# Patient Record
Sex: FEMALE | Race: WHITE | NOT HISPANIC OR LATINO | Employment: PART TIME | URBAN - METROPOLITAN AREA
[De-identification: names, ages, dates, MRNs, and addresses within clinical notes are randomized per-mention and may not be internally consistent; named-entity substitution may affect disease eponyms.]

---

## 2017-04-05 ENCOUNTER — OFFICE VISIT (OUTPATIENT)
Dept: INTERNAL MEDICINE | Facility: CLINIC | Age: 62
End: 2017-04-05

## 2017-04-05 VITALS
OXYGEN SATURATION: 98 % | HEART RATE: 66 BPM | DIASTOLIC BLOOD PRESSURE: 80 MMHG | BODY MASS INDEX: 21.57 KG/M2 | TEMPERATURE: 98.5 F | SYSTOLIC BLOOD PRESSURE: 120 MMHG | WEIGHT: 107 LBS | HEIGHT: 59 IN | RESPIRATION RATE: 16 BRPM

## 2017-04-05 DIAGNOSIS — Z80.3 FAMILY HISTORY OF BREAST CANCER: ICD-10-CM

## 2017-04-05 DIAGNOSIS — J45.909 REACTIVE AIRWAY DISEASE WITHOUT COMPLICATION: ICD-10-CM

## 2017-04-05 DIAGNOSIS — E78.01 FAMILIAL HYPERCHOLESTEROLEMIA: ICD-10-CM

## 2017-04-05 DIAGNOSIS — M89.9 DISORDER OF BONE: ICD-10-CM

## 2017-04-05 DIAGNOSIS — E55.9 HYPOVITAMINOSIS D: ICD-10-CM

## 2017-04-05 DIAGNOSIS — I10 ESSENTIAL HYPERTENSION: Primary | ICD-10-CM

## 2017-04-05 DIAGNOSIS — K63.5 COLON POLYPS: ICD-10-CM

## 2017-04-05 DIAGNOSIS — L29.9 PRURITUS: ICD-10-CM

## 2017-04-05 DIAGNOSIS — Z12.31 ENCOUNTER FOR SCREENING MAMMOGRAM FOR MALIGNANT NEOPLASM OF BREAST: ICD-10-CM

## 2017-04-05 DIAGNOSIS — Z78.0 POSTMENOPAUSAL: ICD-10-CM

## 2017-04-05 DIAGNOSIS — E78.5 HYPERLIPIDEMIA, UNSPECIFIED HYPERLIPIDEMIA TYPE: ICD-10-CM

## 2017-04-05 PROCEDURE — 93000 ELECTROCARDIOGRAM COMPLETE: CPT | Performed by: INTERNAL MEDICINE

## 2017-04-05 PROCEDURE — 99214 OFFICE O/P EST MOD 30 MIN: CPT | Performed by: INTERNAL MEDICINE

## 2017-04-05 RX ORDER — TRIAMCINOLONE ACETONIDE 1 MG/G
CREAM TOPICAL
Qty: 30 G | Refills: 2 | Status: SHIPPED | OUTPATIENT
Start: 2017-04-05 | End: 2023-01-04

## 2017-04-05 RX ORDER — BENAZEPRIL HYDROCHLORIDE 20 MG/1
20 TABLET ORAL DAILY
Qty: 90 TABLET | Refills: 1 | Status: SHIPPED | OUTPATIENT
Start: 2017-04-05 | End: 2017-10-29 | Stop reason: SDUPTHER

## 2017-04-05 RX ORDER — ATORVASTATIN CALCIUM 40 MG/1
40 TABLET, FILM COATED ORAL DAILY
Qty: 90 TABLET | Refills: 1 | Status: SHIPPED | OUTPATIENT
Start: 2017-04-05 | End: 2017-11-25 | Stop reason: SDUPTHER

## 2017-04-05 NOTE — PROGRESS NOTES
Subjective   Nanci Umanzor is a 62 y.o. female.   Visit date not found  Wt Readings from Last 1 Encounters:   04/05/17 107 lb (48.5 kg)    she presents for a preventive medical examination, new patient to me but has been seen previously by Dr. Rubia Portillo    History of Present Illness   She has history of hypertension for at least 15 years.  Initial blood pressure was 190/100.  She is currently treated with benazepril 20 mg daily.  She has taken amlodipine 5 mg daily in addition to this in the past.    She has history of long-standing hypercholesterolemia is been on treatment for 5 or 6 years, currently with atorvastatin 40 mg daily.  There is strong family history of heart disease.    She has history of hypovitaminosis D and takes vitamin D supplement in addition to Citracal.  She was scheduled for bone density study last summer but did not have that done.  She is also negative mammograms.    There is family history of breast cancer in her sister.    She has a history of COPD.  She was previously treated with Advair discus.  She has not smoked for several years now.    She generally tries to exercise but every 2 days.    She has had a pruritic area on her back, below the right scapula, for about a year to year and a half, not previously evaluated    She has history of some chronic neck and back problems.    She has history of colon polyps and last had colonoscopy 2011.    Social history the patient is .  She works for [employer] as a business .  She has 3 grown children.  She previously smoked one pack of cigarettes daily from age [age], but not enough for the last several years.  She consumes occasional glass of wine.    Family history patient's father is 89 and has diabetes mellitus type II, atrial fibrillation, and is had valve surgery and thyroid problems.  Her mother is 87 and has heart disease and seizures and arthritis.  Siblings consistent 3 brothers and 3 sisters.  One  sisters had breast cancer.  The following portions of the patient's history were reviewed and updated as appropriate: allergies, current medications, past family history, past medical history, past social history, past surgical history and problem list.    Review of Systems   Constitutional: Negative.    HENT: Negative.    Eyes: Negative.    Respiratory: Negative.    Cardiovascular: Negative.    Endocrine: Negative.    Genitourinary: Negative.    Musculoskeletal: Positive for back pain and neck pain.   Skin: Negative.    Neurological: Negative.    Hematological: Negative.    Psychiatric/Behavioral: Negative.        Objective   Physical Exam   Constitutional: She appears well-developed and well-nourished.   HENT:   Head: Normocephalic and atraumatic.   Right Ear: External ear normal.   Left Ear: External ear normal.   Eyes: EOM are normal. Pupils are equal, round, and reactive to light. Right eye exhibits no discharge. Left eye exhibits no discharge. No scleral icterus.   Neck: Normal range of motion. Neck supple. No JVD present. No tracheal deviation present. No thyromegaly present.   Cardiovascular: Normal rate and regular rhythm.  Exam reveals no gallop and no friction rub.    No murmur heard.  Pulmonary/Chest: Effort normal and breath sounds normal. No respiratory distress. She has no wheezes. She has no rales. Right breast exhibits no inverted nipple, no mass, no nipple discharge, no skin change and no tenderness. Left breast exhibits no inverted nipple, no mass, no nipple discharge, no skin change and no tenderness.   Abdominal: Soft. Bowel sounds are normal. She exhibits no distension and no mass. There is no tenderness.   Musculoskeletal: Normal range of motion.   Lymphadenopathy:     She has no cervical adenopathy.   Neurological: She is alert.   Skin: Skin is warm.        Psychiatric: Her behavior is normal.   Vitals reviewed.    ECG 12 Lead  Date/Time: 4/5/2017 1:49 PM  Performed by: LUANNE MARIN  JAVIER  Authorized by: LUANNE MARIN   Comparison: not compared with previous ECG   Rhythm: sinus rhythm  Rate: bradycardic  BPM: 56  Conduction: conduction normal  ST Segments: ST segments normal  T Waves: T waves normal  QRS axis: normal  Other: no other findings  Clinical impression: normal ECG  Comments: The resting EKG shows normal sinus rhythm at 56/m, slight sinus bradycardia, otherwise normal, no previous tracing for comparison              Assessment/Plan   Nanci was seen today for annual exam.    Diagnoses and all orders for this visit:    Essential hypertension  Comments:  Continue benazepril 20 mg once daily  Orders:  -     Comprehensive Metabolic Panel  -     XR Chest PA & Lateral; Future  -     Urinalysis With Microscopic  -     ECG 12 Lead    Hyperlipidemia, unspecified hyperlipidemia type  Comments:  Continue atorvastatin 40 mg daily, will check chemistries and lipids  Orders:  -     Comprehensive Metabolic Panel  -     Lipid Panel  -     TSH  -     ECG 12 Lead    Reactive airway disease without complication  Comments:  We will refill Advair, check pulmonary function studies  Orders:  -     CBC & Differential  -     XR Chest PA & Lateral; Future  -     Pulmonary Function Test; Future    Colon polyps  Comments:  I will refer for colonoscopy  Orders:  -     Ambulatory Referral to General Surgery    Family history of breast cancer  Comments:  Advise yearly mammograms and monthly breast self-examinations, will order screening mammograms    Hypovitaminosis D  Comments:  Continue vitamin D supplement, check vitamin D level  Orders:  -     Vitamin D 25 Hydroxy  -     DEXA Bone Density Axial; Future    Postmenopausal  Comments:  Refer to [facility] for gynecology  Orders:  -     Ambulatory Referral to Gynecology  -     Mammo Screening Bilateral With CAD; Future    Disorder of bone   Comments:  We will check bone mineral density  Orders:  -     DEXA Bone Density Axial; Future    Encounter for screening  mammogram for malignant neoplasm of breast   Comments:  Screening mammograms will be ordered  Orders:  -     Mammo Screening Bilateral With CAD; Future        Schedule office follow-up about 4 months, return sooner if needed                       EMR Dragon/Transcription disclaimer:      Much of this encounter note is an electronic transcription/translation of spoken language to printed text. The electronic translation of spoken language may permit erroneous, or at times, nonsensical words or phrases to be inadvertently transcribed; Although I have reviewed the note for such errors, some may still exist.

## 2017-04-07 ENCOUNTER — TELEPHONE (OUTPATIENT)
Dept: INTERNAL MEDICINE | Facility: CLINIC | Age: 62
End: 2017-04-07

## 2017-04-07 DIAGNOSIS — Z86.010 HISTORY OF COLONIC POLYPS: Primary | ICD-10-CM

## 2017-04-07 RX ORDER — TRETINOIN 0.5 MG/G
CREAM TOPICAL NIGHTLY
Qty: 20 G | Refills: 1 | Status: SHIPPED | OUTPATIENT
Start: 2017-04-07 | End: 2018-03-15 | Stop reason: SDUPTHER

## 2017-04-07 RX ORDER — PEG-3350, SODIUM SULFATE, SODIUM CHLORIDE, POTASSIUM CHLORIDE, SODIUM ASCORBATE AND ASCORBIC ACID 7.5-2.691G
KIT ORAL
Qty: 1 EACH | Refills: 0 | Status: SHIPPED | OUTPATIENT
Start: 2017-04-07 | End: 2017-10-31

## 2017-04-07 NOTE — TELEPHONE ENCOUNTER
----- Message from Nanci Umanzor sent at 4/6/2017  4:55 PM EDT -----  Regarding: Prescription Question  Contact: [phone]  I noticed Dr. Kasper took me off tretinoin.  I forgot to ask him to refill that prescription.  Please add that medication back as I need it for a skin condition.  I would also appreciate if you could send a refill to [place] on [address].  Thank you. :SERGE

## 2017-04-21 ENCOUNTER — HOSPITAL ENCOUNTER (OUTPATIENT)
Dept: BONE DENSITY | Facility: HOSPITAL | Age: 62
Discharge: HOME | End: 2017-04-21
Attending: INTERNAL MEDICINE | Admitting: INTERNAL MEDICINE

## 2017-04-21 ENCOUNTER — HOSPITAL ENCOUNTER (OUTPATIENT)
Dept: MAMMOGRAPHY | Facility: HOSPITAL | Age: 62
Discharge: HOME | End: 2017-04-21
Attending: INTERNAL MEDICINE

## 2017-04-21 ENCOUNTER — HOSPITAL ENCOUNTER (OUTPATIENT)
Dept: RESPIRATORY THERAPY | Facility: HOSPITAL | Age: 62
Discharge: HOME | End: 2017-04-21
Attending: INTERNAL MEDICINE

## 2017-04-21 DIAGNOSIS — Z78.0 POSTMENOPAUSAL: ICD-10-CM

## 2017-04-21 DIAGNOSIS — E55.9 HYPOVITAMINOSIS D: ICD-10-CM

## 2017-04-21 DIAGNOSIS — J45.909 REACTIVE AIRWAY DISEASE WITHOUT COMPLICATION: ICD-10-CM

## 2017-04-21 DIAGNOSIS — Z12.31 ENCOUNTER FOR SCREENING MAMMOGRAM FOR MALIGNANT NEOPLASM OF BREAST: ICD-10-CM

## 2017-04-21 DIAGNOSIS — M89.9 DISORDER OF BONE: ICD-10-CM

## 2017-04-21 PROCEDURE — 77080 DXA BONE DENSITY AXIAL: CPT

## 2017-04-21 PROCEDURE — 94060 EVALUATION OF WHEEZING: CPT

## 2017-04-21 PROCEDURE — 94729 DIFFUSING CAPACITY: CPT

## 2017-04-21 PROCEDURE — 94726 PLETHYSMOGRAPHY LUNG VOLUMES: CPT

## 2017-04-21 PROCEDURE — G0202 SCR MAMMO BI INCL CAD: HCPCS

## 2017-04-21 RX ORDER — ALBUTEROL SULFATE 2.5 MG/3ML
2.5 SOLUTION RESPIRATORY (INHALATION) ONCE
Status: COMPLETED | OUTPATIENT
Start: 2017-04-21 | End: 2017-04-21

## 2017-04-21 RX ADMIN — ALBUTEROL SULFATE 2.5 MG: 2.5 SOLUTION RESPIRATORY (INHALATION) at 14:28

## 2017-05-24 ENCOUNTER — OFFICE VISIT (OUTPATIENT)
Dept: INTERNAL MEDICINE | Facility: CLINIC | Age: 62
End: 2017-05-24

## 2017-05-24 VITALS
HEIGHT: 59 IN | OXYGEN SATURATION: 98 % | RESPIRATION RATE: 16 BRPM | WEIGHT: 110 LBS | BODY MASS INDEX: 22.18 KG/M2 | SYSTOLIC BLOOD PRESSURE: 120 MMHG | TEMPERATURE: 98.2 F | DIASTOLIC BLOOD PRESSURE: 80 MMHG | HEART RATE: 60 BPM

## 2017-05-24 DIAGNOSIS — J20.9 ACUTE BRONCHITIS WITH BRONCHOSPASM: Primary | ICD-10-CM

## 2017-05-24 PROCEDURE — 99213 OFFICE O/P EST LOW 20 MIN: CPT | Performed by: INTERNAL MEDICINE

## 2017-05-24 RX ORDER — ALBUTEROL SULFATE 90 UG/1
2 AEROSOL, METERED RESPIRATORY (INHALATION)
Status: SHIPPED | OUTPATIENT
Start: 2017-05-24

## 2017-05-24 RX ORDER — PREDNISONE 10 MG/1
TABLET ORAL
Qty: 1 EACH | Refills: 0 | Status: ON HOLD | OUTPATIENT
Start: 2017-05-24 | End: 2017-06-30

## 2017-05-24 RX ORDER — AMOXICILLIN 500 MG/1
CAPSULE ORAL
Qty: 21 CAPSULE | Refills: 0 | Status: ON HOLD | OUTPATIENT
Start: 2017-05-24 | End: 2017-06-30

## 2017-06-22 ENCOUNTER — TELEPHONE (OUTPATIENT)
Dept: INTERNAL MEDICINE | Facility: CLINIC | Age: 62
End: 2017-06-22

## 2017-06-22 DIAGNOSIS — L98.9 SKIN LESION OF BACK: Primary | ICD-10-CM

## 2017-06-22 NOTE — TELEPHONE ENCOUNTER
Pt called and said the spot on her back that you had given her an anti-itch cream for has gotten worse and has now turned into an ache in the general area of the spot. Pt would like to know if she should be referred to a specialist or what she should do? Please advise.

## 2017-06-30 ENCOUNTER — ANESTHESIA EVENT (OUTPATIENT)
Dept: GASTROENTEROLOGY | Facility: HOSPITAL | Age: 62
End: 2017-06-30

## 2017-06-30 ENCOUNTER — HOSPITAL ENCOUNTER (OUTPATIENT)
Facility: HOSPITAL | Age: 62
Setting detail: HOSPITAL OUTPATIENT SURGERY
Discharge: HOME | End: 2017-06-30
Attending: SURGERY | Admitting: SURGERY

## 2017-06-30 ENCOUNTER — ANESTHESIA (OUTPATIENT)
Dept: GASTROENTEROLOGY | Facility: HOSPITAL | Age: 62
End: 2017-06-30

## 2017-06-30 VITALS
OXYGEN SATURATION: 99 % | HEIGHT: 59 IN | TEMPERATURE: 97.5 F | SYSTOLIC BLOOD PRESSURE: 129 MMHG | BODY MASS INDEX: 21.42 KG/M2 | HEART RATE: 72 BPM | RESPIRATION RATE: 10 BRPM | DIASTOLIC BLOOD PRESSURE: 73 MMHG | WEIGHT: 106.25 LBS

## 2017-06-30 DIAGNOSIS — Z86.010 HISTORY OF COLONIC POLYPS: ICD-10-CM

## 2017-06-30 PROCEDURE — 88305 TISSUE EXAM BY PATHOLOGIST: CPT | Performed by: SURGERY

## 2017-06-30 PROCEDURE — S0260 H&P FOR SURGERY: HCPCS | Performed by: SURGERY

## 2017-06-30 PROCEDURE — 25010000002 PROPOFOL 10 MG/ML EMULSION: Performed by: ANESTHESIOLOGY

## 2017-06-30 PROCEDURE — 45380 COLONOSCOPY AND BIOPSY: CPT | Performed by: SURGERY

## 2017-06-30 RX ORDER — PROPOFOL 10 MG/ML
VIAL (ML) INTRAVENOUS CONTINUOUS PRN
Status: DISCONTINUED | OUTPATIENT
Start: 2017-06-30 | End: 2017-06-30 | Stop reason: SURG

## 2017-06-30 RX ORDER — LIDOCAINE HYDROCHLORIDE 20 MG/ML
INJECTION, SOLUTION INFILTRATION; PERINEURAL AS NEEDED
Status: DISCONTINUED | OUTPATIENT
Start: 2017-06-30 | End: 2017-06-30 | Stop reason: SURG

## 2017-06-30 RX ORDER — PROPOFOL 10 MG/ML
VIAL (ML) INTRAVENOUS AS NEEDED
Status: DISCONTINUED | OUTPATIENT
Start: 2017-06-30 | End: 2017-06-30 | Stop reason: SURG

## 2017-06-30 RX ORDER — SODIUM CHLORIDE, SODIUM LACTATE, POTASSIUM CHLORIDE, CALCIUM CHLORIDE 600; 310; 30; 20 MG/100ML; MG/100ML; MG/100ML; MG/100ML
1000 INJECTION, SOLUTION INTRAVENOUS CONTINUOUS PRN
Status: DISCONTINUED | OUTPATIENT
Start: 2017-06-30 | End: 2017-06-30 | Stop reason: HOSPADM

## 2017-06-30 RX ADMIN — SODIUM CHLORIDE, POTASSIUM CHLORIDE, SODIUM LACTATE AND CALCIUM CHLORIDE 1000 ML: 600; 310; 30; 20 INJECTION, SOLUTION INTRAVENOUS at 10:16

## 2017-06-30 RX ADMIN — LIDOCAINE HYDROCHLORIDE 60 MG: 20 INJECTION, SOLUTION INFILTRATION; PERINEURAL at 10:59

## 2017-06-30 RX ADMIN — SODIUM CHLORIDE, POTASSIUM CHLORIDE, SODIUM LACTATE AND CALCIUM CHLORIDE: 600; 310; 30; 20 INJECTION, SOLUTION INTRAVENOUS at 10:59

## 2017-06-30 RX ADMIN — PROPOFOL 100 MCG/KG/MIN: 10 INJECTION, EMULSION INTRAVENOUS at 10:59

## 2017-06-30 RX ADMIN — PROPOFOL 100 MG: 10 INJECTION, EMULSION INTRAVENOUS at 10:59

## 2017-06-30 NOTE — ANESTHESIA PREPROCEDURE EVALUATION
Anesthesia Evaluation     Patient summary reviewed   history of anesthetic complications: prolonged sedation  NPO Solid Status: > 6 hours       Airway   Mallampati: II  TM distance: >3 FB  Neck ROM: full  Dental - normal exam     Pulmonary - normal exam   (+) asthma,   Cardiovascular - normal exam    (+) hypertension,       Neuro/Psych  GI/Hepatic/Renal/Endo      Musculoskeletal     Abdominal    Substance History      OB/GYN          Other                                        Anesthesia Plan    ASA 2     MAC     Anesthetic plan and risks discussed with patient.

## 2017-06-30 NOTE — ANESTHESIA POSTPROCEDURE EVALUATION
Patient: Nanci Umanzor    Procedure Summary     Date Anesthesia Start Anesthesia Stop Room / Location    06/30/17 1054   [facility]       Procedure Diagnosis Surgeon Provider    COLONOSCOPY TO CECUM with cold polypectomy (N/A ) History of colonic polyps  (History of colonic polyps [Z86.010]) MD David Griffin Jr., MD          Anesthesia Type: MAC  Last vitals  BP      Temp      Pulse 68 (06/30/17 1123)   Resp 12 (06/30/17 1123)    SpO2 96 % (06/30/17 1123)      Post Anesthesia Care and Evaluation    Patient location during evaluation: PHASE II  Anesthetic complications: No anesthetic complications

## 2017-07-03 LAB
CYTO UR: NORMAL
LAB AP CASE REPORT: NORMAL
Lab: NORMAL
PATH REPORT.FINAL DX SPEC: NORMAL
PATH REPORT.GROSS SPEC: NORMAL

## 2017-08-09 ENCOUNTER — OFFICE VISIT (OUTPATIENT)
Dept: INTERNAL MEDICINE | Facility: CLINIC | Age: 62
End: 2017-08-09

## 2017-08-09 VITALS
TEMPERATURE: 97.5 F | BODY MASS INDEX: 21.97 KG/M2 | HEART RATE: 69 BPM | WEIGHT: 109 LBS | SYSTOLIC BLOOD PRESSURE: 110 MMHG | OXYGEN SATURATION: 97 % | RESPIRATION RATE: 16 BRPM | DIASTOLIC BLOOD PRESSURE: 80 MMHG | HEIGHT: 59 IN

## 2017-08-09 DIAGNOSIS — E55.9 HYPOVITAMINOSIS D: Primary | ICD-10-CM

## 2017-08-09 DIAGNOSIS — E78.5 HYPERLIPIDEMIA, UNSPECIFIED HYPERLIPIDEMIA TYPE: ICD-10-CM

## 2017-08-09 DIAGNOSIS — I10 ESSENTIAL HYPERTENSION: ICD-10-CM

## 2017-08-09 DIAGNOSIS — K63.5 COLON POLYPS: ICD-10-CM

## 2017-08-09 DIAGNOSIS — S32.050A: ICD-10-CM

## 2017-08-09 DIAGNOSIS — M85.80 OSTEOPENIA: ICD-10-CM

## 2017-08-09 PROBLEM — J20.9 ACUTE BRONCHITIS WITH BRONCHOSPASM: Status: RESOLVED | Noted: 2017-05-24 | Resolved: 2017-08-09

## 2017-08-09 PROCEDURE — 99214 OFFICE O/P EST MOD 30 MIN: CPT | Performed by: INTERNAL MEDICINE

## 2017-08-09 RX ORDER — ALENDRONATE SODIUM 70 MG/1
TABLET ORAL
Qty: 12 TABLET | Refills: 3 | Status: SHIPPED | OUTPATIENT
Start: 2017-08-09 | End: 2019-01-28 | Stop reason: SDUPTHER

## 2017-08-09 NOTE — PROGRESS NOTES
Subjective   Nanci Umanzor is a 62 y.o. female.   5/24/2017  Wt Readings from Last 1 Encounters:   08/09/17 109 lb (49.4 kg)   She returns for follow-up of hypertension, hyperlipidemia, asthma, colon polyps, osteopenia    History of Present Illness   She was last seen in May for acute care visit because of cough.  She was prescribed amoxicillin, prednisone Dosepak, and albuterol for when necessary use.  Point function studies have been obtained in April and were normal, consistent with asthma.  She has not smoked since age 58.    She has hypertension treated with benazepril 20 mg daily.  No cardiac history or symptoms.    She has hypercholesterolemia treated with atorvastatin 40 mg daily.  No medication problems described.    She had bone mineral density checked in April and was found to have severe osteopenia.  She is taking Citracal and supplemental vitamin D.  She is interested in taking medication to help prevent progression osteoporosis.  We discussed the use of alendronate.  She has a history of a compression fracture of L5 of which was traumatic.  She had orthopedic evaluation by Dr. Kuo in 2008    She had colonoscopy June 30 by Dr. Alo Staton and was found to have diverticulosis and 2 polyps.  The pathology was tubular adenomas for both polyps with only low-grade dysplasia.  Repeat colonoscopy in 5 years was advised  The following portions of the patient's history were reviewed and updated as appropriate: allergies, current medications, past family history, past medical history, past social history, past surgical history and problem list.    Review of Systems   Constitutional: Negative.    HENT: Negative.    Eyes: Negative.    Respiratory: Negative.    Cardiovascular: Negative.    Endocrine: Negative.    Genitourinary: Negative.    Skin: Negative.    Neurological: Negative.    Hematological: Negative.    Psychiatric/Behavioral: Negative.        Objective   Physical Exam   Constitutional: She appears  well-developed and well-nourished.   HENT:   Head: Normocephalic and atraumatic.   Cardiovascular: Normal rate and regular rhythm.  Exam reveals no gallop and no friction rub.    No murmur heard.  Pulmonary/Chest: Effort normal and breath sounds normal. No respiratory distress. She has no wheezes. She has no rales.   Abdominal: Soft. Bowel sounds are normal. She exhibits no distension and no mass. There is no tenderness.   Neurological: She is alert.   Skin: Skin is warm.   Psychiatric: Her behavior is normal.   Vitals reviewed.      Assessment/Plan   Nanci was seen today for hypertension, hyperlipidemia and asthma.    Diagnoses and all orders for this visit:    Hypovitaminosis D  Comments:  Continue vitamin D supplement, will check vitamin D level  Orders:  -     Vitamin D 25 Hydroxy    Essential hypertension  Comments:  Continue benazepril 20 mg daily  Orders:  -     Comprehensive Metabolic Panel    Hyperlipidemia, unspecified hyperlipidemia type  Comments:  Continue atorvastatin 40 mg daily, we will check chemistries and lipids  Orders:  -     Lipid Panel  -     TSH    Colon polyps  Comments:  2 polyps on colonoscopy June 30, repeat colonoscopy in 5 years    Osteopenia  Comments:  Will start alendronate 70 mg once a week, continue Citracal and vitamin D  Orders:  -     TSH  -     Ambulatory Referral to Orthopedic Surgery    Closed compression fracture of fifth lumbar vertebra  -     Ambulatory Referral to Orthopedic Surgery    Other orders  -     alendronate (FOSAMAX) 70 MG tablet; 1 by mouth once a week, 60 minutes before breakfast, with a glass of plain water          Schedule office follow-up about 6 months, return sooner if needed                     EMR Dragon/Transcription disclaimer:      Much of this encounter note is an electronic transcription/translation of spoken language to printed text. The electronic translation of spoken language may permit erroneous, or at times, nonsensical words or phrases to  be inadvertently transcribed; Although I have reviewed the note for such errors, some may still exist.

## 2017-08-10 LAB
25(OH)D3+25(OH)D2 SERPL-MCNC: 45.7 NG/ML (ref 30–100)
ALBUMIN SERPL-MCNC: 4.5 G/DL (ref 3.5–5.2)
ALBUMIN/GLOB SERPL: 1.7 G/DL
ALP SERPL-CCNC: 57 U/L (ref 39–117)
ALT SERPL-CCNC: 30 U/L (ref 1–33)
AST SERPL-CCNC: 24 U/L (ref 1–32)
BILIRUB SERPL-MCNC: 0.4 MG/DL (ref 0.1–1.2)
BUN SERPL-MCNC: 16 MG/DL (ref 8–23)
BUN/CREAT SERPL: 22.2 (ref 7–25)
CALCIUM SERPL-MCNC: 10 MG/DL (ref 8.6–10.5)
CHLORIDE SERPL-SCNC: 97 MMOL/L (ref 98–107)
CHOLEST SERPL-MCNC: 237 MG/DL (ref 0–200)
CO2 SERPL-SCNC: 29 MMOL/L (ref 22–29)
CREAT SERPL-MCNC: 0.72 MG/DL (ref 0.57–1)
GLOBULIN SER CALC-MCNC: 2.7 GM/DL
GLUCOSE SERPL-MCNC: 90 MG/DL (ref 65–99)
HDLC SERPL-MCNC: 100 MG/DL (ref 40–60)
LDLC SERPL CALC-MCNC: 116 MG/DL (ref 0–100)
POTASSIUM SERPL-SCNC: 4.5 MMOL/L (ref 3.5–5.2)
PROT SERPL-MCNC: 7.2 G/DL (ref 6–8.5)
SODIUM SERPL-SCNC: 139 MMOL/L (ref 136–145)
TRIGL SERPL-MCNC: 106 MG/DL (ref 0–150)
TSH SERPL DL<=0.005 MIU/L-ACNC: 1.59 MIU/ML (ref 0.27–4.2)
VLDLC SERPL CALC-MCNC: 21.2 MG/DL (ref 5–40)

## 2017-09-18 DIAGNOSIS — S32.000A LUMBAR COMPRESSION FRACTURE, CLOSED, INITIAL ENCOUNTER (HCC): Primary | ICD-10-CM

## 2017-09-19 DIAGNOSIS — S32.000B LUMBAR COMPRESSION FRACTURE, OPEN, INITIAL ENCOUNTER (HCC): Primary | ICD-10-CM

## 2017-10-27 RX ORDER — ALBUTEROL SULFATE 90 UG/1
2 AEROSOL, METERED RESPIRATORY (INHALATION) EVERY 4 HOURS PRN
Qty: 6.7 G | Refills: 2 | Status: SHIPPED | OUTPATIENT
Start: 2017-10-27 | End: 2017-11-13 | Stop reason: SDUPTHER

## 2017-10-30 RX ORDER — BENAZEPRIL HYDROCHLORIDE 20 MG/1
TABLET ORAL
Qty: 90 TABLET | Refills: 0 | Status: SHIPPED | OUTPATIENT
Start: 2017-10-30 | End: 2017-11-27 | Stop reason: SDUPTHER

## 2017-11-03 ENCOUNTER — OFFICE VISIT (OUTPATIENT)
Dept: NEUROSURGERY | Facility: CLINIC | Age: 62
End: 2017-11-03

## 2017-11-03 VITALS
SYSTOLIC BLOOD PRESSURE: 110 MMHG | DIASTOLIC BLOOD PRESSURE: 83 MMHG | HEART RATE: 65 BPM | BODY MASS INDEX: 21.69 KG/M2 | HEIGHT: 59 IN | WEIGHT: 107.6 LBS

## 2017-11-03 DIAGNOSIS — M54.2 SPINE PAIN, CERVICAL: Primary | ICD-10-CM

## 2017-11-03 DIAGNOSIS — M54.14 THORACIC RADICULITIS: ICD-10-CM

## 2017-11-03 DIAGNOSIS — M54.12 RIGHT CERVICAL RADICULOPATHY: ICD-10-CM

## 2017-11-03 PROCEDURE — 99213 OFFICE O/P EST LOW 20 MIN: CPT | Performed by: NURSE PRACTITIONER

## 2017-11-03 RX ORDER — NAPROXEN 500 MG/1
500 TABLET ORAL 2 TIMES DAILY WITH MEALS
COMMUNITY
End: 2020-02-20

## 2017-11-03 NOTE — PROGRESS NOTES
Subjective   Patient ID: Nanci Umanzor is a 62 y.o. female is being seen for consultation today at the request of Ky Kasper MD  for chronic back pain and bilateral leg pain.  She denies any recent cause or injury. She and not had any recent treatments or imaging. Mrs Lehman takes Naproxen 500 mg PRN for pain.     Back Pain   This is a chronic problem. The current episode started more than 1 year ago. The problem occurs constantly. The pain is present in the thoracic spine and gluteal. The quality of the pain is described as aching. Radiates to: Persistent sensation of deep itching in the upper thoracic region to the right just below the scapula.  The symptoms have been going on for about 2 years with no relief. Associated symptoms include headaches and tingling (occasionally feels tingling in both arms and both legs.). Pertinent negatives include no dysuria, fever or pelvic pain. She has tried ice and heat for the symptoms. The treatment provided no relief.   Neck Pain    This is a recurrent problem. The problem occurs daily. The problem has been gradually worsening. The pain is associated with nothing. The pain is present in the left side, right side and midline. The quality of the pain is described as aching. The pain is moderate. The symptoms are aggravated by twisting and position. Associated symptoms include headaches and tingling (occasionally feels tingling in both arms and both legs.). Pertinent negatives include no fever. She has tried acetaminophen, muscle relaxants and NSAIDs for the symptoms. The treatment provided no relief.       The following portions of the patient's history were reviewed and updated as appropriate: allergies, current medications, past family history, past medical history, past social history, past surgical history and problem list.    Review of Systems   Constitutional: Negative for fever.   Genitourinary: Negative for dysuria and pelvic pain.   Musculoskeletal: Positive for  arthralgias, back pain (bilateral leg pain L>R ) and neck pain.   Allergic/Immunologic: Positive for environmental allergies.   Neurological: Positive for tingling (occasionally feels tingling in both arms and both legs.) and headaches.   Hematological: Bruises/bleeds easily.   All other systems reviewed and are negative.      Objective   Physical Exam   Constitutional: She is oriented to person, place, and time. She appears well-developed and well-nourished. She is cooperative. No distress.   HENT:   Head: Normocephalic and atraumatic.   Eyes: Conjunctivae and EOM are normal. Pupils are equal, round, and reactive to light.   Neck: Normal range of motion. Neck supple.   Cardiovascular: Normal rate and regular rhythm.    Pulmonary/Chest: Effort normal. No respiratory distress.   Abdominal: Soft. She exhibits no distension. There is no tenderness.   Musculoskeletal: Normal range of motion. She exhibits no edema.   Neurological: She is alert and oriented to person, place, and time. She has normal strength and normal reflexes. She displays normal reflexes. No cranial nerve deficit or sensory deficit. She exhibits normal muscle tone. Coordination and gait normal. GCS eye subscore is 4. GCS verbal subscore is 5. GCS motor subscore is 6.   Reflex Scores:       Tricep reflexes are 2+ on the right side and 2+ on the left side.       Bicep reflexes are 2+ on the right side and 2+ on the left side.       Brachioradialis reflexes are 2+ on the right side and 2+ on the left side.       Patellar reflexes are 2+ on the right side and 2+ on the left side.       Achilles reflexes are 2+ on the right side and 2+ on the left side.  No focal neurologic deficit.  Sensation equal and intact in the upper and lower extremities.  Negative Bailey's; negative clonus.  Straight leg raise negative bilaterally.   Skin: Skin is warm and dry. No rash noted. She is not diaphoretic.   Psychiatric: She has a normal mood and affect. Thought content  normal.   Vitals reviewed.    Neurologic Exam     Mental Status   Oriented to person, place, and time.     Cranial Nerves     CN III, IV, VI   Pupils are equal, round, and reactive to light.  Extraocular motions are normal.     Motor Exam     Strength   Strength 5/5 throughout.     Gait, Coordination, and Reflexes     Reflexes   Right brachioradialis: 2+  Left brachioradialis: 2+  Right biceps: 2+  Left biceps: 2+  Right triceps: 2+  Left triceps: 2+  Right patellar: 2+  Left patellar: 2+  Right achilles: 2+  Left achilles: 2+      Assessment/Plan   Independent Review of Radiographic Studies:      I have reviewed a previous MRI report from [facility] dated November 3 2008 today in the office.  The MRI showed a grade 1 anterolisthesis of L4 on L5 secondary to facet hypertrophy with right greater than left neural foraminal narrowing.  There is only mild lumbar stenosis at this level.  There is evidence of a previous burst fracture at L4-5 which is fused with the S1 vertebral body.  There is bilateral neural foraminal narrowing and mild canal stenosis at L5-S1.  In reviewing previous [facility] notes [facility].  There was documentation of a cervical MRI performed May 16, 2018 showed multilevel degenerative disease greatest at C5-6 and C6-7 with some C4-5 central canal stenosis and a right sided disc protrusion at C3-4.    Medical Decision Making:      Ms. Lehman was seen today for neurosurgical opinion at the request of her primary care physician Dr. Ky Kasper.  Notes from today's as will be forwarded upon completion.  Ms. Lehman has never undergone any cervical or lumbar surgery.  She has had problems with cervical and lumbar pain in the past.  She did sustain a burst fracture at L4-5 which was followed by [facility] nearly 10 years ago.  She eventually healed from this injury.  She was also evaluated for complaints of cervical pain but no surgery was  recommended.  She had undergone facet injections in the lumbar spine with fairly good relief.      Although she has complaints of both cervical pain, thoracic pain and lumbar pain, she feels that the cervical and thoracic issues are the most severe at this time.  She is having pain in the occipital region of her head with radiation into the posterior region of her cervical spine and down the left trapezius region and left arm.  She also has been having a worsening deep itching sensation in the right parascapular region.  She has seen a dermatologist due to the constant itching but no skin related issue was noted.    Ms. Umanzor denies wearing any tight clothing such as tight sports bra.  She has pain in the scapular region as well and it radiates to the flank.    I have reviewed the old imaging reports of the cervical and lumbar spine.  We will proceed with a new MRI of the cervical and thoracic spine.  She will return to the office after that for reevaluation.    Nanci was seen today for back pain and leg pain.    Diagnoses and all orders for this visit:    Spine pain, cervical  -     MRI Cervical Spine Without Contrast; Future  -     MRI Thoracic Spine Without Contrast; Future    Right cervical radiculopathy  -     MRI Cervical Spine Without Contrast; Future  -     MRI Thoracic Spine Without Contrast; Future    Thoracic radiculitis  -     MRI Cervical Spine Without Contrast; Future  -     MRI Thoracic Spine Without Contrast; Future      Return for after radiographic imaging.

## 2017-11-13 RX ORDER — ALBUTEROL SULFATE 90 UG/1
2 AEROSOL, METERED RESPIRATORY (INHALATION) EVERY 4 HOURS PRN
Qty: 6.7 G | Refills: 2 | Status: SHIPPED | OUTPATIENT
Start: 2017-11-13 | End: 2019-02-13

## 2017-11-22 ENCOUNTER — HOSPITAL ENCOUNTER (OUTPATIENT)
Dept: MRI IMAGING | Facility: HOSPITAL | Age: 62
Discharge: HOME | End: 2017-11-22
Admitting: NURSE PRACTITIONER

## 2017-11-22 ENCOUNTER — HOSPITAL ENCOUNTER (OUTPATIENT)
Dept: GENERAL RADIOLOGY | Facility: HOSPITAL | Age: 62
Discharge: HOME | End: 2017-11-22
Attending: INTERNAL MEDICINE

## 2017-11-22 ENCOUNTER — HOSPITAL ENCOUNTER (OUTPATIENT)
Dept: MRI IMAGING | Facility: HOSPITAL | Age: 62
Discharge: HOME | End: 2017-11-22

## 2017-11-22 DIAGNOSIS — I10 ESSENTIAL HYPERTENSION: ICD-10-CM

## 2017-11-22 DIAGNOSIS — M54.2 SPINE PAIN, CERVICAL: ICD-10-CM

## 2017-11-22 DIAGNOSIS — M54.14 THORACIC RADICULITIS: ICD-10-CM

## 2017-11-22 DIAGNOSIS — M54.12 RIGHT CERVICAL RADICULOPATHY: ICD-10-CM

## 2017-11-22 DIAGNOSIS — J45.909 REACTIVE AIRWAY DISEASE WITHOUT COMPLICATION: ICD-10-CM

## 2017-11-22 PROCEDURE — 72141 MRI NECK SPINE W/O DYE: CPT

## 2017-11-22 PROCEDURE — 71020 HC CHEST PA AND LATERAL: CPT

## 2017-11-22 PROCEDURE — 72146 MRI CHEST SPINE W/O DYE: CPT

## 2017-11-25 DIAGNOSIS — E78.01 FAMILIAL HYPERCHOLESTEROLEMIA: ICD-10-CM

## 2017-11-27 ENCOUNTER — TELEPHONE (OUTPATIENT)
Dept: INTERNAL MEDICINE | Facility: CLINIC | Age: 62
End: 2017-11-27

## 2017-11-27 RX ORDER — ATORVASTATIN CALCIUM 40 MG/1
TABLET, FILM COATED ORAL
Qty: 90 TABLET | Refills: 0 | Status: SHIPPED | OUTPATIENT
Start: 2017-11-27 | End: 2018-03-14 | Stop reason: SDUPTHER

## 2017-11-27 RX ORDER — BENAZEPRIL HYDROCHLORIDE 20 MG/1
TABLET ORAL
Qty: 90 TABLET | Refills: 0 | Status: SHIPPED | OUTPATIENT
Start: 2017-11-27 | End: 2018-03-14 | Stop reason: SDUPTHER

## 2017-11-27 NOTE — TELEPHONE ENCOUNTER
Patient called office and stated that during the holiday break patient was having blood pressure issues. BP was very high then dropped very low. Patient called on call physician they advised her to take a extra benazepril ever since taking it the patient feels like jaw is swelling and that she has something in chest. Patient was advised to go to ER. Patient said she had a appt with Jimena tomorrow and would rather see her and go that route.

## 2017-11-28 ENCOUNTER — OFFICE VISIT (OUTPATIENT)
Dept: INTERNAL MEDICINE | Facility: CLINIC | Age: 62
End: 2017-11-28

## 2017-11-28 VITALS
WEIGHT: 108 LBS | TEMPERATURE: 97.7 F | HEART RATE: 64 BPM | OXYGEN SATURATION: 97 % | RESPIRATION RATE: 16 BRPM | BODY MASS INDEX: 21.77 KG/M2 | SYSTOLIC BLOOD PRESSURE: 108 MMHG | DIASTOLIC BLOOD PRESSURE: 68 MMHG | HEIGHT: 59 IN

## 2017-11-28 DIAGNOSIS — I10 ESSENTIAL HYPERTENSION: ICD-10-CM

## 2017-11-28 DIAGNOSIS — K04.7 TOOTH INFECTION: Primary | ICD-10-CM

## 2017-11-28 PROCEDURE — 99214 OFFICE O/P EST MOD 30 MIN: CPT | Performed by: NURSE PRACTITIONER

## 2017-11-28 RX ORDER — AMOXICILLIN 500 MG/1
500 TABLET, FILM COATED ORAL 2 TIMES DAILY
Qty: 14 TABLET | Refills: 0 | Status: SHIPPED | OUTPATIENT
Start: 2017-11-28 | End: 2018-04-11 | Stop reason: ALTCHOICE

## 2017-11-28 NOTE — PROGRESS NOTES
Vitals:    11/28/17 1020   BP: 108/68   Pulse: 64   Resp: 16   Temp: 97.7 °F (36.5 °C)   SpO2: 97%     Last 2 weights    11/28/17  1020   Weight: 108 lb (49 kg)     Social History   Substance Use Topics   • Smoking status: Former Smoker   • Smokeless tobacco: Not on file   • Alcohol use 0.6 oz/week     1 Glasses of wine per week      Comment: WINE DAILY       Subjective     HPI  Pt presents to office today with HTN concerns. she states her bp has been ranging from 130-160/80's. Occasionally she has some ringing of the ear when her BP is high. She is currently taking lotensin 20 mg daily. She has been under a lot of stress and taking BP at least 10 times a day. Pt has also stopped exercising which helped her stress level    Pt also has a tender lymph nodes that is slightly swollen for the past week. Denies fever, odor from mouth but concerned she has a tooth infection.      The following portions of the patient's history were reviewed and updated as appropriate: allergies, current medications, past medical history, past social history and problem list.    Review of Systems   Constitutional: Negative.    Respiratory: Negative.  Negative for shortness of breath.    Cardiovascular: Negative.  Negative for chest pain and palpitations.        HTN   Musculoskeletal: Positive for neck pain.   Neurological: Negative for headaches.       Objective     Physical Exam   Constitutional: She is oriented to person, place, and time. Vital signs are normal. She appears well-developed and well-nourished.   HENT:   Head: Normocephalic and atraumatic.   Neck: Normal range of motion.   Cardiovascular: Normal rate, regular rhythm and normal heart sounds.    Pulmonary/Chest: Effort normal and breath sounds normal.   Musculoskeletal: Normal range of motion.   Lymphadenopathy:        Head (right side): Submental adenopathy present.   Pt has painful tender lymph node close to bottom teeth. No apparent swelling/abcess of tooth but given her  tender lymph node will treat with amoxil   Neurological: She is oriented to person, place, and time.   Nursing note and vitals reviewed.      Assessment/Plan   Nanci was seen today for hypertension.    Diagnoses and all orders for this visit:    Tooth infection  -     amoxicillin (AMOXIL) 500 MG tablet; Take 1 tablet by mouth 2 (Two) Times a Day.    Essential hypertension           -pt BP was stable. Cont 20 mg lotensin daily. Advised her to practice de-stressing non-pharmacoligical treatments such as exercise, yoga, deep breathing, massage therapy  -monitor bp daily or every other day. Not 10 times a day  -cont home meds  -antibiotic for possible tooth infection  -FU prn or if symptoms persist/worsen

## 2017-12-01 ENCOUNTER — OFFICE VISIT (OUTPATIENT)
Dept: NEUROSURGERY | Facility: CLINIC | Age: 62
End: 2017-12-01

## 2017-12-01 VITALS — SYSTOLIC BLOOD PRESSURE: 122 MMHG | HEART RATE: 67 BPM | DIASTOLIC BLOOD PRESSURE: 77 MMHG

## 2017-12-01 DIAGNOSIS — M47.814 ARTHROPATHY OF THORACIC FACET JOINT: ICD-10-CM

## 2017-12-01 DIAGNOSIS — M54.6 PAIN OF THORACIC FACET JOINT: ICD-10-CM

## 2017-12-01 DIAGNOSIS — M47.812 CERVICAL SPONDYLOSIS WITHOUT MYELOPATHY: Primary | ICD-10-CM

## 2017-12-01 PROCEDURE — 99213 OFFICE O/P EST LOW 20 MIN: CPT | Performed by: NURSE PRACTITIONER

## 2017-12-01 RX ORDER — AMLODIPINE BESYLATE 5 MG/1
TABLET ORAL
COMMUNITY
Start: 2017-11-27 | End: 2019-01-28 | Stop reason: SDUPTHER

## 2017-12-01 NOTE — PROGRESS NOTES
Subjective   Patient ID: Nanci Umanzor is a 62 y.o. female is here today for follow-up with a new Cervical and Thoracic MRI ordered for neck and back pain. She also has low back pain that radiates into buttocks and left hip. She has been doing Yoga with stretching. She currently takes Naproxen 500 mg PRN pain.    HPI Comments: Ms. Umanzor returns to the office today with a new MRI of the cervical and thoracic spine without contrast.  She continues to have cervical pain and thoracic pain with radiation just below the right scapular tip.  The pain is worse with prolonged standing.  The pain gets better with sitting and lying down.    Back Pain   This is a chronic problem. The current episode started more than 1 year ago. The problem occurs constantly. The problem is unchanged. The pain is present in the thoracic spine. The quality of the pain is described as aching. The pain is at a severity of 5/10. The pain is moderate. The pain is the same all the time. The symptoms are aggravated by twisting, standing, sitting and bending. Stiffness is present all day. Pertinent negatives include no bladder incontinence, bowel incontinence or chest pain. She has tried heat and ice (Yoga) for the symptoms. The treatment provided no relief.   Neck Pain    This is a chronic problem. The current episode started more than 1 year ago. The problem occurs daily. The problem has been gradually worsening. The pain is associated with nothing. The pain is present in the left side, right side and midline. The quality of the pain is described as burning. The pain is at a severity of 6/10. The pain is moderate. The symptoms are aggravated by position. Stiffness is present all day. Pertinent negatives include no chest pain. She has tried heat and ice (Yoga) for the symptoms. The treatment provided no relief.       The following portions of the patient's history were reviewed and updated as appropriate: allergies, current medications, past family  history, past medical history, past social history, past surgical history and problem list.    Review of Systems   Respiratory: Negative for chest tightness and shortness of breath.    Cardiovascular: Negative for chest pain.   Gastrointestinal: Negative for bowel incontinence.   Genitourinary: Negative for bladder incontinence.   Musculoskeletal: Positive for arthralgias (Left hip), back pain and neck pain.   All other systems reviewed and are negative.      Objective   Physical Exam   Constitutional: She is oriented to person, place, and time. She appears well-developed and well-nourished. She is cooperative. No distress.   HENT:   Head: Normocephalic and atraumatic.   Eyes: Conjunctivae and EOM are normal. Pupils are equal, round, and reactive to light.   Neck: Normal range of motion. Neck supple.   Cardiovascular: Normal rate and regular rhythm.    Pulmonary/Chest: Effort normal. No respiratory distress.   Abdominal: Soft. She exhibits no distension. There is no tenderness.   Musculoskeletal: Normal range of motion. She exhibits no edema.   Neurological: She is alert and oriented to person, place, and time. She has normal strength and normal reflexes. She displays normal reflexes. No sensory deficit. She exhibits normal muscle tone. Coordination normal. GCS eye subscore is 4. GCS verbal subscore is 5. GCS motor subscore is 6.   Reflex Scores:       Tricep reflexes are 2+ on the right side and 2+ on the left side.       Bicep reflexes are 2+ on the right side and 2+ on the left side.       Brachioradialis reflexes are 2+ on the right side and 2+ on the left side.       Patellar reflexes are 2+ on the right side and 2+ on the left side.       Achilles reflexes are 2+ on the right side and 2+ on the left side.  Tender to palpation in the right mid thoracic region.  Negative Bailey's; negative clonus   Skin: Skin is warm and dry. No rash noted. She is not diaphoretic. No erythema.   Psychiatric: She has a normal  mood and affect. Thought content normal.   Vitals reviewed.    Neurologic Exam     Mental Status   Oriented to person, place, and time.     Cranial Nerves     CN III, IV, VI   Pupils are equal, round, and reactive to light.  Extraocular motions are normal.     Motor Exam     Strength   Strength 5/5 throughout.     Gait, Coordination, and Reflexes     Reflexes   Right brachioradialis: 2+  Left brachioradialis: 2+  Right biceps: 2+  Left biceps: 2+  Right triceps: 2+  Left triceps: 2+  Right patellar: 2+  Left patellar: 2+  Right achilles: 2+  Left achilles: 2+      Assessment/Plan   Independent Review of Radiographic Studies:      I reviewed the MRI images of the cervical and thoracic spine performed without contrast dated November 22, 2017 today in the office.  The cervical MRI showed multilevel cervical spondylosis.  There is moderate canal narrowing at C4-5 and C5-C6.  There is bony foraminal narrowing greatest on the right at both these levels.  There is no abnormal cord signal or disc herniation.  The thoracic MRI revealed mild diffuse spondylosis secondary to degenerative changes and a small T4-5 disc protrusion as well as a mild to moderate facet arthropathy at T8-9.  No evidence of abnormal cord signal or disc herniation.    Medical Decision Making:      I reviewed the MRI results above with the patient.  She is managing okay but would like to try some injection therapies.  I explained that given that her symptoms are not 2 separate areas it would be in her better interest to see a pain management physician so that alternate therapies can be tried.  She agrees with that approach.  She is not interested in any surgery and her exam findings do not indicate a need for surgery.  Should her symptoms worsen, Ms. Umanzor was encouraged to call the office.  She will be seen as needed from here.    Nanci was seen today for back pain and neck pain.    Diagnoses and all orders for this visit:    Cervical spondylosis  without myelopathy  -     Ambulatory Referral to Pain Management    Pain of thoracic facet joint  -     Ambulatory Referral to Pain Management    Arthropathy of thoracic facet joint  -     Ambulatory Referral to Pain Management    Return if symptoms worsen or fail to improve.

## 2018-03-14 DIAGNOSIS — E78.01 FAMILIAL HYPERCHOLESTEROLEMIA: ICD-10-CM

## 2018-03-15 RX ORDER — ATORVASTATIN CALCIUM 40 MG/1
TABLET, FILM COATED ORAL
Qty: 90 TABLET | Refills: 0 | Status: SHIPPED | OUTPATIENT
Start: 2018-03-15 | End: 2018-06-14 | Stop reason: SDUPTHER

## 2018-03-15 RX ORDER — BENAZEPRIL HYDROCHLORIDE 20 MG/1
TABLET ORAL
Qty: 90 TABLET | Refills: 0 | Status: SHIPPED | OUTPATIENT
Start: 2018-03-15 | End: 2018-06-14 | Stop reason: SDUPTHER

## 2018-03-15 RX ORDER — TRETINOIN 0.5 MG/G
CREAM TOPICAL NIGHTLY
Qty: 20 G | Refills: 1 | Status: SHIPPED | OUTPATIENT
Start: 2018-03-15

## 2018-04-06 ENCOUNTER — TRANSCRIBE ORDERS (OUTPATIENT)
Dept: ADMINISTRATIVE | Facility: HOSPITAL | Age: 63
End: 2018-04-06

## 2018-04-06 DIAGNOSIS — Z12.31 VISIT FOR SCREENING MAMMOGRAM: Primary | ICD-10-CM

## 2018-04-11 ENCOUNTER — OFFICE VISIT (OUTPATIENT)
Dept: INTERNAL MEDICINE | Facility: CLINIC | Age: 63
End: 2018-04-11

## 2018-04-11 VITALS
HEIGHT: 59 IN | SYSTOLIC BLOOD PRESSURE: 108 MMHG | WEIGHT: 108.8 LBS | OXYGEN SATURATION: 97 % | TEMPERATURE: 97.9 F | RESPIRATION RATE: 16 BRPM | DIASTOLIC BLOOD PRESSURE: 70 MMHG | HEART RATE: 75 BPM | BODY MASS INDEX: 21.93 KG/M2

## 2018-04-11 DIAGNOSIS — L03.114 CELLULITIS OF LEFT UPPER EXTREMITY: Primary | ICD-10-CM

## 2018-04-11 PROCEDURE — 99213 OFFICE O/P EST LOW 20 MIN: CPT | Performed by: INTERNAL MEDICINE

## 2018-04-11 RX ORDER — CEPHALEXIN 500 MG/1
500 TABLET ORAL 3 TIMES DAILY
Qty: 30 TABLET | Refills: 0 | Status: SHIPPED | OUTPATIENT
Start: 2018-04-11 | End: 2018-04-21

## 2018-04-11 NOTE — PROGRESS NOTES
Subjective   Nanci Umanzor is a 63 y.o. female.   3/14/2018  Wt Readings from Last 1 Encounters:   04/11/18 49.4 kg (108 lb 12.8 oz)   She comes in for acute care visit regarding redness right elbow    History of Present Illness   She presents with three-day history of redness involving the distal right brachium, elbow area and proximal antibrachium.  No trauma described.  She has not had rash elsewhere has not had fever or chills.    She is due for her wellness visit on Monday next week  The following portions of the patient's history were reviewed and updated as appropriate: allergies, current medications, past family history, past medical history, past social history, past surgical history and problem list.    Review of Systems   Skin: Positive for rash.       Objective   Physical Exam   Constitutional: She appears well-developed and well-nourished. No distress.   Musculoskeletal:   And no mass or bursitis of the elbow was evident   Skin: Skin is warm. Rash noted. She is not diaphoretic. There is erythema.   There is erythema involving the distal right brachium, elbow area, and proximal antibrachium.  This area is tender to palpation, but no point tenderness and no mass is evident, no fluctuance   Vitals reviewed.      Assessment/Plan   Nanci was seen today for rash.    Diagnoses and all orders for this visit:    Cellulitis of left upper extremity  Comments:  We'll treat with cephalexin 500 mg 3 times a day for 10 days and reevaluate on follow-up next week    Other orders  -     Cephalexin 500 MG tablet; Take 500 mg by mouth 3 (Three) Times a Day for 10 days.        I keep appointment next Monday as scheduled, April 16                       EMR Dragon/Transcription disclaimer:      Much of this encounter note is an electronic transcription/translation of spoken language to printed text. The electronic translation of spoken language may permit erroneous, or at times, nonsensical words or phrases to be  inadvertently transcribed; Although I have reviewed the note for such errors, some may still exist.

## 2018-04-16 ENCOUNTER — OFFICE VISIT (OUTPATIENT)
Dept: INTERNAL MEDICINE | Facility: CLINIC | Age: 63
End: 2018-04-16

## 2018-04-16 VITALS — WEIGHT: 110.2 LBS | HEIGHT: 59 IN | RESPIRATION RATE: 16 BRPM | BODY MASS INDEX: 22.22 KG/M2

## 2018-04-16 DIAGNOSIS — I10 ESSENTIAL HYPERTENSION: ICD-10-CM

## 2018-04-16 DIAGNOSIS — L03.114 CELLULITIS OF LEFT UPPER EXTREMITY: Primary | ICD-10-CM

## 2018-04-16 DIAGNOSIS — D12.6 ADENOMATOUS POLYP OF COLON, UNSPECIFIED PART OF COLON: ICD-10-CM

## 2018-04-16 DIAGNOSIS — M85.80 OSTEOPENIA, UNSPECIFIED LOCATION: ICD-10-CM

## 2018-04-16 DIAGNOSIS — Z80.3 FAMILY HISTORY OF BREAST CANCER: ICD-10-CM

## 2018-04-16 DIAGNOSIS — E78.5 HYPERLIPIDEMIA, UNSPECIFIED HYPERLIPIDEMIA TYPE: ICD-10-CM

## 2018-04-16 DIAGNOSIS — J45.20 MILD INTERMITTENT REACTIVE AIRWAY DISEASE WITHOUT COMPLICATION: ICD-10-CM

## 2018-04-16 DIAGNOSIS — M81.8 OTHER OSTEOPOROSIS WITHOUT CURRENT PATHOLOGICAL FRACTURE: ICD-10-CM

## 2018-04-16 DIAGNOSIS — E55.9 HYPOVITAMINOSIS D: ICD-10-CM

## 2018-04-16 DIAGNOSIS — Z00.00 HEALTHCARE MAINTENANCE: ICD-10-CM

## 2018-04-16 PROCEDURE — G0439 PPPS, SUBSEQ VISIT: HCPCS | Performed by: INTERNAL MEDICINE

## 2018-04-16 PROCEDURE — G0009 ADMIN PNEUMOCOCCAL VACCINE: HCPCS | Performed by: INTERNAL MEDICINE

## 2018-04-16 PROCEDURE — 90670 PCV13 VACCINE IM: CPT | Performed by: INTERNAL MEDICINE

## 2018-04-16 PROCEDURE — 93000 ELECTROCARDIOGRAM COMPLETE: CPT | Performed by: INTERNAL MEDICINE

## 2018-04-16 PROCEDURE — 99213 OFFICE O/P EST LOW 20 MIN: CPT | Performed by: INTERNAL MEDICINE

## 2018-04-16 NOTE — PROGRESS NOTES
Subjective   Nanci Umanzor is a 63 y.o. female.   4/11/2018  Wt Readings from Last 1 Encounters:   04/16/18 50 kg (110 lb 3.2 oz)   She was last seen for acute care visit on April 11, 2018.  She returns for wellness visit and follow-up of hypertension, hyperlipidemia, osteoporosis, asthma, cervical arthritis, diverticulosis and colon polyps    History of Present Illness   She was seen in April 11 due to cellulitis of her right arm.  She was treated with cephalexin and has shown good response.  She was much better even after 2 days.  She is advised to complete the course of antibiotic therapy.    She has hypertension treated with amlodipine 5 mg daily and benazepril 20 mg daily.  She does not have any cardiac history or symptoms.    She has hyperlipidemia treated with atorvastatin 40 mg daily.  No medication problems described.    She has osteoporosis.  Bone mineral density study of April 2017 showed T score in the lumbosacral spine of -1.9, consistent with osteopenia and T score of the left femoral neck -2.8, consistent with mild osteoporosis.  She is treated with alendronate 70 mg once a week, Citracal daily and vitamin D supplement daily.  No falls described.    She has cervical spondylosis and has had evaluation by neurosurgery.  No radiculopathy at the present time.    She had colonoscopy in June 2017 by Dr. Alo Staton revealing diverticulosis and 2 polyps.  The polyps were both tubular adenomas on histology Repeat colonoscopy after 5 years was recommended.  No current bowel symptoms are described.    She has reactive airway disease treated with Advair discus 250/50, one inhalation twice daily and she has albuterol as a rescue medication.  She had pulmonary function studies done last spring.  She did not have any restricted obstructive disease at the time of testing, which would be consistent with asthma  The following portions of the patient's history were reviewed and updated as appropriate: allergies,  current medications, past family history, past medical history, past social history, past surgical history and problem list.    Review of Systems   Constitutional: Negative.    HENT: Negative.    Eyes: Negative.    Respiratory: Negative.    Cardiovascular: Negative.    Endocrine: Negative.    Genitourinary: Negative.    Skin: Positive for rash.   Neurological: Negative.    Hematological: Negative.    Psychiatric/Behavioral: Negative.        Objective     Physical Exam   Constitutional: She appears well-developed and well-nourished.   HENT:   Head: Normocephalic and atraumatic.   Right Ear: External ear normal.   Left Ear: External ear normal.   Nose: Nose normal.   Mouth/Throat: Oropharynx is clear and moist.   Eyes: Pupils are equal, round, and reactive to light. No scleral icterus.   Neck: Normal range of motion. No JVD present. No thyromegaly present.   Cardiovascular: Normal rate and regular rhythm.  Exam reveals no gallop and no friction rub.    No murmur heard.  Pulmonary/Chest: Effort normal and breath sounds normal. No respiratory distress. She has no wheezes. She has no rales.   Abdominal: Soft. Bowel sounds are normal. She exhibits no distension and no mass. There is no tenderness.   Lymphadenopathy:     She has no cervical adenopathy.   Neurological: She is alert. She has normal reflexes.   Skin: Skin is warm.   Psychiatric: Her behavior is normal.   Vitals reviewed.      ECG 12 Lead  Date/Time: 4/16/2018 1:57 PM  Performed by: LUANNE MARIN  Authorized by: LUANNE MARIN   Comparison: compared with previous ECG from 4/5/2017  Comparison to previous ECG: The previous EKG showed normal sinus rhythm at a rate 56/m, otherwise normal  Rhythm: sinus rhythm  Rate: normal  BPM: 63  Conduction: conduction normal  ST Segments: ST segments normal  T Waves: T waves normal  Clinical impression: normal ECG  Comments: The resting EKG shows normal sinus rhythm at a rate of 63/m, within normal limits, no significant  change compared with April 2017          Assessment/Plan   Nanci was seen today for annual exam, asthma, hypertension, hyperlipidemia and rash.    Diagnoses and all orders for this visit:    Cellulitis of left upper extremity  Comments:  Cellulitis much improved, finish the course of antibiotic therapy, cephalexin  Orders:  -     CBC & Differential    Essential hypertension  Comments:  Continue amlodipine 5 mg daily and benazepril 20 mg daily  Orders:  -     Comprehensive Metabolic Panel  -     ECG 12 Lead  -     Urinalysis With Microscopic - Urine, Clean Catch    Hyperlipidemia, unspecified hyperlipidemia type  Comments:  Continue atorvastatin 40 mg daily, we will check chemistries and lipids  Orders:  -     Lipid Panel  -     ECG 12 Lead    Mild intermittent reactive airway disease without complication  Comments:  Continue Advair Diskus 250/50 one inhalation twice daily and albuterol inhaler as needed as a rescue medication  Orders:  -     CBC & Differential    Hypovitaminosis D  Comments:  Continue vitamin D supplement, will check vitamin D level  Orders:  -     Vitamin D 25 Hydroxy    Osteopenia, unspecified location  -     TSH  -     T4, Free    Family history of breast cancer    Healthcare maintenance  Comments:  To receive Prevnar today to complete pneumococcal vaccine, check hepatitis C for screening  Orders:  -     Hepatitis C Antibody    Other osteoporosis without current pathological fracture   Comments:  Bone marrow density obtained when he 15  Orders:  -     TSH  -     T4, Free    Adenomatous polyp of colon, unspecified part of colon  Comments:  Colonoscopy June 2017, 2 adenomatous polyps, repeat after 5 years    Other orders  -     Pneumococcal Conjugate Vaccine 13-[facility] All                               EMR Dragon/Transcription disclaimer:      Much of this encounter note is an electronic transcription/translation of spoken language to printed text. The electronic translation of spoken language may  permit erroneous, or at times, nonsensical words or phrases to be inadvertently transcribed; Although I have reviewed the note for such errors, some may still exist.

## 2018-04-16 NOTE — PROGRESS NOTES
QUICK REFERENCE INFORMATION:  The ABCs of the Annual Wellness Visit    Subsequent Medicare Wellness Visit    HEALTH RISK ASSESSMENT    1955    Recent Hospitalizations:  No hospitalization(s) within the last year..        Current Medical Providers:  Patient Care Team:  Ky Kasper MD as PCP - General (Internal Medicine)  Dr. Alo Staton, general surgery      Smoking Status:  History   Smoking Status   • Former Smoker   Smokeless Tobacco   • Not on file       Alcohol Consumption:  History   Alcohol Use   • 0.6 oz/week   • 1 Glasses of wine per week     Comment: WINE DAILY       Depression Screen:   PHQ-2/PHQ-9 Depression Screening 4/16/2018   Little interest or pleasure in doing things 0   Feeling down, depressed, or hopeless 0   Trouble falling or staying asleep, or sleeping too much -   Feeling tired or having little energy -   Poor appetite or overeating -   Feeling bad about yourself - or that you are a failure or have let yourself or your family down -   Trouble concentrating on things, such as reading the newspaper or watching television -   Moving or speaking so slowly that other people could have noticed. Or the opposite - being so fidgety or restless that you have been moving around a lot more than usual -   Thoughts that you would be better off dead, or of hurting yourself in some way -   Total Score 0   If you checked off any problems, how difficult have these problems made it for you to do your work, take care of things at home, or get along with other people? -       Health Habits and Functional and Cognitive Screening:  Functional & Cognitive Status 4/16/2018   Do you have difficulty preparing food and eating? No   Do you have difficulty bathing yourself, getting dressed or grooming yourself? No   Do you have difficulty using the toilet? No   Do you have difficulty moving around from place to place? No   Do you have trouble with steps or getting out of a bed or a chair? No   In the past year  have you fallen or experienced a near fall? No   Current Diet Well Balanced Diet   Dental Exam Up to date   Eye Exam Up to date   Exercise (times per week) 3 times per week   Current Exercise Activities Include Cardiovasular Workout on Exercise Equipment   Do you need help using the phone?  No   Are you deaf or do you have serious difficulty hearing?  No   Do you need help with transportation? No   Do you need help shopping? No   Do you need help preparing meals?  No   Do you need help with housework?  No   Do you need help with laundry? No   Do you need help taking your medications? No   Do you need help managing money? No   Do you ever drive or ride in a car without wearing a seat belt? No           Does the patient have evidence of cognitive impairment? No    Aspirin use counseling: Does not need ASA (and currently is not on it)      Recent Lab Results:  CMP:  Lab Results   Component Value Date    GLU 90 08/09/2017    BUN 16 08/09/2017    CREATININE 0.72 08/09/2017    EGFRIFNONA 82 08/09/2017    EGFRIFAFRI 99 08/09/2017    BCR 22.2 08/09/2017     08/09/2017    K 4.5 08/09/2017    CO2 29.0 08/09/2017    CALCIUM 10.0 08/09/2017    PROTENTOTREF 7.2 08/09/2017    ALBUMIN 4.50 08/09/2017    LABGLOBREF 2.7 08/09/2017    LABIL2 1.7 08/09/2017    BILITOT 0.4 08/09/2017    ALKPHOS 57 08/09/2017    AST 24 08/09/2017    ALT 30 08/09/2017     Lipid Panel:  Lab Results   Component Value Date    TRIG 106 08/09/2017     (H) 08/09/2017    VLDL 21.2 08/09/2017     HbA1c:       Visual Acuity:  No exam data present    Age-appropriate Screening Schedule:  Refer to the list below for future screening recommendations based on patient's age, sex and/or medical conditions. Orders for these recommended tests are listed in the plan section. The patient has been provided with a written plan.    Health Maintenance   Topic Date Due   • PAP SMEAR  05/28/2016   • PNEUMOCOCCAL VACCINES (65+ LOW/MEDIUM RISK) (1 of 2 - PCV13)  02/09/2018   • INFLUENZA VACCINE  08/01/2018   • LIPID PANEL  08/09/2018   • MAMMOGRAM  04/21/2019   • DXA SCAN  04/21/2019   • TDAP/TD VACCINES (2 - Td) 01/01/2024   • COLONOSCOPY  06/30/2027   • ZOSTER VACCINE  Completed        Subjective   History of Present Illness    Nanci Umanzor is a 63 y.o. female who presents for an Subsequent Wellness Visit.    The following portions of the patient's history were reviewed and updated as appropriate: allergies, current medications, past family history, past medical history, past social history, past surgical history and problem list.    Outpatient Medications Prior to Visit   Medication Sig Dispense Refill   • albuterol (PROAIR HFA) 108 (90 Base) MCG/ACT inhaler Inhale 2 puffs Every 4 (Four) Hours As Needed for Wheezing or Shortness of Air. 6.7 g 2   • alendronate (FOSAMAX) 70 MG tablet 1 by mouth once a week, 60 minutes before breakfast, with a glass of plain water 12 tablet 3   • amLODIPine (NORVASC) 5 MG tablet      • atorvastatin (LIPITOR) 40 MG tablet TAKE ONE TABLET BY MOUTH DAILY 90 tablet 0   • benazepril (LOTENSIN) 20 MG tablet TAKE ONE TABLET BY MOUTH DAILY 90 tablet 0   • BIOTIN PO Take  by mouth.     • BLACK COHOSH PO Take  by mouth.     • calcium citrate-vitamin d (CITRACAL) 200-250 MG-UNIT tablet tablet Take  by mouth Daily.     • Cephalexin 500 MG tablet Take 500 mg by mouth 3 (Three) Times a Day for 10 days. 30 tablet 0   • Cholecalciferol (VITAMIN D-3 PO) Take  by mouth.     • fluticasone-salmeterol (ADVAIR DISKUS) 250-50 MCG/DOSE DISKUS Inhale 1 puff 2 (Two) Times a Day. 60 each 5   • Multiple Vitamins-Minerals (CENTRUM SILVER ADULT 50+ PO) Take  by mouth.     • Multiple Vitamins-Minerals (OCUVITE ADULT 50+ PO) Take  by mouth.     • naproxen (NAPROSYN) 500 MG tablet Take 500 mg by mouth 2 (Two) Times a Day With Meals.     • Omega-3 Fatty Acids (FISH OIL PO) Take  by mouth.     • tretinoin (RETIN-A) 0.05 % cream Apply  topically Every Night. 20 g 1   •  "triamcinolone (KENALOG) 0.1 % cream Apply topically to involved area up to twice a day as needed for itching 30 g 2     Facility-Administered Medications Prior to Visit   Medication Dose Route Frequency Provider Last Rate Last Dose   • albuterol (PROVENTIL HFA;VENTOLIN HFA) inhaler 2 puff  2 puff Inhalation 4x Daily - RT Ky Kasper MD           Patient Active Problem List   Diagnosis   • Cervical nerve root disorder   • Essential hypertension   • Drug therapy   • Hyperlipidemia   • Reactive airway disease without complication   • Colon polyps   • Family history of breast cancer   • Hypovitaminosis D   • Pruritus   • Osteopenia   • Closed compression fracture of fifth lumbar vertebra   • Cellulitis of left upper extremity       Advance Care Planning:  has NO advance directive - information provided to the patient today    Identification of Risk Factors:  Risk factors include: cardiovascular risk.    Review of Systems    Compared to one year ago, the patient feels her physical health is better.  Compared to one year ago, the patient feels her mental health is better.    Objective     Physical Exam    Vitals:    04/16/18 1255   Resp: 16   Weight: 50 kg (110 lb 3.2 oz)   Height: 149.9 cm (59\")   PainSc: 0-No pain       Patient's Body mass index is 22.26 kg/m². BMI is within normal parameters. No follow-up required.      Assessment/Plan   Patient Self-Management and Personalized Health Advice  The patient has been provided with information about: prevention of cardiac or vascular disease and preventive services including:   · Advance directive, Counseling for cardiovascular disease risk reduction, Pneumococcal vaccine .    Visit Diagnoses:    ICD-10-CM ICD-9-CM   1. Cellulitis of left upper extremity L03.114 682.3   2. Essential hypertension I10 401.9   3. Hyperlipidemia, unspecified hyperlipidemia type E78.5 272.4   4. Mild intermittent reactive airway disease without complication J45.20 493.90   5. Hypovitaminosis " D E55.9 268.9   6. Osteopenia, unspecified location M85.80 733.90   7. Family history of breast cancer Z80.3 V16.3   8. Healthcare maintenance Z00.00 V70.0   9. Other osteoporosis without current pathological fracture  M81.8 733.09   10. Adenomatous polyp of colon, unspecified part of colon D12.6 211.3       Orders Placed This Encounter   Procedures   • Pneumococcal Conjugate Vaccine 13-Valent All   • Comprehensive Metabolic Panel   • Lipid Panel   • Vitamin D 25 Hydroxy   • TSH   • T4, Free   • Hepatitis C Antibody   • ECG 12 Lead     Order Specific Question:   Reason for Exam:     Answer:   Hypertension, hyperlipidemia   • CBC & Differential     Order Specific Question:   Manual Differential     Answer:   No   • Urinalysis With Microscopic - Urine, Clean Catch       Outpatient Encounter Prescriptions as of 4/16/2018   Medication Sig Dispense Refill   • albuterol (PROAIR HFA) 108 (90 Base) MCG/ACT inhaler Inhale 2 puffs Every 4 (Four) Hours As Needed for Wheezing or Shortness of Air. 6.7 g 2   • alendronate (FOSAMAX) 70 MG tablet 1 by mouth once a week, 60 minutes before breakfast, with a glass of plain water 12 tablet 3   • amLODIPine (NORVASC) 5 MG tablet      • atorvastatin (LIPITOR) 40 MG tablet TAKE ONE TABLET BY MOUTH DAILY 90 tablet 0   • benazepril (LOTENSIN) 20 MG tablet TAKE ONE TABLET BY MOUTH DAILY 90 tablet 0   • BIOTIN PO Take  by mouth.     • BLACK COHOSH PO Take  by mouth.     • calcium citrate-vitamin d (CITRACAL) 200-250 MG-UNIT tablet tablet Take  by mouth Daily.     • Cephalexin 500 MG tablet Take 500 mg by mouth 3 (Three) Times a Day for 10 days. 30 tablet 0   • Cholecalciferol (VITAMIN D-3 PO) Take  by mouth.     • fluticasone-salmeterol (ADVAIR DISKUS) 250-50 MCG/DOSE DISKUS Inhale 1 puff 2 (Two) Times a Day. 60 each 5   • Multiple Vitamins-Minerals (CENTRUM SILVER ADULT 50+ PO) Take  by mouth.     • Multiple Vitamins-Minerals (OCUVITE ADULT 50+ PO) Take  by mouth.     • naproxen (NAPROSYN)  500 MG tablet Take 500 mg by mouth 2 (Two) Times a Day With Meals.     • Omega-3 Fatty Acids (FISH OIL PO) Take  by mouth.     • tretinoin (RETIN-A) 0.05 % cream Apply  topically Every Night. 20 g 1   • triamcinolone (KENALOG) 0.1 % cream Apply topically to involved area up to twice a day as needed for itching 30 g 2     Facility-Administered Encounter Medications as of 4/16/2018   Medication Dose Route Frequency Provider Last Rate Last Dose   • albuterol (PROVENTIL HFA;VENTOLIN HFA) inhaler 2 puff  2 puff Inhalation 4x Daily - RT Ky Kasper MD           Reviewed use of high risk medication in the elderly: not applicable  Reviewed for potential of harmful drug interactions in the elderly: not applicable    Follow Up:  No Follow-up on file.     An After Visit Summary and PPPS with all of these plans were given to the patient.

## 2018-04-17 LAB
25(OH)D3+25(OH)D2 SERPL-MCNC: 50.8 NG/ML (ref 30–100)
ALBUMIN SERPL-MCNC: 4.6 G/DL (ref 3.5–5.2)
ALBUMIN/GLOB SERPL: 1.8 G/DL
ALP SERPL-CCNC: 51 U/L (ref 39–117)
ALT SERPL-CCNC: 27 U/L (ref 1–33)
APPEARANCE UR: CLEAR
AST SERPL-CCNC: 26 U/L (ref 1–32)
BACTERIA #/AREA URNS HPF: NORMAL /HPF
BASOPHILS # BLD AUTO: 0.06 10*3/MM3 (ref 0–0.2)
BASOPHILS NFR BLD AUTO: 1 % (ref 0–1.5)
BILIRUB SERPL-MCNC: 0.2 MG/DL (ref 0.1–1.2)
BILIRUB UR QL STRIP: NEGATIVE
BUN SERPL-MCNC: 16 MG/DL (ref 8–23)
BUN/CREAT SERPL: 25 (ref 7–25)
CALCIUM SERPL-MCNC: 10 MG/DL (ref 8.6–10.5)
CASTS URNS MICRO: NORMAL
CHLORIDE SERPL-SCNC: 95 MMOL/L (ref 98–107)
CHOLEST SERPL-MCNC: 226 MG/DL (ref 0–200)
CO2 SERPL-SCNC: 29.2 MMOL/L (ref 22–29)
COLOR UR: YELLOW
CREAT SERPL-MCNC: 0.64 MG/DL (ref 0.57–1)
EOSINOPHIL # BLD AUTO: 0.39 10*3/MM3 (ref 0–0.7)
EOSINOPHIL NFR BLD AUTO: 6.2 % (ref 0.3–6.2)
EPI CELLS #/AREA URNS HPF: NORMAL /HPF
ERYTHROCYTE [DISTWIDTH] IN BLOOD BY AUTOMATED COUNT: 12.8 % (ref 11.7–13)
GFR SERPLBLD CREATININE-BSD FMLA CKD-EPI: 113 ML/MIN/1.73
GFR SERPLBLD CREATININE-BSD FMLA CKD-EPI: 93 ML/MIN/1.73
GLOBULIN SER CALC-MCNC: 2.6 GM/DL
GLUCOSE SERPL-MCNC: 88 MG/DL (ref 65–99)
GLUCOSE UR QL: NEGATIVE
HCT VFR BLD AUTO: 41 % (ref 35.6–45.5)
HCV AB S/CO SERPL IA: <0.1 S/CO RATIO (ref 0–0.9)
HDLC SERPL-MCNC: 101 MG/DL (ref 40–60)
HGB BLD-MCNC: 13.2 G/DL (ref 11.9–15.5)
HGB UR QL STRIP: NEGATIVE
IMM GRANULOCYTES # BLD: 0 10*3/MM3 (ref 0–0.03)
IMM GRANULOCYTES NFR BLD: 0 % (ref 0–0.5)
KETONES UR QL STRIP: NEGATIVE
LDLC SERPL CALC-MCNC: 103 MG/DL (ref 0–100)
LEUKOCYTE ESTERASE UR QL STRIP: NEGATIVE
LYMPHOCYTES # BLD AUTO: 1.63 10*3/MM3 (ref 0.9–4.8)
LYMPHOCYTES NFR BLD AUTO: 25.8 % (ref 19.6–45.3)
MCH RBC QN AUTO: 32.4 PG (ref 26.9–32)
MCHC RBC AUTO-ENTMCNC: 32.2 G/DL (ref 32.4–36.3)
MCV RBC AUTO: 100.7 FL (ref 80.5–98.2)
MONOCYTES # BLD AUTO: 0.51 10*3/MM3 (ref 0.2–1.2)
MONOCYTES NFR BLD AUTO: 8.1 % (ref 5–12)
NEUTROPHILS # BLD AUTO: 3.72 10*3/MM3 (ref 1.9–8.1)
NEUTROPHILS NFR BLD AUTO: 58.9 % (ref 42.7–76)
NITRITE UR QL STRIP: NEGATIVE
PH UR STRIP: 6 [PH] (ref 5–8)
PLATELET # BLD AUTO: 314 10*3/MM3 (ref 140–500)
POTASSIUM SERPL-SCNC: 4.5 MMOL/L (ref 3.5–5.2)
PROT SERPL-MCNC: 7.2 G/DL (ref 6–8.5)
PROT UR QL STRIP: NEGATIVE
RBC # BLD AUTO: 4.07 10*6/MM3 (ref 3.9–5.2)
RBC #/AREA URNS HPF: NORMAL /HPF
SODIUM SERPL-SCNC: 138 MMOL/L (ref 136–145)
SP GR UR: 1.02 (ref 1–1.03)
T4 FREE SERPL-MCNC: 1 NG/DL (ref 0.93–1.7)
TRIGL SERPL-MCNC: 110 MG/DL (ref 0–150)
TSH SERPL DL<=0.005 MIU/L-ACNC: 1.68 MIU/ML (ref 0.27–4.2)
UROBILINOGEN UR STRIP-MCNC: (no result) MG/DL
VLDLC SERPL CALC-MCNC: 22 MG/DL (ref 5–40)
WBC # BLD AUTO: 6.31 10*3/MM3 (ref 4.5–10.7)
WBC #/AREA URNS HPF: NORMAL /HPF

## 2018-04-28 ENCOUNTER — APPOINTMENT (OUTPATIENT)
Dept: MAMMOGRAPHY | Facility: HOSPITAL | Age: 63
End: 2018-04-28
Attending: INTERNAL MEDICINE

## 2018-06-14 DIAGNOSIS — E78.01 FAMILIAL HYPERCHOLESTEROLEMIA: ICD-10-CM

## 2018-06-14 RX ORDER — BENAZEPRIL HYDROCHLORIDE 20 MG/1
TABLET ORAL
Qty: 90 TABLET | Refills: 0 | Status: SHIPPED | OUTPATIENT
Start: 2018-06-14 | End: 2018-07-09 | Stop reason: SDUPTHER

## 2018-06-14 RX ORDER — ATORVASTATIN CALCIUM 40 MG/1
TABLET, FILM COATED ORAL
Qty: 90 TABLET | Refills: 0 | Status: SHIPPED | OUTPATIENT
Start: 2018-06-14 | End: 2018-09-29 | Stop reason: SDUPTHER

## 2018-06-15 ENCOUNTER — HOSPITAL ENCOUNTER (OUTPATIENT)
Dept: MAMMOGRAPHY | Facility: HOSPITAL | Age: 63
Discharge: HOME | End: 2018-06-15
Attending: INTERNAL MEDICINE | Admitting: INTERNAL MEDICINE

## 2018-06-15 DIAGNOSIS — Z12.31 VISIT FOR SCREENING MAMMOGRAM: ICD-10-CM

## 2018-06-15 PROCEDURE — 77067 SCR MAMMO BI INCL CAD: CPT

## 2018-06-15 PROCEDURE — 77063 BREAST TOMOSYNTHESIS BI: CPT

## 2018-07-09 RX ORDER — BENAZEPRIL HYDROCHLORIDE 20 MG/1
20 TABLET ORAL DAILY
Qty: 90 TABLET | Refills: 0 | Status: SHIPPED | OUTPATIENT
Start: 2018-07-09 | End: 2018-12-24 | Stop reason: SDUPTHER

## 2018-09-29 DIAGNOSIS — E78.01 FAMILIAL HYPERCHOLESTEROLEMIA: ICD-10-CM

## 2018-10-02 RX ORDER — ATORVASTATIN CALCIUM 40 MG/1
TABLET, FILM COATED ORAL
Qty: 90 TABLET | Refills: 0 | Status: SHIPPED | OUTPATIENT
Start: 2018-10-02 | End: 2019-02-13 | Stop reason: SDUPTHER

## 2018-10-22 ENCOUNTER — TELEPHONE (OUTPATIENT)
Dept: INTERNAL MEDICINE | Facility: CLINIC | Age: 63
End: 2018-10-22

## 2018-10-22 DIAGNOSIS — G89.29 CHRONIC BILATERAL LOW BACK PAIN WITH BILATERAL SCIATICA: Primary | ICD-10-CM

## 2018-10-22 DIAGNOSIS — M54.41 CHRONIC BILATERAL LOW BACK PAIN WITH BILATERAL SCIATICA: Primary | ICD-10-CM

## 2018-10-22 DIAGNOSIS — M54.42 CHRONIC BILATERAL LOW BACK PAIN WITH BILATERAL SCIATICA: Primary | ICD-10-CM

## 2018-10-22 NOTE — TELEPHONE ENCOUNTER
Pt called complaining of severe sciatica pain to the point of not being able to work and would like to know if you could refer her to pain management. Please advise.

## 2018-11-05 ENCOUNTER — OFFICE VISIT (OUTPATIENT)
Dept: INTERNAL MEDICINE | Facility: CLINIC | Age: 63
End: 2018-11-05

## 2018-11-05 VITALS
BODY MASS INDEX: 21.41 KG/M2 | SYSTOLIC BLOOD PRESSURE: 140 MMHG | RESPIRATION RATE: 16 BRPM | DIASTOLIC BLOOD PRESSURE: 84 MMHG | HEIGHT: 59 IN | WEIGHT: 106.2 LBS | HEART RATE: 75 BPM | TEMPERATURE: 97.7 F | OXYGEN SATURATION: 96 %

## 2018-11-05 DIAGNOSIS — M21.619 BUNION: Primary | ICD-10-CM

## 2018-11-05 DIAGNOSIS — D12.6 ADENOMATOUS POLYP OF COLON, UNSPECIFIED PART OF COLON: ICD-10-CM

## 2018-11-05 DIAGNOSIS — E55.9 HYPOVITAMINOSIS D: ICD-10-CM

## 2018-11-05 DIAGNOSIS — J45.20 MILD INTERMITTENT REACTIVE AIRWAY DISEASE WITHOUT COMPLICATION: ICD-10-CM

## 2018-11-05 DIAGNOSIS — E78.5 HYPERLIPIDEMIA, UNSPECIFIED HYPERLIPIDEMIA TYPE: ICD-10-CM

## 2018-11-05 DIAGNOSIS — I10 ESSENTIAL HYPERTENSION: ICD-10-CM

## 2018-11-05 LAB
ALBUMIN SERPL-MCNC: 4.7 G/DL (ref 3.5–5.2)
ALBUMIN/GLOB SERPL: 1.7 G/DL
ALP SERPL-CCNC: 53 U/L (ref 39–117)
ALT SERPL-CCNC: 30 U/L (ref 1–33)
APPEARANCE UR: CLEAR
AST SERPL-CCNC: 30 U/L (ref 1–32)
BACTERIA #/AREA URNS HPF: ABNORMAL /HPF
BILIRUB SERPL-MCNC: 0.2 MG/DL (ref 0.1–1.2)
BILIRUB UR QL STRIP: NEGATIVE
BUN SERPL-MCNC: 15 MG/DL (ref 8–23)
BUN/CREAT SERPL: 22.4 (ref 7–25)
CALCIUM SERPL-MCNC: 9.9 MG/DL (ref 8.6–10.5)
CASTS URNS MICRO: ABNORMAL
CHLORIDE SERPL-SCNC: 98 MMOL/L (ref 98–107)
CHOLEST SERPL-MCNC: 217 MG/DL (ref 0–200)
CO2 SERPL-SCNC: 28.6 MMOL/L (ref 22–29)
COLOR UR: (no result)
CREAT SERPL-MCNC: 0.67 MG/DL (ref 0.57–1)
EPI CELLS #/AREA URNS HPF: ABNORMAL /HPF
GLOBULIN SER CALC-MCNC: 2.8 GM/DL
GLUCOSE SERPL-MCNC: 89 MG/DL (ref 65–99)
GLUCOSE UR QL: NEGATIVE
HDLC SERPL-MCNC: 121 MG/DL (ref 40–60)
HGB UR QL STRIP: NEGATIVE
KETONES UR QL STRIP: (no result)
LDLC SERPL CALC-MCNC: 70 MG/DL (ref 0–100)
LEUKOCYTE ESTERASE UR QL STRIP: (no result)
NITRITE UR QL STRIP: NEGATIVE
PH UR STRIP: 7 [PH] (ref 5–8)
POTASSIUM SERPL-SCNC: 4 MMOL/L (ref 3.5–5.2)
PROT SERPL-MCNC: 7.5 G/DL (ref 6–8.5)
PROT UR QL STRIP: NEGATIVE
RBC #/AREA URNS HPF: ABNORMAL /HPF
SODIUM SERPL-SCNC: 142 MMOL/L (ref 136–145)
SP GR UR: 1.03 (ref 1–1.03)
TRIGL SERPL-MCNC: 130 MG/DL (ref 0–150)
UROBILINOGEN UR STRIP-MCNC: (no result) MG/DL
VLDLC SERPL CALC-MCNC: 26 MG/DL (ref 5–40)
WBC #/AREA URNS HPF: ABNORMAL /HPF

## 2018-11-05 PROCEDURE — 99214 OFFICE O/P EST MOD 30 MIN: CPT | Performed by: INTERNAL MEDICINE

## 2018-11-05 NOTE — PROGRESS NOTES
Subjective   Nanci Umanzor is a 63 y.o. female.   9/29/2018  Wt Readings from Last 1 Encounters:   11/05/18 48.2 kg (106 lb 3.2 oz)   She was last seen in April 2018 neck, subsequent annual wellness visit then.  Her weight at that time was 110, 106 now.    She returns for follow-up of hypertension, hyperlipidemia, colon polyps, arthritis, asthma, right foot pain    History of Present Illness   She complains of having problems with a bunion on her right foot.  This makes it painful for her to have shoes on.    She has hypertension treated with amlodipine 5 mg daily and benazepril 20 mg daily.  No cardiac history or symptoms.    She has hyperlipidemia treated with atorvastatin 40 mg daily.  No problems with medication.    She has osteoporosis and last had bone density checked April 2017.  She is treated with alendronate 70 mg once a week, Citracal and vitamin D.    She had colonoscopy June 2017 by Dr. Alo Staton.  She was found have diverticulosis and 2 tubular adenomas.  Repeat colonoscopy after 5 years was recommended.    She has asthma treated with Advair discus 250/50 one inhalation twice daily and albuterol as a rescue medication.    She complains of stress, partially related to her daughter having a drug problem.  The following portions of the patient's history were reviewed and updated as appropriate: allergies, current medications, past family history, past medical history, past social history, past surgical history and problem list.    Review of Systems   Constitutional: Negative.    HENT: Negative.    Eyes: Negative.    Respiratory: Positive for cough.    Cardiovascular: Negative.    Endocrine: Negative.    Genitourinary: Negative.    Musculoskeletal: Positive for arthralgias.   Skin: Negative.    Neurological: Negative.    Hematological: Negative.    Psychiatric/Behavioral: Negative.        Objective   Physical Exam   Constitutional: She appears well-developed and well-nourished.   HENT:   Head:  Normocephalic and atraumatic.   Cardiovascular: Normal rate and regular rhythm.  Exam reveals no gallop and no friction rub.    No murmur heard.  Pulmonary/Chest: Effort normal and breath sounds normal. No respiratory distress. She has no wheezes. She has no rales.   Abdominal: Soft. Bowel sounds are normal. She exhibits no distension and no mass. There is no tenderness.   Musculoskeletal:   Hallux valgus right foot   Neurological: She is alert.   Skin: Skin is warm.   Psychiatric: Her behavior is normal.   Vitals reviewed.      Assessment/Plan   Nanci was seen today for hypertension, hyperlipidemia and asthma.    Diagnoses and all orders for this visit:    Terry  Comments:  We'll refer for podiatry consult, Dr. Jaiden Valente  Orders:  -     Ambulatory Referral to Podiatry    Essential hypertension  Comments:  Continue amlodipine 5 mg daily and benazepril 20 mg daily  Orders:  -     Comprehensive Metabolic Panel  -     Urinalysis With Microscopic - Urine, Clean Catch    Hyperlipidemia, unspecified hyperlipidemia type  Comments:  Continue atorvastatin 40 mg daily, we will check chemistries and lipids  Orders:  -     Lipid Panel    Mild intermittent reactive airway disease without complication  Comments:  Continue Advair Diskus 250/50, one inhalation twice daily, and albuterol inhaler as needed    Adenomatous polyp of colon, unspecified part of colon  Comments:  Last colonoscopy June 2017, Dr. Alo Staton.  Repeat colonoscopy after 5 years    Hypovitaminosis D  Comments:  Continue vitamin D 2000 international units daily        Recommend office follow-up about 6 months, return sooner if needed                       EMR Dragon/Transcription disclaimer:      Much of this encounter note is an electronic transcription/translation of spoken language to printed text. The electronic translation of spoken language may permit erroneous, or at times, nonsensical words or phrases to be inadvertently transcribed; Although I  have reviewed the note for such errors, some may still exist.

## 2018-12-24 RX ORDER — BENAZEPRIL HYDROCHLORIDE 20 MG/1
TABLET ORAL
Qty: 90 TABLET | Refills: 3 | Status: SHIPPED | OUTPATIENT
Start: 2018-12-24 | End: 2020-01-21 | Stop reason: SDUPTHER

## 2019-01-28 RX ORDER — ALENDRONATE SODIUM 70 MG/1
TABLET ORAL
Qty: 12 TABLET | Refills: 3 | Status: SHIPPED | OUTPATIENT
Start: 2019-01-28 | End: 2019-09-12

## 2019-01-28 RX ORDER — AMLODIPINE BESYLATE 5 MG/1
5 TABLET ORAL DAILY
Qty: 30 TABLET | Refills: 5 | Status: SHIPPED | OUTPATIENT
Start: 2019-01-28 | End: 2019-09-05 | Stop reason: SDUPTHER

## 2019-01-31 ENCOUNTER — TRANSCRIBE ORDERS (OUTPATIENT)
Dept: ADMINISTRATIVE | Facility: HOSPITAL | Age: 64
End: 2019-01-31

## 2019-01-31 DIAGNOSIS — G89.29 CHRONIC LOW BACK PAIN, UNSPECIFIED BACK PAIN LATERALITY, WITH SCIATICA PRESENCE UNSPECIFIED: ICD-10-CM

## 2019-01-31 DIAGNOSIS — M51.37 DEGENERATION OF LUMBAR OR LUMBOSACRAL INTERVERTEBRAL DISC: ICD-10-CM

## 2019-01-31 DIAGNOSIS — M47.816 LUMBAR SPONDYLOSIS: Primary | ICD-10-CM

## 2019-01-31 DIAGNOSIS — M54.5 CHRONIC LOW BACK PAIN, UNSPECIFIED BACK PAIN LATERALITY, WITH SCIATICA PRESENCE UNSPECIFIED: ICD-10-CM

## 2019-02-13 ENCOUNTER — OFFICE VISIT (OUTPATIENT)
Dept: FAMILY MEDICINE CLINIC | Facility: CLINIC | Age: 64
End: 2019-02-13

## 2019-02-13 VITALS
DIASTOLIC BLOOD PRESSURE: 70 MMHG | BODY MASS INDEX: 21.77 KG/M2 | OXYGEN SATURATION: 98 % | RESPIRATION RATE: 15 BRPM | HEART RATE: 77 BPM | SYSTOLIC BLOOD PRESSURE: 110 MMHG | TEMPERATURE: 98.3 F | HEIGHT: 59 IN | WEIGHT: 108 LBS

## 2019-02-13 DIAGNOSIS — M85.80 OSTEOPENIA, UNSPECIFIED LOCATION: ICD-10-CM

## 2019-02-13 DIAGNOSIS — E55.9 HYPOVITAMINOSIS D: ICD-10-CM

## 2019-02-13 DIAGNOSIS — D12.6 ADENOMATOUS POLYP OF COLON, UNSPECIFIED PART OF COLON: ICD-10-CM

## 2019-02-13 DIAGNOSIS — E78.01 FAMILIAL HYPERCHOLESTEROLEMIA: ICD-10-CM

## 2019-02-13 DIAGNOSIS — J45.20 MILD INTERMITTENT REACTIVE AIRWAY DISEASE WITHOUT COMPLICATION: ICD-10-CM

## 2019-02-13 DIAGNOSIS — I10 ESSENTIAL HYPERTENSION: Primary | ICD-10-CM

## 2019-02-13 DIAGNOSIS — Z78.0 POST-MENOPAUSAL: ICD-10-CM

## 2019-02-13 PROCEDURE — 99214 OFFICE O/P EST MOD 30 MIN: CPT | Performed by: INTERNAL MEDICINE

## 2019-02-13 RX ORDER — ATORVASTATIN CALCIUM 40 MG/1
40 TABLET, FILM COATED ORAL DAILY
Qty: 90 TABLET | Refills: 3 | Status: SHIPPED | OUTPATIENT
Start: 2019-02-13 | End: 2020-01-21 | Stop reason: SDUPTHER

## 2019-02-13 RX ORDER — ALBUTEROL SULFATE 90 UG/1
2 AEROSOL, METERED RESPIRATORY (INHALATION) EVERY 4 HOURS PRN
Qty: 1 INHALER | Refills: 6 | Status: SHIPPED | OUTPATIENT
Start: 2019-02-13 | End: 2020-02-24

## 2019-02-13 NOTE — PROGRESS NOTES
Subjective   Nanci Umanzor is a 64 y.o. female. Patient is here today for establishing care here as a new patient.  Patient's been seeing Dr. Kasper and has known problems of hypertension, hyperlipidemia, reactive airway disease with periodic asthma, low vitamin D level, history of colon polyps, history of osteopenia and chronic back pain.  She is seeing a back specialist and is having an MRI scan done tomorrow.  I of course would like to get those results and I believe this is Dr. Bender.  Otherwise she's feeling okay and is had no significant chest pain, breathing problems or myalgias  Chief Complaint   Patient presents with   • Establish Care   • Back Pain          Vitals:    02/13/19 1322   BP: 110/70   Pulse: 77   Resp: 15   Temp: 98.3 °F (36.8 °C)   SpO2: 98%     The following portions of the patient's history were reviewed and updated as appropriate: allergies, current medications, past family history, past medical history, past social history, past surgical history and problem list.    Past Medical History:   Diagnosis Date   • Ankle fracture     Rt 2002  Lt 2004   • Anxiety    • Arthritis    • Asthma    • Clavicle fracture     10/21/1970   • Colon polyp    • Depression    • Fibula fracture     left 2002   • Heart murmur    • Hyperlipidemia    • Hypertension    • Pelvis fracture (CMS/[facility])     10/21/1970   • Wrist fracture     2007      Allergies   Allergen Reactions   • Clarithromycin Shortness Of Breath and Palpitations      Social History     Socioeconomic History   • Marital status:      Spouse name: Not on file   • Number of children: 3   • Years of education: AS   • Highest education level: Not on file   Social Needs   • Financial resource strain: Not on file   • Food insecurity - worry: Not on file   • Food insecurity - inability: Not on file   • Transportation needs - medical: Not on file   • Transportation needs - non-medical: Not on file   Occupational History   • Occupation: ADMINISTRATION      Comment: Disabled   Tobacco Use   • Smoking status: Former Smoker   Substance and Sexual Activity   • Alcohol use: Yes     Alcohol/week: 0.6 oz     Types: 1 Glasses of wine per week     Comment: WINE DAILY   • Drug use: Defer   • Sexual activity: Defer   Other Topics Concern   • Not on file   Social History Narrative   • Not on file        Current Outpatient Medications:   •  albuterol sulfate  (90 Base) MCG/ACT inhaler, Inhale 2 puffs Every 4 (Four) Hours As Needed for Wheezing., Disp: 1 inhaler, Rfl: 6  •  alendronate (FOSAMAX) 70 MG tablet, 1 by mouth once a week, 60 minutes before breakfast, with a glass of plain water, Disp: 12 tablet, Rfl: 3  •  amLODIPine (NORVASC) 5 MG tablet, Take 1 tablet by mouth Daily., Disp: 30 tablet, Rfl: 5  •  atorvastatin (LIPITOR) 40 MG tablet, Take 1 tablet by mouth Daily., Disp: 90 tablet, Rfl: 3  •  benazepril (LOTENSIN) 20 MG tablet, TAKE ONE TABLET BY MOUTH DAILY, Disp: 90 tablet, Rfl: 3  •  BIOTIN PO, Take  by mouth., Disp: , Rfl:   •  BLACK COHOSH PO, Take  by mouth., Disp: , Rfl:   •  calcium citrate-vitamin d (CITRACAL) 200-250 MG-UNIT tablet tablet, Take  by mouth Daily., Disp: , Rfl:   •  Cholecalciferol (VITAMIN D-3 PO), Take  by mouth., Disp: , Rfl:   •  fluticasone-salmeterol (ADVAIR DISKUS) 250-50 MCG/DOSE DISKUS, Inhale 1 puff 2 (Two) Times a Day., Disp: 60 each, Rfl: 4  •  Multiple Vitamins-Minerals (CENTRUM SILVER ADULT 50+ PO), Take  by mouth., Disp: , Rfl:   •  Multiple Vitamins-Minerals (OCUVITE ADULT 50+ PO), Take  by mouth., Disp: , Rfl:   •  naproxen (NAPROSYN) 500 MG tablet, Take 500 mg by mouth 2 (Two) Times a Day With Meals., Disp: , Rfl:   •  Omega-3 Fatty Acids (FISH OIL PO), Take  by mouth., Disp: , Rfl:   •  tretinoin (RETIN-A) 0.05 % cream, Apply  topically Every Night., Disp: 20 g, Rfl: 1  •  triamcinolone (KENALOG) 0.1 % cream, Apply topically to involved area up to twice a day as needed for itching, Disp: 30 g, Rfl: 2    Current  Facility-Administered Medications:   •  albuterol (PROVENTIL HFA;VENTOLIN HFA) inhaler 2 puff, 2 puff, Inhalation, 4x Daily - RT, Ky Kasper MD     Objective     History of Present Illness     Review of Systems   Constitutional: Negative for diaphoresis, fatigue and fever.   HENT: Negative.    Eyes: Negative.    Respiratory: Negative.  Negative for shortness of breath.    Cardiovascular: Negative.  Negative for chest pain and palpitations.   Gastrointestinal: Negative.  Negative for abdominal pain, nausea and vomiting.   Endocrine: Negative.    Genitourinary: Negative.    Musculoskeletal: Positive for back pain and neck pain.   Neurological: Positive for weakness. Negative for dizziness, syncope, light-headedness and headaches.   Psychiatric/Behavioral: Negative.  Negative for confusion.       Physical Exam   Constitutional: She is oriented to person, place, and time. She appears well-developed and well-nourished.   Pleasant, cooperative no acute distress and appearing healthy with a blood pressure 120/70   HENT:   Head: Normocephalic and atraumatic.   Eyes: Conjunctivae are normal. Pupils are equal, round, and reactive to light. No scleral icterus.   Neck: Normal range of motion. Neck supple.   Cardiovascular: Normal rate, regular rhythm and normal heart sounds.   Pulmonary/Chest: Effort normal and breath sounds normal. No respiratory distress. She has no wheezes. She has no rales.   Musculoskeletal: Normal range of motion. She exhibits no edema.   Neurological: She is alert and oriented to person, place, and time.   Skin: Skin is warm and dry.   Psychiatric: She has a normal mood and affect. Her behavior is normal.   Nursing note and vitals reviewed.      ASSESSMENT  patient appears stable and generally healthy.  Blood pressures well controlled and heart and lungs sound fine and I heard no wheezing currently.  She has some lumbar back pain with some mild symptoms involving her left thigh and buttock area  consistent with some mild sciatica.  She is seeing a pain specialist tomorrow and is having an MRI scan.  #1-hypertension, controlled  #2-hyperlipidemia  #3-mild asthma  #4-low back pain with mild left sciatica, workup in progress by pain specialists  #5-history of osteopenia  #6-family history of breast cancer     Problem List Items Addressed This Visit        Cardiovascular and Mediastinum    Essential hypertension - Primary    Hyperlipidemia    Relevant Medications    atorvastatin (LIPITOR) 40 MG tablet       Respiratory    Reactive airway disease without complication       Digestive    Colon polyps    Hypovitaminosis D       Musculoskeletal and Integument    Osteopenia    Relevant Orders    DEXA Bone Density Axial      Other Visit Diagnoses     Post-menopausal        Relevant Orders    DEXA Bone Density Axial          PLAN  the patient will continue current medicines as now.  I ordered a DEXA scan to be done on the patient's April 21 or later because of insurance coverage.  I plan on rechecking the patient in 4 months with a CBC, CMP, lipid panel, TSH and free T4, vitamin D level, urinalysis    There are no Patient Instructions on file for this visit.  Return in about 4 months (around 6/13/2019) for with labs.

## 2019-02-14 ENCOUNTER — HOSPITAL ENCOUNTER (OUTPATIENT)
Dept: MRI IMAGING | Facility: HOSPITAL | Age: 64
Discharge: HOME | End: 2019-02-14
Attending: PAIN MEDICINE | Admitting: PAIN MEDICINE

## 2019-02-14 DIAGNOSIS — M47.816 LUMBAR SPONDYLOSIS: ICD-10-CM

## 2019-02-14 DIAGNOSIS — M51.37 DEGENERATION OF LUMBAR OR LUMBOSACRAL INTERVERTEBRAL DISC: ICD-10-CM

## 2019-02-14 DIAGNOSIS — M54.5 CHRONIC LOW BACK PAIN, UNSPECIFIED BACK PAIN LATERALITY, WITH SCIATICA PRESENCE UNSPECIFIED: ICD-10-CM

## 2019-02-14 DIAGNOSIS — G89.29 CHRONIC LOW BACK PAIN, UNSPECIFIED BACK PAIN LATERALITY, WITH SCIATICA PRESENCE UNSPECIFIED: ICD-10-CM

## 2019-02-14 PROCEDURE — 72148 MRI LUMBAR SPINE W/O DYE: CPT

## 2019-04-19 RX ORDER — TRETINOIN 0.5 MG/G
CREAM TOPICAL NIGHTLY
Qty: 20 G | Refills: 1 | OUTPATIENT
Start: 2019-04-19

## 2019-05-08 ENCOUNTER — APPOINTMENT (OUTPATIENT)
Dept: BONE DENSITY | Facility: HOSPITAL | Age: 64
End: 2019-05-08

## 2019-06-03 DIAGNOSIS — E55.9 HYPOVITAMINOSIS D: ICD-10-CM

## 2019-06-03 DIAGNOSIS — E78.5 HYPERLIPIDEMIA, UNSPECIFIED HYPERLIPIDEMIA TYPE: Primary | ICD-10-CM

## 2019-06-03 DIAGNOSIS — I10 ESSENTIAL HYPERTENSION: ICD-10-CM

## 2019-06-03 DIAGNOSIS — M85.80 OSTEOPENIA, UNSPECIFIED LOCATION: ICD-10-CM

## 2019-06-14 LAB
25(OH)D3+25(OH)D2 SERPL-MCNC: 50.9 NG/ML (ref 30–100)
ALBUMIN SERPL-MCNC: 4.6 G/DL (ref 3.5–5.2)
ALBUMIN/GLOB SERPL: 1.8 G/DL
ALP SERPL-CCNC: 55 U/L (ref 39–117)
ALT SERPL-CCNC: 31 U/L (ref 1–33)
APPEARANCE UR: CLEAR
AST SERPL-CCNC: 23 U/L (ref 1–32)
BASOPHILS # BLD AUTO: 0.05 10*3/MM3 (ref 0–0.2)
BASOPHILS NFR BLD AUTO: 1.1 % (ref 0–1.5)
BILIRUB SERPL-MCNC: 0.4 MG/DL (ref 0.2–1.2)
BILIRUB UR QL STRIP: NEGATIVE
BUN SERPL-MCNC: 11 MG/DL (ref 8–23)
BUN/CREAT SERPL: 17.7 (ref 7–25)
CALCIUM SERPL-MCNC: 9.3 MG/DL (ref 8.6–10.5)
CHLORIDE SERPL-SCNC: 99 MMOL/L (ref 98–107)
CHOLEST SERPL-MCNC: 201 MG/DL (ref 0–200)
CO2 SERPL-SCNC: 29.6 MMOL/L (ref 22–29)
COLOR UR: YELLOW
CREAT SERPL-MCNC: 0.62 MG/DL (ref 0.57–1)
EOSINOPHIL # BLD AUTO: 0.31 10*3/MM3 (ref 0–0.4)
EOSINOPHIL NFR BLD AUTO: 7 % (ref 0.3–6.2)
ERYTHROCYTE [DISTWIDTH] IN BLOOD BY AUTOMATED COUNT: 11.9 % (ref 12.3–15.4)
GLOBULIN SER CALC-MCNC: 2.6 GM/DL
GLUCOSE SERPL-MCNC: 82 MG/DL (ref 65–99)
GLUCOSE UR QL: NEGATIVE
HCT VFR BLD AUTO: 40.7 % (ref 34–46.6)
HDLC SERPL-MCNC: 104 MG/DL (ref 40–60)
HGB BLD-MCNC: 13.2 G/DL (ref 12–15.9)
HGB UR QL STRIP: NEGATIVE
IMM GRANULOCYTES # BLD AUTO: 0.01 10*3/MM3 (ref 0–0.05)
IMM GRANULOCYTES NFR BLD AUTO: 0.2 % (ref 0–0.5)
KETONES UR QL STRIP: NEGATIVE
LDLC SERPL CALC-MCNC: 75 MG/DL (ref 0–100)
LDLC/HDLC SERPL: 0.72 {RATIO}
LEUKOCYTE ESTERASE UR QL STRIP: NEGATIVE
LYMPHOCYTES # BLD AUTO: 1.24 10*3/MM3 (ref 0.7–3.1)
LYMPHOCYTES NFR BLD AUTO: 28.1 % (ref 19.6–45.3)
MCH RBC QN AUTO: 32.6 PG (ref 26.6–33)
MCHC RBC AUTO-ENTMCNC: 32.4 G/DL (ref 31.5–35.7)
MCV RBC AUTO: 100.5 FL (ref 79–97)
MONOCYTES # BLD AUTO: 0.47 10*3/MM3 (ref 0.1–0.9)
MONOCYTES NFR BLD AUTO: 10.7 % (ref 5–12)
NEUTROPHILS # BLD AUTO: 2.33 10*3/MM3 (ref 1.7–7)
NEUTROPHILS NFR BLD AUTO: 52.9 % (ref 42.7–76)
NITRITE UR QL STRIP: NEGATIVE
NRBC BLD AUTO-RTO: 0 /100 WBC (ref 0–0.2)
PH UR STRIP: 6.5 [PH] (ref 5–8)
PLATELET # BLD AUTO: 301 10*3/MM3 (ref 140–450)
POTASSIUM SERPL-SCNC: 4.6 MMOL/L (ref 3.5–5.2)
PROT SERPL-MCNC: 7.2 G/DL (ref 6–8.5)
PROT UR QL STRIP: NEGATIVE
RBC # BLD AUTO: 4.05 10*6/MM3 (ref 3.77–5.28)
SODIUM SERPL-SCNC: 138 MMOL/L (ref 136–145)
SP GR UR: 1.01 (ref 1–1.03)
T4 FREE SERPL-MCNC: 1 NG/DL (ref 0.93–1.7)
TRIGL SERPL-MCNC: 109 MG/DL (ref 0–150)
TSH SERPL DL<=0.005 MIU/L-ACNC: 2.25 MIU/ML (ref 0.27–4.2)
UROBILINOGEN UR STRIP-MCNC: NORMAL MG/DL
VLDLC SERPL CALC-MCNC: 21.8 MG/DL
WBC # BLD AUTO: 4.41 10*3/MM3 (ref 3.4–10.8)

## 2019-07-12 ENCOUNTER — HOSPITAL ENCOUNTER (OUTPATIENT)
Dept: BONE DENSITY | Facility: HOSPITAL | Age: 64
Discharge: HOME | End: 2019-07-12
Admitting: INTERNAL MEDICINE

## 2019-07-12 DIAGNOSIS — Z78.0 POST-MENOPAUSAL: ICD-10-CM

## 2019-07-12 DIAGNOSIS — M85.80 OSTEOPENIA, UNSPECIFIED LOCATION: ICD-10-CM

## 2019-07-12 PROCEDURE — 77080 DXA BONE DENSITY AXIAL: CPT

## 2019-09-04 RX ORDER — AMLODIPINE BESYLATE 5 MG/1
TABLET ORAL
Qty: 30 TABLET | Refills: 4 | OUTPATIENT
Start: 2019-09-04

## 2019-09-05 RX ORDER — AMLODIPINE BESYLATE 5 MG/1
5 TABLET ORAL DAILY
Qty: 30 TABLET | Refills: 0 | Status: SHIPPED | OUTPATIENT
Start: 2019-09-05 | End: 2019-10-04 | Stop reason: SDUPTHER

## 2019-09-12 ENCOUNTER — OFFICE VISIT (OUTPATIENT)
Dept: FAMILY MEDICINE CLINIC | Facility: CLINIC | Age: 64
End: 2019-09-12

## 2019-09-12 VITALS
TEMPERATURE: 98.1 F | HEIGHT: 59 IN | HEART RATE: 66 BPM | WEIGHT: 109 LBS | RESPIRATION RATE: 16 BRPM | SYSTOLIC BLOOD PRESSURE: 130 MMHG | BODY MASS INDEX: 21.97 KG/M2 | OXYGEN SATURATION: 98 % | DIASTOLIC BLOOD PRESSURE: 80 MMHG

## 2019-09-12 DIAGNOSIS — M80.00XS AGE-RELATED OSTEOPOROSIS WITH CURRENT PATHOLOGICAL FRACTURE, SEQUELA: ICD-10-CM

## 2019-09-12 DIAGNOSIS — Z87.81 HISTORY OF COMPRESSION FRACTURE OF SPINE: ICD-10-CM

## 2019-09-12 DIAGNOSIS — I10 ESSENTIAL HYPERTENSION: Primary | ICD-10-CM

## 2019-09-12 DIAGNOSIS — Z23 NEED FOR VACCINATION: ICD-10-CM

## 2019-09-12 DIAGNOSIS — S32.050D CLOSED COMPRESSION FRACTURE OF L5 LUMBAR VERTEBRA WITH ROUTINE HEALING, SUBSEQUENT ENCOUNTER: ICD-10-CM

## 2019-09-12 PROBLEM — M80.00XA AGE-RELATED OSTEOPOROSIS WITH CURRENT PATHOLOGICAL FRACTURE: Status: ACTIVE | Noted: 2017-08-09

## 2019-09-12 PROCEDURE — G0009 ADMIN PNEUMOCOCCAL VACCINE: HCPCS | Performed by: INTERNAL MEDICINE

## 2019-09-12 PROCEDURE — 90732 PPSV23 VACC 2 YRS+ SUBQ/IM: CPT | Performed by: INTERNAL MEDICINE

## 2019-09-12 PROCEDURE — 99214 OFFICE O/P EST MOD 30 MIN: CPT | Performed by: INTERNAL MEDICINE

## 2019-09-12 NOTE — PROGRESS NOTES
Subjective   Nanci Umanzor is a 64 y.o. female. Patient is here today for discussion about her osteoporosis.  She generally feels well but she has a history of an ankle fracture and a wrist fracture that are suspicious for being osteoporotic.  She had a bone density done earlier this year that shows osteoporosis in the hips that were stable but she also had a MRI scan of the lumbar spine showing previous compression fractures at T11 and L5.  Patient has been on alendronate but has a definite family history of osteoporosis and I think is at higher risk that her bone density is indicating.  She has nothing going on currently.  Chief Complaint   Patient presents with   • Osteoporosis     GO OVER DEXA SCAN AND X-RAYS SHE HAD IN PAST FOR HER SPINE   • Muscle Pain     PT HAS AN ACHE IN HER LEFT CALF          Vitals:    09/12/19 1530   BP: 130/80   Pulse: 66   Resp: 16   Temp: 98.1 °F (36.7 °C)   SpO2: 98%     The following portions of the patient's history were reviewed and updated as appropriate: allergies, current medications, past family history, past medical history, past social history, past surgical history and problem list.    Past Medical History:   Diagnosis Date   • Ankle fracture     Rt 2002  Lt 2004   • Anxiety    • Arthritis    • Asthma    • Clavicle fracture     10/21/1970   • Colon polyp    • Depression    • Fibula fracture     left 2002   • Heart murmur    • Hyperlipidemia    • Hypertension    • Pelvis fracture (CMS/[facility])     10/21/1970   • Wrist fracture     2007      Allergies   Allergen Reactions   • Clarithromycin Shortness Of Breath and Palpitations      Social History     Socioeconomic History   • Marital status:      Spouse name: Not on file   • Number of children: 3   • Years of education: AS   • Highest education level: Not on file   Occupational History   • Occupation: ADMINISTRATION     Comment: Disabled   Tobacco Use   • Smoking status: Former Smoker   Substance and Sexual Activity   •  Alcohol use: Yes     Alcohol/week: 0.6 oz     Types: 1 Glasses of wine per week     Comment: WINE DAILY   • Drug use: Defer   • Sexual activity: Defer        Current Outpatient Medications:   •  albuterol sulfate  (90 Base) MCG/ACT inhaler, Inhale 2 puffs Every 4 (Four) Hours As Needed for Wheezing., Disp: 1 inhaler, Rfl: 6  •  alendronate (FOSAMAX) 70 MG tablet, 1 by mouth once a week, 60 minutes before breakfast, with a glass of plain water, Disp: 12 tablet, Rfl: 3  •  amLODIPine (NORVASC) 5 MG tablet, Take 1 tablet by mouth Daily., Disp: 30 tablet, Rfl: 0  •  atorvastatin (LIPITOR) 40 MG tablet, Take 1 tablet by mouth Daily., Disp: 90 tablet, Rfl: 3  •  benazepril (LOTENSIN) 20 MG tablet, TAKE ONE TABLET BY MOUTH DAILY, Disp: 90 tablet, Rfl: 3  •  BIOTIN PO, Take  by mouth., Disp: , Rfl:   •  BLACK COHOSH PO, Take  by mouth., Disp: , Rfl:   •  calcium citrate-vitamin d (CITRACAL) 200-250 MG-UNIT tablet tablet, Take  by mouth Daily., Disp: , Rfl:   •  Cholecalciferol (VITAMIN D-3 PO), Take  by mouth., Disp: , Rfl:   •  fluticasone-salmeterol (ADVAIR DISKUS) 250-50 MCG/DOSE DISKUS, Inhale 1 puff 2 (Two) Times a Day., Disp: 60 each, Rfl: 4  •  Multiple Vitamins-Minerals (CENTRUM SILVER ADULT 50+ PO), Take  by mouth., Disp: , Rfl:   •  Multiple Vitamins-Minerals (OCUVITE ADULT 50+ PO), Take  by mouth., Disp: , Rfl:   •  naproxen (NAPROSYN) 500 MG tablet, Take 500 mg by mouth 2 (Two) Times a Day With Meals., Disp: , Rfl:   •  Omega-3 Fatty Acids (FISH OIL PO), Take  by mouth., Disp: , Rfl:   •  tretinoin (RETIN-A) 0.05 % cream, Apply  topically Every Night., Disp: 20 g, Rfl: 1  •  triamcinolone (KENALOG) 0.1 % cream, Apply topically to involved area up to twice a day as needed for itching, Disp: 30 g, Rfl: 2    Current Facility-Administered Medications:   •  albuterol (PROVENTIL HFA;VENTOLIN HFA) inhaler 2 puff, 2 puff, Inhalation, 4x Daily - RT, Ky Kasper MD     Objective     History of Present Illness      Review of Systems   Constitutional: Negative.    HENT: Negative.    Eyes: Negative.    Respiratory: Negative.    Cardiovascular: Negative.    Gastrointestinal: Negative.    Genitourinary: Negative.    Musculoskeletal: Negative.    Skin: Negative.    Neurological: Negative.    Psychiatric/Behavioral: Negative.        Physical Exam   Constitutional: She is oriented to person, place, and time. She appears well-developed and well-nourished.   Pleasant, cooperative in no acute distress   HENT:   Head: Normocephalic and atraumatic.   Eyes: Conjunctivae are normal. Pupils are equal, round, and reactive to light. No scleral icterus.   Cardiovascular: Normal rate, regular rhythm and normal heart sounds.   Pulmonary/Chest: Effort normal and breath sounds normal. No respiratory distress. She has no wheezes. She has no rales.   Musculoskeletal: Normal range of motion. She exhibits no edema.   Neurological: She is alert and oriented to person, place, and time.   Skin: Skin is warm and dry.   Psychiatric: She has a normal mood and affect. Her behavior is normal.   Nursing note and vitals reviewed.      ASSESSMENT as mentioned above, recent bone density test shows stable osteoporosis at the hips but not in the spine.  Prior MRI scan of the lumbar spine is shown compression fractures at T11 and L5.  Patient also has a history of fractured ankle and wrist for fairly trivial reasons and I suspect they were related to her osteoporosis and she has a family history of it as well.     Problem List Items Addressed This Visit        Cardiovascular and Mediastinum    Essential hypertension - Primary       Musculoskeletal and Integument    Age-related osteoporosis with current pathological fracture     COMPRESSION FX OF T11 AND L5  VERTEBRAE         Closed compression fracture of fifth lumbar vertebra (CMS/HCC)    History of compression fracture of spine          PLAN THE PATIENT RECEIVED A PNEUMOVAX 23 IMMUNIZATION TODAY.  I would like  to get the patient started on Prolia injections and stop the alendronate.  She will continue the calcium and vitamin D.  I also recommended the shingles and Tdap immunizations and a flu shot in October.  I would like to recheck the patient in 4 months with a CMP, lipid panel, vitamin D level     There are no Patient Instructions on file for this visit.  Return in about 4 months (around 1/12/2020) for with labs.

## 2019-09-13 ENCOUNTER — TELEPHONE (OUTPATIENT)
Dept: FAMILY MEDICINE CLINIC | Facility: CLINIC | Age: 64
End: 2019-09-13

## 2019-09-13 ENCOUNTER — HOSPITAL ENCOUNTER (EMERGENCY)
Facility: HOSPITAL | Age: 64
Discharge: HOME | End: 2019-09-13
Attending: EMERGENCY MEDICINE | Admitting: EMERGENCY MEDICINE

## 2019-09-13 VITALS
HEIGHT: 60 IN | SYSTOLIC BLOOD PRESSURE: 124 MMHG | BODY MASS INDEX: 21.4 KG/M2 | OXYGEN SATURATION: 98 % | HEART RATE: 72 BPM | TEMPERATURE: 98.4 F | DIASTOLIC BLOOD PRESSURE: 77 MMHG | WEIGHT: 109 LBS | RESPIRATION RATE: 18 BRPM

## 2019-09-13 DIAGNOSIS — T50.Z95A ADVERSE EFFECT OF VACCINE, INITIAL ENCOUNTER: Primary | ICD-10-CM

## 2019-09-13 PROCEDURE — 99283 EMERGENCY DEPT VISIT LOW MDM: CPT

## 2019-09-13 NOTE — ED NOTES
Pt reports she got the PPSV vaccination yesterday and since then she has had swelling and redness in her left upper arm.     Anna Tyler, RN  09/13/19 5721

## 2019-09-13 NOTE — DISCHARGE INSTRUCTIONS
Keep left arm elevated and limit use of left arm.  Take Tylenol or Motrin for pain.  Apply ice 30 to 45 minutes 5 times a day to help decrease swelling.  Return if any concerns, any thoughts of an infection, worsening of symptoms, or any fever.

## 2019-09-13 NOTE — ED PROVIDER NOTES
EMERGENCY DEPARTMENT ENCOUNTER    Room Number:    Date of encounter:  2019  PCP: Bowen Covarrubias MD  Historian: pt      HPI:  Chief Complaint: Medication reaction  A complete HPI/ROS/PMH/PSH/SH/FH are unobtainable due to: N/A  Context: Nanci Umanzor is a 64 y.o. female who presents to the ED c/o a sudden moderate medication reaction that was noticed by the pt this morning when she noticed some erythema, swelling, and pain to the LUE at the injection site where the pt received a pneumococcal vaccine yesterday.       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   • Essential hypertension 06/10/2016   • Drug therapy 06/10/2016   • Hyperlipidemia 06/10/2016   • Reactive airway disease without complication 06/10/2016   • Colon polyps 2017   • Family history of breast cancer 2017   • Hypovitaminosis D 2017   • Pruritus 2017   • Age-related osteoporosis with current pathological fracture 2017   • Closed compression fracture of fifth lumbar vertebra (CMS/HCC) 2017   • Cellulitis of left upper extremity 2018   • Bunion 2018   • History of compression fracture of spine 2019     Resolved Ambulatory Problems     Diagnosis Date Noted   • Acute bronchitis with bronchospasm 2017     Past Medical History:   Diagnosis Date   • Ankle fracture    • Anxiety    • Arthritis    • Asthma    • Clavicle fracture    • Colon polyp    • Depression    • Fibula fracture    • Heart murmur    • Hyperlipidemia    • Hypertension    • Pelvis fracture (CMS/HCC)    • Wrist fracture          PAST SURGICAL HISTORY  Past Surgical History:   Procedure Laterality Date   • CERVICAL CONIZATION     •  SECTION      ,,   • COLONOSCOPY N/A 2017    Procedure: COLONOSCOPY TO CECUM with cold polypectomy;  Surgeon: Alo Staton Jr., MD;  Location:   [facility];  Service:    • FINGER SURGERY           FAMILY HISTORY  Family History   Problem Relation Age of Onset   • Heart disease Mother    • Hypertension Mother    • Hyperlipidemia Mother    • Rheum arthritis Mother    • Diabetes Father    • Hyperlipidemia Father    • Hypertension Father    • Heart disease Father    • Thyroid disease Father    • Cancer Sister    • Depression Sister    • Glaucoma Sister    • Hypertension Brother    • Lung disease Brother    • Lung disease Daughter    • Heart disease Maternal Aunt    • Rheum arthritis Maternal Aunt    • Heart disease Paternal Aunt    • Heart disease Maternal Grandmother    • Hyperlipidemia Maternal Grandmother    • Rheum arthritis Maternal Grandmother    • Stroke Maternal Grandmother    • Heart disease Maternal Grandfather    • Cancer Maternal Grandfather    • Heart disease Paternal Grandmother    • Hyperlipidemia Paternal Grandmother    • Stroke Paternal Grandmother    • Heart disease Paternal Grandfather    • Cancer Paternal Grandfather    • Hypertension Brother    • Hypertension Brother          SOCIAL HISTORY  Social History     Socioeconomic History   • Marital status:      Spouse name: Not on file   • Number of children: 3   • Years of education: AS   • Highest education level: Not on file   Occupational History   • Occupation: ADMINISTRATION     Comment: Disabled   Tobacco Use   • Smoking status: Former Smoker   Substance and Sexual Activity   • Alcohol use: Yes     Alcohol/week: 0.6 oz     Types: 1 Glasses of wine per week     Comment: WINE DAILY   • Drug use: No   • Sexual activity: Defer         ALLERGIES  Clarithromycin and Pneumococcal vaccines        REVIEW OF SYSTEMS  Review of Systems   Constitutional: Negative for fever.   Respiratory: Negative for shortness of breath.    Cardiovascular: Negative for chest pain.   Musculoskeletal:        Swelling, erythema, and pain to the LUE        All systems reviewed and negative except for those  discussed in HPI.       PHYSICAL EXAM    I have reviewed the triage vital signs and nursing notes.    ED Triage Vitals [09/13/19 1157]   Temp Heart Rate Resp BP SpO2   98.4 °F (36.9 °C) 83 18 -- 98 %      Temp src Heart Rate Source Patient Position BP Location FiO2 (%)   Tympanic -- -- -- --       GENERAL: not distressed  HENT: nares patent  EYES: no scleral icterus  CV: regular rhythm, regular rate, heart sounds normal, intact distal pulses  RESPIRATORY: normal effort, no respiratory distress, CTAB  ABDOMEN: soft, nontender  MUSCULOSKELETAL: no deformity, swelling and pain to upper left extremity just proximal to the elbow, pt placed ice on the area and I compared it to pictures the pt had from this morning and it appears as if the swelling has gone down.  NEURO: alert, moves all extremities, follows commands  SKIN: warm, dry      PROCEDURES    Procedures      MEDICATIONS GIVEN IN ER    Medications - No data to display      PROGRESS, DATA ANALYSIS, CONSULTS, AND MEDICAL DECISION MAKING    1214  Discussed pt case with Ruth Ann from pharmacy.    1218  Rechecked patient who is resting. Discussed plan to discharge the pt with a sling to help stabilize the extremity. Pt understands and agrees with the plan. All questions have been answered.     Pt presents with a localized reaction. I discussed this at length with the pt and discussed treatment options with pharmacy. No lab work is necessary at this time.  Instructed her to not get another pneumococcal vaccine.  Discussion below represents my analysis of pertinent findings related to patient's condition, differential diagnosis, treatment plan and final disposition.           AS OF 12:23 PM VITALS:    BP - 117/82  HR - 83  TEMP - 98.4 °F (36.9 °C) (Tympanic)  02 SATS - 98%        DIAGNOSIS  Final diagnoses:   Adverse effect of vaccine, initial encounter (localized to the left arm)         DISPOSITION  DISCHARGE    Patient discharged in stable condition.    Reviewed  implications of results, diagnosis, meds, responsibility to follow up, warning signs and symptoms of possible worsening, potential complications and reasons to return to ER.    Patient/Family voiced understanding of above instructions.    Discussed plan for discharge, as there is no emergent indication for admission. Patient referred to primary care provider for BP management due to today's BP. Pt/family is agreeable and understands need for follow up and repeat testing.  Pt is aware that discharge does not mean that nothing is wrong but it indicates no emergency is present that requires admission and they must continue care with follow-up as given below or physician of their choice.     FOLLOW-UP  Bowen Covarrubias MD  [id number] [id number] [phone]      As needed    Bowen Covarrubias MD  [id number] [id number] [phone]    In 1 week  Return if pain worsens, If symptoms worsen, shortness of breath, fever, any concerns         Medication List      No changes were made to your prescriptions during this visit.         Documentation assistance provided by elayne Jain for Dr Parish.  Information recorded by the scribe was done at my direction and has been verified and validated by me.         Blessing Jain  09/13/19 9837       Sumit Parish MD  09/13/19 3800

## 2019-09-13 NOTE — TELEPHONE ENCOUNTER
Called pt lm advising pt needed to return call.     ----- Message from Bowen Covarrubias MD sent at 9/13/2019 11:39 AM EDT -----  Contact: [phone]  Some local reaction occasionally happens.  I would just use some ice on it and she can take some Advil or Aleve if she normally uses those or some Tylenol and it should settle down over the next several days.  She will not need to get this vaccination again      ----- Message -----  From: Phu Gray MA  Sent: 9/13/2019  11:22 AM  To: Bowen Covarrubias MD    PLEASE ADVISE    ----- Message -----  From: Estela Dueñas  Sent: 9/13/2019   8:31 AM  To: Dayanara Payan MA    SHE GOT THE NEW  PNEUMONIA SHOT YESTERDAY AND TODAY HER ARM IS SWOLLEN 3 TIMES ITS SIZE- SHOULD SHE BE CONCERNED- IS THERE ANYTHING SHE NEEDS TO DO

## 2019-10-04 ENCOUNTER — OFFICE VISIT (OUTPATIENT)
Dept: FAMILY MEDICINE CLINIC | Facility: CLINIC | Age: 64
End: 2019-10-04

## 2019-10-04 VITALS
RESPIRATION RATE: 16 BRPM | WEIGHT: 108 LBS | BODY MASS INDEX: 21.2 KG/M2 | HEART RATE: 66 BPM | DIASTOLIC BLOOD PRESSURE: 70 MMHG | TEMPERATURE: 98.3 F | SYSTOLIC BLOOD PRESSURE: 112 MMHG | HEIGHT: 60 IN | OXYGEN SATURATION: 98 %

## 2019-10-04 DIAGNOSIS — Z00.00 MEDICARE ANNUAL WELLNESS VISIT, SUBSEQUENT: Primary | ICD-10-CM

## 2019-10-04 PROBLEM — L03.114 CELLULITIS OF LEFT UPPER EXTREMITY: Status: RESOLVED | Noted: 2018-04-11 | Resolved: 2019-10-04

## 2019-10-04 PROBLEM — L29.9 PRURITUS: Status: RESOLVED | Noted: 2017-04-05 | Resolved: 2019-10-04

## 2019-10-04 PROCEDURE — G0439 PPPS, SUBSEQ VISIT: HCPCS | Performed by: NURSE PRACTITIONER

## 2019-10-04 RX ORDER — AMLODIPINE BESYLATE 5 MG/1
5 TABLET ORAL DAILY
Qty: 90 TABLET | Refills: 3 | Status: SHIPPED | OUTPATIENT
Start: 2019-10-04 | End: 2020-11-17

## 2019-10-04 NOTE — PROGRESS NOTES
The ABCs of the Annual Wellness Visit  Subsequent Medicare Wellness Visit    Chief Complaint   Patient presents with   • Medicare Wellness-subsequent       Subjective   History of Present Illness:  Nanci Umanzor is a 64 y.o. female who presents for a Subsequent Medicare Wellness Visit.    HEALTH RISK ASSESSMENT    Recent Hospitalizations:  No hospitalization(s) within the last year.    Current Medical Providers:  Patient Care Team:  Bowen Covarrubias MD as PCP - General (Internal Medicine)    Smoking Status:  Social History     Tobacco Use   Smoking Status Former Smoker       Alcohol Consumption:  Social History     Substance and Sexual Activity   Alcohol Use Yes   • Alcohol/week: 0.6 oz   • Types: 1 Glasses of wine per week    Comment: WINE DAILY       Depression Screen:   PHQ-2/PHQ-9 Depression Screening 10/4/2019   Little interest or pleasure in doing things 0   Feeling down, depressed, or hopeless 0   Trouble falling or staying asleep, or sleeping too much -   Feeling tired or having little energy -   Poor appetite or overeating -   Feeling bad about yourself - or that you are a failure or have let yourself or your family down -   Trouble concentrating on things, such as reading the newspaper or watching television -   Moving or speaking so slowly that other people could have noticed. Or the opposite - being so fidgety or restless that you have been moving around a lot more than usual -   Thoughts that you would be better off dead, or of hurting yourself in some way -   Total Score 0   If you checked off any problems, how difficult have these problems made it for you to do your work, take care of things at home, or get along with other people? -       Fall Risk Screen:  STEADI Fall Risk Assessment was completed, and patient is at MODERATE risk for falls. Assessment completed on:10/4/2019    Health Habits and Functional and Cognitive Screening:  Functional & Cognitive Status 10/4/2019   Do you have difficulty  preparing food and eating? No   Do you have difficulty bathing yourself, getting dressed or grooming yourself? No   Do you have difficulty using the toilet? No   Do you have difficulty moving around from place to place? No   Do you have trouble with steps or getting out of a bed or a chair? No   Current Diet Well Balanced Diet   Dental Exam Up to date   Eye Exam Up to date   Exercise (times per week) 4 times per week   Current Exercise Activities Include Walking   Do you need help using the phone?  No   Are you deaf or do you have serious difficulty hearing?  No   Do you need help with transportation? No   Do you need help shopping? No   Do you need help preparing meals?  No   Do you need help with housework?  No   Do you need help with laundry? No   Do you need help taking your medications? No   Do you need help managing money? No   Do you ever drive or ride in a car without wearing a seat belt? No   Have you felt unusual stress, anger or loneliness in the last month? No   Who do you live with? Alone   If you need help, do you have trouble finding someone available to you? No   Have you been bothered in the last four weeks by sexual problems? No   Do you have difficulty concentrating, remembering or making decisions? No         Does the patient have evidence of cognitive impairment? No    Asprin use counseling:Does not need ASA (and currently is not on it)    Age-appropriate Screening Schedule:  Refer to the list below for future screening recommendations based on patient's age, sex and/or medical conditions. Orders for these recommended tests are listed in the plan section. The patient has been provided with a written plan.    Health Maintenance   Topic Date Due   • ZOSTER VACCINE (2 of 2) 10/12/2020 (Originally 2/26/2013)   • LIPID PANEL  06/14/2020   • MAMMOGRAM  06/15/2020   • DXA SCAN  07/12/2021   • TDAP/TD VACCINES (2 - Td) 01/01/2024   • COLONOSCOPY  06/30/2027   • PNEUMOCOCCAL VACCINES (65+ LOW/MEDIUM  RISK)  Completed   • INFLUENZA VACCINE  Addressed          The following portions of the patient's history were reviewed and updated as appropriate: allergies, current medications, past family history, past medical history, past social history, past surgical history and problem list.    Outpatient Medications Prior to Visit   Medication Sig Dispense Refill   • albuterol sulfate  (90 Base) MCG/ACT inhaler Inhale 2 puffs Every 4 (Four) Hours As Needed for Wheezing. 1 inhaler 6   • amLODIPine (NORVASC) 5 MG tablet Take 1 tablet by mouth Daily. 30 tablet 0   • atorvastatin (LIPITOR) 40 MG tablet Take 1 tablet by mouth Daily. 90 tablet 3   • benazepril (LOTENSIN) 20 MG tablet TAKE ONE TABLET BY MOUTH DAILY 90 tablet 3   • BIOTIN PO Take  by mouth.     • BLACK COHOSH PO Take  by mouth.     • calcium citrate-vitamin d (CITRACAL) 200-250 MG-UNIT tablet tablet Take  by mouth Daily.     • Cholecalciferol (VITAMIN D-3 PO) Take  by mouth.     • fluticasone-salmeterol (ADVAIR DISKUS) 250-50 MCG/DOSE DISKUS Inhale 1 puff 2 (Two) Times a Day. 60 each 4   • Multiple Vitamins-Minerals (CENTRUM SILVER ADULT 50+ PO) Take  by mouth.     • Multiple Vitamins-Minerals (OCUVITE ADULT 50+ PO) Take  by mouth.     • naproxen (NAPROSYN) 500 MG tablet Take 500 mg by mouth 2 (Two) Times a Day With Meals.     • Omega-3 Fatty Acids (FISH OIL PO) Take  by mouth.     • tretinoin (RETIN-A) 0.05 % cream Apply  topically Every Night. 20 g 1   • triamcinolone (KENALOG) 0.1 % cream Apply topically to involved area up to twice a day as needed for itching 30 g 2     Facility-Administered Medications Prior to Visit   Medication Dose Route Frequency Provider Last Rate Last Dose   • albuterol (PROVENTIL HFA;VENTOLIN HFA) inhaler 2 puff  2 puff Inhalation 4x Daily - RT yK Kasper MD           Patient Active Problem List   Diagnosis   • Cervical nerve root disorder   • Essential hypertension   • Hyperlipidemia   • Reactive airway disease without  "complication   • Colon polyps   • Family history of breast cancer   • Hypovitaminosis D   • Age-related osteoporosis with current pathological fracture   • Closed compression fracture of fifth lumbar vertebra (CMS/HCC)   • Bunion   • History of compression fracture of spine       Advanced Care Planning:  Patient does not have an advance directive - information provided to the patient today    Review of Systems    Compared to one year ago, the patient feels her physical health is the same.  Compared to one year ago, the patient feels her mental health is worse.  MEMORY IS WORSENED. SHE STATES SHE USED TO REMEMBER EVERYTHING AND STATES SHE HAS SOME MILD DIFFICULTY WITH SHORT TERM MEMORY . SHE PASSED THE MINI COG EXAM   Reviewed chart for potential of high risk medication in the elderly: yes  Reviewed chart for potential of harmful drug interactions in the elderly:yes    Objective         Vitals:    10/04/19 1251   BP: 112/70   Pulse: 66   Resp: 16   Temp: 98.3 °F (36.8 °C)   TempSrc: Oral   SpO2: 98%   Weight: 49 kg (108 lb)   Height: 152.4 cm (60\")   PainSc: 0-No pain       Body mass index is 21.09 kg/m².  Discussed the patient's BMI with her. The BMI is in the acceptable range.    Physical Exam          Assessment/Plan   Medicare Risks and Personalized Health Plan  CMS Preventative Services Quick Reference  Cardiovascular risk  Immunizations Discussed/Encouraged (specific immunizations; Shingrix )    The above risks/problems have been discussed with the patient.  Pertinent information has been shared with the patient in the After Visit Summary.  Follow up plans and orders are seen below in the Assessment/Plan Section.    Diagnoses and all orders for this visit:    1. Medicare annual wellness visit, subsequent (Primary)      Follow Up:  Return for Next scheduled follow up.     An After Visit Summary and PPPS were given to the patient.             "

## 2019-10-28 ENCOUNTER — TELEPHONE (OUTPATIENT)
Dept: FAMILY MEDICINE CLINIC | Facility: CLINIC | Age: 64
End: 2019-10-28

## 2019-10-28 NOTE — TELEPHONE ENCOUNTER
SENT ORDER WITH AUTH INFO TO [facility] FOR PATIENT.     ----- Message from Colin Figueroa sent at 10/25/2019  3:11 PM EDT -----  Contact: LILIYA FERNANDES  PLEASE SEND PT'S PROLIA REQUEST TO HER MAIL ORDER PHARMACY    PROLIA 60 MG PREFILLED SYRINGE 60 MG SC EVERY 6 MONTHS WITH 3 REFILLS.    MIKE, I'M NOT SURE IF YOU CAN PUT AUTH # [id number] EFF 7-26-19 - 10- PER MITCH @ [phone] @ PT'S PLAN.    SEND TO [facility]

## 2019-11-18 ENCOUNTER — TELEPHONE (OUTPATIENT)
Dept: FAMILY MEDICINE CLINIC | Facility: CLINIC | Age: 64
End: 2019-11-18

## 2020-01-09 DIAGNOSIS — E55.9 HYPOVITAMINOSIS D: ICD-10-CM

## 2020-01-09 DIAGNOSIS — E78.5 HYPERLIPIDEMIA, UNSPECIFIED HYPERLIPIDEMIA TYPE: ICD-10-CM

## 2020-01-14 LAB
25(OH)D3+25(OH)D2 SERPL-MCNC: 49.6 NG/ML (ref 30–100)
ALBUMIN SERPL-MCNC: 4.5 G/DL (ref 3.5–5.2)
ALBUMIN/GLOB SERPL: 1.7 G/DL
ALP SERPL-CCNC: 62 U/L (ref 39–117)
ALT SERPL-CCNC: 29 U/L (ref 1–33)
AST SERPL-CCNC: 25 U/L (ref 1–32)
BILIRUB SERPL-MCNC: 0.4 MG/DL (ref 0.2–1.2)
BUN SERPL-MCNC: 10 MG/DL (ref 8–23)
BUN/CREAT SERPL: 14.9 (ref 7–25)
CALCIUM SERPL-MCNC: 9.1 MG/DL (ref 8.6–10.5)
CHLORIDE SERPL-SCNC: 96 MMOL/L (ref 98–107)
CHOLEST SERPL-MCNC: 210 MG/DL (ref 0–200)
CO2 SERPL-SCNC: 28.2 MMOL/L (ref 22–29)
CREAT SERPL-MCNC: 0.67 MG/DL (ref 0.57–1)
GLOBULIN SER CALC-MCNC: 2.6 GM/DL
GLUCOSE SERPL-MCNC: 81 MG/DL (ref 65–99)
HDLC SERPL-MCNC: 98 MG/DL (ref 40–60)
LDLC SERPL CALC-MCNC: 91 MG/DL (ref 0–100)
LDLC/HDLC SERPL: 0.93 {RATIO}
POTASSIUM SERPL-SCNC: 4.5 MMOL/L (ref 3.5–5.2)
PROT SERPL-MCNC: 7.1 G/DL (ref 6–8.5)
SODIUM SERPL-SCNC: 137 MMOL/L (ref 136–145)
TRIGL SERPL-MCNC: 106 MG/DL (ref 0–150)
VLDLC SERPL CALC-MCNC: 21.2 MG/DL

## 2020-01-21 ENCOUNTER — OFFICE VISIT (OUTPATIENT)
Dept: FAMILY MEDICINE CLINIC | Facility: CLINIC | Age: 65
End: 2020-01-21

## 2020-01-21 VITALS
DIASTOLIC BLOOD PRESSURE: 80 MMHG | BODY MASS INDEX: 20.97 KG/M2 | TEMPERATURE: 98 F | WEIGHT: 106.8 LBS | SYSTOLIC BLOOD PRESSURE: 120 MMHG | OXYGEN SATURATION: 99 % | HEIGHT: 60 IN | RESPIRATION RATE: 18 BRPM | HEART RATE: 66 BPM

## 2020-01-21 DIAGNOSIS — E55.9 HYPOVITAMINOSIS D: ICD-10-CM

## 2020-01-21 DIAGNOSIS — I10 ESSENTIAL HYPERTENSION: ICD-10-CM

## 2020-01-21 DIAGNOSIS — E78.5 HYPERLIPIDEMIA, UNSPECIFIED HYPERLIPIDEMIA TYPE: Primary | ICD-10-CM

## 2020-01-21 DIAGNOSIS — J45.20 MILD INTERMITTENT REACTIVE AIRWAY DISEASE WITHOUT COMPLICATION: ICD-10-CM

## 2020-01-21 DIAGNOSIS — E78.01 FAMILIAL HYPERCHOLESTEROLEMIA: ICD-10-CM

## 2020-01-21 PROCEDURE — 99214 OFFICE O/P EST MOD 30 MIN: CPT | Performed by: INTERNAL MEDICINE

## 2020-01-21 RX ORDER — BENAZEPRIL HYDROCHLORIDE 20 MG/1
20 TABLET ORAL DAILY
Qty: 90 TABLET | Refills: 3 | Status: SHIPPED | OUTPATIENT
Start: 2020-01-21 | End: 2021-03-09 | Stop reason: SDUPTHER

## 2020-01-21 RX ORDER — ATORVASTATIN CALCIUM 40 MG/1
40 TABLET, FILM COATED ORAL DAILY
Qty: 90 TABLET | Refills: 3 | Status: SHIPPED | OUTPATIENT
Start: 2020-01-21 | End: 2021-04-20

## 2020-01-21 NOTE — PROGRESS NOTES
Subjective   Nanci Umanzor is a 64 y.o. female. Patient is here today for follow-up on her hypertension, hyperlipidemia, reactive airway disease, history of vitamin D deficiency.  Patient's generally been feeling well and has had no chest pain, shortness of breath, edema or myalgias.  Chief Complaint   Patient presents with   • Hypertension     HLD- FOLLOW UP LABS          Vitals:    01/21/20 1016   BP: 120/80   Pulse: 66   Resp: 18   Temp: 98 °F (36.7 °C)   SpO2: 99%     Body mass index is 20.86 kg/m².  The following portions of the patient's history were reviewed and updated as appropriate: allergies, current medications, past family history, past medical history, past social history, past surgical history and problem list.    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   • Cataract 2017   • Clavicle fracture     10/21/1970   • Colon polyp    • COPD (chronic obstructive pulmonary disease) (CMS/[facility]) 2006   • Depression    • Fibula fracture     left 2002   • GERD (gastroesophageal reflux disease) 2016   • Glaucoma 2006   • Heart murmur    • Hyperlipidemia    • Hypertension    • Low back pain 1980s   • Osteopenia 2016   • Pelvis fracture (CMS/[facility])     10/21/1970   • Pneumonia    • Visual impairment Since 1yrs of age   • Wrist fracture     2007      Allergies   Allergen Reactions   • Clarithromycin Shortness Of Breath and Palpitations   • Pneumococcal Vaccines Swelling      Social History     Socioeconomic History   • Marital status:      Spouse name: Not on file   • Number of children: 3   • Years of education: AS   • Highest education level: Not on file   Occupational History   • Occupation: ADMINISTRATION     Comment: Disabled   Tobacco Use   • Smoking status: Former Smoker     Packs/day: 1.00     Years: 15.00     Pack years: 15.00      Last attempt to quit: 2013     Years since quittin.8   Substance and Sexual Activity   • Alcohol use: Yes     Alcohol/week: 1.0 standard drinks     Types: 1 Glasses of wine per week     Comment: WINE DAILY   • Drug use: No   • Sexual activity: Not Currently     Partners: Male     Birth control/protection: Post-menopausal        Current Outpatient Medications:   •  albuterol sulfate  (90 Base) MCG/ACT inhaler, Inhale 2 puffs Every 4 (Four) Hours As Needed for Wheezing., Disp: 1 inhaler, Rfl: 6  •  amLODIPine (NORVASC) 5 MG tablet, Take 1 tablet by mouth Daily., Disp: 90 tablet, Rfl: 3  •  atorvastatin (LIPITOR) 40 MG tablet, Take 1 tablet by mouth Daily., Disp: 90 tablet, Rfl: 3  •  benazepril (LOTENSIN) 20 MG tablet, Take 1 tablet by mouth Daily., Disp: 90 tablet, Rfl: 3  •  BIOTIN PO, Take  by mouth., Disp: , Rfl:   •  BLACK COHOSH PO, Take  by mouth., Disp: , Rfl:   •  calcium citrate-vitamin d (CITRACAL) 200-250 MG-UNIT tablet tablet, Take  by mouth Daily., Disp: , Rfl:   •  Cholecalciferol (VITAMIN D-3 PO), Take  by mouth., Disp: , Rfl:   •  fluticasone-salmeterol (ADVAIR DISKUS) 250-50 MCG/DOSE DISKUS, Inhale 1 puff 2 (Two) Times a Day., Disp: 60 each, Rfl: 4  •  Multiple Vitamins-Minerals (CENTRUM SILVER ADULT 50+ PO), Take  by mouth., Disp: , Rfl:   •  Multiple Vitamins-Minerals (OCUVITE ADULT 50+ PO), Take  by mouth., Disp: , Rfl:   •  naproxen (NAPROSYN) 500 MG tablet, Take 500 mg by mouth 2 (Two) Times a Day With Meals., Disp: , Rfl:   •  Omega-3 Fatty Acids (FISH OIL PO), Take  by mouth., Disp: , Rfl:   •  tretinoin (RETIN-A) 0.05 % cream, Apply  topically Every Night., Disp: 20 g, Rfl: 1  •  triamcinolone (KENALOG) 0.1 % cream, Apply topically to involved area up to twice a day as needed for itching, Disp: 30 g, Rfl: 2    Current Facility-Administered Medications:   •  albuterol (PROVENTIL HFA;VENTOLIN HFA) inhaler 2 puff, 2 puff, Inhalation, 4x Daily - RT, Ky Kasper MD      Objective     History of Present Illness     Review of Systems   Constitutional: Negative.    HENT: Negative.    Eyes: Negative.    Respiratory: Negative.    Cardiovascular: Negative.    Gastrointestinal: Negative.    Genitourinary: Negative.    Musculoskeletal: Negative.    Skin: Negative.    Neurological: Negative.    Psychiatric/Behavioral: Negative.        Physical Exam   Constitutional: She is oriented to person, place, and time. She appears well-developed and well-nourished.   Pleasant, cooperative no acute distress, blood pressure 110/70   HENT:   Head: Normocephalic and atraumatic.   Eyes: Pupils are equal, round, and reactive to light. Conjunctivae are normal. No scleral icterus.   Neck: Normal range of motion. Neck supple.   Cardiovascular: Normal rate, regular rhythm and normal heart sounds.   Pulmonary/Chest: Effort normal and breath sounds normal. No respiratory distress. She has no wheezes. She has no rales.   Musculoskeletal: Normal range of motion. She exhibits no edema.   Neurological: She is alert and oriented to person, place, and time.   Skin: Skin is warm and dry.   Psychiatric: She has a normal mood and affect. Her behavior is normal.   Nursing note and vitals reviewed.      ASSESSMENT CMP had a normal sugar and was essentially normal.  Vitamin D level on supplement is normal at 49.6.  Lipid panel is stable with a total cholesterol of 210 but an HDL of 98 and LDL of 91  #1-hypertension, controlled on medication  #2-hyperlipidemia, reasonable control on medication  #3-history of reactive airway disease, essentially asymptomatic and stable  #4-history of vitamin D deficiency, corrected on supplement     Problem List Items Addressed This Visit        Cardiovascular and Mediastinum    Essential hypertension    Relevant Medications    benazepril (LOTENSIN) 20 MG tablet    Hyperlipidemia - Primary    Relevant Medications    atorvastatin (LIPITOR) 40 MG tablet       Respiratory    Reactive airway  disease without complication          PLAN patient will continue current medicines as now and I would like to recheck her in 6 months with a CBC, CMP, lipid panel, vitamin D level and TSH    There are no Patient Instructions on file for this visit.  Return in about 6 months (around 7/21/2020) for with labs.

## 2020-02-20 ENCOUNTER — OFFICE VISIT (OUTPATIENT)
Dept: FAMILY MEDICINE CLINIC | Facility: CLINIC | Age: 65
End: 2020-02-20

## 2020-02-20 VITALS
WEIGHT: 106.4 LBS | RESPIRATION RATE: 16 BRPM | DIASTOLIC BLOOD PRESSURE: 70 MMHG | TEMPERATURE: 98.4 F | HEART RATE: 77 BPM | BODY MASS INDEX: 20.89 KG/M2 | HEIGHT: 60 IN | OXYGEN SATURATION: 95 % | SYSTOLIC BLOOD PRESSURE: 110 MMHG

## 2020-02-20 DIAGNOSIS — J06.9 ACUTE URI: Primary | ICD-10-CM

## 2020-02-20 DIAGNOSIS — J01.00 ACUTE NON-RECURRENT MAXILLARY SINUSITIS: ICD-10-CM

## 2020-02-20 DIAGNOSIS — I10 ESSENTIAL HYPERTENSION: ICD-10-CM

## 2020-02-20 DIAGNOSIS — J45.20 MILD INTERMITTENT REACTIVE AIRWAY DISEASE WITHOUT COMPLICATION: ICD-10-CM

## 2020-02-20 DIAGNOSIS — J40 BRONCHITIS: ICD-10-CM

## 2020-02-20 PROCEDURE — 99214 OFFICE O/P EST MOD 30 MIN: CPT | Performed by: INTERNAL MEDICINE

## 2020-02-20 RX ORDER — AZITHROMYCIN 250 MG/1
TABLET, FILM COATED ORAL
Qty: 6 TABLET | Refills: 0 | Status: SHIPPED | OUTPATIENT
Start: 2020-02-20 | End: 2020-08-19

## 2020-02-20 NOTE — PROGRESS NOTES
Subjective   Nanci Umanzor is a 65 y.o. female. Patient is here today for complaints of an upper respiratory infection.  She has sinus congestion with drainage that is thick and white, slightly irritated throat that is better and coughing with chest congestion but no pleurisy.  She had a low-grade fever yesterday but none today and feels better today.  She was having breathing difficulties yesterday with wheezing but that is improved with albuterol.  Today she feels much better  Chief Complaint   Patient presents with   • Cough     PT HAVING SINUS PROBLEMS AND COUGH AND SETTLING IN HER CHEST           Vitals:    02/20/20 1456   BP: 110/70   Pulse: 77   Resp: 16   Temp: 98.4 °F (36.9 °C)   SpO2: 95%     Body mass index is 20.78 kg/m².  The following portions of the patient's history were reviewed and updated as appropriate: allergies, current medications, past family history, past medical history, past social history, past surgical history and problem list.    Past Medical History:   Diagnosis Date   • Allergic 1990's   • Ankle fracture     Rt 2002  Lt 2004   • Anxiety    • Arthritis    • Asthma    • Cancer (CMS/[facility]) 1992    Cervical cone biopsy for Stage II abnormality   • Cataract 2017   • Clavicle fracture     10/21/1970   • Colon polyp    • COPD (chronic obstructive pulmonary disease) (CMS/[facility]) 2006   • Depression    • Fibula fracture     left 2002   • GERD (gastroesophageal reflux disease) 2016   • Glaucoma 2006   • Heart murmur    • Hyperlipidemia    • Hypertension    • Low back pain 1980s   • Osteopenia 2016   • Pelvis fracture (CMS/[facility])     10/21/1970   • Pneumonia    • Visual impairment Since 1yrs of age   • Wrist fracture     2007      Allergies   Allergen Reactions   • Clarithromycin Shortness Of Breath and Palpitations   • Pneumococcal Vaccines Swelling      Social History     Socioeconomic History   • Marital status:      Spouse name: Not on file   • Number of children: 3   • Years of education:  AS   • Highest education level: Not on file   Occupational History   • Occupation: ADMINISTRATION     Comment: Disabled   Tobacco Use   • Smoking status: Former Smoker     Packs/day: 1.00     Years: 15.00     Pack years: 15.00     Last attempt to quit: 2013     Years since quittin.8   Substance and Sexual Activity   • Alcohol use: Yes     Alcohol/week: 1.0 standard drinks     Types: 1 Glasses of wine per week     Comment: WINE DAILY   • Drug use: No   • Sexual activity: Not Currently     Partners: Male     Birth control/protection: Post-menopausal        Current Outpatient Medications:   •  albuterol sulfate  (90 Base) MCG/ACT inhaler, Inhale 2 puffs Every 4 (Four) Hours As Needed for Wheezing., Disp: 1 inhaler, Rfl: 6  •  amLODIPine (NORVASC) 5 MG tablet, Take 1 tablet by mouth Daily., Disp: 90 tablet, Rfl: 3  •  atorvastatin (LIPITOR) 40 MG tablet, Take 1 tablet by mouth Daily., Disp: 90 tablet, Rfl: 3  •  benazepril (LOTENSIN) 20 MG tablet, Take 1 tablet by mouth Daily., Disp: 90 tablet, Rfl: 3  •  BIOTIN PO, Take  by mouth., Disp: , Rfl:   •  calcium citrate-vitamin d (CITRACAL) 200-250 MG-UNIT tablet tablet, Take  by mouth Daily., Disp: , Rfl:   •  Cholecalciferol (VITAMIN D-3 PO), Take  by mouth., Disp: , Rfl:   •  fluticasone-salmeterol (ADVAIR DISKUS) 250-50 MCG/DOSE DISKUS, Inhale 1 puff 2 (Two) Times a Day., Disp: 60 each, Rfl: 4  •  Multiple Vitamins-Minerals (CENTRUM SILVER ADULT 50+ PO), Take  by mouth., Disp: , Rfl:   •  Multiple Vitamins-Minerals (OCUVITE ADULT 50+ PO), Take  by mouth., Disp: , Rfl:   •  Omega-3 Fatty Acids (FISH OIL PO), Take  by mouth., Disp: , Rfl:   •  tretinoin (RETIN-A) 0.05 % cream, Apply  topically Every Night., Disp: 20 g, Rfl: 1  •  triamcinolone (KENALOG) 0.1 % cream, Apply topically to involved area up to twice a day as needed for itching, Disp: 30 g, Rfl: 2  •  azithromycin (ZITHROMAX Z-TAURUS) 250 MG tablet, Take 2 tablets the first day, then 1 tablet daily  for 4 days., Disp: 6 tablet, Rfl: 0    Current Facility-Administered Medications:   •  albuterol (PROVENTIL HFA;VENTOLIN HFA) inhaler 2 puff, 2 puff, Inhalation, 4x Daily - RT, Ky Kasper MD     Objective     History of Present Illness     Review of Systems   Constitutional: Positive for fever.   HENT: Positive for postnasal drip, rhinorrhea and sore throat. Negative for ear pain.    Respiratory: Positive for shortness of breath and wheezing.    Cardiovascular: Negative.  Negative for chest pain and leg swelling.   Gastrointestinal: Negative.  Negative for abdominal pain and vomiting.   Genitourinary: Negative.    Musculoskeletal: Positive for neck pain.   Skin: Negative.  Negative for rash.   Neurological: Negative.    Psychiatric/Behavioral: Negative.        Physical Exam   Constitutional: She is oriented to person, place, and time. She appears well-developed and well-nourished.   Pleasant, cooperative in no acute distress, blood pressure 110/70 and no significant cough currently   HENT:   Head: Normocephalic and atraumatic.   Nose: Right sinus exhibits maxillary sinus tenderness and frontal sinus tenderness. Left sinus exhibits maxillary sinus tenderness and frontal sinus tenderness.   Eyes: Pupils are equal, round, and reactive to light. Conjunctivae are normal. No scleral icterus.   Neck: Normal range of motion. Neck supple. No thyromegaly present.   Cardiovascular: Normal rate, regular rhythm and normal heart sounds.   Pulmonary/Chest: Effort normal. No respiratory distress. She has wheezes. She has no rales. She exhibits no tenderness.   Patient has no respiratory distress but there are some scattered coarse rhonchi and an occasional mild wheeze.  Air movement however is fine and there is no dyspnea   Musculoskeletal: Normal range of motion. She exhibits no edema.   Lymphadenopathy:     She has no cervical adenopathy.   Neurological: She is alert and oriented to person, place, and time.   Skin: Skin is  warm and dry.   Psychiatric: She has a normal mood and affect. Her behavior is normal.   Nursing note and vitals reviewed.      ASSESSMENT the patient's afebrile and in no respiratory distress and has a normal pulse oximetry.  Her lungs have some scattered coarse rhonchi and occasional mild wheeze but overall good air movement and no rales and nothing to suggest a pneumonia.  Clinically the patient feels much better today than yesterday.  #1-upper respiratory infection with sinusitis and bronchitis with mild bronchospasm, controlled on inhaler  #2-hypertension, controlled     Problem List Items Addressed This Visit        Respiratory    Reactive airway disease without complication      Other Visit Diagnoses     Acute URI    -  Primary    Relevant Medications    azithromycin (ZITHROMAX Z-TAURUS) 250 MG tablet    Bronchitis        Acute non-recurrent maxillary sinusitis              PLAN the patient will continue with symptomatic treatment and albuterol inhaler use as needed.  I did get her prescription for a Z-Taurus to have and she can start on that if her symptoms worsen but she will not use it if she continues to improve.    There are no Patient Instructions on file for this visit.  No follow-ups on file.  Answers for HPI/ROS submitted by the patient on 2/20/2020   Shortness of breath  What is the primary reason for your visit?: Shortness of Breath

## 2020-02-24 RX ORDER — ALBUTEROL SULFATE 90 UG/1
AEROSOL, METERED RESPIRATORY (INHALATION)
Qty: 18 G | Refills: 5 | Status: SHIPPED | OUTPATIENT
Start: 2020-02-24 | End: 2020-04-19 | Stop reason: SDUPTHER

## 2020-03-17 ENCOUNTER — TELEPHONE (OUTPATIENT)
Dept: FAMILY MEDICINE CLINIC | Facility: CLINIC | Age: 65
End: 2020-03-17

## 2020-03-17 NOTE — TELEPHONE ENCOUNTER
albuterol (PROVENTIL HFA;VENTOLIN HFA) inhaler 2 puff   [[id number]]   Order Details   Ordered Dose: 2 puff Route: Inhalation Frequency: 4 Times Daily - RT   Administration Dose: 2 puff          PT CALLED PHARM AND KEPT HANGED UP ON. AND SHE IS NEEDING THE SOLUTION FOR THE MACHINE    SENT TO [facility]     Pharmacy:  [place] [address] AT [facility] [address] [facility] [phone] [facility] [phone] [id number]    THANKS YFN ]

## 2020-03-18 RX ORDER — ALBUTEROL SULFATE 2.5 MG/3ML
2.5 SOLUTION RESPIRATORY (INHALATION) EVERY 4 HOURS PRN
Qty: 3 ML | Refills: 5 | Status: SHIPPED | OUTPATIENT
Start: 2020-03-18 | End: 2020-04-19 | Stop reason: SDUPTHER

## 2020-04-20 RX ORDER — ALBUTEROL SULFATE 2.5 MG/3ML
2.5 SOLUTION RESPIRATORY (INHALATION) EVERY 4 HOURS PRN
Qty: 3 ML | Refills: 5 | Status: SHIPPED | OUTPATIENT
Start: 2020-04-20 | End: 2020-04-21 | Stop reason: SDUPTHER

## 2020-04-20 RX ORDER — ALBUTEROL SULFATE 90 UG/1
2 AEROSOL, METERED RESPIRATORY (INHALATION) EVERY 4 HOURS PRN
Qty: 18 G | Refills: 5 | Status: SHIPPED | OUTPATIENT
Start: 2020-04-20 | End: 2021-11-05 | Stop reason: SDUPTHER

## 2020-04-21 ENCOUNTER — TELEPHONE (OUTPATIENT)
Dept: FAMILY MEDICINE CLINIC | Facility: CLINIC | Age: 65
End: 2020-04-21

## 2020-04-21 RX ORDER — ALBUTEROL SULFATE 2.5 MG/3ML
2.5 SOLUTION RESPIRATORY (INHALATION) EVERY 4 HOURS PRN
Qty: 20 VIAL | Refills: 5 | Status: SHIPPED | OUTPATIENT
Start: 2020-04-21 | End: 2021-11-05 | Stop reason: SDUPTHER

## 2020-04-21 NOTE — TELEPHONE ENCOUNTER
[place] is calling to verify quantity of the following :    albuterol (PROVENTIL) (2.5 MG/3ML) 0.083% nebulizer solution      [phone]

## 2020-05-26 ENCOUNTER — TELEPHONE (OUTPATIENT)
Dept: FAMILY MEDICINE CLINIC | Facility: CLINIC | Age: 65
End: 2020-05-26

## 2020-05-26 NOTE — TELEPHONE ENCOUNTER
PATIENT STATES: That she drop some paper work off at the office 5/19/2020 stating that she cant wear a mask. She needs this form filled out today so she can go back to work tomorrow.  Please advise     PATIENT CAN BE REACHED ON: [phone]

## 2020-06-16 ENCOUNTER — TELEPHONE (OUTPATIENT)
Dept: FAMILY MEDICINE CLINIC | Facility: CLINIC | Age: 65
End: 2020-06-16

## 2020-06-16 DIAGNOSIS — Z12.39 SCREENING FOR BREAST CANCER: Primary | ICD-10-CM

## 2020-06-16 NOTE — TELEPHONE ENCOUNTER
1- Patient is due for  Annual mammogram- wants to go to dx ctr- needs order     2. Pat would like to have a order to be tested for Covid  Antibodies-because  Says in Feb had breathing problems, had to use her nebulizier slight fever saw MD severino regank that did not cure it.   Told her was not sure we can do that but will ask    Pt  [phone]

## 2020-06-22 ENCOUNTER — TELEPHONE (OUTPATIENT)
Dept: FAMILY MEDICINE CLINIC | Facility: CLINIC | Age: 65
End: 2020-06-22

## 2020-06-22 NOTE — TELEPHONE ENCOUNTER
Patient called again and stated that she needs an official letter from Dr. Covarrubias stating the reasons that she should excused from wearing a mask at work due to her health issues (asthma and COPD). She states that her workplace sent her home due to refusing to wear a mask. She stated that she has tried to wear a mask, but it has since caused headaches/dizziness, as well as triggering an asthma episode. Patient provided the following information to contact her workplace.    [employer]  Carolina Sabillonmadhu - Human Resources Rep  [address] [place] [address]  PH: [phone] Ext: [phone]  FAX: [phone]    Please call and advise.  [phone]

## 2020-06-22 NOTE — TELEPHONE ENCOUNTER
PT CALLED SAYING THAT SHE NEEDS A STATEMENT THAT SHE CAN NOT WEAR A MAKE DUE TO HER HEALTH CONCERNS, SUCH AS HER ASTHMA. IT ALSO NEEDS TO INCLUDE HER MEDICATIONS. HE SAYS THAT IT NEEDS TO INCLUDE A FEW OTHER PIECES OF INFORMATION. THIS STATEMENT NEEDS TO BE ON THE OFFICE LETTER HEAD.     PLEASE CALL THAT PT TO GET ALL THE INFORMATION FOR THIS STATEMENT. CALL BACK# [phone].

## 2020-06-24 ENCOUNTER — TELEPHONE (OUTPATIENT)
Dept: FAMILY MEDICINE CLINIC | Facility: CLINIC | Age: 65
End: 2020-06-24

## 2020-06-24 NOTE — TELEPHONE ENCOUNTER
PATIENT CALLED TO MAKE SURE THAT HER LETTER FOR WORK STATES THAT SHE HAS COPD.     PATIENT CALLBACK [phone]

## 2020-07-01 ENCOUNTER — TELEPHONE (OUTPATIENT)
Dept: FAMILY MEDICINE CLINIC | Facility: CLINIC | Age: 65
End: 2020-07-01

## 2020-07-01 DIAGNOSIS — I10 ESSENTIAL HYPERTENSION: ICD-10-CM

## 2020-07-01 DIAGNOSIS — E78.5 HYPERLIPIDEMIA, UNSPECIFIED HYPERLIPIDEMIA TYPE: ICD-10-CM

## 2020-07-01 DIAGNOSIS — J45.20 MILD INTERMITTENT REACTIVE AIRWAY DISEASE WITHOUT COMPLICATION: Primary | ICD-10-CM

## 2020-07-01 DIAGNOSIS — E55.9 HYPOVITAMINOSIS D: ICD-10-CM

## 2020-07-01 NOTE — TELEPHONE ENCOUNTER
PT NEEDS ORDER FOR PROLIA INJECTION.  PT HAD LABS FOR PROLIA ON 7/1/2020.  PT WOULD LIKE TO HAVE HER INJECTION DONE AT THIS OFFICE AND IF POSSIBLE AT HER FU APPT WITH THE DR SCHEDULED FOR 7/10/2020.  PLEASE CONTACT PT T ADVISE IF INJECTION AND APPT WILL BE DONE AT THE SAME APPT     [phone]

## 2020-07-02 LAB
25(OH)D3+25(OH)D2 SERPL-MCNC: 63.7 NG/ML (ref 30–100)
ALBUMIN SERPL-MCNC: 5 G/DL (ref 3.5–5.2)
ALBUMIN/GLOB SERPL: 1.9 G/DL
ALP SERPL-CCNC: 43 U/L (ref 39–117)
ALT SERPL-CCNC: 29 U/L (ref 1–33)
AST SERPL-CCNC: 26 U/L (ref 1–32)
BASOPHILS # BLD AUTO: 0.07 10*3/MM3 (ref 0–0.2)
BASOPHILS NFR BLD AUTO: 1.1 % (ref 0–1.5)
BILIRUB SERPL-MCNC: 0.5 MG/DL (ref 0.2–1.2)
BUN SERPL-MCNC: 11 MG/DL (ref 8–23)
BUN/CREAT SERPL: 15.5 (ref 7–25)
CALCIUM SERPL-MCNC: 9.3 MG/DL (ref 8.6–10.5)
CHLORIDE SERPL-SCNC: 99 MMOL/L (ref 98–107)
CHOLEST SERPL-MCNC: 209 MG/DL (ref 0–200)
CO2 SERPL-SCNC: 28.4 MMOL/L (ref 22–29)
CREAT SERPL-MCNC: 0.71 MG/DL (ref 0.57–1)
EOSINOPHIL # BLD AUTO: 0.46 10*3/MM3 (ref 0–0.4)
EOSINOPHIL NFR BLD AUTO: 7.4 % (ref 0.3–6.2)
ERYTHROCYTE [DISTWIDTH] IN BLOOD BY AUTOMATED COUNT: 11.8 % (ref 12.3–15.4)
GLOBULIN SER CALC-MCNC: 2.6 GM/DL
GLUCOSE SERPL-MCNC: 82 MG/DL (ref 65–99)
HCT VFR BLD AUTO: 38.8 % (ref 34–46.6)
HDLC SERPL-MCNC: 115 MG/DL (ref 40–60)
HGB BLD-MCNC: 13.3 G/DL (ref 12–15.9)
IMM GRANULOCYTES # BLD AUTO: 0.03 10*3/MM3 (ref 0–0.05)
IMM GRANULOCYTES NFR BLD AUTO: 0.5 % (ref 0–0.5)
LDLC SERPL CALC-MCNC: 76 MG/DL (ref 0–100)
LDLC/HDLC SERPL: 0.66 {RATIO}
LYMPHOCYTES # BLD AUTO: 1.56 10*3/MM3 (ref 0.7–3.1)
LYMPHOCYTES NFR BLD AUTO: 25 % (ref 19.6–45.3)
MCH RBC QN AUTO: 32 PG (ref 26.6–33)
MCHC RBC AUTO-ENTMCNC: 34.3 G/DL (ref 31.5–35.7)
MCV RBC AUTO: 93.3 FL (ref 79–97)
MONOCYTES # BLD AUTO: 0.56 10*3/MM3 (ref 0.1–0.9)
MONOCYTES NFR BLD AUTO: 9 % (ref 5–12)
NEUTROPHILS # BLD AUTO: 3.57 10*3/MM3 (ref 1.7–7)
NEUTROPHILS NFR BLD AUTO: 57 % (ref 42.7–76)
NRBC BLD AUTO-RTO: 0 /100 WBC (ref 0–0.2)
PLATELET # BLD AUTO: 325 10*3/MM3 (ref 140–450)
POTASSIUM SERPL-SCNC: 4.6 MMOL/L (ref 3.5–5.2)
PROT SERPL-MCNC: 7.6 G/DL (ref 6–8.5)
RBC # BLD AUTO: 4.16 10*6/MM3 (ref 3.77–5.28)
SODIUM SERPL-SCNC: 138 MMOL/L (ref 136–145)
TRIGL SERPL-MCNC: 89 MG/DL (ref 0–150)
TSH SERPL DL<=0.005 MIU/L-ACNC: 2.38 UIU/ML (ref 0.27–4.2)
VLDLC SERPL CALC-MCNC: 17.8 MG/DL
WBC # BLD AUTO: 6.25 10*3/MM3 (ref 3.4–10.8)

## 2020-07-10 ENCOUNTER — OFFICE VISIT (OUTPATIENT)
Dept: FAMILY MEDICINE CLINIC | Facility: CLINIC | Age: 65
End: 2020-07-10

## 2020-07-10 VITALS
TEMPERATURE: 97.7 F | DIASTOLIC BLOOD PRESSURE: 81 MMHG | SYSTOLIC BLOOD PRESSURE: 127 MMHG | BODY MASS INDEX: 21.36 KG/M2 | RESPIRATION RATE: 16 BRPM | HEART RATE: 81 BPM | WEIGHT: 108.8 LBS | OXYGEN SATURATION: 96 % | HEIGHT: 60 IN

## 2020-07-10 DIAGNOSIS — I10 ESSENTIAL HYPERTENSION: Primary | ICD-10-CM

## 2020-07-10 DIAGNOSIS — E78.5 HYPERLIPIDEMIA, UNSPECIFIED HYPERLIPIDEMIA TYPE: ICD-10-CM

## 2020-07-10 DIAGNOSIS — Z12.31 VISIT FOR SCREENING MAMMOGRAM: ICD-10-CM

## 2020-07-10 DIAGNOSIS — E55.9 HYPOVITAMINOSIS D: ICD-10-CM

## 2020-07-10 DIAGNOSIS — M80.00XS AGE-RELATED OSTEOPOROSIS WITH CURRENT PATHOLOGICAL FRACTURE, SEQUELA: ICD-10-CM

## 2020-07-10 PROCEDURE — 99214 OFFICE O/P EST MOD 30 MIN: CPT | Performed by: INTERNAL MEDICINE

## 2020-07-10 NOTE — PROGRESS NOTES
Subjective   Nanci Umanzor is a 65 y.o. female. Patient is here today for follow-up on her hypertension, hyperlipidemia, vitamin D deficiency, COPD, osteoporosis.  She generally feels well and has no acute complaints.  She is due for mammograms and a Prolia injection.  Chief Complaint   Patient presents with   • Follow-up     labs-hyperlipidemia and hypertension          Vitals:    07/10/20 1029   BP: 127/81   Pulse: 81   Resp: 16   Temp: 97.7 °F (36.5 °C)   SpO2: 96%     Body mass index is 21.25 kg/m².  The following portions of the patient's history were reviewed and updated as appropriate: allergies, current medications, past family history, past medical history, past social history, past surgical history and problem list.    Past Medical History:   Diagnosis Date   • Allergic 1990's   • Ankle fracture     Rt 2002  Lt 2004   • Anxiety    • Arthritis    • Asthma    • Cancer (CMS/[facility]) 1992    Cervical cone biopsy for Stage II abnormality   • Cataract 2017   • Clavicle fracture     10/21/1970   • Colon polyp    • COPD (chronic obstructive pulmonary disease) (CMS/[facility]) 2006   • Depression    • Fibula fracture     left 2002   • GERD (gastroesophageal reflux disease) 2016   • Glaucoma 2006   • Heart murmur    • Hyperlipidemia    • Hypertension    • Low back pain 1980s   • Osteopenia 2016   • Pelvis fracture (CMS/[facility])     10/21/1970   • Pneumonia    • Visual impairment Since 1yrs of age   • Wrist fracture     2007      Allergies   Allergen Reactions   • Clarithromycin Shortness Of Breath and Palpitations   • Pneumococcal Vaccines Swelling      Social History     Socioeconomic History   • Marital status:      Spouse name: Not on file   • Number of children: 3   • Years of education: AS   • Highest education level: Not on file   Occupational History   • Occupation: ADMINISTRATION     Comment: Disabled   Tobacco Use   • Smoking status: Former Smoker     Packs/day: 1.00     Years: 15.00     Pack years: 15.00      Last attempt to quit: 2013     Years since quittin.2   Substance and Sexual Activity   • Alcohol use: Yes     Alcohol/week: 1.0 standard drinks     Types: 1 Glasses of wine per week     Comment: WINE DAILY   • Drug use: No   • Sexual activity: Not Currently     Partners: Male     Birth control/protection: Post-menopausal        Current Outpatient Medications:   •  albuterol (PROVENTIL) (2.5 MG/3ML) 0.083% nebulizer solution, Take 2.5 mg by nebulization Every 4 (Four) Hours As Needed for Wheezing., Disp: 20 vial, Rfl: 5  •  albuterol sulfate HFA (Ventolin HFA) 108 (90 Base) MCG/ACT inhaler, Inhale 2 puffs Every 4 (Four) Hours As Needed for Wheezing., Disp: 18 g, Rfl: 5  •  amLODIPine (NORVASC) 5 MG tablet, Take 1 tablet by mouth Daily., Disp: 90 tablet, Rfl: 3  •  atorvastatin (LIPITOR) 40 MG tablet, Take 1 tablet by mouth Daily., Disp: 90 tablet, Rfl: 3  •  azithromycin (ZITHROMAX Z-TAURUS) 250 MG tablet, Take 2 tablets the first day, then 1 tablet daily for 4 days., Disp: 6 tablet, Rfl: 0  •  benazepril (LOTENSIN) 20 MG tablet, Take 1 tablet by mouth Daily., Disp: 90 tablet, Rfl: 3  •  BIOTIN PO, Take  by mouth., Disp: , Rfl:   •  calcium citrate-vitamin d (CITRACAL) 200-250 MG-UNIT tablet tablet, Take  by mouth Daily., Disp: , Rfl:   •  Cholecalciferol (VITAMIN D-3 PO), Take  by mouth., Disp: , Rfl:   •  fluticasone-salmeterol (Advair Diskus) 250-50 MCG/DOSE DISKUS, Inhale 1 puff 2 (Two) Times a Day., Disp: 60 each, Rfl: 11  •  Multiple Vitamins-Minerals (CENTRUM SILVER ADULT 50+ PO), Take  by mouth., Disp: , Rfl:   •  Multiple Vitamins-Minerals (OCUVITE ADULT 50+ PO), Take  by mouth., Disp: , Rfl:   •  Omega-3 Fatty Acids (FISH OIL PO), Take  by mouth., Disp: , Rfl:   •  tretinoin (RETIN-A) 0.05 % cream, Apply  topically Every Night., Disp: 20 g, Rfl: 1  •  triamcinolone (KENALOG) 0.1 % cream, Apply topically to involved area up to twice a day as needed for itching, Disp: 30 g, Rfl: 2    Current  Facility-Administered Medications:   •  albuterol (PROVENTIL HFA;VENTOLIN HFA) inhaler 2 puff, 2 puff, Inhalation, 4x Daily - RT, Ky Kasper MD     Objective     History of Present Illness     Review of Systems   Constitutional: Negative.    HENT: Negative.    Respiratory: Negative.    Cardiovascular: Negative.    Gastrointestinal: Negative.    Genitourinary: Negative.    Musculoskeletal: Negative.    Skin: Negative.    Neurological: Negative.    Psychiatric/Behavioral: Negative.        Physical Exam   Constitutional: She is oriented to person, place, and time. She appears well-developed and well-nourished.   Pleasant, cooperative no acute distress, 120/80   HENT:   Head: Normocephalic and atraumatic.   Eyes: Conjunctivae are normal. No scleral icterus.   Neck: Normal range of motion. Neck supple.   Cardiovascular: Normal rate, regular rhythm and normal heart sounds.   Pulmonary/Chest: Effort normal and breath sounds normal. No respiratory distress. She has no wheezes. She has no rales.   Musculoskeletal: Normal range of motion. She exhibits no edema.   Neurological: She is alert and oriented to person, place, and time.   Skin: Skin is warm and dry.   Psychiatric: She has a normal mood and affect. Her behavior is normal.   Nursing note and vitals reviewed.      ASSESSMENT CBC is normal.  Vitamin D level is normal on supplement at 63.7.  CMP is completely normal and TSH was normal.  Lipid panel is stable with a total cholesterol of 209, and HDL of 115 and an LDL of 76.  #1-hypertension controlled  #2-hyperlipidemia, stable  #3-COPD, stable  #4-vitamin D deficiency, corrected with supplement  #5-osteoporosis, due a Prolia injection  #6-due for a mammogram     Problem List Items Addressed This Visit        Cardiovascular and Mediastinum    Essential hypertension - Primary    Hyperlipidemia       Digestive    Hypovitaminosis D      Other Visit Diagnoses     Visit for screening mammogram        Relevant Orders     Mammo Screening Bilateral With CAD          PLAN I have ordered mammograms on the patient, refilled medications and want her to get a Prolia injection.  I would like to recheck her in 6 months with a CMP, lipid panel and vitamin D level    There are no Patient Instructions on file for this visit.  Return in about 6 months (around 1/10/2021) for with labs.

## 2020-08-14 ENCOUNTER — TELEPHONE (OUTPATIENT)
Dept: FAMILY MEDICINE CLINIC | Facility: CLINIC | Age: 65
End: 2020-08-14

## 2020-08-14 NOTE — TELEPHONE ENCOUNTER
PATIENT WANTED TO KNOW IF DR WOULD WANT TO REFER TO SPECIALIST-SHE HAS GAS, PRESSURE, CONSTIPATION AND WHEN SHE DOES PASS STOOL IT IS VERY THIN AND RIBBON LIKE     NO DARK STOOLS, SMALL AMOUNT OF BRIGHT RED BLOOD-ONLY WHEN WIPING OR ON UNDERGARMENTS OCCASIONALLY    SHE HAS HISTORY OF POLYPS AND HAS HEMMORRHOIDS    [phone]

## 2020-08-15 ENCOUNTER — HOSPITAL ENCOUNTER (EMERGENCY)
Facility: HOSPITAL | Age: 65
Discharge: HOME | End: 2020-08-15
Attending: EMERGENCY MEDICINE | Admitting: EMERGENCY MEDICINE

## 2020-08-15 ENCOUNTER — APPOINTMENT (OUTPATIENT)
Dept: CT IMAGING | Facility: HOSPITAL | Age: 65
End: 2020-08-15

## 2020-08-15 VITALS
OXYGEN SATURATION: 98 % | TEMPERATURE: 98 F | RESPIRATION RATE: 17 BRPM | HEART RATE: 91 BPM | HEIGHT: 59 IN | DIASTOLIC BLOOD PRESSURE: 71 MMHG | SYSTOLIC BLOOD PRESSURE: 133 MMHG | BODY MASS INDEX: 21.97 KG/M2

## 2020-08-15 DIAGNOSIS — K57.32 SIGMOID DIVERTICULITIS: Primary | ICD-10-CM

## 2020-08-15 LAB
ALBUMIN SERPL-MCNC: 4.5 G/DL (ref 3.5–5.2)
ALBUMIN/GLOB SERPL: 1.6 G/DL
ALP SERPL-CCNC: 54 U/L (ref 39–117)
ALT SERPL W P-5'-P-CCNC: 24 U/L (ref 1–33)
ANION GAP SERPL CALCULATED.3IONS-SCNC: 12.8 MMOL/L (ref 5–15)
AST SERPL-CCNC: 18 U/L (ref 1–32)
BACTERIA UR QL AUTO: ABNORMAL /HPF
BASOPHILS # BLD AUTO: 0.06 10*3/MM3 (ref 0–0.2)
BASOPHILS NFR BLD AUTO: 0.5 % (ref 0–1.5)
BILIRUB SERPL-MCNC: 0.4 MG/DL (ref 0–1.2)
BILIRUB UR QL STRIP: NEGATIVE
BUN SERPL-MCNC: 10 MG/DL (ref 8–23)
BUN/CREAT SERPL: 16.7 (ref 7–25)
CALCIUM SPEC-SCNC: 9.2 MG/DL (ref 8.6–10.5)
CHLORIDE SERPL-SCNC: 93 MMOL/L (ref 98–107)
CLARITY UR: CLEAR
CO2 SERPL-SCNC: 26.2 MMOL/L (ref 22–29)
COLOR UR: ABNORMAL
CREAT SERPL-MCNC: 0.6 MG/DL (ref 0.57–1)
DEPRECATED RDW RBC AUTO: 44.7 FL (ref 37–54)
EOSINOPHIL # BLD AUTO: 0.16 10*3/MM3 (ref 0–0.4)
EOSINOPHIL NFR BLD AUTO: 1.3 % (ref 0.3–6.2)
ERYTHROCYTE [DISTWIDTH] IN BLOOD BY AUTOMATED COUNT: 12.4 % (ref 12.3–15.4)
GFR SERPL CREATININE-BSD FRML MDRD: 100 ML/MIN/1.73
GLOBULIN UR ELPH-MCNC: 2.8 GM/DL
GLUCOSE SERPL-MCNC: 121 MG/DL (ref 65–99)
GLUCOSE UR STRIP-MCNC: NEGATIVE MG/DL
HCT VFR BLD AUTO: 36 % (ref 34–46.6)
HGB BLD-MCNC: 12.1 G/DL (ref 12–15.9)
HGB UR QL STRIP.AUTO: ABNORMAL
HYALINE CASTS UR QL AUTO: ABNORMAL /LPF
IMM GRANULOCYTES # BLD AUTO: 0.04 10*3/MM3 (ref 0–0.05)
IMM GRANULOCYTES NFR BLD AUTO: 0.3 % (ref 0–0.5)
KETONES UR QL STRIP: ABNORMAL
LEUKOCYTE ESTERASE UR QL STRIP.AUTO: ABNORMAL
LIPASE SERPL-CCNC: 40 U/L (ref 13–60)
LYMPHOCYTES # BLD AUTO: 1.03 10*3/MM3 (ref 0.7–3.1)
LYMPHOCYTES NFR BLD AUTO: 8.2 % (ref 19.6–45.3)
MCH RBC QN AUTO: 32.4 PG (ref 26.6–33)
MCHC RBC AUTO-ENTMCNC: 33.6 G/DL (ref 31.5–35.7)
MCV RBC AUTO: 96.3 FL (ref 79–97)
MONOCYTES # BLD AUTO: 0.82 10*3/MM3 (ref 0.1–0.9)
MONOCYTES NFR BLD AUTO: 6.5 % (ref 5–12)
NEUTROPHILS NFR BLD AUTO: 10.44 10*3/MM3 (ref 1.7–7)
NEUTROPHILS NFR BLD AUTO: 83.2 % (ref 42.7–76)
NITRITE UR QL STRIP: NEGATIVE
NRBC BLD AUTO-RTO: 0 /100 WBC (ref 0–0.2)
PH UR STRIP.AUTO: <=5 [PH] (ref 5–8)
PLATELET # BLD AUTO: 240 10*3/MM3 (ref 140–450)
PMV BLD AUTO: 8.4 FL (ref 6–12)
POTASSIUM SERPL-SCNC: 3.6 MMOL/L (ref 3.5–5.2)
PROT SERPL-MCNC: 7.3 G/DL (ref 6–8.5)
PROT UR QL STRIP: ABNORMAL
RBC # BLD AUTO: 3.74 10*6/MM3 (ref 3.77–5.28)
RBC # UR: ABNORMAL /HPF
REF LAB TEST METHOD: ABNORMAL
SODIUM SERPL-SCNC: 132 MMOL/L (ref 136–145)
SP GR UR STRIP: 1.02 (ref 1–1.03)
SQUAMOUS #/AREA URNS HPF: ABNORMAL /HPF
UROBILINOGEN UR QL STRIP: ABNORMAL
WBC # BLD AUTO: 12.55 10*3/MM3 (ref 3.4–10.8)
WBC UR QL AUTO: ABNORMAL /HPF

## 2020-08-15 PROCEDURE — 83690 ASSAY OF LIPASE: CPT | Performed by: EMERGENCY MEDICINE

## 2020-08-15 PROCEDURE — 74177 CT ABD & PELVIS W/CONTRAST: CPT

## 2020-08-15 PROCEDURE — 80053 COMPREHEN METABOLIC PANEL: CPT | Performed by: EMERGENCY MEDICINE

## 2020-08-15 PROCEDURE — 99283 EMERGENCY DEPT VISIT LOW MDM: CPT

## 2020-08-15 PROCEDURE — 25010000002 IOPAMIDOL 61 % SOLUTION: Performed by: EMERGENCY MEDICINE

## 2020-08-15 PROCEDURE — 81001 URINALYSIS AUTO W/SCOPE: CPT | Performed by: EMERGENCY MEDICINE

## 2020-08-15 PROCEDURE — 85025 COMPLETE CBC W/AUTO DIFF WBC: CPT | Performed by: EMERGENCY MEDICINE

## 2020-08-15 RX ORDER — SODIUM CHLORIDE 0.9 % (FLUSH) 0.9 %
10 SYRINGE (ML) INJECTION AS NEEDED
Status: DISCONTINUED | OUTPATIENT
Start: 2020-08-15 | End: 2020-08-15 | Stop reason: HOSPADM

## 2020-08-15 RX ORDER — HYDROCODONE BITARTRATE AND ACETAMINOPHEN 5; 325 MG/1; MG/1
1 TABLET ORAL EVERY 6 HOURS PRN
Qty: 12 TABLET | Refills: 0 | Status: SHIPPED | OUTPATIENT
Start: 2020-08-15 | End: 2020-08-28

## 2020-08-15 RX ORDER — AMOXICILLIN AND CLAVULANATE POTASSIUM 875; 125 MG/1; MG/1
1 TABLET, FILM COATED ORAL 2 TIMES DAILY
Qty: 14 TABLET | Refills: 0 | Status: SHIPPED | OUTPATIENT
Start: 2020-08-15 | End: 2020-08-19 | Stop reason: SDUPTHER

## 2020-08-15 RX ADMIN — IOPAMIDOL 85 ML: 612 INJECTION, SOLUTION INTRAVENOUS at 12:26

## 2020-08-15 NOTE — ED NOTES
Patient was placed in face mask in first look. Patient was wearing facemask when I entered the room and throughout our encounter. I wore full protective equipment throughout this patient encounter including a face mask, and gloves. Hand hygiene was performed before donning protective equipment and after removal when leaving the room.       German Gregg RN  08/15/20 [id number]

## 2020-08-15 NOTE — ED NOTES
Pt reports pain to R lower abdomen that radiates to L side. Reports small bowel movements for the past 2 days; has tried enemas. Denies n/v. Pt wearing mask. This RN wearing mask and safety glasses.       Courtney Bean, RN  08/15/20 6626

## 2020-08-15 NOTE — ED PROVIDER NOTES
EMERGENCY DEPARTMENT ENCOUNTER    Room Number:  24/24  PCP: Bowen Covarrubias MD  Historian: Patient  History Limited By: Nothing      HPI  Chief Complaint: Abdominal pain  Context: Nanci Umanzor is a 65 y.o. female who presents to the ED c/o abdominal pain.  Patient states she started having abdominal pain a couple days ago.  Has noted some pencil thin stools.  States pain is right lower quadrant radiating to the left lower quadrant.  Had no dysuria or vaginal discharge.  No vaginal bleeding.  Patient has a hemorrhoid and states she has had some mild bleeding.  Patient has had no diarrhea.  Has had no vomiting.  Patient has tried some medications for constipation without relief.      Location: Right lower quadrant  Radiation: Left lower quadrant  Character: Aching  Duration: 2 to 3 days  Severity: Moderate  Progression: Improved  Aggravating Factors: Nothing  Alleviating Factors: Nothing        MEDICAL RECORD REVIEW    In [place] patient has history of hypertension hyperlipidemia and asthma          PAST MEDICAL HISTORY  Active Ambulatory Problems     Diagnosis Date Noted   • Cervical nerve root disorder 06/10/2016   • Essential hypertension 06/10/2016   • Hyperlipidemia 06/10/2016   • Reactive airway disease without complication 06/10/2016   • Colon polyps 04/05/2017   • Family history of breast cancer 04/05/2017   • Hypovitaminosis D 04/05/2017   • Age-related osteoporosis with current pathological fracture 08/09/2017   • Closed compression fracture of fifth lumbar vertebra (CMS/HCC) 08/09/2017   • Bunion 11/05/2018   • History of compression fracture of spine 09/12/2019     Resolved Ambulatory Problems     Diagnosis Date Noted   • Drug therapy 06/10/2016   • Pruritus 04/05/2017   • Acute bronchitis with bronchospasm 05/24/2017   • Cellulitis of left upper extremity 04/11/2018     Past Medical History:   Diagnosis Date   • Allergic 1990's   • Ankle fracture    • Anxiety    • Arthritis    • Asthma    • Cancer  (CMS/[facility])    • Cataract    • Clavicle fracture    • Colon polyp    • COPD (chronic obstructive pulmonary disease) (CMS/[facility])    • Depression    • Diverticulosis    • Fibula fracture    • GERD (gastroesophageal reflux disease)    • Glaucoma    • Heart murmur    • Hypertension    • Low back pain    • Osteopenia 2016   • Pelvis fracture (CMS/[facility])    • Pneumonia    • Visual impairment Since 1yrs of age   • Wrist fracture          PAST SURGICAL HISTORY  Past Surgical History:   Procedure Laterality Date   • CERVICAL CONIZATION     •  SECTION      ,,   • COLONOSCOPY N/A 2017    Procedure: COLONOSCOPY TO CECUM with cold polypectomy;  Surgeon: Alo Staton Jr., MD;  Location: [facility] ENDOSCOPY;  Service:    • ENDOMETRIAL ABLATION     • EYE SURGERY     • FINGER SURGERY     • FRACTURE SURGERY     • TUBAL ABDOMINAL LIGATION           FAMILY HISTORY  Family History   Problem Relation Age of Onset   • Heart disease Mother         Mechanical aoritic valve, several heart attacks   • Hypertension Mother    • Hyperlipidemia Mother    • Rheum arthritis Mother    • Arthritis Mother    • Diabetes Father         Type II   • Hyperlipidemia Father    • Hypertension Father    • Heart disease Father    • Thyroid disease Father    • Hearing loss Father         Hearing aid   • Cancer Sister    • Depression Sister    • Glaucoma Sister    • Miscarriages / Stillbirths Sister    • Vision loss Sister    • Hypertension Brother    • Lung disease Brother    • Lung disease Daughter    • Asthma Daughter    • Heart disease Maternal Aunt    • Rheum arthritis Maternal Aunt    • Arthritis Maternal Aunt    • Heart disease Paternal Aunt    • Heart disease Maternal Grandmother    • Hyperlipidemia Maternal Grandmother    • Rheum arthritis Maternal Grandmother    • Stroke Maternal Grandmother    • Arthritis Maternal Grandmother    • Heart disease Maternal Grandfather    • Cancer Maternal  Grandfather         Pancreatic   • Heart disease Paternal Grandmother    • Hyperlipidemia Paternal Grandmother    • Stroke Paternal Grandmother    • Heart disease Paternal Grandfather    • Cancer Paternal Grandfather         Lung   • Hypertension Brother    • Hypertension Brother          SOCIAL HISTORY  Social History     Socioeconomic History   • Marital status:      Spouse name: Not on file   • Number of children: 3   • Years of education: AS   • Highest education level: Not on file   Occupational History   • Occupation: ADMINISTRATION     Comment: Disabled   Tobacco Use   • Smoking status: Former Smoker     Packs/day: 1.00     Years: 15.00     Pack years: 15.00     Last attempt to quit: 2013     Years since quittin.3   Substance and Sexual Activity   • Alcohol use: Yes     Alcohol/week: 1.0 standard drinks     Types: 1 Glasses of wine per week     Comment: WINE DAILY   • Drug use: No   • Sexual activity: Not Currently     Partners: Male     Birth control/protection: Post-menopausal         ALLERGIES  Clarithromycin and Pneumococcal vaccines        REVIEW OF SYSTEMS  Review of Systems   Constitutional: Negative for fever.   HENT: Negative for sore throat.    Eyes: Negative.    Respiratory: Negative for cough and shortness of breath.    Cardiovascular: Negative for chest pain.   Gastrointestinal: Positive for abdominal pain and blood in stool. Negative for diarrhea and vomiting.   Genitourinary: Negative for dysuria.   Musculoskeletal: Negative for neck pain.   Skin: Negative for rash.   Allergic/Immunologic: Negative.    Neurological: Negative for weakness, numbness and headaches.   Hematological: Negative.    Psychiatric/Behavioral: Negative.    All other systems reviewed and are negative.           PHYSICAL EXAM  ED Triage Vitals [08/15/20 1058]   Temp Heart Rate Resp BP SpO2   98 °F (36.7 °C) 103 16 -- 97 %      Temp src Heart Rate Source Patient Position BP Location FiO2 (%)   Tympanic Monitor  -- -- --       Physical Exam   Constitutional: She is oriented to person, place, and time. No distress.   HENT:   Head: Normocephalic and atraumatic.   Eyes: Pupils are equal, round, and reactive to light. EOM are normal.   Neck: Normal range of motion. Neck supple.   Cardiovascular: Normal rate, regular rhythm and normal heart sounds.   Pulmonary/Chest: Effort normal and breath sounds normal. No respiratory distress.   Abdominal: Soft. There is tenderness. There is no rebound and no guarding.   Right lower quadrant   Musculoskeletal: Normal range of motion. She exhibits no edema.   Neurological: She is alert and oriented to person, place, and time. She has normal sensation and normal strength.   Skin: Skin is warm and dry. No rash noted.   Psychiatric: Mood and affect normal.   Nursing note and vitals reviewed.    Patient was wearing a face mask when I entered the room and they continued to wear a mask throughout their stay in the ED.  I wore PPE, including  gloves, face mask with shield or face mask with goggles whenever I was in the room with patient.       LAB RESULTS  Recent Results (from the past 24 hour(s))   Comprehensive Metabolic Panel    Collection Time: 08/15/20 11:16 AM   Result Value Ref Range    Glucose 121 (H) 65 - 99 mg/dL    BUN 10 8 - 23 mg/dL    Creatinine 0.60 0.57 - 1.00 mg/dL    Sodium 132 (L) 136 - 145 mmol/L    Potassium 3.6 3.5 - 5.2 mmol/L    Chloride 93 (L) 98 - 107 mmol/L    CO2 26.2 22.0 - 29.0 mmol/L    Calcium 9.2 8.6 - 10.5 mg/dL    Total Protein 7.3 6.0 - 8.5 g/dL    Albumin 4.50 3.50 - 5.20 g/dL    ALT (SGPT) 24 1 - 33 U/L    AST (SGOT) 18 1 - 32 U/L    Alkaline Phosphatase 54 39 - 117 U/L    Total Bilirubin 0.4 0.0 - 1.2 mg/dL    eGFR Non African Amer 100 >60 mL/min/1.73    Globulin 2.8 gm/dL    A/G Ratio 1.6 g/dL    BUN/Creatinine Ratio 16.7 7.0 - 25.0    Anion Gap 12.8 5.0 - 15.0 mmol/L   Lipase    Collection Time: 08/15/20 11:16 AM   Result Value Ref Range    Lipase 40 13 - 60  U/L   Urinalysis With Microscopic If Indicated (No Culture) - Urine, Clean Catch    Collection Time: 08/15/20 11:16 AM   Result Value Ref Range    Color, UA Dark Yellow (A) Yellow, Straw    Appearance, UA Clear Clear    pH, UA <=5.0 5.0 - 8.0    Specific Gravity, UA 1.019 1.005 - 1.030    Glucose, UA Negative Negative    Ketones, UA Trace (A) Negative    Bilirubin, UA Negative Negative    Blood, UA Trace (A) Negative    Protein, UA Trace (A) Negative    Leuk Esterase, UA Trace (A) Negative    Nitrite, UA Negative Negative    Urobilinogen, UA 0.2 E.U./dL 0.2 - 1.0 E.U./dL   CBC Auto Differential    Collection Time: 08/15/20 11:16 AM   Result Value Ref Range    WBC 12.55 (H) 3.40 - 10.80 10*3/mm3    RBC 3.74 (L) 3.77 - 5.28 10*6/mm3    Hemoglobin 12.1 12.0 - 15.9 g/dL    Hematocrit 36.0 34.0 - 46.6 %    MCV 96.3 79.0 - 97.0 fL    MCH 32.4 26.6 - 33.0 pg    MCHC 33.6 31.5 - 35.7 g/dL    RDW 12.4 12.3 - 15.4 %    RDW-SD 44.7 37.0 - 54.0 fl    MPV 8.4 6.0 - 12.0 fL    Platelets 240 140 - 450 10*3/mm3    Neutrophil % 83.2 (H) 42.7 - 76.0 %    Lymphocyte % 8.2 (L) 19.6 - 45.3 %    Monocyte % 6.5 5.0 - 12.0 %    Eosinophil % 1.3 0.3 - 6.2 %    Basophil % 0.5 0.0 - 1.5 %    Immature Grans % 0.3 0.0 - 0.5 %    Neutrophils, Absolute 10.44 (H) 1.70 - 7.00 10*3/mm3    Lymphocytes, Absolute 1.03 0.70 - 3.10 10*3/mm3    Monocytes, Absolute 0.82 0.10 - 0.90 10*3/mm3    Eosinophils, Absolute 0.16 0.00 - 0.40 10*3/mm3    Basophils, Absolute 0.06 0.00 - 0.20 10*3/mm3    Immature Grans, Absolute 0.04 0.00 - 0.05 10*3/mm3    nRBC 0.0 0.0 - 0.2 /100 WBC   Urinalysis, Microscopic Only - Urine, Clean Catch    Collection Time: 08/15/20 11:16 AM   Result Value Ref Range    RBC, UA 6-12 (A) None Seen, 0-2 /HPF    WBC, UA 0-2 None Seen, 0-2 /HPF    Bacteria, UA None Seen None Seen /HPF    Squamous Epithelial Cells, UA 0-2 None Seen, 0-2 /HPF    Hyaline Casts, UA None Seen None Seen /LPF    Methodology Automated Microscopy        Ordered the  above labs and reviewed the results.        RADIOLOGY  CT Abdomen Pelvis With Contrast   Final Result       Sigmoid acute diverticulitis, with wall thickening, follow-up advised.       No obstructive uropathy. Liver and renal low densities too small to   characterize.       Discussed by telephone with Dr. Hobbs at 1251, 08/15/2020.       This report was finalized on 8/15/2020 12:53 PM by Dr. Shemar Leone M.D.               Ordered the above noted radiological studies. Reviewed by me in PACS.  Discussed with Dr. Leone (radiologist) regarding CT/MRI scan results.          PROCEDURES  Procedures            MEDICATIONS GIVEN IN ER  Medications   sodium chloride 0.9 % flush 10 mL (has no administration in time range)   iopamidol (ISOVUE-300) 61 % injection 100 mL (85 mL Intravenous Given by Other 8/15/20 1226)             PROGRESS AND CONSULTS  ED Course as of Aug 15 1518   Sat Aug 15, 2020   1319 13:19  Patient presents with abdominal pain and appears to have acute sigmoid diverticulitis.  Patient has no abscess or free air.  Patient is nontoxic-appearing.  Discussed options and patient will be discharged home.  Give her small amount of pain medicine as well as Augmentin for home.  She is to return for worsening symptoms.    [SL]      ED Course User Index  [SL] Glenn Hobbs MD           MEDICAL DECISION MAKING      MDM  Number of Diagnoses or Management Options  Sigmoid diverticulitis:      Amount and/or Complexity of Data Reviewed  Clinical lab tests: reviewed and ordered (Elevated wbc)  Tests in the radiology section of CPT®: reviewed and ordered (Acute sigmoid diverticulitis )               DIAGNOSIS  Final diagnoses:   Sigmoid diverticulitis           DISPOSITION  DISCHARGE    Patient discharged in stable condition.    Reviewed implications of results, diagnosis, meds, responsibility to follow up, warning signs and symptoms of possible worsening, potential complications and reasons to return to  ER, including worsening pain, fevers.    Patient/Family voiced understanding of above instructions.    Discussed plan for discharge, as there is no emergent indication for admission. Patient referred to primary care provider for BP management due to today's BP. Pt/family is agreeable and understands need for follow up and repeat testing.  Pt is aware that discharge does not mean that nothing is wrong but it indicates no emergency is present that requires admission and they must continue care with follow-up as given below or physician of their choice.     FOLLOW-UP  Bowen Covarrubias MD  [id number] [id number] [phone]    Schedule an appointment as soon as possible for a visit in 2 days           Medication List      New Prescriptions    amoxicillin-clavulanate 875-125 MG per tablet  Commonly known as:  AUGMENTIN  Take 1 tablet by mouth 2 (Two) Times a Day.     HYDROcodone-acetaminophen 5-325 MG per tablet  Commonly known as:  NORCO  Take 1 tablet by mouth Every 6 (Six) Hours As Needed for Moderate Pain .                Latest Documented Vital Signs:  As of 15:18  BP- 133/71 HR- 91 Temp- 98 °F (36.7 °C) (Tympanic) O2 sat- 98%                         Glenn Hobbs MD  08/15/20 1520

## 2020-08-19 ENCOUNTER — OFFICE VISIT (OUTPATIENT)
Dept: FAMILY MEDICINE CLINIC | Facility: CLINIC | Age: 65
End: 2020-08-19

## 2020-08-19 VITALS
WEIGHT: 108.4 LBS | DIASTOLIC BLOOD PRESSURE: 72 MMHG | OXYGEN SATURATION: 98 % | HEIGHT: 59 IN | HEART RATE: 77 BPM | RESPIRATION RATE: 16 BRPM | TEMPERATURE: 97.5 F | SYSTOLIC BLOOD PRESSURE: 132 MMHG | BODY MASS INDEX: 21.85 KG/M2

## 2020-08-19 DIAGNOSIS — K57.92 DIVERTICULITIS: Primary | ICD-10-CM

## 2020-08-19 DIAGNOSIS — I10 ESSENTIAL HYPERTENSION: ICD-10-CM

## 2020-08-19 LAB
BASOPHILS # BLD AUTO: 0.05 10*3/MM3 (ref 0–0.2)
BASOPHILS NFR BLD AUTO: 0.5 % (ref 0–1.5)
EOSINOPHIL # BLD AUTO: 0.41 10*3/MM3 (ref 0–0.4)
EOSINOPHIL NFR BLD AUTO: 4.2 % (ref 0.3–6.2)
ERYTHROCYTE [DISTWIDTH] IN BLOOD BY AUTOMATED COUNT: 11.8 % (ref 12.3–15.4)
HCT VFR BLD AUTO: 39.5 % (ref 34–46.6)
HGB BLD-MCNC: 13.3 G/DL (ref 12–15.9)
IMM GRANULOCYTES # BLD AUTO: 0.03 10*3/MM3 (ref 0–0.05)
IMM GRANULOCYTES NFR BLD AUTO: 0.3 % (ref 0–0.5)
LYMPHOCYTES # BLD AUTO: 1.37 10*3/MM3 (ref 0.7–3.1)
LYMPHOCYTES NFR BLD AUTO: 14 % (ref 19.6–45.3)
MCH RBC QN AUTO: 32.2 PG (ref 26.6–33)
MCHC RBC AUTO-ENTMCNC: 33.7 G/DL (ref 31.5–35.7)
MCV RBC AUTO: 95.6 FL (ref 79–97)
MONOCYTES # BLD AUTO: 0.79 10*3/MM3 (ref 0.1–0.9)
MONOCYTES NFR BLD AUTO: 8.1 % (ref 5–12)
NEUTROPHILS # BLD AUTO: 7.14 10*3/MM3 (ref 1.7–7)
NEUTROPHILS NFR BLD AUTO: 72.9 % (ref 42.7–76)
NRBC BLD AUTO-RTO: 0 /100 WBC (ref 0–0.2)
PLATELET # BLD AUTO: 374 10*3/MM3 (ref 140–450)
RBC # BLD AUTO: 4.13 10*6/MM3 (ref 3.77–5.28)
WBC # BLD AUTO: 9.79 10*3/MM3 (ref 3.4–10.8)

## 2020-08-19 PROCEDURE — 99214 OFFICE O/P EST MOD 30 MIN: CPT | Performed by: INTERNAL MEDICINE

## 2020-08-19 RX ORDER — AMOXICILLIN AND CLAVULANATE POTASSIUM 875; 125 MG/1; MG/1
1 TABLET, FILM COATED ORAL 2 TIMES DAILY
Qty: 14 TABLET | Refills: 0 | Status: SHIPPED | OUTPATIENT
Start: 2020-08-19 | End: 2020-08-28

## 2020-08-19 NOTE — PROGRESS NOTES
Subjective   Nanci Umanzor is a 65 y.o. female. Patient is here today for follow-up from an emergency room visit on August 15th for severe abdominal pain.  The patient was found to have sigmoid diverticulitis on CT scan and was started on Augmentin and is here for follow-up.  She relates that she is improving.  She has had no nausea or vomiting.  Her abdominal pain is much improved and she is tolerating small amounts of food.  She has had no problems with the Augmentin.  She also is due for a Prolia injection.  She has no jaw or dental problems going on.  Chief Complaint   Patient presents with   • Diverticulitis       (Not on file)-  Risk for Readmission (LACE) No data recorded         Vitals:    08/19/20 1034   BP: 132/72   Pulse: 77   Resp: 16   Temp: 97.5 °F (36.4 °C)   SpO2: 98%     The following portions of the patient's history were reviewed and updated as appropriate: allergies, current medications, past family history, past medical history, past social history, past surgical history and problem list.    Past Medical History:   Diagnosis Date   • Allergic 1990's   • Ankle fracture     Rt 2002  Lt 2004   • Anxiety    • Arthritis    • Asthma    • Cancer (CMS/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) 2006   • Depression    • Diverticulosis    • Fibula fracture     left 2002   • GERD (gastroesophageal reflux disease) 2016   • Glaucoma 2006   • Heart murmur    • Hyperlipidemia    • Hypertension    • Low back pain 1980s   • Osteopenia 2016   • Pelvis fracture (CMS/[facility])     10/21/1970   • Pneumonia    • Visual impairment Since 1yrs of age   • Wrist fracture     2007      Allergies   Allergen Reactions   • Clarithromycin Shortness Of Breath and Palpitations   • Pneumococcal Vaccines Swelling      Social History     Socioeconomic History   • Marital status:       Spouse name: Not on file   • Number of children: 3   • Years of education: AS   • Highest education level: Not on file   Occupational History   • Occupation: ADMINISTRATION     Comment: Disabled   Tobacco Use   • Smoking status: Former Smoker     Packs/day: 1.00     Years: 15.00     Pack years: 15.00     Last attempt to quit: 2013     Years since quittin.3   • Smokeless tobacco: Never Used   Substance and Sexual Activity   • Alcohol use: Yes     Alcohol/week: 1.0 standard drinks     Types: 1 Glasses of wine per week     Comment: WINE DAILY   • Drug use: No   • Sexual activity: Not Currently     Partners: Male     Birth control/protection: Post-menopausal        Current Outpatient Medications:   •  albuterol (PROVENTIL) (2.5 MG/3ML) 0.083% nebulizer solution, Take 2.5 mg by nebulization Every 4 (Four) Hours As Needed for Wheezing., Disp: 20 vial, Rfl: 5  •  albuterol sulfate HFA (Ventolin HFA) 108 (90 Base) MCG/ACT inhaler, Inhale 2 puffs Every 4 (Four) Hours As Needed for Wheezing., Disp: 18 g, Rfl: 5  •  amLODIPine (NORVASC) 5 MG tablet, Take 1 tablet by mouth Daily., Disp: 90 tablet, Rfl: 3  •  amoxicillin-clavulanate (AUGMENTIN) 875-125 MG per tablet, Take 1 tablet by mouth 2 (Two) Times a Day., Disp: 14 tablet, Rfl: 0  •  atorvastatin (LIPITOR) 40 MG tablet, Take 1 tablet by mouth Daily., Disp: 90 tablet, Rfl: 3  •  benazepril (LOTENSIN) 20 MG tablet, Take 1 tablet by mouth Daily., Disp: 90 tablet, Rfl: 3  •  BIOTIN PO, Take  by mouth., Disp: , Rfl:   •  calcium citrate-vitamin d (CITRACAL) 200-250 MG-UNIT tablet tablet, Take  by mouth Daily., Disp: , Rfl:   •  Cholecalciferol (VITAMIN D-3 PO), Take  by mouth., Disp: , Rfl:   •  fluticasone-salmeterol (Advair Diskus) 250-50 MCG/DOSE DISKUS, Inhale 1 puff 2 (Two) Times a Day., Disp: 60 each, Rfl: 11  •  HYDROcodone-acetaminophen (NORCO) 5-325 MG per tablet, Take 1 tablet by mouth Every 6 (Six) Hours As Needed for Moderate Pain ., Disp: 12 tablet, Rfl:  0  •  Multiple Vitamins-Minerals (CENTRUM SILVER ADULT 50+ PO), Take  by mouth., Disp: , Rfl:   •  Multiple Vitamins-Minerals (OCUVITE ADULT 50+ PO), Take  by mouth., Disp: , Rfl:   •  Omega-3 Fatty Acids (FISH OIL PO), Take  by mouth., Disp: , Rfl:   •  tretinoin (RETIN-A) 0.05 % cream, Apply  topically Every Night., Disp: 20 g, Rfl: 1  •  triamcinolone (KENALOG) 0.1 % cream, Apply topically to involved area up to twice a day as needed for itching, Disp: 30 g, Rfl: 2    Current Facility-Administered Medications:   •  albuterol (PROVENTIL HFA;VENTOLIN HFA) inhaler 2 puff, 2 puff, Inhalation, 4x Daily - RT, Ky Kasper MD     Objective     History of Present Illness     Review of Systems   Constitutional: Negative.    HENT: Negative.    Respiratory: Negative.    Cardiovascular: Negative.    Gastrointestinal: Positive for abdominal pain and nausea.   Genitourinary: Negative.    Musculoskeletal: Negative.    Skin: Negative.    Neurological: Negative.    Psychiatric/Behavioral: Negative.        Physical Exam   Constitutional: She is oriented to person, place, and time. She appears well-developed and well-nourished.   Pleasant, cooperative no acute distress and afebrile   HENT:   Head: Normocephalic and atraumatic.   Eyes: Conjunctivae are normal. No scleral icterus.   Neck: Normal range of motion. Neck supple.   Cardiovascular: Normal rate, regular rhythm and normal heart sounds.   Pulmonary/Chest: Effort normal and breath sounds normal. No respiratory distress. She has no wheezes. She has no rales.   Abdominal: Soft. Bowel sounds are normal. There is tenderness.   Bowel sounds are active.  There is some mild tenderness in the lower abdomen that is much improved according to the patient.  There is no rigidity   Musculoskeletal: Normal range of motion. She exhibits no edema.   Neurological: She is alert and oriented to person, place, and time.   Skin: Skin is warm and dry.   Psychiatric: She has a normal mood and  affect. Her behavior is normal.   Nursing note and vitals reviewed.      ASSESSMENT at the emergency room on August 15, lipase was normal at 40.  CMP had a sugar 121 and sodium 132 and was otherwise fine and CBC had an elevated white cell count of 12.55 with a left shift.  CT scan showed acute sigmoid diverticulitis with wall thickening but no evidence for perforation  #1-hypertension controlled  #2-sigmoid diverticulitis, improving on Augmentin  #3-history of osteoporosis, due for a Prolia injection     Problem List Items Addressed This Visit        Cardiovascular and Mediastinum    Essential hypertension       Other    Diverticulitis - Primary    Relevant Orders    CBC & Differential          Current outpatient and discharge medications have been reconciled for the patient.  Reviewed by: Bowen Covarrubias MD      PLAN the patient is due a Prolia injection.  I am going to have a CBC drawn today to recheck on the white cell count.  I refilled the Augmentin and want the patient to take it for an additional week.  I would like to recheck her in about 10 days to make sure the diverticulitis resolves and a repeat CBC then..    There are no Patient Instructions on file for this visit.  Return in about 10 days (around 8/29/2020) for with labs.

## 2020-08-20 ENCOUNTER — TELEPHONE (OUTPATIENT)
Dept: FAMILY MEDICINE CLINIC | Facility: CLINIC | Age: 65
End: 2020-08-20

## 2020-08-20 NOTE — TELEPHONE ENCOUNTER
CALLED AND S/W PT AND ADVISED WHAT DR. GARCIA SAID. PT VOICED UNDERSTANDING AND SAID EVERYDAY SHE IS FEELING A LITTLE BETTER.     ----- Message from Bowen Garcia MD sent at 8/20/2020  7:59 AM EDT -----  We can let her know that her white cell count his gone back to normal which is a good sign that the diverticulitis is settling down      ----- Message -----  From: [place], Reflab Results In  Sent: 8/19/2020   8:09 PM EDT  To: Bowen Garcia MD

## 2020-08-28 ENCOUNTER — OFFICE VISIT (OUTPATIENT)
Dept: FAMILY MEDICINE CLINIC | Facility: CLINIC | Age: 65
End: 2020-08-28

## 2020-08-28 VITALS
DIASTOLIC BLOOD PRESSURE: 70 MMHG | WEIGHT: 107 LBS | BODY MASS INDEX: 21.57 KG/M2 | SYSTOLIC BLOOD PRESSURE: 100 MMHG | OXYGEN SATURATION: 96 % | HEART RATE: 68 BPM | TEMPERATURE: 98.2 F | HEIGHT: 59 IN | RESPIRATION RATE: 17 BRPM

## 2020-08-28 DIAGNOSIS — K57.92 DIVERTICULITIS: Primary | ICD-10-CM

## 2020-08-28 PROCEDURE — 99213 OFFICE O/P EST LOW 20 MIN: CPT | Performed by: INTERNAL MEDICINE

## 2020-08-28 RX ORDER — DENOSUMAB 60 MG/ML
INJECTION SUBCUTANEOUS
COMMUNITY
Start: 2020-08-13 | End: 2021-02-05 | Stop reason: SDUPTHER

## 2020-09-08 ENCOUNTER — HOSPITAL ENCOUNTER (OUTPATIENT)
Dept: MAMMOGRAPHY | Facility: HOSPITAL | Age: 65
Discharge: HOME | End: 2020-09-08
Admitting: INTERNAL MEDICINE

## 2020-09-08 DIAGNOSIS — Z12.31 VISIT FOR SCREENING MAMMOGRAM: ICD-10-CM

## 2020-09-08 PROCEDURE — 77067 SCR MAMMO BI INCL CAD: CPT

## 2020-09-08 PROCEDURE — 77063 BREAST TOMOSYNTHESIS BI: CPT

## 2020-10-07 ENCOUNTER — TELEPHONE (OUTPATIENT)
Dept: FAMILY MEDICINE CLINIC | Facility: CLINIC | Age: 65
End: 2020-10-07

## 2020-10-07 NOTE — TELEPHONE ENCOUNTER
MIKE GARCIA NEEDS TO AMEND THE 8- OFFICE NOTE STATING PT GOT HER PROLIA INJECTION WHEN SHE WAS HER FOR A HOSPITAL FOLLOW UP WITH DR GARCIA ON 8-.    THANKS LILIYA

## 2020-10-12 ENCOUNTER — TELEPHONE (OUTPATIENT)
Dept: FAMILY MEDICINE CLINIC | Facility: CLINIC | Age: 65
End: 2020-10-12

## 2020-10-12 NOTE — TELEPHONE ENCOUNTER
Patient called in and stated she spoke with [facility] and stated they needed Doctor Vivian to reauthorize for patient to get the Prolia shot . Please call [place] at [phone].     Please send patient a message via my chart once this is done .           Patient call back [phone]

## 2020-10-20 ENCOUNTER — OFFICE VISIT (OUTPATIENT)
Dept: FAMILY MEDICINE CLINIC | Facility: CLINIC | Age: 65
End: 2020-10-20

## 2020-10-20 VITALS
DIASTOLIC BLOOD PRESSURE: 79 MMHG | BODY MASS INDEX: 21.37 KG/M2 | WEIGHT: 106 LBS | RESPIRATION RATE: 16 BRPM | OXYGEN SATURATION: 96 % | TEMPERATURE: 98.6 F | SYSTOLIC BLOOD PRESSURE: 130 MMHG | HEART RATE: 66 BPM | HEIGHT: 59 IN

## 2020-10-20 DIAGNOSIS — Z87.891 HISTORY OF SMOKING: ICD-10-CM

## 2020-10-20 DIAGNOSIS — J01.40 ACUTE PANSINUSITIS, RECURRENCE NOT SPECIFIED: ICD-10-CM

## 2020-10-20 DIAGNOSIS — Z12.2 ENCOUNTER FOR SCREENING FOR LUNG CANCER: ICD-10-CM

## 2020-10-20 DIAGNOSIS — Z00.00 MEDICARE ANNUAL WELLNESS VISIT, SUBSEQUENT: Primary | ICD-10-CM

## 2020-10-20 PROCEDURE — 99213 OFFICE O/P EST LOW 20 MIN: CPT | Performed by: NURSE PRACTITIONER

## 2020-10-20 PROCEDURE — G0439 PPPS, SUBSEQ VISIT: HCPCS | Performed by: NURSE PRACTITIONER

## 2020-10-20 RX ORDER — VITAMIN E 268 MG
400 CAPSULE ORAL DAILY
COMMUNITY
End: 2023-01-04

## 2020-10-20 RX ORDER — AMOXICILLIN AND CLAVULANATE POTASSIUM 875; 125 MG/1; MG/1
1 TABLET, FILM COATED ORAL 2 TIMES DAILY
Qty: 20 TABLET | Refills: 0 | Status: SHIPPED | OUTPATIENT
Start: 2020-10-20 | End: 2020-10-30

## 2020-10-20 RX ORDER — PREDNISONE 20 MG/1
20 TABLET ORAL 2 TIMES DAILY
Qty: 8 TABLET | Refills: 0 | Status: SHIPPED | OUTPATIENT
Start: 2020-10-20 | End: 2020-10-24

## 2020-10-20 RX ORDER — ASCORBIC ACID 500 MG
1000 TABLET ORAL DAILY
COMMUNITY

## 2020-10-20 NOTE — PROGRESS NOTES
The ABCs of the Annual Wellness Visit  Subsequent Medicare Wellness Visit    Chief Complaint   Patient presents with   • Medicare Wellness-subsequent       History of Present Illness:  Nanci Umanzor is a 65 y.o. female who presents for a Subsequent Medicare Wellness Visit.  Subjective   HEALTH RISK ASSESSMENT    Recent Hospitalizations:  Recently treated at the following:  [facility]    Current Medical Providers:  Patient Care Team:  Bowen Covarrubias MD as PCP - General (Internal Medicine)    Smoking Status:  Social History     Tobacco Use   Smoking Status Former Smoker   • Packs/day: 1.00   • Years: 15.00   • Pack years: 15.00   • Quit date: 2013   • Years since quittin.5   Smokeless Tobacco Never Used       Alcohol Consumption:  Social History     Substance and Sexual Activity   Alcohol Use Yes   • Alcohol/week: 1.0 standard drinks   • Types: 1 Glasses of wine per week    Comment: WINE DAILY       Depression Screen:   PHQ-2/PHQ-9 Depression Screening 10/20/2020   Little interest or pleasure in doing things 0   Feeling down, depressed, or hopeless 0   Trouble falling or staying asleep, or sleeping too much -   Feeling tired or having little energy -   Poor appetite or overeating -   Feeling bad about yourself - or that you are a failure or have let yourself or your family down -   Trouble concentrating on things, such as reading the newspaper or watching television -   Moving or speaking so slowly that other people could have noticed. Or the opposite - being so fidgety or restless that you have been moving around a lot more than usual -   Thoughts that you would be better off dead, or of hurting yourself in some way -   Total Score 0   If you checked off any problems, how difficult have these problems made it for you to do your work, take care of things at home, or get along with other people? -       Fall Risk Screen:  STEADI Fall Risk Assessment was completed, and patient is at LOW risk  for falls.Assessment completed on:10/20/2020    Health Habits and Functional and Cognitive Screening:  Functional & Cognitive Status 10/20/2020   Do you have difficulty preparing food and eating? No   Do you have difficulty bathing yourself, getting dressed or grooming yourself? No   Do you have difficulty using the toilet? No   Do you have difficulty moving around from place to place? No   Do you have trouble with steps or getting out of a bed or a chair? No   Current Diet Well Balanced Diet   Dental Exam Not up to date   Eye Exam Up to date   Exercise (times per week) 5 times per week   Current Exercise Activities Include Walking   Do you need help using the phone?  No   Are you deaf or do you have serious difficulty hearing?  No   Do you need help with transportation? No   Do you need help shopping? No   Do you need help preparing meals?  No   Do you need help with housework?  No   Do you need help with laundry? No   Do you need help taking your medications? No   Do you need help managing money? No   Do you ever drive or ride in a car without wearing a seat belt? No   Have you felt unusual stress, anger or loneliness in the last month? -   Who do you live with? -   If you need help, do you have trouble finding someone available to you? -   Have you been bothered in the last four weeks by sexual problems? -   Do you have difficulty concentrating, remembering or making decisions? -     Last dental exam: 1 year ago  Does the patient have evidence of cognitive impairment? No    Asprin use counseling:Does not need ASA (and currently is not on it)    Age-appropriate Screening Schedule:  Refer to the list below for future screening recommendations based on patient's age, sex and/or medical conditions. Orders for these recommended tests are listed in the plan section. The patient has been provided with a written plan.    Health Maintenance   Topic Date Due   • ZOSTER VACCINE (2 of 2) 02/26/2013   • INFLUENZA VACCINE   08/01/2020   • LIPID PANEL  07/01/2021   • DXA SCAN  07/12/2021   • COLONOSCOPY  06/30/2022   • MAMMOGRAM  09/08/2022   • TDAP/TD VACCINES (2 - Td) 01/01/2024          The following portions of the patient's history were reviewed and updated as appropriate: allergies, current medications, past family history, past medical history, past social history, past surgical history and problem list.    Outpatient Medications Prior to Visit   Medication Sig Dispense Refill   • albuterol (PROVENTIL) (2.5 MG/3ML) 0.083% nebulizer solution Take 2.5 mg by nebulization Every 4 (Four) Hours As Needed for Wheezing. 20 vial 5   • albuterol sulfate HFA (Ventolin HFA) 108 (90 Base) MCG/ACT inhaler Inhale 2 puffs Every 4 (Four) Hours As Needed for Wheezing. 18 g 5   • amLODIPine (NORVASC) 5 MG tablet Take 1 tablet by mouth Daily. 90 tablet 3   • atorvastatin (LIPITOR) 40 MG tablet Take 1 tablet by mouth Daily. 90 tablet 3   • benazepril (LOTENSIN) 20 MG tablet Take 1 tablet by mouth Daily. 90 tablet 3   • BIOTIN PO Take  by mouth.     • calcium citrate-vitamin d (CITRACAL) 200-250 MG-UNIT tablet tablet Take  by mouth Daily.     • Cholecalciferol (VITAMIN D-3 PO) Take  by mouth.     • fluticasone-salmeterol (Advair Diskus) 250-50 MCG/DOSE DISKUS Inhale 1 puff 2 (Two) Times a Day. 60 each 11   • Multiple Vitamins-Minerals (CENTRUM SILVER ADULT 50+ PO) Take  by mouth.     • Multiple Vitamins-Minerals (OCUVITE ADULT 50+ PO) Take  by mouth.     • Omega-3 Fatty Acids (FISH OIL PO) Take  by mouth.     • PROLIA 60 MG/ML solution prefilled syringe syringe      • tretinoin (RETIN-A) 0.05 % cream Apply  topically Every Night. 20 g 1   • triamcinolone (KENALOG) 0.1 % cream Apply topically to involved area up to twice a day as needed for itching 30 g 2   • vitamin C (ASCORBIC ACID) 500 MG tablet Take 500 mg by mouth Daily.     • vitamin E 400 UNIT capsule Take 400 Units by mouth Daily.       Facility-Administered Medications Prior to Visit  "  Medication Dose Route Frequency Provider Last Rate Last Dose   • albuterol (PROVENTIL HFA;VENTOLIN HFA) inhaler 2 puff  2 puff Inhalation 4x Daily - RT Ky Kasper MD           Patient Active Problem List   Diagnosis   • Cervical nerve root disorder   • Essential hypertension   • Hyperlipidemia   • Reactive airway disease without complication   • Colon polyps   • Family history of breast cancer   • Hypovitaminosis D   • Age-related osteoporosis with current pathological fracture   • Closed compression fracture of fifth lumbar vertebra (CMS/HCC)   • Bunion   • History of compression fracture of spine   • Diverticulitis   • COPD (chronic obstructive pulmonary disease) with chronic bronchitis (CMS/HCC)       Advanced Care Planning:  ACP discussion was held with the patient during this visit. Patient does not have an advance directive, information provided.    Review of Systems   Constitutional: Negative for fever.   HENT: Positive for congestion, rhinorrhea and sinus pressure. Negative for ear pain and sore throat.    Respiratory: Negative for cough and shortness of breath.    Cardiovascular: Negative for chest pain and palpitations.   Gastrointestinal: Positive for abdominal pain (left upper abd.). Negative for diarrhea, nausea and vomiting.   Genitourinary: Negative.    Musculoskeletal: Negative for arthralgias.   Neurological: Positive for headaches.       Compared to one year ago, the patient feels her physical health is the same.  Compared to one year ago, the patient feels her mental health is better.    Reviewed chart for potential of high risk medication in the elderly: yes  Reviewed chart for potential of harmful drug interactions in the elderly:yes    Objective         Vitals:    10/20/20 1315   BP: 130/79   BP Location: Left arm   Patient Position: Sitting   Cuff Size: Adult   Pulse: 66   Resp: 16   Temp: 98.6 °F (37 °C)   SpO2: 96%   Weight: 48.1 kg (106 lb)   Height: 149.9 cm (59\")       Body mass " index is 21.41 kg/m².  Discussed the patient's BMI with her. The BMI is in the acceptable range.    Physical Exam  Vitals signs reviewed.   HENT:      Head: Normocephalic.      Right Ear: Tympanic membrane normal. Tympanic membrane is not erythematous.      Left Ear: Tympanic membrane normal. Tympanic membrane is not erythematous.      Nose: Congestion present.      Right Sinus: Maxillary sinus tenderness and frontal sinus tenderness present.      Left Sinus: Maxillary sinus tenderness and frontal sinus tenderness present.      Mouth/Throat:      Mouth: Mucous membranes are moist.      Pharynx: Oropharyngeal exudate (pnd) present. No posterior oropharyngeal erythema.   Eyes:      Pupils: Pupils are equal, round, and reactive to light.   Cardiovascular:      Rate and Rhythm: Normal rate and regular rhythm.   Pulmonary:      Effort: No accessory muscle usage or respiratory distress.      Breath sounds: No stridor. Examination of the right-upper field reveals wheezing and rhonchi. Examination of the left-upper field reveals wheezing and rhonchi. Wheezing and rhonchi present. No decreased breath sounds or rales.   Musculoskeletal: Normal range of motion.   Lymphadenopathy:      Cervical: No cervical adenopathy.   Neurological:      Mental Status: She is alert and oriented to person, place, and time.               Assessment/Plan   Medicare Risks and Personalized Health Plan  CMS Preventative Services Quick Reference  Advance Directive Discussion  Immunizations Discussed/Encouraged (specific immunizations; Influenza and Shingrix )  Lung Cancer Risk    The above risks/problems have been discussed with the patient.  Pertinent information has been shared with the patient in the After Visit Summary.  Follow up plans and orders are seen below in the Assessment/Plan Section.    Diagnoses and all orders for this visit:    1. Medicare annual wellness visit, subsequent (Primary)    2. History of smoking  -     CT Chest Low Dose  Wo    3. Acute pansinusitis, recurrence not specified  -     amoxicillin-clavulanate (Augmentin) 875-125 MG per tablet; Take 1 tablet by mouth 2 (Two) Times a Day for 10 days.  Dispense: 20 tablet; Refill: 0  -     predniSONE (DELTASONE) 20 MG tablet; Take 1 tablet by mouth 2 (Two) Times a Day for 4 days.  Dispense: 8 tablet; Refill: 0      Follow Up:  Return if symptoms worsen or fail to improve, for Dr. Covarrubias as needed/as recommended.     Pt to continue using albuterol inhaler and advair diskus as prescribed daily, to start augmentin and prednisone today, if no improvement or worsening of symptoms to go to UC. Pt verb. Understanding.     An After Visit Summary and PPPS were given to the patient.

## 2020-11-17 RX ORDER — AMLODIPINE BESYLATE 5 MG/1
TABLET ORAL
Qty: 90 TABLET | Refills: 2 | Status: SHIPPED | OUTPATIENT
Start: 2020-11-17 | End: 2021-09-21 | Stop reason: SDUPTHER

## 2021-01-11 DIAGNOSIS — E78.5 HYPERLIPIDEMIA, UNSPECIFIED HYPERLIPIDEMIA TYPE: ICD-10-CM

## 2021-01-11 DIAGNOSIS — E55.9 HYPOVITAMINOSIS D: ICD-10-CM

## 2021-02-05 RX ORDER — DENOSUMAB 60 MG/ML
60 INJECTION SUBCUTANEOUS ONCE
Qty: 1 ML | Refills: 2 | Status: SHIPPED | OUTPATIENT
Start: 2021-02-05 | End: 2023-04-03

## 2021-02-05 NOTE — TELEPHONE ENCOUNTER
PLEASE SEND NEW RX FOR     PROLIA 60 MG/ML 1 SC  EVERY 6 MONTHS WITH 2 REFILLS PLEASE    SEND TO     [facility]   [place]   [phone]

## 2021-02-09 ENCOUNTER — OFFICE VISIT (OUTPATIENT)
Dept: FAMILY MEDICINE CLINIC | Facility: CLINIC | Age: 66
End: 2021-02-09

## 2021-02-09 VITALS
DIASTOLIC BLOOD PRESSURE: 80 MMHG | WEIGHT: 106.4 LBS | HEIGHT: 59 IN | HEART RATE: 72 BPM | TEMPERATURE: 98.4 F | SYSTOLIC BLOOD PRESSURE: 130 MMHG | RESPIRATION RATE: 16 BRPM | OXYGEN SATURATION: 99 % | BODY MASS INDEX: 21.45 KG/M2

## 2021-02-09 DIAGNOSIS — M54.50 CHRONIC LOW BACK PAIN, UNSPECIFIED BACK PAIN LATERALITY, UNSPECIFIED WHETHER SCIATICA PRESENT: ICD-10-CM

## 2021-02-09 DIAGNOSIS — M54.2 NECK PAIN: ICD-10-CM

## 2021-02-09 DIAGNOSIS — G89.29 CHRONIC LOW BACK PAIN, UNSPECIFIED BACK PAIN LATERALITY, UNSPECIFIED WHETHER SCIATICA PRESENT: ICD-10-CM

## 2021-02-09 DIAGNOSIS — I10 ESSENTIAL HYPERTENSION: Primary | ICD-10-CM

## 2021-02-09 PROCEDURE — 99213 OFFICE O/P EST LOW 20 MIN: CPT | Performed by: INTERNAL MEDICINE

## 2021-02-09 NOTE — PROGRESS NOTES
Subjective  Answers for HPI/ROS submitted by the patient on 2/7/2021   Neurologic complaint  What is the primary reason for your visit?: Neurological Problem    Nanci Umanzor is a 66 y.o. female. Patient is here today for some complaints of some numb feeling on the right side in her arm and also on her leg.  She has chronic neck problems as well as lower back discomfort and has had sciatica.  She has had periodic epidural shots which generally do well with her.  Since her symptoms started, she has improved.  There is been no definite weakness and the numbness and tingling have essentially resolved.  She has good range of motion and function with the arms and hands.  Chief Complaint   Patient presents with   • Numbness     PT HAS BEEN HAVING NUMBNESS ON HER RIGHT SIDE- THINKS IT IS RELATED TO HER SCIATICA           Vitals:    02/09/21 0938   BP: 130/80   Pulse: 72   Resp: 16   Temp: 98.4 °F (36.9 °C)   SpO2: 99%     Body mass index is 21.48 kg/m².  The following portions of the patient's history were reviewed and updated as appropriate: allergies, current medications, past family history, past medical history, past social history, past surgical history and problem list.    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   • Cataract 2017   • Clavicle fracture     10/21/1970   • Colon polyp    • COPD (chronic obstructive pulmonary disease) (CMS/[facility]) 2006   • Depression    • Diverticulosis    • Fibula fracture     left 2002   • GERD (gastroesophageal reflux disease) 2016   • Glaucoma 2006   • Heart murmur    • Hyperlipidemia    • Hypertension    • Low back pain 1980s   • Osteopenia 2016   • Pelvis fracture (CMS/[facility])     10/21/1970   • Pneumonia    • Visual impairment Since 1yrs of age   • Wrist fracture     2007      Allergies   Allergen Reactions   •  Clarithromycin Shortness Of Breath and Palpitations   • Pneumococcal Vaccines Swelling      Social History     Socioeconomic History   • Marital status:      Spouse name: Not on file   • Number of children: 3   • Years of education: AS   • Highest education level: Not on file   Occupational History   • Occupation: ADMINISTRATION     Comment: Disabled   Tobacco Use   • Smoking status: Former Smoker     Packs/day: 1.00     Years: 15.00     Pack years: 15.00     Quit date: 2013     Years since quittin.8   • Smokeless tobacco: Never Used   Substance and Sexual Activity   • Alcohol use: Yes     Alcohol/week: 1.0 standard drinks     Types: 1 Glasses of wine per week     Comment: WINE DAILY   • Drug use: No   • Sexual activity: Not Currently     Partners: Male     Birth control/protection: Post-menopausal        Current Outpatient Medications:   •  albuterol (PROVENTIL) (2.5 MG/3ML) 0.083% nebulizer solution, Take 2.5 mg by nebulization Every 4 (Four) Hours As Needed for Wheezing., Disp: 20 vial, Rfl: 5  •  albuterol sulfate HFA (Ventolin HFA) 108 (90 Base) MCG/ACT inhaler, Inhale 2 puffs Every 4 (Four) Hours As Needed for Wheezing., Disp: 18 g, Rfl: 5  •  amLODIPine (NORVASC) 5 MG tablet, TAKE ONE TABLET BY MOUTH DAILY, Disp: 90 tablet, Rfl: 2  •  atorvastatin (LIPITOR) 40 MG tablet, Take 1 tablet by mouth Daily., Disp: 90 tablet, Rfl: 3  •  benazepril (LOTENSIN) 20 MG tablet, Take 1 tablet by mouth Daily., Disp: 90 tablet, Rfl: 3  •  BIOTIN PO, Take  by mouth., Disp: , Rfl:   •  calcium citrate-vitamin d (CITRACAL) 200-250 MG-UNIT tablet tablet, Take  by mouth Daily., Disp: , Rfl:   •  Cholecalciferol (VITAMIN D-3 PO), Take  by mouth., Disp: , Rfl:   •  fluticasone-salmeterol (Advair Diskus) 250-50 MCG/DOSE DISKUS, Inhale 1 puff 2 (Two) Times a Day., Disp: 60 each, Rfl: 11  •  Multiple Vitamins-Minerals (CENTRUM SILVER ADULT 50+ PO), Take  by mouth., Disp: , Rfl:   •  Multiple Vitamins-Minerals (OCUVITE  ADULT 50+ PO), Take  by mouth., Disp: , Rfl:   •  Omega-3 Fatty Acids (FISH OIL PO), Take  by mouth., Disp: , Rfl:   •  tretinoin (RETIN-A) 0.05 % cream, Apply  topically Every Night., Disp: 20 g, Rfl: 1  •  triamcinolone (KENALOG) 0.1 % cream, Apply topically to involved area up to twice a day as needed for itching, Disp: 30 g, Rfl: 2  •  vitamin C (ASCORBIC ACID) 500 MG tablet, Take 500 mg by mouth Daily., Disp: , Rfl:   •  vitamin E 400 UNIT capsule, Take 400 Units by mouth Daily., Disp: , Rfl:   •  Prolia 60 MG/ML solution prefilled syringe syringe, Inject 1 mL under the skin into the appropriate area as directed 1 (One) Time for 1 dose., Disp: 1 mL, Rfl: 2    Current Facility-Administered Medications:   •  albuterol (PROVENTIL HFA;VENTOLIN HFA) inhaler 2 puff, 2 puff, Inhalation, 4x Daily - RT, Ky Kasper MD     Objective     History of Present Illness     Review of Systems   Constitutional: Negative for diaphoresis, fatigue and fever.   HENT: Negative.    Respiratory: Negative.  Negative for shortness of breath.    Cardiovascular: Negative.  Negative for chest pain and palpitations.   Gastrointestinal: Negative.  Negative for abdominal pain, nausea and vomiting.   Genitourinary: Negative.    Musculoskeletal: Positive for back pain and neck pain.   Skin: Negative.    Neurological: Positive for weakness. Negative for dizziness, syncope, light-headedness and headaches.   Psychiatric/Behavioral: Negative.  Negative for confusion.       Physical Exam  Vitals signs and nursing note reviewed.   Constitutional:       General: She is not in acute distress.     Appearance: Normal appearance. She is normal weight. She is not ill-appearing.   HENT:      Head: Normocephalic and atraumatic.   Eyes:      General: No scleral icterus.     Conjunctiva/sclera: Conjunctivae normal.   Neck:      Musculoskeletal: Normal range of motion.   Cardiovascular:      Rate and Rhythm: Normal rate and regular rhythm.      Heart sounds:  Normal heart sounds.   Pulmonary:      Effort: Pulmonary effort is normal. No respiratory distress.      Breath sounds: Normal breath sounds. No wheezing or rales.   Musculoskeletal: Normal range of motion.      Comments: Mild tenderness in the neck and lower back but straight leg raising is negative on the right and there is no weakness or loss of function involving the right arm   Skin:     General: Skin is warm and dry.   Neurological:      Mental Status: She is alert and oriented to person, place, and time.   Psychiatric:         Mood and Affect: Mood normal.         Behavior: Behavior normal.         ASSESSMENT patient has chronic neck and lower back pain.  She was having some probably slight radicular symptoms involving the right arm and leg that have essentially resolved.  Ibuprofen or Aleve are helpful and she can continue to use those.     Problems Addressed this Visit        Cardiac and Vasculature    Essential hypertension - Primary      Other Visit Diagnoses     Neck pain        Chronic low back pain, unspecified back pain laterality, unspecified whether sciatica present          Diagnoses       Codes Comments    Essential hypertension    -  Primary ICD-10-CM: I10  ICD-9-CM: 401.9     Neck pain     ICD-10-CM: M54.2  ICD-9-CM: 723.1     Chronic low back pain, unspecified back pain laterality, unspecified whether sciatica present     ICD-10-CM: M54.5, G89.29  ICD-9-CM: 724.2, 338.29           PLAN patient will get labs drawn today and I will see her next week and see how she is doing but at this point she will discontinue Aleve.    There are no Patient Instructions on file for this visit.  No follow-ups on file.

## 2021-02-10 LAB
25(OH)D3+25(OH)D2 SERPL-MCNC: 67.9 NG/ML (ref 30–100)
ALBUMIN SERPL-MCNC: 4.6 G/DL (ref 3.5–5.2)
ALBUMIN/GLOB SERPL: 2 G/DL
ALP SERPL-CCNC: 51 U/L (ref 39–117)
ALT SERPL-CCNC: 28 U/L (ref 1–33)
AST SERPL-CCNC: 25 U/L (ref 1–32)
BILIRUB SERPL-MCNC: 0.4 MG/DL (ref 0–1.2)
BUN SERPL-MCNC: 12 MG/DL (ref 8–23)
BUN/CREAT SERPL: 20.7 (ref 7–25)
CALCIUM SERPL-MCNC: 9.5 MG/DL (ref 8.6–10.5)
CHLORIDE SERPL-SCNC: 96 MMOL/L (ref 98–107)
CHOLEST SERPL-MCNC: 203 MG/DL (ref 0–200)
CO2 SERPL-SCNC: 26.9 MMOL/L (ref 22–29)
CREAT SERPL-MCNC: 0.58 MG/DL (ref 0.57–1)
GLOBULIN SER CALC-MCNC: 2.3 GM/DL
GLUCOSE SERPL-MCNC: 76 MG/DL (ref 65–99)
HDLC SERPL-MCNC: 112 MG/DL (ref 40–60)
LDLC SERPL CALC-MCNC: 75 MG/DL (ref 0–100)
LDLC/HDLC SERPL: 0.64 {RATIO}
POTASSIUM SERPL-SCNC: 4.5 MMOL/L (ref 3.5–5.2)
PROT SERPL-MCNC: 6.9 G/DL (ref 6–8.5)
SODIUM SERPL-SCNC: 136 MMOL/L (ref 136–145)
TRIGL SERPL-MCNC: 95 MG/DL (ref 0–150)
VLDLC SERPL CALC-MCNC: 16 MG/DL (ref 5–40)

## 2021-02-19 ENCOUNTER — APPOINTMENT (OUTPATIENT)
Dept: PET IMAGING | Facility: HOSPITAL | Age: 66
End: 2021-02-19

## 2021-02-22 ENCOUNTER — HOSPITAL ENCOUNTER (OUTPATIENT)
Dept: PET IMAGING | Facility: HOSPITAL | Age: 66
Discharge: HOME | End: 2021-02-22
Admitting: NURSE PRACTITIONER

## 2021-02-22 DIAGNOSIS — Z87.891 HISTORY OF SMOKING: ICD-10-CM

## 2021-02-22 DIAGNOSIS — Z12.2 ENCOUNTER FOR SCREENING FOR LUNG CANCER: ICD-10-CM

## 2021-02-22 PROCEDURE — 71271 CT THORAX LUNG CANCER SCR C-: CPT

## 2021-03-09 ENCOUNTER — OFFICE VISIT (OUTPATIENT)
Dept: FAMILY MEDICINE CLINIC | Facility: CLINIC | Age: 66
End: 2021-03-09

## 2021-03-09 VITALS
HEART RATE: 72 BPM | TEMPERATURE: 98.2 F | WEIGHT: 105 LBS | HEIGHT: 59 IN | DIASTOLIC BLOOD PRESSURE: 70 MMHG | RESPIRATION RATE: 15 BRPM | OXYGEN SATURATION: 98 % | SYSTOLIC BLOOD PRESSURE: 120 MMHG | BODY MASS INDEX: 21.17 KG/M2

## 2021-03-09 DIAGNOSIS — I10 ESSENTIAL HYPERTENSION: ICD-10-CM

## 2021-03-09 DIAGNOSIS — E55.9 HYPOVITAMINOSIS D: ICD-10-CM

## 2021-03-09 DIAGNOSIS — E78.5 HYPERLIPIDEMIA, UNSPECIFIED HYPERLIPIDEMIA TYPE: Primary | ICD-10-CM

## 2021-03-09 DIAGNOSIS — Z78.0 POST-MENOPAUSAL: ICD-10-CM

## 2021-03-09 DIAGNOSIS — M80.00XS AGE-RELATED OSTEOPOROSIS WITH CURRENT PATHOLOGICAL FRACTURE, SEQUELA: ICD-10-CM

## 2021-03-09 PROCEDURE — 99214 OFFICE O/P EST MOD 30 MIN: CPT | Performed by: INTERNAL MEDICINE

## 2021-03-09 PROCEDURE — 96372 THER/PROPH/DIAG INJ SC/IM: CPT | Performed by: INTERNAL MEDICINE

## 2021-03-09 RX ORDER — BENAZEPRIL HYDROCHLORIDE 20 MG/1
20 TABLET ORAL DAILY
Qty: 90 TABLET | Refills: 3 | Status: SHIPPED | OUTPATIENT
Start: 2021-03-09 | End: 2021-09-21 | Stop reason: SDUPTHER

## 2021-03-09 NOTE — PROGRESS NOTES
Subjective   Nanci Umanzor is a 66 y.o. female. Patient is here today for follow-up on her hypertension, hyperlipidemia, vitamin D deficiency and reactive airway disease.  She also has osteoporosis and gets Prolia injections and is due for 1 of those.  She has been feeling fine and has no acute complaints  Chief Complaint   Patient presents with   • Hypertension     PROLIA INJECTION          Vitals:    03/09/21 1355   BP: 120/70   Pulse: 72   Resp: 15   Temp: 98.2 °F (36.8 °C)   SpO2: 98%     Body mass index is 21.2 kg/m².  The following portions of the patient's history were reviewed and updated as appropriate: allergies, current medications, past family history, past medical history, past social history, past surgical history and problem list.    Past Medical History:   Diagnosis Date   • Allergic 1990's   • Ankle fracture     Rt 2002  Lt 2004   • Anxiety    • Arthritis    • Asthma    • Cancer (CMS/[facility]) 1992    Cervical cone biopsy for Stage II abnormality   • Cataract 2017   • Clavicle fracture     10/21/1970   • Colon polyp    • COPD (chronic obstructive pulmonary disease) (CMS/[facility]) 2006   • Depression    • Diverticulosis    • Fibula fracture     left 2002   • GERD (gastroesophageal reflux disease) 2016   • Glaucoma 2006   • Heart murmur    • Hyperlipidemia    • Hypertension    • Low back pain 1980s   • Osteopenia 2016   • Pelvis fracture (CMS/[facility])     10/21/1970   • Pneumonia    • Visual impairment Since 1yrs of age   • Wrist fracture     2007      Allergies   Allergen Reactions   • Clarithromycin Shortness Of Breath and Palpitations   • Pneumococcal Vaccines Swelling      Social History     Socioeconomic History   • Marital status:      Spouse name: Not on file   • Number of children: 3   • Years of education: AS   • Highest education level: Not on file   Tobacco Use   • Smoking status: Former Smoker     Packs/day: 1.00     Years: 15.00     Pack years: 15.00     Quit date: 4/1/2013     Years since  quittin.9   • Smokeless tobacco: Never Used   Substance and Sexual Activity   • Alcohol use: Yes     Alcohol/week: 1.0 standard drinks     Types: 1 Glasses of wine per week     Comment: WINE DAILY   • Drug use: No   • Sexual activity: Not Currently     Partners: Male     Birth control/protection: Post-menopausal        Current Outpatient Medications:   •  albuterol (PROVENTIL) (2.5 MG/3ML) 0.083% nebulizer solution, Take 2.5 mg by nebulization Every 4 (Four) Hours As Needed for Wheezing., Disp: 20 vial, Rfl: 5  •  albuterol sulfate HFA (Ventolin HFA) 108 (90 Base) MCG/ACT inhaler, Inhale 2 puffs Every 4 (Four) Hours As Needed for Wheezing., Disp: 18 g, Rfl: 5  •  amLODIPine (NORVASC) 5 MG tablet, TAKE ONE TABLET BY MOUTH DAILY, Disp: 90 tablet, Rfl: 2  •  atorvastatin (LIPITOR) 40 MG tablet, Take 1 tablet by mouth Daily., Disp: 90 tablet, Rfl: 3  •  benazepril (LOTENSIN) 20 MG tablet, Take 1 tablet by mouth Daily., Disp: 90 tablet, Rfl: 3  •  BIOTIN PO, Take  by mouth., Disp: , Rfl:   •  calcium citrate-vitamin d (CITRACAL) 200-250 MG-UNIT tablet tablet, Take  by mouth Daily., Disp: , Rfl:   •  Cholecalciferol (VITAMIN D-3 PO), Take  by mouth., Disp: , Rfl:   •  fluticasone-salmeterol (Advair Diskus) 250-50 MCG/DOSE DISKUS, Inhale 1 puff 2 (Two) Times a Day., Disp: 60 each, Rfl: 11  •  Multiple Vitamins-Minerals (CENTRUM SILVER ADULT 50+ PO), Take  by mouth., Disp: , Rfl:   •  Multiple Vitamins-Minerals (OCUVITE ADULT 50+ PO), Take  by mouth., Disp: , Rfl:   •  Multiple Vitamins-Minerals (ZINC PO), Take  by mouth., Disp: , Rfl:   •  Omega-3 Fatty Acids (FISH OIL PO), Take  by mouth., Disp: , Rfl:   •  tretinoin (RETIN-A) 0.05 % cream, Apply  topically Every Night., Disp: 20 g, Rfl: 1  •  triamcinolone (KENALOG) 0.1 % cream, Apply topically to involved area up to twice a day as needed for itching, Disp: 30 g, Rfl: 2  •  vitamin C (ASCORBIC ACID) 500 MG tablet, Take 500 mg by mouth Daily., Disp: , Rfl:   •   vitamin E 400 UNIT capsule, Take 400 Units by mouth Daily., Disp: , Rfl:   •  Prolia 60 MG/ML solution prefilled syringe syringe, Inject 1 mL under the skin into the appropriate area as directed 1 (One) Time for 1 dose., Disp: 1 mL, Rfl: 2    Current Facility-Administered Medications:   •  albuterol (PROVENTIL HFA;VENTOLIN HFA) inhaler 2 puff, 2 puff, Inhalation, 4x Daily - RT, Ky Kasper MD     Objective     History of Present Illness     Review of Systems   Constitutional: Negative.    HENT: Negative.    Respiratory: Negative.    Cardiovascular: Negative.    Gastrointestinal: Negative.    Genitourinary: Negative.    Musculoskeletal: Negative.    Skin: Negative.    Neurological: Negative.    Hematological: Negative.    Psychiatric/Behavioral: Negative.        Physical Exam  Vitals and nursing note reviewed.   Constitutional:       Appearance: Normal appearance.   HENT:      Head: Normocephalic and atraumatic.   Eyes:      General: No scleral icterus.     Conjunctiva/sclera: Conjunctivae normal.   Cardiovascular:      Rate and Rhythm: Normal rate and regular rhythm.      Heart sounds: Normal heart sounds.   Pulmonary:      Effort: Pulmonary effort is normal. No respiratory distress.      Breath sounds: Normal breath sounds. No wheezing or rales.   Musculoskeletal:         General: Normal range of motion.      Cervical back: Normal range of motion and neck supple.   Skin:     General: Skin is warm and dry.   Neurological:      General: No focal deficit present.      Mental Status: She is alert and oriented to person, place, and time.   Psychiatric:         Mood and Affect: Mood normal.         Behavior: Behavior normal.         ASSESSMENT CMP is essentially normal.  Vitamin D level was quite normal at 67.9 and lipid panel is stable with total cholesterol 203, HDL of 112 and LDL 75  #1-hypertension controlled  #2-hyperlipidemia, controlled  #3-vitamin D deficiency, corrected with supplement  #4-osteoporosis, on  Prolia injections and due for 1 today     Problems Addressed this Visit        Cardiac and Vasculature    Essential hypertension    Relevant Medications    benazepril (LOTENSIN) 20 MG tablet    Hyperlipidemia - Primary       Endocrine and Metabolic    Hypovitaminosis D       Musculoskeletal and Injuries    Age-related osteoporosis with current pathological fracture    Relevant Orders    DEXA Bone Density Axial      Other Visit Diagnoses     Post-menopausal        Relevant Orders    DEXA Bone Density Axial      Diagnoses       Codes Comments    Hyperlipidemia, unspecified hyperlipidemia type    -  Primary ICD-10-CM: E78.5  ICD-9-CM: 272.4     Essential hypertension     ICD-10-CM: I10  ICD-9-CM: 401.9     Hypovitaminosis D     ICD-10-CM: E55.9  ICD-9-CM: 268.9     Age-related osteoporosis with current pathological fracture, sequela     ICD-10-CM: M80.00XS  ICD-9-CM: 905.5, 733.01     Post-menopausal     ICD-10-CM: Z78.0  ICD-9-CM: V49.81           PLAN the patient will receive a Prolia injection today and will continue current medicines as now.  I recommended she get the coronavirus immunizations.  I plan on rechecking her in 6 months with a CBC, CMP, lipid panel, TSH    There are no Patient Instructions on file for this visit.  Return in about 6 months (around 9/9/2021) for with labs.

## 2021-03-16 ENCOUNTER — BULK ORDERING (OUTPATIENT)
Dept: CASE MANAGEMENT | Facility: OTHER | Age: 66
End: 2021-03-16

## 2021-03-16 DIAGNOSIS — Z23 IMMUNIZATION DUE: ICD-10-CM

## 2021-04-18 DIAGNOSIS — E78.01 FAMILIAL HYPERCHOLESTEROLEMIA: ICD-10-CM

## 2021-04-20 RX ORDER — ATORVASTATIN CALCIUM 40 MG/1
TABLET, FILM COATED ORAL
Qty: 90 TABLET | Refills: 2 | Status: SHIPPED | OUTPATIENT
Start: 2021-04-20 | End: 2022-02-17

## 2021-05-18 NOTE — TELEPHONE ENCOUNTER
"Juan M Lynch  1985     Office/Outpatient Visit    Visit Date:  01:46 pm    Provider: Zoila Mckinney N.P. (Assistant: Rani Hinojosa MA)    Location: [facility]        Electronically signed by Zoila Mckinney N.P. on  2020 10:04:04 PM                             Subjective:        CC: Mr. Lynch is a 34 year old White male.  Patient presents today for new patient establishment, \"mass of problems\"         HPI:           Patient to be evaluated for anxiety disorder, unspecified.  has never had problem with anxiety before covid 19 pandemic.  once he returned to work at [employer] he has been required to wear a mask.  it has caused a rash which was dx as atopic dermatitis per horace sher at his PCP office in [place].  he is also feeling claustrophobic and cannot breathe with a mask on.  employer has provided different masks which is most recently a helmet that has to have fabric velcro'd around back of head.  it had dark piece at bottom and he could not see his feet.  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  he is not eating or sleeping well due to the stress he feels from this.  he denies depression.  if it is his day off he is fine.  [employer] wrote letter stating he cannot wear a mask at work.  employer states no mask then you don;t work.  pt states he is ok with that and will take time off without pay until mask mandate is  at work.  he states he is here for second opinion and note stating he cannot wear a mask or helmet.    reportedly employer will accept a note if from a  second opinion.        hx of and not current.    ROS:     CONSTITUTIONAL:  Negative for chills, fatigue, fever, and weight change.      CARDIOVASCULAR:  Negative for chest pain, palpitations, tachycardia, orthopnea, and edema.      RESPIRATORY:  Negative for cough, dyspnea, and hemoptysis.      GASTROINTESTINAL:  Negative " RX HAS BEEN SENT TO PHARMACY FOR PATIENT.     ----- Message from Aaliyah Lama sent at 11/12/2019 10:35 AM EST -----  PT NEEDS A PRESCRIPTION 60MG PER MIL SYRINGE FOR PROLIA SENT TO [facility]  [address]      [phone] CELL    THANK YOU     for abdominal pain, heartburn, constipation, diarrhea, and stool changes.      MUSCULOSKELETAL:  Negative for arthralgias, back pain, and myalgias.      INTEGUMENTARY:  Positive for rash (resolved).      NEUROLOGICAL:  Negative for dizziness, headaches, paresthesias, and weakness.      PSYCHIATRIC:  Positive for anxiety, feelings of stress and insomnia.   Negative for depression or suicidal thoughts.          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          Substance Abuse History:     Last Reviewed on 7/18/2017 11:56 AM by Jose Angel Antony        Mental Health History:     Last Reviewed on 7/18/2017 11:56 AM by Jose Angel Antony        Communicable Diseases (eg STDs):     Last Reviewed on 7/18/2017 11:56 AM by Jose Angel Antony        Current Problems:     Last Reviewed on 6/19/2020 01:55 PM by Zoila Mckinney    Gastro-esophageal reflux disease without esophagitis    Pleurisy        Immunizations:     zzFluarix Quadrivalent Preservative Free 3 & older 11/3/2016        Allergies:     Last Reviewed on 7/18/2017 11:56 AM by Jose Angel Antony    Robitussin:          Current Medications:     Last Reviewed on 7/18/2017 11:56 AM by Jose Angel Antony    Hydrocortisone 1% Cream [Apply thin film to affected area bid]    Esomeprazole  20mg Capsules, Delayed Release [ONE PO PRN]    HYDROCORT    CRE 2.5%        Objective:        Vitals:         Current: 6/19/2020 1:51:00 PM    Ht:  5  ft, 4 in;  Wt: 145.8 lbs;  BMI: 25.0T: 97.6 F (oral);  BP: 109/61 mm Hg (left arm, sitting);  P: 68 bpm (left arm (BP Cuff), sitting)        Exams:     PHYSICAL EXAM:     GENERAL: anxious;     NECK: thyroid is non-palpable;     RESPIRATORY: normal respiratory rate and pattern with no distress; normal breath sounds with no rales, rhonchi, wheezes or rubs;     CARDIOVASCULAR: normal rate; rhythm is regular;     SKIN:  no significant rashes or lesions; no suspicious moles;     MUSCULOSKELETAL:  Normal range of motion, strength and tone;     NEUROLOGIC: mental status: alert and oriented x 3; GROSSLY INTACT     PSYCHIATRIC: affect/demeanor: anxious;  psychomotor: continuous wringing of hands;  normal speech pattern; normal thought and perception;         Assessment:         F41.9   Anxiety disorder, unspecified       L20.9   Atopic dermatitis, unspecified           Plan:         Anxiety disorder, unspecified        RECOMMENDATIONS given include: avoidance of caffeine, stress reduction, and suggest trial of buspirone if symptoms interfering with daily functioning.  express concern that a note could pose financial stress and hardship if it results in him not working.  he states it will not.  I also caution him that his employer may not hold his poistion for him and he states employer promises to hold his position.  he states he has absolutely exhausted all options of wearing PPE.  he is upset that he cannot use his own tools at work.  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  after long discussion he clearly is notably anxious about this.  I will write note stating he cannot wear a mask due to anxiety on condition that he accepts all risks with his job security and risks of exposure to covid 19.  I agree with and follow CDC recommendations and strongly encourage tx for anxiety.  he also declines wellness labs..            Patient Education Handouts:       Generalized Anxiety Disorder           Atopic dermatitis, unspecifiedhx of , not present today.              Patient Recommendations:        For  Anxiety disorder, unspecified:    Try to avoid or reduce the amount of caffeine intake. Try stress reduction methods to reduce the frequency or lessen the severity of anxiety episodes.              Charge Capture:         Primary Diagnosis:     F41.9  Anxiety disorder, unspecified           Orders:      84106  Office/outpatient visit; established patient, level 3  (In-House)              L20.9  Atopic dermatitis, unspecified

## 2021-07-13 ENCOUNTER — HOSPITAL ENCOUNTER (OUTPATIENT)
Dept: BONE DENSITY | Facility: HOSPITAL | Age: 66
Discharge: HOME | End: 2021-07-13
Admitting: INTERNAL MEDICINE

## 2021-07-13 DIAGNOSIS — M80.00XS AGE-RELATED OSTEOPOROSIS WITH CURRENT PATHOLOGICAL FRACTURE, SEQUELA: ICD-10-CM

## 2021-07-13 DIAGNOSIS — Z78.0 POST-MENOPAUSAL: ICD-10-CM

## 2021-07-13 PROCEDURE — 77080 DXA BONE DENSITY AXIAL: CPT

## 2021-09-13 DIAGNOSIS — E78.5 HYPERLIPIDEMIA, UNSPECIFIED HYPERLIPIDEMIA TYPE: Primary | ICD-10-CM

## 2021-09-15 LAB
ALBUMIN SERPL-MCNC: 5 G/DL (ref 3.5–5.2)
ALBUMIN/GLOB SERPL: 2.5 G/DL
ALP SERPL-CCNC: 50 U/L (ref 39–117)
ALT SERPL-CCNC: 28 U/L (ref 1–33)
AST SERPL-CCNC: 25 U/L (ref 1–32)
BASOPHILS # BLD AUTO: ABNORMAL 10*3/UL
BASOPHILS # BLD MANUAL: 0 10*3/MM3 (ref 0–0.2)
BASOPHILS NFR BLD MANUAL: 0 % (ref 0–1.5)
BILIRUB SERPL-MCNC: 0.3 MG/DL (ref 0–1.2)
BUN SERPL-MCNC: 12 MG/DL (ref 8–23)
BUN/CREAT SERPL: 21.1 (ref 7–25)
CALCIUM SERPL-MCNC: 9.3 MG/DL (ref 8.6–10.5)
CHLORIDE SERPL-SCNC: 99 MMOL/L (ref 98–107)
CHOLEST SERPL-MCNC: 209 MG/DL (ref 0–200)
CO2 SERPL-SCNC: 27.3 MMOL/L (ref 22–29)
CREAT SERPL-MCNC: 0.57 MG/DL (ref 0.57–1)
DIFFERENTIAL COMMENT: ABNORMAL
EOSINOPHIL # BLD AUTO: ABNORMAL 10*3/UL
EOSINOPHIL # BLD MANUAL: 0.4 10*3/MM3 (ref 0–0.4)
EOSINOPHIL NFR BLD AUTO: ABNORMAL %
EOSINOPHIL NFR BLD MANUAL: 7 % (ref 0.3–6.2)
ERYTHROCYTE [DISTWIDTH] IN BLOOD BY AUTOMATED COUNT: 11.9 % (ref 12.3–15.4)
GLOBULIN SER CALC-MCNC: 2 GM/DL
GLUCOSE SERPL-MCNC: 84 MG/DL (ref 65–99)
HCT VFR BLD AUTO: 38.8 % (ref 34–46.6)
HDLC SERPL-MCNC: 108 MG/DL (ref 40–60)
HGB BLD-MCNC: 13.4 G/DL (ref 12–15.9)
LDLC SERPL CALC-MCNC: 87 MG/DL (ref 0–100)
LDLC/HDLC SERPL: 0.78 {RATIO}
LYMPHOCYTES # BLD AUTO: ABNORMAL 10*3/UL
LYMPHOCYTES # BLD MANUAL: 1.7 10*3/MM3 (ref 0.7–3.1)
LYMPHOCYTES NFR BLD AUTO: ABNORMAL %
LYMPHOCYTES NFR BLD MANUAL: 30 % (ref 19.6–45.3)
MCH RBC QN AUTO: 32.7 PG (ref 26.6–33)
MCHC RBC AUTO-ENTMCNC: 34.5 G/DL (ref 31.5–35.7)
MCV RBC AUTO: 94.6 FL (ref 79–97)
MONOCYTES # BLD MANUAL: 0.28 10*3/MM3 (ref 0.1–0.9)
MONOCYTES NFR BLD AUTO: ABNORMAL %
MONOCYTES NFR BLD MANUAL: 5 % (ref 5–12)
NEUTROPHILS # BLD MANUAL: 3.29 10*3/MM3 (ref 1.7–7)
NEUTROPHILS NFR BLD AUTO: ABNORMAL %
NEUTROPHILS NFR BLD MANUAL: 58 % (ref 42.7–76)
PLATELET # BLD AUTO: 325 10*3/MM3 (ref 140–450)
PLATELET BLD QL SMEAR: ABNORMAL
POTASSIUM SERPL-SCNC: 4.4 MMOL/L (ref 3.5–5.2)
PROT SERPL-MCNC: 7 G/DL (ref 6–8.5)
RBC # BLD AUTO: 4.1 10*6/MM3 (ref 3.77–5.28)
RBC MORPH BLD: ABNORMAL
SODIUM SERPL-SCNC: 139 MMOL/L (ref 136–145)
TRIGL SERPL-MCNC: 83 MG/DL (ref 0–150)
TSH SERPL DL<=0.005 MIU/L-ACNC: 3.38 UIU/ML (ref 0.27–4.2)
VLDLC SERPL CALC-MCNC: 14 MG/DL (ref 5–40)
WBC # BLD AUTO: 5.68 10*3/MM3 (ref 3.4–10.8)

## 2021-09-21 ENCOUNTER — OFFICE VISIT (OUTPATIENT)
Dept: FAMILY MEDICINE CLINIC | Facility: CLINIC | Age: 66
End: 2021-09-21

## 2021-09-21 VITALS
DIASTOLIC BLOOD PRESSURE: 80 MMHG | BODY MASS INDEX: 21.45 KG/M2 | RESPIRATION RATE: 18 BRPM | TEMPERATURE: 97.3 F | OXYGEN SATURATION: 96 % | SYSTOLIC BLOOD PRESSURE: 120 MMHG | WEIGHT: 106.4 LBS | HEIGHT: 59 IN | HEART RATE: 75 BPM

## 2021-09-21 DIAGNOSIS — G89.29 CHRONIC BILATERAL LOW BACK PAIN WITHOUT SCIATICA: ICD-10-CM

## 2021-09-21 DIAGNOSIS — M54.2 NECK PAIN: ICD-10-CM

## 2021-09-21 DIAGNOSIS — M54.50 CHRONIC BILATERAL LOW BACK PAIN WITHOUT SCIATICA: ICD-10-CM

## 2021-09-21 DIAGNOSIS — S32.050D CLOSED COMPRESSION FRACTURE OF L5 LUMBAR VERTEBRA WITH ROUTINE HEALING, SUBSEQUENT ENCOUNTER: ICD-10-CM

## 2021-09-21 DIAGNOSIS — Z87.81 HISTORY OF COMPRESSION FRACTURE OF SPINE: ICD-10-CM

## 2021-09-21 DIAGNOSIS — M80.00XS AGE-RELATED OSTEOPOROSIS WITH CURRENT PATHOLOGICAL FRACTURE, SEQUELA: ICD-10-CM

## 2021-09-21 DIAGNOSIS — E55.9 HYPOVITAMINOSIS D: ICD-10-CM

## 2021-09-21 DIAGNOSIS — I10 ESSENTIAL HYPERTENSION: ICD-10-CM

## 2021-09-21 DIAGNOSIS — E78.5 HYPERLIPIDEMIA, UNSPECIFIED HYPERLIPIDEMIA TYPE: Primary | ICD-10-CM

## 2021-09-21 PROCEDURE — 99214 OFFICE O/P EST MOD 30 MIN: CPT | Performed by: INTERNAL MEDICINE

## 2021-09-21 RX ORDER — BENAZEPRIL HYDROCHLORIDE 10 MG/1
10 TABLET ORAL DAILY
Qty: 90 TABLET | Refills: 3 | Status: SHIPPED | OUTPATIENT
Start: 2021-09-21 | End: 2022-10-10

## 2021-09-21 RX ORDER — AMLODIPINE BESYLATE 5 MG/1
5 TABLET ORAL DAILY
Qty: 90 TABLET | Refills: 3 | Status: SHIPPED | OUTPATIENT
Start: 2021-09-21 | End: 2022-10-10

## 2021-09-21 NOTE — PROGRESS NOTES
Subjective   Nanci Umanzor is a 66 y.o. female. Patient is here today for follow-up on her hypertension, hyperlipidemia, vitamin D deficiency and osteoporosis.  She is due for Prolia injection.  Only new complaints are of some chronic neck and back pain for years.  She has had previous MRI scans.  She would like to try some physical therapy.  She has had no recent trauma or falls.  She also apparently had a recent prominence of a varicose vein in her left leg that was tender but is since completely resolved  Chief Complaint   Patient presents with   • Hypertension     HYPERLIPIDEMIA- F/U LABS   • Varicose Veins     PT HAS VARICOSE VEINS ON LEFT LEG AND SHE SAID RECENTLY HER LEG WAS HOT AND IT WAS BURNING AND WONDERS IF SHE HAD A CLOT           Vitals:    09/21/21 1312   BP: 120/80   Pulse: 75   Resp: 18   Temp: 97.3 °F (36.3 °C)   SpO2: 96%     Body mass index is 21.48 kg/m².  The following portions of the patient's history were reviewed and updated as appropriate: allergies, current medications, past family history, past medical history, past social history, past surgical history and problem list.    Past Medical History:   Diagnosis Date   • Allergic 1990's   • Ankle fracture     Rt 2002  Lt 2004   • Anxiety    • Arthritis    • Asthma    • Cancer (CMS/[facility]) 1992    Cervical cone biopsy for Stage II abnormality   • Cataract 2017   • Clavicle fracture     10/21/1970   • Colon polyp    • COPD (chronic obstructive pulmonary disease) (CMS/[facility]) 2006   • Depression    • Diverticulosis    • Fibula fracture     left 2002   • GERD (gastroesophageal reflux disease) 2016   • Glaucoma 2006   • Heart murmur    • Hyperlipidemia    • Hypertension    • Low back pain 1980s   • Osteopenia 2016   • Pelvis fracture (CMS/[facility])     10/21/1970   • Pneumonia    • Visual impairment Since 1yrs of age   • Wrist fracture     2007      Allergies   Allergen Reactions   • Clarithromycin Shortness Of Breath and Palpitations   • Pneumococcal  Vaccines Swelling      Social History     Socioeconomic History   • Marital status:      Spouse name: Not on file   • Number of children: 3   • Years of education: AS   • Highest education level: Not on file   Tobacco Use   • Smoking status: Former Smoker     Packs/day: 1.00     Years: 15.00     Pack years: 15.00     Quit date: 2013     Years since quittin.4   • Smokeless tobacco: Never Used   Substance and Sexual Activity   • Alcohol use: Yes     Alcohol/week: 1.0 standard drinks     Types: 1 Glasses of wine per week     Comment: WINE DAILY   • Drug use: No   • Sexual activity: Not Currently     Partners: Male     Birth control/protection: Post-menopausal        Current Outpatient Medications:   •  albuterol (PROVENTIL) (2.5 MG/3ML) 0.083% nebulizer solution, Take 2.5 mg by nebulization Every 4 (Four) Hours As Needed for Wheezing., Disp: 20 vial, Rfl: 5  •  albuterol sulfate HFA (Ventolin HFA) 108 (90 Base) MCG/ACT inhaler, Inhale 2 puffs Every 4 (Four) Hours As Needed for Wheezing., Disp: 18 g, Rfl: 5  •  amLODIPine (NORVASC) 5 MG tablet, Take 1 tablet by mouth Daily., Disp: 90 tablet, Rfl: 3  •  atorvastatin (LIPITOR) 40 MG tablet, TAKE ONE TABLET BY MOUTH DAILY, Disp: 90 tablet, Rfl: 2  •  benazepril (LOTENSIN) 10 MG tablet, Take 1 tablet by mouth Daily., Disp: 90 tablet, Rfl: 3  •  BIOTIN PO, Take  by mouth., Disp: , Rfl:   •  calcium citrate-vitamin d (CITRACAL) 200-250 MG-UNIT tablet tablet, Take  by mouth Daily., Disp: , Rfl:   •  Cholecalciferol (VITAMIN D-3 PO), Take  by mouth., Disp: , Rfl:   •  fluticasone-salmeterol (Advair Diskus) 250-50 MCG/DOSE DISKUS, Inhale 1 puff 2 (Two) Times a Day., Disp: 60 each, Rfl: 11  •  Multiple Vitamins-Minerals (CENTRUM SILVER ADULT 50+ PO), Take  by mouth., Disp: , Rfl:   •  Multiple Vitamins-Minerals (OCUVITE ADULT 50+ PO), Take  by mouth., Disp: , Rfl:   •  Multiple Vitamins-Minerals (ZINC PO), Take  by mouth., Disp: , Rfl:   •  Omega-3 Fatty Acids  (FISH OIL PO), Take  by mouth., Disp: , Rfl:   •  tretinoin (RETIN-A) 0.05 % cream, Apply  topically Every Night., Disp: 20 g, Rfl: 1  •  triamcinolone (KENALOG) 0.1 % cream, Apply topically to involved area up to twice a day as needed for itching, Disp: 30 g, Rfl: 2  •  vitamin C (ASCORBIC ACID) 500 MG tablet, Take 500 mg by mouth Daily., Disp: , Rfl:   •  vitamin E 400 UNIT capsule, Take 400 Units by mouth Daily., Disp: , Rfl:   •  Prolia 60 MG/ML solution prefilled syringe syringe, Inject 1 mL under the skin into the appropriate area as directed 1 (One) Time for 1 dose., Disp: 1 mL, Rfl: 2    Current Facility-Administered Medications:   •  albuterol (PROVENTIL HFA;VENTOLIN HFA) inhaler 2 puff, 2 puff, Inhalation, 4x Daily - RT, Ky Kasper MD     Objective     History of Present Illness     Review of Systems   Constitutional: Negative.    HENT: Negative.    Respiratory: Negative.    Cardiovascular: Negative.    Gastrointestinal: Negative.    Genitourinary: Negative.    Musculoskeletal: Negative.    Skin: Negative.    Neurological: Negative.    Hematological: Negative.    Psychiatric/Behavioral: Negative.        Physical Exam  Vitals and nursing note reviewed.   Constitutional:       Appearance: Normal appearance.   HENT:      Head: Normocephalic and atraumatic.   Eyes:      General: No scleral icterus.     Conjunctiva/sclera: Conjunctivae normal.   Cardiovascular:      Rate and Rhythm: Normal rate and regular rhythm.      Heart sounds: Normal heart sounds.   Pulmonary:      Effort: Pulmonary effort is normal. No respiratory distress.      Breath sounds: Normal breath sounds. No wheezing or rales.   Musculoskeletal:         General: Normal range of motion.      Cervical back: Normal range of motion and neck supple.   Skin:     General: Skin is warm and dry.      Comments: Some varicosities in the legs but no palpable cords, erythema or tenderness and Homans is negative   Neurological:      General: No focal  deficit present.      Mental Status: She is alert and oriented to person, place, and time.   Psychiatric:         Mood and Affect: Mood normal.         Behavior: Behavior normal.         ASSESSMENT CBC is normal.  CMP was completely normal.  Lipid panel stable with total cholesterol 209,  and LDL 87.  TSH is normal.  #1-hypertension controlled but possible side effects with benazepril  #2-hyperlipidemia controlled  3-vitamin D deficiency, on supplement  #4-osteoporosis, received a Prolia injection today  #5-chronic low back and neck pain with degenerative disease, requesting physical therapy     Problems Addressed this Visit        Cardiac and Vasculature    Essential hypertension    Relevant Medications    benazepril (LOTENSIN) 10 MG tablet    amLODIPine (NORVASC) 5 MG tablet    Hyperlipidemia - Primary       Endocrine and Metabolic    Hypovitaminosis D       Musculoskeletal and Injuries    Age-related osteoporosis with current pathological fracture    Low back pain    Relevant Orders    Ambulatory Referral to Physical Therapy Evaluate and treat    Neck pain    Relevant Orders    Ambulatory Referral to Physical Therapy Evaluate and treat       Neuro    Closed compression fracture of fifth lumbar vertebra (CMS/HCC)    Relevant Orders    Ambulatory Referral to Physical Therapy Evaluate and treat    History of compression fracture of spine      Diagnoses       Codes Comments    Hyperlipidemia, unspecified hyperlipidemia type    -  Primary ICD-10-CM: E78.5  ICD-9-CM: 272.4     Essential hypertension     ICD-10-CM: I10  ICD-9-CM: 401.9     Hypovitaminosis D     ICD-10-CM: E55.9  ICD-9-CM: 268.9     Age-related osteoporosis with current pathological fracture, sequela     ICD-10-CM: M80.00XS  ICD-9-CM: 905.5, 733.01     Neck pain     ICD-10-CM: M54.2  ICD-9-CM: 723.1     History of compression fracture of spine     ICD-10-CM: Z87.81  ICD-9-CM: V15.51     Closed compression fracture of L5 lumbar vertebra with  routine healing, subsequent encounter     ICD-10-CM: S32.050D  ICD-9-CM: V54.17     Chronic bilateral low back pain without sciatica     ICD-10-CM: M54.5, G89.29  ICD-9-CM: 724.2, 338.29           PLAN the patient received a Prolia injection today.  I recommended the flu shot in October.  Also strongly recommended the patient get COVID-19 vaccination and informed her that I will continue to see patients who do not get vaccinated and she is agreeable.  I referred the patient for physical therapy and she will continue current medicines as now.  I would like to recheck her in 6 months with a  CMP, lipid panel and vitamin D level    There are no Patient Instructions on file for this visit.  Return in about 6 months (around 3/21/2022) for with labs.

## 2021-11-05 RX ORDER — ALBUTEROL SULFATE 2.5 MG/3ML
2.5 SOLUTION RESPIRATORY (INHALATION) EVERY 4 HOURS PRN
Qty: 3 ML | Refills: 2 | Status: SHIPPED | OUTPATIENT
Start: 2021-11-05 | End: 2023-01-13 | Stop reason: SDUPTHER

## 2021-11-05 RX ORDER — ALBUTEROL SULFATE 90 UG/1
2 AEROSOL, METERED RESPIRATORY (INHALATION) EVERY 4 HOURS PRN
Qty: 18 G | Refills: 5 | Status: SHIPPED | OUTPATIENT
Start: 2021-11-05 | End: 2022-11-23

## 2021-11-05 NOTE — TELEPHONE ENCOUNTER
Caller: Nanci Umanzor    Relationship: Self        Requested Prescriptions:   Requested Prescriptions     Pending Prescriptions Disp Refills   • fluticasone-salmeterol (Advair Diskus) 250-50 MCG/DOSE DISKUS 60 each 11     Sig: Inhale 1 puff 2 (Two) Times a Day.   • albuterol (PROVENTIL) (2.5 MG/3ML) 0.083% nebulizer solution       Sig: Take 2.5 mg by nebulization Every 4 (Four) Hours As Needed for Wheezing.   • albuterol sulfate HFA (Ventolin HFA) 108 (90 Base) MCG/ACT inhaler 18 g 5     Sig: Inhale 2 puffs Every 4 (Four) Hours As Needed for Wheezing.        Pharmacy where request should be sent: [facility] AT [facility] [phone] [facility] [phone] [place]       Best call back number:[phone]     Does the patient have less than a 3 day supply:  [x] Yes  [] No    Colin Brown Rep   11/05/21 10:45 EDT

## 2022-02-17 DIAGNOSIS — E78.01 FAMILIAL HYPERCHOLESTEROLEMIA: ICD-10-CM

## 2022-02-17 RX ORDER — ATORVASTATIN CALCIUM 40 MG/1
TABLET, FILM COATED ORAL
Qty: 90 TABLET | Refills: 2 | Status: SHIPPED | OUTPATIENT
Start: 2022-02-17 | End: 2022-12-07

## 2022-03-16 DIAGNOSIS — E78.5 HYPERLIPIDEMIA, UNSPECIFIED HYPERLIPIDEMIA TYPE: Primary | ICD-10-CM

## 2022-03-16 DIAGNOSIS — E55.9 HYPOVITAMINOSIS D: ICD-10-CM

## 2022-03-29 ENCOUNTER — TELEPHONE (OUTPATIENT)
Dept: FAMILY MEDICINE CLINIC | Facility: CLINIC | Age: 67
End: 2022-03-29

## 2022-03-29 NOTE — TELEPHONE ENCOUNTER
Caller: Nanci Umanzor    Relationship: Self    Best call back number: [phone]    What medication are you requesting: ZPACK    What are your current symptoms: BRONCHITIS    How long have you been experiencing symptoms: 3 DAYS    Have you had these symptoms before: [x] Yes  [] No    Have you been treated for these symptoms before:  [x] Yes  [] No    If a prescription is needed, what is your preferred pharmacy and phone number: [facility] [address] [facility] [address] [facility] [phone] [facility] [phone]

## 2022-03-30 ENCOUNTER — OFFICE VISIT (OUTPATIENT)
Dept: FAMILY MEDICINE CLINIC | Facility: CLINIC | Age: 67
End: 2022-03-30

## 2022-03-30 VITALS
WEIGHT: 105.8 LBS | OXYGEN SATURATION: 96 % | HEART RATE: 78 BPM | BODY MASS INDEX: 21.33 KG/M2 | RESPIRATION RATE: 18 BRPM | TEMPERATURE: 98.2 F | DIASTOLIC BLOOD PRESSURE: 70 MMHG | HEIGHT: 59 IN | SYSTOLIC BLOOD PRESSURE: 110 MMHG

## 2022-03-30 DIAGNOSIS — J02.9 PHARYNGITIS, UNSPECIFIED ETIOLOGY: ICD-10-CM

## 2022-03-30 DIAGNOSIS — H65.03 BILATERAL ACUTE SEROUS OTITIS MEDIA, RECURRENCE NOT SPECIFIED: ICD-10-CM

## 2022-03-30 DIAGNOSIS — J06.9 ACUTE URI: Primary | ICD-10-CM

## 2022-03-30 PROCEDURE — 99213 OFFICE O/P EST LOW 20 MIN: CPT | Performed by: NURSE PRACTITIONER

## 2022-03-30 RX ORDER — FLUTICASONE PROPIONATE 50 MCG
2 SPRAY, SUSPENSION (ML) NASAL DAILY
Qty: 16 G | Refills: 0 | Status: SHIPPED | OUTPATIENT
Start: 2022-03-30 | End: 2022-07-01 | Stop reason: SDUPTHER

## 2022-03-30 RX ORDER — AZITHROMYCIN 250 MG/1
TABLET, FILM COATED ORAL
Qty: 6 TABLET | Refills: 0 | Status: SHIPPED | OUTPATIENT
Start: 2022-03-30 | End: 2022-09-29

## 2022-03-30 RX ORDER — GUAIFENESIN 600 MG/1
1200 TABLET, EXTENDED RELEASE ORAL 2 TIMES DAILY
Qty: 28 TABLET | Refills: 0 | Status: SHIPPED | OUTPATIENT
Start: 2022-03-30 | End: 2022-04-06

## 2022-03-30 NOTE — PATIENT INSTRUCTIONS
Pt declined covid testing today.  Drink plenty of fluids-water preferably, eat a heart healthy diet, get plenty of sleep and do warm salt water gargles twice a day until feeling better; Pt verb. Understanding.

## 2022-03-30 NOTE — PROGRESS NOTES
Subjective     Nanci Umanzor is a 67 y.o.. female.     Pt stating she was out of town and came back in town  night.      URI   This is a new problem. Episode onset: 3 DAYS. Associated symptoms include congestion and coughing. Pertinent negatives include no chest pain, diarrhea, ear pain, headaches, nausea, rhinorrhea, sore throat or vomiting. Treatments tried: bactrim 1 tab from left over script.       The following portions of the patient's history were reviewed and updated as appropriate: allergies, current medications, past family history, past medical history, past social history, past surgical history and problem list.    Past Medical History:   Diagnosis Date   • Allergic    • Ankle fracture     Rt   Lt    • Anxiety    • Arthritis    • Asthma    • Cancer ([facility])     Cervical cone biopsy for Stage II abnormality   • Cataract    • Clavicle fracture     10/21/1970   • Colon polyp    • COPD (chronic obstructive pulmonary disease) ([facility])    • Depression    • Diverticulosis    • Fibula fracture     left    • GERD (gastroesophageal reflux disease)    • Glaucoma    • Heart murmur    • Hyperlipidemia    • Hypertension    • Low back pain    • Osteopenia    • Pelvis fracture ([facility])     10/21/1970   • Pneumonia    • Visual impairment Since 1yrs of age   • Wrist fracture            Past Surgical History:   Procedure Laterality Date   • CERVICAL CONIZATION     •  SECTION      ,,   • COLONOSCOPY N/A 2017    Procedure: COLONOSCOPY TO CECUM with cold polypectomy;  Surgeon: Alo Staton Jr., MD;  Location: [facility] ENDOSCOPY;  Service:    • ENDOMETRIAL ABLATION     • EYE SURGERY     • FINGER SURGERY     • FRACTURE SURGERY     • TUBAL ABDOMINAL LIGATION         Review of Systems   Constitutional: Positive for fatigue (yesterday) and fever (yesterday).   HENT: Positive for congestion and postnasal drip. Negative for ear pain, rhinorrhea  "and sore throat.    Respiratory: Positive for cough and shortness of breath.    Cardiovascular: Negative for chest pain.   Gastrointestinal: Negative for diarrhea, nausea and vomiting.   Musculoskeletal: Negative for arthralgias.   Neurological: Negative for dizziness and headaches.       Allergies   Allergen Reactions   • Clarithromycin Shortness Of Breath and Palpitations   • Pneumococcal Vaccines Swelling       Objective     Vitals:    03/30/22 1121   BP: 110/70   Pulse: 78   Resp: 18   Temp: 98.2 °F (36.8 °C)   TempSrc: Oral   SpO2: 96%   Weight: 48 kg (105 lb 12.8 oz)   Height: 149.9 cm (59.02\")     Body mass index is 21.36 kg/m².    Physical Exam  Vitals reviewed.   HENT:      Head: Normocephalic.      Right Ear: A middle ear effusion (clear fluid) is present. Tympanic membrane is not erythematous.      Left Ear: A middle ear effusion (clear fluid) is present. Tympanic membrane is not erythematous.      Nose: Nose normal.      Mouth/Throat:      Mouth: Mucous membranes are moist.      Pharynx: Oropharyngeal exudate (pnd clear) and posterior oropharyngeal erythema (slight) present. No pharyngeal swelling.   Eyes:      Pupils: Pupils are equal, round, and reactive to light.   Cardiovascular:      Rate and Rhythm: Normal rate and regular rhythm.   Pulmonary:      Effort: No accessory muscle usage or respiratory distress.      Breath sounds: No stridor. Examination of the right-upper field reveals wheezing and rhonchi. Examination of the left-upper field reveals wheezing and rhonchi. Wheezing (fine) and rhonchi present. No rales.   Musculoskeletal:         General: Normal range of motion.   Lymphadenopathy:      Cervical: No cervical adenopathy.   Neurological:      Mental Status: She is alert and oriented to person, place, and time.   Psychiatric:         Behavior: Behavior normal.           Current Outpatient Medications:   •  albuterol (PROVENTIL) (2.5 MG/3ML) 0.083% nebulizer solution, Take 2.5 mg by " nebulization Every 4 (Four) Hours As Needed for Wheezing., Disp: 3 mL, Rfl: 2  •  albuterol sulfate HFA (Ventolin HFA) 108 (90 Base) MCG/ACT inhaler, Inhale 2 puffs Every 4 (Four) Hours As Needed for Wheezing., Disp: 18 g, Rfl: 5  •  amLODIPine (NORVASC) 5 MG tablet, Take 1 tablet by mouth Daily., Disp: 90 tablet, Rfl: 3  •  atorvastatin (LIPITOR) 40 MG tablet, TAKE ONE TABLET BY MOUTH DAILY, Disp: 90 tablet, Rfl: 2  •  benazepril (LOTENSIN) 10 MG tablet, Take 1 tablet by mouth Daily., Disp: 90 tablet, Rfl: 3  •  BIOTIN PO, Take  by mouth., Disp: , Rfl:   •  calcium citrate-vitamin d (CITRACAL) 200-250 MG-UNIT tablet tablet, Take  by mouth Daily., Disp: , Rfl:   •  Cholecalciferol (VITAMIN D-3 PO), Take  by mouth., Disp: , Rfl:   •  fluticasone-salmeterol (Advair Diskus) 250-50 MCG/DOSE DISKUS, Inhale 1 puff 2 (Two) Times a Day., Disp: 60 each, Rfl: 11  •  Multiple Vitamins-Minerals (CENTRUM SILVER ADULT 50+ PO), Take  by mouth., Disp: , Rfl:   •  Multiple Vitamins-Minerals (OCUVITE ADULT 50+ PO), Take  by mouth., Disp: , Rfl:   •  Multiple Vitamins-Minerals (ZINC PO), Take  by mouth., Disp: , Rfl:   •  Omega-3 Fatty Acids (FISH OIL PO), Take  by mouth., Disp: , Rfl:   •  tretinoin (RETIN-A) 0.05 % cream, Apply  topically Every Night., Disp: 20 g, Rfl: 1  •  triamcinolone (KENALOG) 0.1 % cream, Apply topically to involved area up to twice a day as needed for itching, Disp: 30 g, Rfl: 2  •  vitamin C (ASCORBIC ACID) 500 MG tablet, Take 500 mg by mouth Daily., Disp: , Rfl:   •  vitamin E 400 UNIT capsule, Take 400 Units by mouth Daily., Disp: , Rfl:   •  azithromycin (Zithromax) 250 MG tablet, Take 2 tablets the first day, then 1 tablet daily for 4 days., Disp: 6 tablet, Rfl: 0  •  fluticasone (FLONASE) 50 MCG/ACT nasal spray, 2 sprays into the nostril(s) as directed by provider Daily for 14 days., Disp: 16 g, Rfl: 0  •  guaiFENesin (Mucinex) 600 MG 12 hr tablet, Take 2 tablets by mouth 2 (Two) Times a Day for 7  days., Disp: 28 tablet, Rfl: 0  •  Prolia 60 MG/ML solution prefilled syringe syringe, Inject 1 mL under the skin into the appropriate area as directed 1 (One) Time for 1 dose., Disp: 1 mL, Rfl: 2    Current Facility-Administered Medications:   •  albuterol (PROVENTIL HFA;VENTOLIN HFA) inhaler 2 puff, 2 puff, Inhalation, 4x Daily - RT, Ky Kasper MD        Assessment/Plan   Diagnoses and all orders for this visit:    1. Acute URI (Primary)  -     guaiFENesin (Mucinex) 600 MG 12 hr tablet; Take 2 tablets by mouth 2 (Two) Times a Day for 7 days.  Dispense: 28 tablet; Refill: 0    2. Bilateral acute serous otitis media, recurrence not specified  -     azithromycin (Zithromax) 250 MG tablet; Take 2 tablets the first day, then 1 tablet daily for 4 days.  Dispense: 6 tablet; Refill: 0  -     fluticasone (FLONASE) 50 MCG/ACT nasal spray; 2 sprays into the nostril(s) as directed by provider Daily for 14 days.  Dispense: 16 g; Refill: 0    3. Pharyngitis, unspecified etiology        Patient Instructions   Pt declined covid testing today.  Drink plenty of fluids-water preferably, eat a heart healthy diet, get plenty of sleep and do warm salt water gargles twice a day until feeling better; Pt verb. Understanding.       Return if symptoms worsen or fail to improve, for Dr. Covarrubias as needed/as recommended.

## 2022-05-05 LAB
25(OH)D3+25(OH)D2 SERPL-MCNC: 59.4 NG/ML (ref 30–100)
ALBUMIN SERPL-MCNC: 4.8 G/DL (ref 3.8–4.8)
ALBUMIN/GLOB SERPL: 1.9 {RATIO} (ref 1.2–2.2)
ALP SERPL-CCNC: 50 IU/L (ref 44–121)
ALT SERPL-CCNC: 29 IU/L (ref 0–32)
AST SERPL-CCNC: 24 IU/L (ref 0–40)
BILIRUB SERPL-MCNC: 0.5 MG/DL (ref 0–1.2)
BUN SERPL-MCNC: 12 MG/DL (ref 8–27)
BUN/CREAT SERPL: 18 (ref 12–28)
CALCIUM SERPL-MCNC: 9.8 MG/DL (ref 8.7–10.3)
CHLORIDE SERPL-SCNC: 94 MMOL/L (ref 96–106)
CHOLEST SERPL-MCNC: 207 MG/DL (ref 100–199)
CO2 SERPL-SCNC: 25 MMOL/L (ref 20–29)
CREAT SERPL-MCNC: 0.66 MG/DL (ref 0.57–1)
EGFRCR SERPLBLD CKD-EPI 2021: 95 ML/MIN/1.73
GLOBULIN SER CALC-MCNC: 2.5 G/DL (ref 1.5–4.5)
GLUCOSE SERPL-MCNC: 82 MG/DL (ref 65–99)
HDLC SERPL-MCNC: 110 MG/DL
LDLC SERPL CALC-MCNC: 88 MG/DL (ref 0–99)
LDLC/HDLC SERPL: 0.8 RATIO (ref 0–3.2)
POTASSIUM SERPL-SCNC: 4.8 MMOL/L (ref 3.5–5.2)
PROT SERPL-MCNC: 7.3 G/DL (ref 6–8.5)
SODIUM SERPL-SCNC: 137 MMOL/L (ref 134–144)
TRIGL SERPL-MCNC: 46 MG/DL (ref 0–149)
VLDLC SERPL CALC-MCNC: 9 MG/DL (ref 5–40)

## 2022-05-13 ENCOUNTER — CLINICAL SUPPORT (OUTPATIENT)
Dept: FAMILY MEDICINE CLINIC | Facility: CLINIC | Age: 67
End: 2022-05-13

## 2022-05-13 ENCOUNTER — TRANSCRIBE ORDERS (OUTPATIENT)
Dept: ADMINISTRATIVE | Facility: HOSPITAL | Age: 67
End: 2022-05-13

## 2022-05-13 ENCOUNTER — OFFICE VISIT (OUTPATIENT)
Dept: FAMILY MEDICINE CLINIC | Facility: CLINIC | Age: 67
End: 2022-05-13

## 2022-05-13 VITALS
TEMPERATURE: 97.8 F | DIASTOLIC BLOOD PRESSURE: 79 MMHG | BODY MASS INDEX: 21.49 KG/M2 | HEIGHT: 59 IN | WEIGHT: 106.6 LBS | RESPIRATION RATE: 18 BRPM | HEART RATE: 68 BPM | OXYGEN SATURATION: 97 % | SYSTOLIC BLOOD PRESSURE: 124 MMHG

## 2022-05-13 DIAGNOSIS — M43.16 SPONDYLOLISTHESIS OF LUMBAR REGION: ICD-10-CM

## 2022-05-13 DIAGNOSIS — M80.00XS AGE-RELATED OSTEOPOROSIS WITH CURRENT PATHOLOGICAL FRACTURE, SEQUELA: Primary | ICD-10-CM

## 2022-05-13 DIAGNOSIS — M54.50 CHRONIC BILATERAL LOW BACK PAIN WITHOUT SCIATICA: ICD-10-CM

## 2022-05-13 DIAGNOSIS — Z00.00 MEDICARE ANNUAL WELLNESS VISIT, SUBSEQUENT: Primary | ICD-10-CM

## 2022-05-13 DIAGNOSIS — Z12.11 COLON CANCER SCREENING: ICD-10-CM

## 2022-05-13 DIAGNOSIS — G89.29 CHRONIC BILATERAL LOW BACK PAIN WITHOUT SCIATICA: ICD-10-CM

## 2022-05-13 DIAGNOSIS — Z12.31 SCREENING MAMMOGRAM FOR HIGH-RISK PATIENT: Primary | ICD-10-CM

## 2022-05-13 DIAGNOSIS — Z12.31 ENCOUNTER FOR SCREENING MAMMOGRAM FOR MALIGNANT NEOPLASM OF BREAST: ICD-10-CM

## 2022-05-13 PROCEDURE — G0439 PPPS, SUBSEQ VISIT: HCPCS | Performed by: NURSE PRACTITIONER

## 2022-05-13 PROCEDURE — 96372 THER/PROPH/DIAG INJ SC/IM: CPT | Performed by: INTERNAL MEDICINE

## 2022-05-13 PROCEDURE — 1159F MED LIST DOCD IN RCRD: CPT | Performed by: NURSE PRACTITIONER

## 2022-05-13 PROCEDURE — 1170F FXNL STATUS ASSESSED: CPT | Performed by: NURSE PRACTITIONER

## 2022-05-13 PROCEDURE — 99213 OFFICE O/P EST LOW 20 MIN: CPT | Performed by: NURSE PRACTITIONER

## 2022-05-13 NOTE — PROGRESS NOTES
The ABCs of the Annual Wellness Visit  Subsequent Medicare Wellness Visit    Chief Complaint   Patient presents with   • Medicare Wellness-subsequent      Subjective    History of Present Illness:  Nanci Umanzor is a 67 y.o. female who presents for a Subsequent Medicare Wellness Visit.    The following portions of the patient's history were reviewed and   updated as appropriate: allergies, current medications, past family history, past medical history, past social history, past surgical history and problem list.    Compared to one year ago, the patient feels her physical   health is the same.    Compared to one year ago, the patient feels her mental   health is the same.  Mother passed away in March 2022    Recent Hospitalizations:  She was not admitted to the hospital during the last year.       Current Medical Providers:  Patient Care Team:  Bowen Covarrubias MD as PCP - General (Internal Medicine)    Outpatient Medications Prior to Visit   Medication Sig Dispense Refill   • albuterol (PROVENTIL) (2.5 MG/3ML) 0.083% nebulizer solution Take 2.5 mg by nebulization Every 4 (Four) Hours As Needed for Wheezing. 3 mL 2   • albuterol sulfate HFA (Ventolin HFA) 108 (90 Base) MCG/ACT inhaler Inhale 2 puffs Every 4 (Four) Hours As Needed for Wheezing. 18 g 5   • amLODIPine (NORVASC) 5 MG tablet Take 1 tablet by mouth Daily. 90 tablet 3   • atorvastatin (LIPITOR) 40 MG tablet TAKE ONE TABLET BY MOUTH DAILY 90 tablet 2   • benazepril (LOTENSIN) 10 MG tablet Take 1 tablet by mouth Daily. 90 tablet 3   • BIOTIN PO Take  by mouth.     • calcium citrate-vitamin d (CITRACAL) 200-250 MG-UNIT tablet tablet Take  by mouth Daily.     • Cholecalciferol (VITAMIN D-3 PO) Take  by mouth.     • fluticasone-salmeterol (Advair Diskus) 250-50 MCG/DOSE DISKUS Inhale 1 puff 2 (Two) Times a Day. 60 each 11   • Multiple Vitamins-Minerals (CENTRUM SILVER ADULT 50+ PO) Take  by mouth.     • Multiple Vitamins-Minerals (OCUVITE ADULT 50+ PO) Take   by mouth.     • Multiple Vitamins-Minerals (ZINC PO) Take  by mouth.     • Omega-3 Fatty Acids (FISH OIL PO) Take  by mouth.     • tretinoin (RETIN-A) 0.05 % cream Apply  topically Every Night. 20 g 1   • triamcinolone (KENALOG) 0.1 % cream Apply topically to involved area up to twice a day as needed for itching 30 g 2   • vitamin C (ASCORBIC ACID) 500 MG tablet Take 500 mg by mouth Daily.     • vitamin E 400 UNIT capsule Take 400 Units by mouth Daily.     • azithromycin (Zithromax) 250 MG tablet Take 2 tablets the first day, then 1 tablet daily for 4 days. 6 tablet 0   • fluticasone (FLONASE) 50 MCG/ACT nasal spray 2 sprays into the nostril(s) as directed by provider Daily for 14 days. 16 g 0   • Prolia 60 MG/ML solution prefilled syringe syringe Inject 1 mL under the skin into the appropriate area as directed 1 (One) Time for 1 dose. 1 mL 2     Facility-Administered Medications Prior to Visit   Medication Dose Route Frequency Provider Last Rate Last Admin   • albuterol (PROVENTIL HFA;VENTOLIN HFA) inhaler 2 puff  2 puff Inhalation 4x Daily - RT Ky Kasper MD           No opioid medication identified on active medication list. I have reviewed chart for other potential  high risk medication/s and harmful drug interactions in the elderly.          Aspirin is not on active medication list.  Aspirin use is not indicated based on review of current medical condition/s. Risk of harm outweighs potential benefits.  .    Patient Active Problem List   Diagnosis   • Cervical nerve root disorder   • Essential hypertension   • Hyperlipidemia   • Reactive airway disease without complication   • Colon polyps   • Family history of breast cancer   • Hypovitaminosis D   • Age-related osteoporosis with current pathological fracture   • Closed compression fracture of fifth lumbar vertebra (HCC)   • Bunion   • History of compression fracture of spine   • Diverticulitis   • COPD (chronic obstructive pulmonary disease) with chronic  "bronchitis (HCC)   • Low back pain   • Neck pain     Advance Care Planning  Advance Directive is not on file.  ACP discussion was held with the patient during this visit. Patient does not have an advance directive, information provided.    Review of Systems   Constitutional: Negative.    Respiratory: Negative.    Cardiovascular: Negative.    Musculoskeletal: Positive for back pain.        Objective    Vitals:    05/13/22 0818   BP: 124/79   Pulse: 68   Resp: 18   Temp: 97.8 °F (36.6 °C)   TempSrc: Oral   SpO2: 97%   Weight: 48.4 kg (106 lb 9.6 oz)   Height: 149.9 cm (59.02\")     BMI Readings from Last 1 Encounters:   05/13/22 21.52 kg/m²   BMI is within normal parameters. No follow-up required.    Does the patient have evidence of cognitive impairment? No    Physical Exam  Vitals reviewed.   HENT:      Head: Normocephalic.   Eyes:      Pupils: Pupils are equal, round, and reactive to light.   Neck:      Vascular: No carotid bruit.   Cardiovascular:      Rate and Rhythm: Normal rate and regular rhythm.   Pulmonary:      Effort: Pulmonary effort is normal.      Breath sounds: Normal breath sounds.   Musculoskeletal:      Lumbar back: Tenderness present. No swelling, edema or bony tenderness. Decreased range of motion.      Right lower leg: No edema.      Left lower leg: No edema.   Neurological:      Mental Status: She is alert and oriented to person, place, and time.   Psychiatric:         Behavior: Behavior normal.          Recent Results (from the past 336 hour(s))   Comprehensive Metabolic Panel    Collection Time: 05/04/22  9:24 AM    Specimen: Blood   Result Value Ref Range    Glucose 82 65 - 99 mg/dL    BUN 12 8 - 27 mg/dL    Creatinine 0.66 0.57 - 1.00 mg/dL    EGFR Result 95 >59 mL/min/1.73    BUN/Creatinine Ratio 18 12 - 28    Sodium 137 134 - 144 mmol/L    Potassium 4.8 3.5 - 5.2 mmol/L    Chloride 94 (L) 96 - 106 mmol/L    Total CO2 25 20 - 29 mmol/L    Calcium 9.8 8.7 - 10.3 mg/dL    Total Protein 7.3 6.0 " - 8.5 g/dL    Albumin 4.8 3.8 - 4.8 g/dL    Globulin 2.5 1.5 - 4.5 g/dL    A/G Ratio 1.9 1.2 - 2.2    Total Bilirubin 0.5 0.0 - 1.2 mg/dL    Alkaline Phosphatase 50 44 - 121 IU/L    AST (SGOT) 24 0 - 40 IU/L    ALT (SGPT) 29 0 - 32 IU/L   Lipid Panel With LDL / HDL Ratio    Collection Time: 22  9:24 AM    Specimen: Blood   Result Value Ref Range    Total Cholesterol 207 (H) 100 - 199 mg/dL    Triglycerides 46 0 - 149 mg/dL    HDL Cholesterol 110 >39 mg/dL    VLDL Cholesterol Rajiv 9 5 - 40 mg/dL    LDL Chol Calc (NIH) 88 0 - 99 mg/dL    LDL/HDL RATIO 0.8 0.0 - 3.2 ratio   Vitamin D 25 Hydroxy    Collection Time: 22  9:24 AM    Specimen: Blood   Result Value Ref Range    25 Hydroxy, Vitamin D 59.4 30.0 - 100.0 ng/mL       HEALTH RISK ASSESSMENT    Smoking Status:  Social History     Tobacco Use   Smoking Status Former Smoker   • Packs/day: 1.00   • Years: 15.00   • Pack years: 15.00   • Quit date: 2013   • Years since quittin.1   Smokeless Tobacco Never Used     Alcohol Consumption:  Social History     Substance and Sexual Activity   Alcohol Use Yes   • Alcohol/week: 1.0 standard drink   • Types: 1 Glasses of wine per week    Comment: WINE DAILY     Fall Risk Screen:    ELAINA Fall Risk Assessment was completed, and patient is at LOW risk for falls.Assessment completed on:2022    Depression Screening:  PHQ-2/PHQ-9 Depression Screening 2022   Retired PHQ-9 Total Score -   Retired Total Score -   Little Interest or Pleasure in Doing Things 0-->not at all   Feeling Down, Depressed or Hopeless 0-->not at all   PHQ-9: Brief Depression Severity Measure Score 0       Health Habits and Functional and Cognitive Screening:  Functional & Cognitive Status 2022   Do you have difficulty preparing food and eating? No   Do you have difficulty bathing yourself, getting dressed or grooming yourself? No   Do you have difficulty using the toilet? No   Do you have difficulty moving around from place to  place? No   Do you have trouble with steps or getting out of a bed or a chair? No   Current Diet Well Balanced Diet   Dental Exam Not up to date   Eye Exam Up to date   Exercise (times per week) 4 times per week   Current Exercises Include Walking   Current Exercise Activities Include -   Do you need help using the phone?  No   Are you deaf or do you have serious difficulty hearing?  No   Do you need help with transportation? No   Do you need help shopping? No   Do you need help preparing meals?  No   Do you need help with housework?  No   Do you need help with laundry? No   Do you need help taking your medications? No   Do you need help managing money? No   Do you ever drive or ride in a car without wearing a seat belt? No   Have you felt unusual stress, anger or loneliness in the last month? No   Who do you live with? -   If you need help, do you have trouble finding someone available to you? No   Have you been bothered in the last four weeks by sexual problems? -   Do you have difficulty concentrating, remembering or making decisions? No       Age-appropriate Screening Schedule:  Refer to the list below for future screening recommendations based on patient's age, sex and/or medical conditions. Orders for these recommended tests are listed in the plan section. The patient has been provided with a written plan.    Health Maintenance   Topic Date Due   • ZOSTER VACCINE (2 of 2) 02/26/2013   • PAP SMEAR  Never done   • INFLUENZA VACCINE  08/01/2022   • MAMMOGRAM  09/08/2022   • LIPID PANEL  05/04/2023   • DXA SCAN  07/13/2023   • TDAP/TD VACCINES (2 - Td or Tdap) 01/01/2024              Assessment & Plan   CMS Preventative Services Quick Reference  Risk Factors Identified During Encounter  Immunizations Discussed/Encouraged (specific Immunizations; Shingrix and COVID19  The above risks/problems have been discussed with the patient.  Follow up actions/plans if indicated are seen below in the Assessment/Plan  Section.  Pertinent information has been shared with the patient in the After Visit Summary.    Diagnoses and all orders for this visit:    1. Medicare annual wellness visit, subsequent (Primary)    2. Spondylolisthesis of lumbar region  Comments:  wanting to discuss epidural injections  Orders:  -     Ambulatory Referral to Orthopedic Surgery    3. Chronic bilateral low back pain without sciatica  -     Ambulatory Referral to Orthopedic Surgery    4. Colon cancer screening  -     Ambulatory Referral For Screening Colonoscopy    5. Encounter for screening mammogram for malignant neoplasm of breast  -     Mammo Screening Bilateral With CAD; Future    Discussed all recent labs and MRI from 1/2019.    May use cold compress/ice pack 10-15 minutes at a time several times a day   May use warm compress/heating pad 10-15 minutes at at time several times a day  May use over the counter biofreeze as needed (wash off from skin before using heating pad)    Follow Up:   Return if symptoms worsen or fail to improve, for Dr. Covarrubias as needed/as recommended.     An After Visit Summary and PPPS were made available to the patient.

## 2022-05-25 ENCOUNTER — PRE-PROCEDURE SCREENING (OUTPATIENT)
Dept: GASTROENTEROLOGY | Facility: CLINIC | Age: 67
End: 2022-05-25

## 2022-06-07 ENCOUNTER — PRE-PROCEDURE SCREENING (OUTPATIENT)
Dept: GASTROENTEROLOGY | Facility: CLINIC | Age: 67
End: 2022-06-07

## 2022-06-07 NOTE — TELEPHONE ENCOUNTER
Last scope: 2017 Dr. Staton @ [facility]  Does patient has hx of polyps: Yes  Family hx of polys: No  Family hx of colon cancer: No  Taking ASA of blood thinners: No  List of medications:  atorvastatin (LIPITOR) 40 MG tablet   benazepril (LOTENSIN) 10 MG tablet   amLODIPine (NORVASC) 5 MG tablet   Omega-3 Fatty Acids (FISH OIL PO)  calcium citrate-vitamin d (CITRACAL) 200-250 MG-UNIT tablet tablet   Cholecalciferol (VITAMIN D-3 PO)  vitamin C (ASCORBIC ACID) 500 MG tablet                     OA form scanned in media and last scope scanned into media

## 2022-06-20 ENCOUNTER — PREP FOR SURGERY (OUTPATIENT)
Dept: OTHER | Facility: HOSPITAL | Age: 67
End: 2022-06-20

## 2022-06-20 ENCOUNTER — TELEPHONE (OUTPATIENT)
Dept: GASTROENTEROLOGY | Facility: CLINIC | Age: 67
End: 2022-06-20

## 2022-06-20 DIAGNOSIS — Z86.010 HISTORY OF ADENOMATOUS POLYP OF COLON: Primary | ICD-10-CM

## 2022-06-20 PROBLEM — Z86.0101 HISTORY OF ADENOMATOUS POLYP OF COLON: Status: ACTIVE | Noted: 2022-06-20

## 2022-06-20 NOTE — TELEPHONE ENCOUNTER
carmen Talley for 09/07 arrive at 1115/cs- mail prep inst out on 6/20 advised pt time is subject to change [facility] will call.

## 2022-06-28 DIAGNOSIS — H65.03 BILATERAL ACUTE SEROUS OTITIS MEDIA, RECURRENCE NOT SPECIFIED: ICD-10-CM

## 2022-06-28 RX ORDER — FLUTICASONE PROPIONATE 50 MCG
SPRAY, SUSPENSION (ML) NASAL
Qty: 16 ML | OUTPATIENT
Start: 2022-06-28

## 2022-06-28 NOTE — TELEPHONE ENCOUNTER
This was a medication prescribed by APRN for an acute issue. I do not see where this patient has called complaining of any new or worsening symptoms. For this reason I will deny this request.      Cipriano

## 2022-07-01 DIAGNOSIS — H65.03 BILATERAL ACUTE SEROUS OTITIS MEDIA, RECURRENCE NOT SPECIFIED: ICD-10-CM

## 2022-07-01 RX ORDER — FLUTICASONE PROPIONATE 50 MCG
2 SPRAY, SUSPENSION (ML) NASAL DAILY
Qty: 16 G | Refills: 0 | Status: SHIPPED | OUTPATIENT
Start: 2022-07-01 | End: 2023-01-04

## 2022-07-12 ENCOUNTER — OFFICE VISIT (OUTPATIENT)
Dept: ORTHOPEDIC SURGERY | Facility: CLINIC | Age: 67
End: 2022-07-12

## 2022-07-12 VITALS — TEMPERATURE: 97.7 F | HEIGHT: 59 IN | BODY MASS INDEX: 20.96 KG/M2 | WEIGHT: 104 LBS

## 2022-07-12 DIAGNOSIS — M47.22 CERVICAL SPONDYLOSIS WITH RADICULOPATHY: ICD-10-CM

## 2022-07-12 DIAGNOSIS — M54.2 NECK PAIN: Primary | ICD-10-CM

## 2022-07-12 DIAGNOSIS — M54.50 LUMBAR PAIN: ICD-10-CM

## 2022-07-12 DIAGNOSIS — M43.16 SPONDYLOLISTHESIS OF LUMBAR REGION: ICD-10-CM

## 2022-07-12 PROCEDURE — 72040 X-RAY EXAM NECK SPINE 2-3 VW: CPT | Performed by: ORTHOPAEDIC SURGERY

## 2022-07-12 PROCEDURE — 99204 OFFICE O/P NEW MOD 45 MIN: CPT | Performed by: ORTHOPAEDIC SURGERY

## 2022-07-12 PROCEDURE — 72100 X-RAY EXAM L-S SPINE 2/3 VWS: CPT | Performed by: ORTHOPAEDIC SURGERY
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
## 2022-08-09 ENCOUNTER — HOSPITAL ENCOUNTER (OUTPATIENT)
Dept: MAMMOGRAPHY | Facility: HOSPITAL | Age: 67
Discharge: HOME | End: 2022-08-09
Admitting: NURSE PRACTITIONER

## 2022-08-09 DIAGNOSIS — Z12.31 SCREENING MAMMOGRAM FOR HIGH-RISK PATIENT: ICD-10-CM

## 2022-08-09 PROCEDURE — 77063 BREAST TOMOSYNTHESIS BI: CPT

## 2022-08-09 PROCEDURE — 77067 SCR MAMMO BI INCL CAD: CPT

## 2022-08-10 ENCOUNTER — TREATMENT (OUTPATIENT)
Dept: PHYSICAL THERAPY | Facility: CLINIC | Age: 67
End: 2022-08-10

## 2022-08-10 DIAGNOSIS — M54.41 CHRONIC BILATERAL LOW BACK PAIN WITH RIGHT-SIDED SCIATICA: ICD-10-CM

## 2022-08-10 DIAGNOSIS — G89.29 CHRONIC BILATERAL LOW BACK PAIN WITH RIGHT-SIDED SCIATICA: ICD-10-CM

## 2022-08-10 DIAGNOSIS — M54.2 PAIN, NECK: Primary | ICD-10-CM

## 2022-08-10 DIAGNOSIS — R53.1 WEAKNESS: ICD-10-CM

## 2022-08-10 DIAGNOSIS — M47.812 CERVICAL SPONDYLOSIS: ICD-10-CM

## 2022-08-10 PROCEDURE — 97162 PT EVAL MOD COMPLEX 30 MIN: CPT | Performed by: PHYSICAL THERAPIST
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
## 2022-08-17 ENCOUNTER — TREATMENT (OUTPATIENT)
Dept: PHYSICAL THERAPY | Facility: CLINIC | Age: 67
End: 2022-08-17

## 2022-08-17 DIAGNOSIS — G89.29 CHRONIC BILATERAL LOW BACK PAIN WITH RIGHT-SIDED SCIATICA: ICD-10-CM

## 2022-08-17 DIAGNOSIS — M47.812 CERVICAL SPONDYLOSIS: ICD-10-CM

## 2022-08-17 DIAGNOSIS — M54.2 PAIN, NECK: Primary | ICD-10-CM

## 2022-08-17 DIAGNOSIS — M54.41 CHRONIC BILATERAL LOW BACK PAIN WITH RIGHT-SIDED SCIATICA: ICD-10-CM

## 2022-08-17 DIAGNOSIS — R53.1 WEAKNESS: ICD-10-CM

## 2022-08-17 PROCEDURE — 97140 MANUAL THERAPY 1/> REGIONS: CPT | Performed by: PHYSICAL THERAPIST

## 2022-08-17 PROCEDURE — 97110 THERAPEUTIC EXERCISES: CPT | Performed by: PHYSICAL THERAPIST

## 2022-08-17 PROCEDURE — G0283 ELEC STIM OTHER THAN WOUND: HCPCS | Performed by: PHYSICAL THERAPIST

## 2022-08-17 NOTE — PROGRESS NOTES
Physical Therapy Daily Progress Note  Visit: 2    Subjective Nanci Umanzor reports: stiffness in her neck     Objective   See Exercise, Manual, and Modality Logs for complete treatment.     Assessment/Plan: Compliant/cooperative with current rehab efforts.  Plan details: Progress ROM / strengthening / stabilization / functional activity as tolerated     Manual Therapy:  15     mins  43658;  Therapeutic Exercise:    10      mins  25621;     Neuromuscular Amanda:         mins  25364;    Therapeutic Activity:           mins  07755;     Gait Training:            mins  08492;     Ultrasound:           mins  91218;    Electrical Stimulation:   15       mins  41686 ( );  Dry Needling           mins self-pay  Traction           mins 89742  Canalith Repositioning        mins 67343      Timed Treatment:   25   mins   Total Treatment:    40    mins    Teresa Cantu, PT  [place] License #: [id number]    Physical Therapist

## 2022-08-23 ENCOUNTER — TREATMENT (OUTPATIENT)
Dept: PHYSICAL THERAPY | Facility: CLINIC | Age: 67
End: 2022-08-23

## 2022-08-23 DIAGNOSIS — R53.1 WEAKNESS: ICD-10-CM

## 2022-08-23 DIAGNOSIS — M54.2 PAIN, NECK: Primary | ICD-10-CM

## 2022-08-23 DIAGNOSIS — M47.812 CERVICAL SPONDYLOSIS: ICD-10-CM

## 2022-08-23 DIAGNOSIS — G89.29 CHRONIC BILATERAL LOW BACK PAIN WITH RIGHT-SIDED SCIATICA: ICD-10-CM

## 2022-08-23 DIAGNOSIS — M54.41 CHRONIC BILATERAL LOW BACK PAIN WITH RIGHT-SIDED SCIATICA: ICD-10-CM

## 2022-08-23 PROCEDURE — G0283 ELEC STIM OTHER THAN WOUND: HCPCS | Performed by: PHYSICAL THERAPIST

## 2022-08-23 PROCEDURE — 97140 MANUAL THERAPY 1/> REGIONS: CPT | Performed by: PHYSICAL THERAPIST

## 2022-08-23 PROCEDURE — 97110 THERAPEUTIC EXERCISES: CPT | Performed by: PHYSICAL THERAPIST

## 2022-08-23 NOTE — PROGRESS NOTES
"Physical Therapy Daily Treatment Note    Patient: Nanci Umanzor  : 1955  Referring practitioner: Micky Mcclure MD  Date of Initial Visit: Type: THERAPY  Noted: 8/10/2022  Today's Date: 2022  Patient seen for 3 sessions      Visit Diagnoses:    ICD-10-CM ICD-9-CM   1. Pain, neck  M54.2 723.1   2. Cervical spondylosis  M47.812 721.0   3. Chronic bilateral low back pain with right-sided sciatica  M54.41 724.2    G89.29 724.3     338.29   4. Weakness  R53.1 780.79       VISIT#: 3    Subjective   Nanci Umanzor reports that she was \"in bad shape\" yesterday. She states she was sore after previous treatment session but that she feels it helped. Additionally, she reports that she had torticollis as a child.    Pain Rating (0-10): 5    Objective     See Exercise, Manual, and Modality Logs for complete treatment.     Patient Education: New exercises.    Assessment/Plan    Added multiple new exercises for flexibility and strengthening today. Pt was able to complete all new exercises without c/o increased pain. Pt reported improvement in symptoms following manual stretching and manual traction and stated \"this is the best my neck has felt in years\" at end of session.    Progress per Plan of Care         Timed:         Manual Therapy:    10     mins  17968;     Therapeutic Exercise:    35     mins  43849;     Neuromuscular Amanda:        mins  65315;    Therapeutic Activity:          mins  41083;     Gait Training:           mins  91958;     Ultrasound:          mins  97471;    Ionto                                   mins   06209  Self Care                            mins   51992    Un-Timed:  Electrical Stimulation:    15     mins  43720 ( );  Traction          mins 88048  Re-Eval                               mins  92752    Timed Treatment:   45   mins   Total Treatment:     60   mins        Briseida Holguin PT  Physical Therapist  [place] License #: [id number]  [place] License #: [id number]                     "

## 2022-08-25 ENCOUNTER — TREATMENT (OUTPATIENT)
Dept: PHYSICAL THERAPY | Facility: CLINIC | Age: 67
End: 2022-08-25

## 2022-08-25 ENCOUNTER — TELEPHONE (OUTPATIENT)
Dept: GASTROENTEROLOGY | Facility: CLINIC | Age: 67
End: 2022-08-25

## 2022-08-25 DIAGNOSIS — G89.29 CHRONIC BILATERAL LOW BACK PAIN WITH RIGHT-SIDED SCIATICA: ICD-10-CM

## 2022-08-25 DIAGNOSIS — M54.41 CHRONIC BILATERAL LOW BACK PAIN WITH RIGHT-SIDED SCIATICA: ICD-10-CM

## 2022-08-25 DIAGNOSIS — M54.2 PAIN, NECK: Primary | ICD-10-CM

## 2022-08-25 DIAGNOSIS — M47.812 CERVICAL SPONDYLOSIS: ICD-10-CM

## 2022-08-25 DIAGNOSIS — R53.1 WEAKNESS: ICD-10-CM

## 2022-08-25 PROCEDURE — G0283 ELEC STIM OTHER THAN WOUND: HCPCS | Performed by: PHYSICAL THERAPIST

## 2022-08-25 PROCEDURE — 97530 THERAPEUTIC ACTIVITIES: CPT | Performed by: PHYSICAL THERAPIST

## 2022-08-25 PROCEDURE — 97140 MANUAL THERAPY 1/> REGIONS: CPT | Performed by: PHYSICAL THERAPIST

## 2022-08-25 PROCEDURE — 97110 THERAPEUTIC EXERCISES: CPT | Performed by: PHYSICAL THERAPIST

## 2022-08-25 NOTE — TELEPHONE ENCOUNTER
Caller: Nanci Umanzor    Relationship to patient: Self    Best call back number: [phone]    Chief complaint: PT WANTS TO RESCHEDULE COLONOSCOPY    Type of visit: COLONOSCOPY    Requested date: FIRST AVAILABLE    If rescheduling, when is the original appointment:9/7

## 2022-08-25 NOTE — PROGRESS NOTES
Physical Therapy Daily Treatment Note    Patient: Nanci Umanzor  : 1955  Referring practitioner: Micky Mcclure MD  Date of Initial Visit: Type: THERAPY  Noted: 8/10/2022  Today's Date: 2022  Patient seen for 4 sessions      Visit Diagnoses:    ICD-10-CM ICD-9-CM   1. Pain, neck  M54.2 723.1   2. Cervical spondylosis  M47.812 721.0   3. Chronic bilateral low back pain with right-sided sciatica  M54.41 724.2    G89.29 724.3     338.29   4. Weakness  R53.1 780.79       VISIT#: 4    Subjective   Nanci Umanzor reports she felt really good after last session but experienced soreness the next day and increased soreness two days later. She states this may be related to modified pushups that she has been performing in the evenings at home. She reports decreased numbness of RLE and improvement in back pain.    Pain Rating (0-10): 7    Objective     See Exercise, Manual, and Modality Logs for complete treatment.     Patient Education: New exercises.    Assessment/Plan    Advised pt to discontinue modified pushups and assess effect on her pain/soreness level. Issued HEP with pictures today. Pt expressed interest in dry needling, plan to discuss at future session as appropriate.    Progress per Plan of Care         Timed:         Manual Therapy:    15     mins  63693;     Therapeutic Exercise:    30     mins  72477;     Neuromuscular Amanda:        mins  59591;    Therapeutic Activity:     15    mins  59980;     Gait Training:           mins  37488;     Ultrasound:          mins  82389;    Ionto                                   mins   16832  Self Care                            mins   78192    Un-Timed:  Electrical Stimulation:    15     mins  47380 ( );  Traction          mins 05574  Re-Eval                               mins  43287    Timed Treatment:   60   mins   Total Treatment:     80   mins        Briseida Holguin PT  Physical Therapist  [place] License #: [id number]  [place] License #:  [id number]

## 2022-09-30 ENCOUNTER — HOSPITAL ENCOUNTER (OUTPATIENT)
Facility: HOSPITAL | Age: 67
Setting detail: HOSPITAL OUTPATIENT SURGERY
Discharge: HOME | End: 2022-09-30
Attending: INTERNAL MEDICINE | Admitting: INTERNAL MEDICINE

## 2022-09-30 ENCOUNTER — ANESTHESIA EVENT (OUTPATIENT)
Dept: GASTROENTEROLOGY | Facility: HOSPITAL | Age: 67
End: 2022-09-30

## 2022-09-30 ENCOUNTER — ANESTHESIA (OUTPATIENT)
Dept: GASTROENTEROLOGY | Facility: HOSPITAL | Age: 67
End: 2022-09-30

## 2022-09-30 VITALS
SYSTOLIC BLOOD PRESSURE: 130 MMHG | OXYGEN SATURATION: 99 % | HEIGHT: 58 IN | TEMPERATURE: 98.4 F | HEART RATE: 75 BPM | BODY MASS INDEX: 22.67 KG/M2 | DIASTOLIC BLOOD PRESSURE: 75 MMHG | RESPIRATION RATE: 16 BRPM | WEIGHT: 108 LBS

## 2022-09-30 DIAGNOSIS — Z86.010 HISTORY OF ADENOMATOUS POLYP OF COLON: ICD-10-CM

## 2022-09-30 PROCEDURE — 25010000002 PROPOFOL 10 MG/ML EMULSION: Performed by: ANESTHESIOLOGY

## 2022-09-30 PROCEDURE — S0260 H&P FOR SURGERY: HCPCS | Performed by: INTERNAL MEDICINE

## 2022-09-30 PROCEDURE — 88305 TISSUE EXAM BY PATHOLOGIST: CPT | Performed by: INTERNAL MEDICINE

## 2022-09-30 PROCEDURE — 45380 COLONOSCOPY AND BIOPSY: CPT | Performed by: INTERNAL MEDICINE

## 2022-09-30 RX ORDER — PROPOFOL 10 MG/ML
VIAL (ML) INTRAVENOUS CONTINUOUS PRN
Status: DISCONTINUED | OUTPATIENT
Start: 2022-09-30 | End: 2022-09-30 | Stop reason: SURG

## 2022-09-30 RX ORDER — LIDOCAINE HYDROCHLORIDE 20 MG/ML
INJECTION, SOLUTION INFILTRATION; PERINEURAL AS NEEDED
Status: DISCONTINUED | OUTPATIENT
Start: 2022-09-30 | End: 2022-09-30 | Stop reason: SURG

## 2022-09-30 RX ORDER — SODIUM CHLORIDE, SODIUM LACTATE, POTASSIUM CHLORIDE, CALCIUM CHLORIDE 600; 310; 30; 20 MG/100ML; MG/100ML; MG/100ML; MG/100ML
1000 INJECTION, SOLUTION INTRAVENOUS CONTINUOUS
Status: DISCONTINUED | OUTPATIENT
Start: 2022-09-30 | End: 2022-09-30 | Stop reason: HOSPADM

## 2022-09-30 RX ORDER — SODIUM CHLORIDE 0.9 % (FLUSH) 0.9 %
10 SYRINGE (ML) INJECTION AS NEEDED
Status: DISCONTINUED | OUTPATIENT
Start: 2022-09-30 | End: 2022-09-30 | Stop reason: HOSPADM

## 2022-09-30 RX ORDER — PROPOFOL 10 MG/ML
VIAL (ML) INTRAVENOUS AS NEEDED
Status: DISCONTINUED | OUTPATIENT
Start: 2022-09-30 | End: 2022-09-30 | Stop reason: SURG

## 2022-09-30 RX ADMIN — LIDOCAINE HYDROCHLORIDE 30 MG: 20 INJECTION, SOLUTION INFILTRATION; PERINEURAL at 13:10

## 2022-09-30 RX ADMIN — SODIUM CHLORIDE, POTASSIUM CHLORIDE, SODIUM LACTATE AND CALCIUM CHLORIDE 1000 ML: 600; 310; 30; 20 INJECTION, SOLUTION INTRAVENOUS at 12:54

## 2022-09-30 RX ADMIN — Medication 140 MCG/KG/MIN: at 13:11

## 2022-09-30 RX ADMIN — PROPOFOL 60 MG: 10 INJECTION, EMULSION INTRAVENOUS at 13:10

## 2022-09-30 NOTE — ANESTHESIA POSTPROCEDURE EVALUATION
"Patient: Nanci Umanzor    Procedure Summary     Date: 09/30/22 Room / Location: [facility] ENDOSCOPY 5 / [facility] ENDOSCOPY    Anesthesia Start: 1257 Anesthesia Stop: 1341    Procedure: COLONOSCOPY TO CECUM WITH COLD BIOPSY POLYPECTOMY (N/A ) Diagnosis:       History of adenomatous polyp of colon      Diverticulosis      Internal hemorrhoids      (History of adenomatous polyp of colon [Z86.010])    Surgeons: Chuck Servin MD Provider: Efrem Zaragoza MD    Anesthesia Type: MAC ASA Status: 3          Anesthesia Type: MAC    Vitals  Vitals Value Taken Time   /75 09/30/22 1359   Temp     Pulse 75 09/30/22 1359   Resp 16 09/30/22 1359   SpO2 99 % 09/30/22 1359           Post Anesthesia Care and Evaluation    Level of consciousness: awake and alert  Pain management: adequate  Anesthetic complications: No anesthetic complications    Cardiovascular status: acceptable  Respiratory status: acceptable  Hydration status: acceptable    Comments: /75 (BP Location: Left arm, Patient Position: Lying)   Pulse 75   Temp 36.9 °C (98.4 °F) (Oral)   Resp 16   Ht 147.3 cm (58\")   Wt 49 kg (108 lb)   SpO2 99%   BMI 22.57 kg/m²         "

## 2022-09-30 NOTE — ANESTHESIA PREPROCEDURE EVALUATION
Anesthesia Evaluation     Patient summary reviewed   history of anesthetic complications: prolonged sedation  NPO Solid Status: > 6 hours             Airway   Mallampati: II  TM distance: >3 FB  Neck ROM: full  Dental - normal exam   (+) partials    Pulmonary    (+) COPD, asthma,  Cardiovascular - normal exam    (+) hypertension, valvular problems/murmurs murmur,       Neuro/Psych  (+) psychiatric history Anxiety and Depression,    GI/Hepatic/Renal/Endo      Musculoskeletal     (+) neck pain,   Abdominal    Substance History      OB/GYN          Other      history of cancer                    Anesthesia Plan    ASA 3     MAC       Anesthetic plan, risks, benefits, and alternatives have been provided, discussed and informed consent has been obtained with: patient.

## 2022-10-03 LAB
LAB AP CASE REPORT: NORMAL
PATH REPORT.FINAL DX SPEC: NORMAL
PATH REPORT.GROSS SPEC: NORMAL

## 2022-10-04 ENCOUNTER — TELEPHONE (OUTPATIENT)
Dept: PHYSICAL THERAPY | Facility: CLINIC | Age: 67
End: 2022-10-04

## 2022-10-10 RX ORDER — BENAZEPRIL HYDROCHLORIDE 10 MG/1
TABLET ORAL
Qty: 90 TABLET | Refills: 3 | Status: SHIPPED | OUTPATIENT
Start: 2022-10-10

## 2022-10-10 RX ORDER — AMLODIPINE BESYLATE 5 MG/1
TABLET ORAL
Qty: 90 TABLET | Refills: 3 | Status: SHIPPED | OUTPATIENT
Start: 2022-10-10

## 2022-10-26 ENCOUNTER — TELEPHONE (OUTPATIENT)
Dept: ORTHOPEDIC SURGERY | Facility: CLINIC | Age: 67
End: 2022-10-26

## 2022-10-26 NOTE — TELEPHONE ENCOUNTER
Provider: DILCIA  Caller: SHERLYN  Phone Number: [phone]  [phone]  Reason for Call: PT IS ASKING FOR ANOTHER APPROACH TO HER PAIN. SHE IS ASKING FOR A REFERRAL TO  Acupuncture

## 2022-11-01 ENCOUNTER — TELEPHONE (OUTPATIENT)
Dept: GASTROENTEROLOGY | Facility: CLINIC | Age: 67
End: 2022-11-01

## 2022-11-01 NOTE — TELEPHONE ENCOUNTER
----- Message from Chuck Servin MD sent at 10/16/2022 10:45 PM EDT -----  Polyp benign repeat: 5 years as discussed

## 2022-11-14 DIAGNOSIS — E55.9 HYPOVITAMINOSIS D: ICD-10-CM

## 2022-11-14 DIAGNOSIS — E78.5 HYPERLIPIDEMIA, UNSPECIFIED HYPERLIPIDEMIA TYPE: Primary | ICD-10-CM

## 2022-11-16 LAB
25(OH)D3+25(OH)D2 SERPL-MCNC: 61.6 NG/ML (ref 30–100)
ALBUMIN SERPL-MCNC: 4.6 G/DL (ref 3.5–5.2)
ALBUMIN/GLOB SERPL: 1.8 G/DL
ALP SERPL-CCNC: 63 U/L (ref 39–117)
ALT SERPL-CCNC: 31 U/L (ref 1–33)
APPEARANCE UR: CLEAR
AST SERPL-CCNC: 31 U/L (ref 1–32)
BACTERIA #/AREA URNS HPF: NORMAL /HPF
BASOPHILS # BLD AUTO: 0.08 10*3/MM3 (ref 0–0.2)
BASOPHILS NFR BLD AUTO: 1.6 % (ref 0–1.5)
BILIRUB SERPL-MCNC: 0.3 MG/DL (ref 0–1.2)
BILIRUB UR QL STRIP: NEGATIVE
BUN SERPL-MCNC: 10 MG/DL (ref 8–23)
BUN/CREAT SERPL: 16.1 (ref 7–25)
CALCIUM SERPL-MCNC: 10 MG/DL (ref 8.6–10.5)
CASTS URNS MICRO: NORMAL
CHLORIDE SERPL-SCNC: 97 MMOL/L (ref 98–107)
CHOLEST SERPL-MCNC: 217 MG/DL (ref 0–200)
CO2 SERPL-SCNC: 28.1 MMOL/L (ref 22–29)
COLOR UR: YELLOW
CREAT SERPL-MCNC: 0.62 MG/DL (ref 0.57–1)
EGFRCR SERPLBLD CKD-EPI 2021: 96.5 ML/MIN/1.73
EOSINOPHIL # BLD AUTO: 0.4 10*3/MM3 (ref 0–0.4)
EOSINOPHIL NFR BLD AUTO: 7.8 % (ref 0.3–6.2)
EPI CELLS #/AREA URNS HPF: NORMAL /HPF
ERYTHROCYTE [DISTWIDTH] IN BLOOD BY AUTOMATED COUNT: 12.3 % (ref 12.3–15.4)
GLOBULIN SER CALC-MCNC: 2.6 GM/DL
GLUCOSE SERPL-MCNC: 82 MG/DL (ref 65–99)
GLUCOSE UR QL STRIP: NEGATIVE
HCT VFR BLD AUTO: 38.1 % (ref 34–46.6)
HDLC SERPL-MCNC: 107 MG/DL (ref 40–60)
HGB BLD-MCNC: 12.7 G/DL (ref 12–15.9)
HGB UR QL STRIP: NEGATIVE
IMM GRANULOCYTES # BLD AUTO: 0.01 10*3/MM3 (ref 0–0.05)
IMM GRANULOCYTES NFR BLD AUTO: 0.2 % (ref 0–0.5)
KETONES UR QL STRIP: NEGATIVE
LDLC SERPL CALC-MCNC: 99 MG/DL (ref 0–100)
LDLC/HDLC SERPL: 0.91 {RATIO}
LEUKOCYTE ESTERASE UR QL STRIP: NEGATIVE
LYMPHOCYTES # BLD AUTO: 1.55 10*3/MM3 (ref 0.7–3.1)
LYMPHOCYTES NFR BLD AUTO: 30.3 % (ref 19.6–45.3)
MCH RBC QN AUTO: 32.2 PG (ref 26.6–33)
MCHC RBC AUTO-ENTMCNC: 33.3 G/DL (ref 31.5–35.7)
MCV RBC AUTO: 96.7 FL (ref 79–97)
MONOCYTES # BLD AUTO: 0.46 10*3/MM3 (ref 0.1–0.9)
MONOCYTES NFR BLD AUTO: 9 % (ref 5–12)
NEUTROPHILS # BLD AUTO: 2.61 10*3/MM3 (ref 1.7–7)
NEUTROPHILS NFR BLD AUTO: 51.1 % (ref 42.7–76)
NITRITE UR QL STRIP: NEGATIVE
NRBC BLD AUTO-RTO: 0.2 /100 WBC (ref 0–0.2)
PH UR STRIP: 6 [PH] (ref 5–8)
PLATELET # BLD AUTO: 443 10*3/MM3 (ref 140–450)
POTASSIUM SERPL-SCNC: 4.8 MMOL/L (ref 3.5–5.2)
PROT SERPL-MCNC: 7.2 G/DL (ref 6–8.5)
PROT UR QL STRIP: NEGATIVE
RBC # BLD AUTO: 3.94 10*6/MM3 (ref 3.77–5.28)
RBC #/AREA URNS HPF: NORMAL /HPF
SODIUM SERPL-SCNC: 136 MMOL/L (ref 136–145)
SP GR UR STRIP: 1.01 (ref 1–1.03)
TRIGL SERPL-MCNC: 61 MG/DL (ref 0–150)
TSH SERPL DL<=0.005 MIU/L-ACNC: 3.02 UIU/ML (ref 0.27–4.2)
UROBILINOGEN UR STRIP-MCNC: NORMAL MG/DL
VLDLC SERPL CALC-MCNC: 11 MG/DL (ref 5–40)
WBC # BLD AUTO: 5.11 10*3/MM3 (ref 3.4–10.8)
WBC #/AREA URNS HPF: NORMAL /HPF

## 2022-11-17 ENCOUNTER — OFFICE VISIT (OUTPATIENT)
Dept: FAMILY MEDICINE CLINIC | Facility: CLINIC | Age: 67
End: 2022-11-17

## 2022-11-17 VITALS
SYSTOLIC BLOOD PRESSURE: 124 MMHG | HEART RATE: 70 BPM | DIASTOLIC BLOOD PRESSURE: 70 MMHG | RESPIRATION RATE: 18 BRPM | TEMPERATURE: 98 F | WEIGHT: 109 LBS | OXYGEN SATURATION: 99 % | HEIGHT: 58 IN | BODY MASS INDEX: 22.88 KG/M2

## 2022-11-17 DIAGNOSIS — Z87.891 PERSONAL HISTORY OF TOBACCO USE, PRESENTING HAZARDS TO HEALTH: ICD-10-CM

## 2022-11-17 DIAGNOSIS — M80.00XS AGE-RELATED OSTEOPOROSIS WITH CURRENT PATHOLOGICAL FRACTURE, SEQUELA: ICD-10-CM

## 2022-11-17 DIAGNOSIS — M54.12 CERVICAL NERVE ROOT DISORDER: ICD-10-CM

## 2022-11-17 DIAGNOSIS — E55.9 HYPOVITAMINOSIS D: ICD-10-CM

## 2022-11-17 DIAGNOSIS — E78.5 HYPERLIPIDEMIA, UNSPECIFIED HYPERLIPIDEMIA TYPE: ICD-10-CM

## 2022-11-17 DIAGNOSIS — M54.2 NECK PAIN: ICD-10-CM

## 2022-11-17 DIAGNOSIS — I10 ESSENTIAL HYPERTENSION: Primary | ICD-10-CM

## 2022-11-17 PROCEDURE — 99214 OFFICE O/P EST MOD 30 MIN: CPT | Performed by: INTERNAL MEDICINE

## 2022-11-17 NOTE — PROGRESS NOTES
Subjective   Nanci Umanzor is a 67 y.o. female. Patient is here today for follow-up on her hypertension, hyperlipidemia, vitamin D deficiency, osteoporosis, history of neck pain and history of tobacco use.  It has been over a year since she has had a low-dose CT scan so she is due.  She is having some continued neck pain and will contact her specialist, Dr. Mcclure  Chief Complaint   Patient presents with   • Hypertension     HYPERLIPIDEMIA- F/U LABS          Vitals:    11/17/22 1304   BP: 124/70   Pulse: 70   Resp: 18   Temp: 98 °F (36.7 °C)   SpO2: 99%     Body mass index is 22.79 kg/m².  The following portions of the patient's history were reviewed and updated as appropriate: allergies, current medications, past family history, past medical history, past social history, past surgical history and problem list.    Past Medical History:   Diagnosis Date   • Allergic 1990's   • Ankle fracture     Rt 2002  Lt 2004   • Anxiety    • Arthritis    • Asthma    • Cancer ([facility]) 1992    Cervical cone biopsy for Stage II abnormality   • Cataract 2017   • Clavicle fracture     10/21/1970   • Colon polyp    • COPD (chronic obstructive pulmonary disease) ([facility]) 2006   • Depression    • Diverticulosis    • Fibula fracture     left 2002   • GERD (gastroesophageal reflux disease) 2016   • Glaucoma 2006   • Heart murmur    • Hyperlipidemia    • Hypertension    • Low back pain 1980s   • Osteopenia 2016   • Pelvis fracture ([facility])     10/21/1970   • Pneumonia    • Visual impairment Since 1yrs of age   • Wrist fracture     2007      Allergies   Allergen Reactions   • Clarithromycin Shortness Of Breath and Palpitations   • Fluarix [Influenza Virus Vaccine] Myalgia   • Pneumococcal Vaccines Swelling      Social History     Socioeconomic History   • Marital status:    • Number of children: 3   • Years of education: AS   Tobacco Use   • Smoking status: Former     Packs/day: 1.00     Years: 15.00     Pack years: 15.00     Types:  Cigarettes     Quit date: 2013     Years since quittin.6   • Smokeless tobacco: Never   Vaping Use   • Vaping Use: Never used   Substance and Sexual Activity   • Alcohol use: Yes     Alcohol/week: 1.0 standard drink     Types: 1 Glasses of wine per week     Comment: WINE DAILY   • Drug use: No   • Sexual activity: Not Currently     Partners: Male     Birth control/protection: Post-menopausal        Current Outpatient Medications:   •  albuterol (PROVENTIL) (2.5 MG/3ML) 0.083% nebulizer solution, Take 2.5 mg by nebulization Every 4 (Four) Hours As Needed for Wheezing., Disp: 3 mL, Rfl: 2  •  albuterol sulfate HFA (Ventolin HFA) 108 (90 Base) MCG/ACT inhaler, Inhale 2 puffs Every 4 (Four) Hours As Needed for Wheezing., Disp: 18 g, Rfl: 5  •  amLODIPine (NORVASC) 5 MG tablet, TAKE ONE TABLET BY MOUTH DAILY, Disp: 90 tablet, Rfl: 3  •  atorvastatin (LIPITOR) 40 MG tablet, TAKE ONE TABLET BY MOUTH DAILY, Disp: 90 tablet, Rfl: 2  •  benazepril (LOTENSIN) 10 MG tablet, TAKE ONE TABLET BY MOUTH DAILY, Disp: 90 tablet, Rfl: 3  •  BIOTIN PO, Take  by mouth., Disp: , Rfl:   •  calcium citrate-vitamin d (CITRACAL) 200-250 MG-UNIT tablet tablet, Take  by mouth Daily., Disp: , Rfl:   •  Cholecalciferol (VITAMIN D-3 PO), Take  by mouth., Disp: , Rfl:   •  fluticasone-salmeterol (Advair Diskus) 250-50 MCG/DOSE DISKUS, Inhale 1 puff 2 (Two) Times a Day., Disp: 60 each, Rfl: 11  •  Multiple Vitamins-Minerals (CENTRUM SILVER ADULT 50+ PO), Take  by mouth., Disp: , Rfl:   •  Multiple Vitamins-Minerals (OCUVITE ADULT 50+ PO), Take  by mouth., Disp: , Rfl:   •  Multiple Vitamins-Minerals (ZINC PO), Take  by mouth., Disp: , Rfl:   •  Omega-3 Fatty Acids (FISH OIL PO), Take  by mouth., Disp: , Rfl:   •  tretinoin (RETIN-A) 0.05 % cream, Apply  topically Every Night., Disp: 20 g, Rfl: 1  •  triamcinolone (KENALOG) 0.1 % cream, Apply topically to involved area up to twice a day as needed for itching, Disp: 30 g, Rfl: 2  •  vitamin C  (ASCORBIC ACID) 500 MG tablet, Take 500 mg by mouth Daily., Disp: , Rfl:   •  vitamin E 400 UNIT capsule, Take 400 Units by mouth Daily., Disp: , Rfl:   •  fluticasone (FLONASE) 50 MCG/ACT nasal spray, 2 sprays into the nostril(s) as directed by provider Daily for 14 days., Disp: 16 g, Rfl: 0  •  Prolia 60 MG/ML solution prefilled syringe syringe, Inject 1 mL under the skin into the appropriate area as directed 1 (One) Time for 1 dose., Disp: 1 mL, Rfl: 2    Current Facility-Administered Medications:   •  albuterol (PROVENTIL HFA;VENTOLIN HFA) inhaler 2 puff, 2 puff, Inhalation, 4x Daily - RT, Ky Kasper MD     Objective     History of Present Illness     Review of Systems    Physical Exam  Vitals and nursing note reviewed.   Constitutional:       General: She is not in acute distress.     Appearance: Normal appearance. She is not ill-appearing.   HENT:      Head: Normocephalic and atraumatic.   Neck:      Comments: Slightly decreased head turning bilaterally and some tenderness in the upper neck  Cardiovascular:      Rate and Rhythm: Normal rate and regular rhythm.      Heart sounds: Normal heart sounds.   Pulmonary:      Effort: Pulmonary effort is normal. No respiratory distress.      Breath sounds: Normal breath sounds. No wheezing or rales.   Musculoskeletal:         General: Normal range of motion.   Skin:     General: Skin is warm and dry.   Neurological:      General: No focal deficit present.      Mental Status: She is alert and oriented to person, place, and time.   Psychiatric:         Mood and Affect: Mood normal.         Behavior: Behavior normal.         ASSESSMENT CBC was normal.  CMP was normal and lipid panel is stable with total cholesterol 217, , LDL 99.  TSH was normal.  Urinalysis was clear.  Vitamin D level is normal at 61.6  1-osteoporosis, stable  #2-neck pain, chronic and will be seeing her spine specialist  #3-hypertension controlled  #4-hyperlipidemia controlled  #5-vitamin D  deficiency, corrected  #6-history of tobacco use       Problems Addressed this Visit        Cardiac and Vasculature    Essential hypertension - Primary    Hyperlipidemia       Endocrine and Metabolic    Hypovitaminosis D       Musculoskeletal and Injuries    Age-related osteoporosis with current pathological fracture    Neck pain       Neuro    Cervical nerve root disorder       Tobacco    Personal history of tobacco use, presenting hazards to health    Relevant Orders     CT Chest Low Dose Cancer Screening WO   Diagnoses       Codes Comments    Essential hypertension    -  Primary ICD-10-CM: I10  ICD-9-CM: 401.9     Hyperlipidemia, unspecified hyperlipidemia type     ICD-10-CM: E78.5  ICD-9-CM: 272.4     Hypovitaminosis D     ICD-10-CM: E55.9  ICD-9-CM: 268.9     Age-related osteoporosis with current pathological fracture, sequela     ICD-10-CM: M80.00XS  ICD-9-CM: 905.5, 733.01     Cervical nerve root disorder     ICD-10-CM: M54.12  ICD-9-CM: 723.4     Neck pain     ICD-10-CM: M54.2  ICD-9-CM: 723.1     Personal history of tobacco use, presenting hazards to health     ICD-10-CM: Z87.891  ICD-9-CM: V15.82           PLAN the patient received a Prolia injection today.  I have ordered a low-dose CT scan for screening.  She will continue current medicines as now.  She will contact her spine specialist office for follow-up.  I plan on rechecking her in 6 months with a CMP, lipid panel, vitamin D level and TSH    There are no Patient Instructions on file for this visit.  Return in about 6 months (around 5/17/2023) for with labs.

## 2022-11-23 RX ORDER — ALBUTEROL SULFATE 90 UG/1
AEROSOL, METERED RESPIRATORY (INHALATION)
Qty: 18 G | Refills: 5 | Status: SHIPPED | OUTPATIENT
Start: 2022-11-23

## 2022-11-23 RX ORDER — AZITHROMYCIN 250 MG/1
TABLET, FILM COATED ORAL
Qty: 6 TABLET | Refills: 0 | Status: SHIPPED | OUTPATIENT
Start: 2022-11-23 | End: 2023-01-04

## 2022-11-23 RX ORDER — AZITHROMYCIN 250 MG/1
TABLET, FILM COATED ORAL
Qty: 6 TABLET | Refills: 0 | Status: SHIPPED | OUTPATIENT
Start: 2022-11-23 | End: 2022-11-23 | Stop reason: SDUPTHER

## 2022-12-07 DIAGNOSIS — E78.01 FAMILIAL HYPERCHOLESTEROLEMIA: ICD-10-CM

## 2022-12-07 RX ORDER — ATORVASTATIN CALCIUM 40 MG/1
TABLET, FILM COATED ORAL
Qty: 90 TABLET | Refills: 2 | Status: SHIPPED | OUTPATIENT
Start: 2022-12-07

## 2022-12-13 RX ORDER — FLUTICASONE PROPIONATE AND SALMETEROL 250; 50 UG/1; UG/1
POWDER RESPIRATORY (INHALATION)
Qty: 60 EACH | Refills: 11 | Status: SHIPPED | OUTPATIENT
Start: 2022-12-13

## 2022-12-23 ENCOUNTER — TELEPHONE (OUTPATIENT)
Dept: FAMILY MEDICINE CLINIC | Facility: CLINIC | Age: 67
End: 2022-12-23

## 2022-12-23 NOTE — TELEPHONE ENCOUNTER
Caller: Nanci Umanzor    Relationship to patient: Self    Best call back number: [phone]    Chief complaint: NUMBNESS IN LEGS, FEET FEEL COLD    Patient directed to call 911 or go to their nearest emergency room.     Patient verbalized understanding: [x] Yes  [] No  If no, why?    Additional notes: PATIENT STATES SHE HAD COMPLETED PHYSICAL THERAPY FOR HER NECK PAIN, BUT IT HAD NOT IMPROVED, AND HAD BEGUN TO CAUSE NUMBNESS. ADVISED TO GO TO ER, AND FOLLOW UP WITH DR GARCIA LATER

## 2022-12-23 NOTE — TELEPHONE ENCOUNTER
PATIENT CALLING STATING THAT SHE WENT TO [facility] SHE STATED THAT THEY LET HER KNOW SHE IS HAVING MUSCLE SPASMS AS WELL AS SMALL BUNDLE BRANCH BLOCK.    PLEASE ADVISE   [phone]

## 2022-12-27 NOTE — TELEPHONE ENCOUNTER
SENT MYCHART MESSAGE BACK TO PATIENT REGARDING THIS, AS SHE SENT A [facility]AGE AS WELL.

## 2022-12-30 ENCOUNTER — OFFICE VISIT (OUTPATIENT)
Dept: FAMILY MEDICINE CLINIC | Facility: CLINIC | Age: 67
End: 2022-12-30

## 2022-12-30 VITALS
SYSTOLIC BLOOD PRESSURE: 108 MMHG | HEART RATE: 63 BPM | WEIGHT: 108.8 LBS | BODY MASS INDEX: 22.84 KG/M2 | RESPIRATION RATE: 18 BRPM | OXYGEN SATURATION: 97 % | HEIGHT: 58 IN | DIASTOLIC BLOOD PRESSURE: 72 MMHG | TEMPERATURE: 97.2 F

## 2022-12-30 DIAGNOSIS — M54.2 CERVICALGIA: ICD-10-CM

## 2022-12-30 DIAGNOSIS — R94.31 ABNORMAL EKG: ICD-10-CM

## 2022-12-30 DIAGNOSIS — M48.02 NEURAL FORAMINAL STENOSIS OF CERVICAL SPINE: Primary | ICD-10-CM

## 2022-12-30 DIAGNOSIS — M50.30 DDD (DEGENERATIVE DISC DISEASE), CERVICAL: ICD-10-CM

## 2022-12-30 PROCEDURE — 99214 OFFICE O/P EST MOD 30 MIN: CPT | Performed by: NURSE PRACTITIONER

## 2022-12-30 RX ORDER — BACLOFEN 5 MG/1
5 TABLET ORAL NIGHTLY PRN
Qty: 14 TABLET | Refills: 1 | Status: SHIPPED | OUTPATIENT
Start: 2022-12-30 | End: 2023-01-04

## 2022-12-30 RX ORDER — DICLOFENAC SODIUM 75 MG/1
75 TABLET, DELAYED RELEASE ORAL
COMMUNITY
Start: 2022-12-23 | End: 2023-01-04

## 2022-12-30 NOTE — PATIENT INSTRUCTIONS
May use cold compress/ice pack 10-15 minutes at a time several times a day   May use warm compress/heating pad 10-15 minutes at at time several times a day  May use over the counter biofreeze and lidocaine patches as needed (wash off from skin before using heating pad)

## 2022-12-30 NOTE — PROGRESS NOTES
Subjective   Nanci Umanzor is a 67 y.o. female. Patient is here today for   Chief Complaint   Patient presents with   • Hospital Follow Up Visit     Zaheer diehl 22            Vitals:    22 1526   BP: 108/72   Pulse: 63   Resp: 18   Temp: 97.2 °F (36.2 °C)   SpO2: 97%     The following portions of the patient's history were reviewed and updated as appropriate: allergies, current medications, past family history, past medical history, past social history, past surgical history and problem list.    Past Medical History:   Diagnosis Date   • Allergic    • Ankle fracture     Rt   Lt    • Anxiety    • Arthritis    • Asthma    • Cancer ([facility])     Cervical cone biopsy for Stage II abnormality   • Cataract    • Clavicle fracture     10/21/1970   • Colon polyp    • COPD (chronic obstructive pulmonary disease) ([facility])    • Depression    • Diverticulosis    • Fibula fracture     left    • GERD (gastroesophageal reflux disease)    • Glaucoma    • Heart murmur    • Hyperlipidemia    • Hypertension    • Low back pain    • Osteopenia    • Pelvis fracture ([facility])     10/21/1970   • Pneumonia    • Visual impairment Since 1yrs of age   • Wrist fracture           Allergies   Allergen Reactions   • Clarithromycin Shortness Of Breath and Palpitations   • Fluarix [Influenza Virus Vaccine] Myalgia   • Pneumococcal Vaccines Swelling      Social History     Socioeconomic History   • Marital status:    • Number of children: 3   • Years of education: AS   Tobacco Use   • Smoking status: Former     Packs/day: 1.00     Years: 15.00     Pack years: 15.00     Types: Cigarettes     Quit date: 2013     Years since quittin.7   • Smokeless tobacco: Never   Vaping Use   • Vaping Use: Never used   Substance and Sexual Activity   • Alcohol use: Yes     Alcohol/week: 1.0 standard drink     Types: 1 Glasses of wine per week     Comment: WINE DAILY   • Drug use: No   • Sexual  activity: Not Currently     Partners: Male     Birth control/protection: Post-menopausal        Current Outpatient Medications:   •  albuterol (PROVENTIL) (2.5 MG/3ML) 0.083% nebulizer solution, Take 2.5 mg by nebulization Every 4 (Four) Hours As Needed for Wheezing., Disp: 3 mL, Rfl: 2  •  amLODIPine (NORVASC) 5 MG tablet, TAKE ONE TABLET BY MOUTH DAILY, Disp: 90 tablet, Rfl: 3  •  atorvastatin (LIPITOR) 40 MG tablet, TAKE ONE TABLET BY MOUTH DAILY, Disp: 90 tablet, Rfl: 2  •  benazepril (LOTENSIN) 10 MG tablet, TAKE ONE TABLET BY MOUTH DAILY, Disp: 90 tablet, Rfl: 3  •  BIOTIN PO, Take  by mouth., Disp: , Rfl:   •  calcium citrate-vitamin d (CITRACAL) 200-250 MG-UNIT tablet tablet, Take  by mouth Daily., Disp: , Rfl:   •  Cholecalciferol (VITAMIN D-3 PO), Take  by mouth., Disp: , Rfl:   •  diclofenac (VOLTAREN) 75 MG EC tablet, Take 75 mg by mouth., Disp: , Rfl:   •  Diclofenac Sodium (VOLTAREN) 1 % gel gel, Apply 4 g topically to the appropriate area as directed 4 (Four) Times a Day As Needed., Disp: , Rfl:   •  Fluticasone-Salmeterol (ADVAIR/WIXELA) 250-50 MCG/ACT DISKUS, INHALE ONE PUFF BY MOUTH TWICE A DAY, Disp: 60 each, Rfl: 11  •  Multiple Vitamins-Minerals (CENTRUM SILVER ADULT 50+ PO), Take  by mouth., Disp: , Rfl:   •  Multiple Vitamins-Minerals (OCUVITE ADULT 50+ PO), Take  by mouth., Disp: , Rfl:   •  Multiple Vitamins-Minerals (ZINC PO), Take  by mouth., Disp: , Rfl:   •  Omega-3 Fatty Acids (FISH OIL PO), Take  by mouth., Disp: , Rfl:   •  tretinoin (RETIN-A) 0.05 % cream, Apply  topically Every Night., Disp: 20 g, Rfl: 1  •  vitamin C (ASCORBIC ACID) 500 MG tablet, Take 500 mg by mouth Daily., Disp: , Rfl:   •  vitamin E 400 UNIT capsule, Take 400 Units by mouth Daily., Disp: , Rfl:   •  albuterol sulfate  (90 Base) MCG/ACT inhaler, INHALE TWO PUFFS BY MOUTH EVERY 4 HOURS AS NEEDED FOR WHEEZING, Disp: 18 g, Rfl: 5  •  azithromycin (Zithromax Z-Vitaliy) 250 MG tablet, Take 2 tablets the first day,  then 1 tablet daily for 4 days., Disp: 6 tablet, Rfl: 0  •  Baclofen 5 MG tablet, Take 5 mg by mouth At Night As Needed (cervicalgia)., Disp: 14 tablet, Rfl: 1  •  fluticasone (FLONASE) 50 MCG/ACT nasal spray, 2 sprays into the nostril(s) as directed by provider Daily for 14 days., Disp: 16 g, Rfl: 0  •  Prolia 60 MG/ML solution prefilled syringe syringe, Inject 1 mL under the skin into the appropriate area as directed 1 (One) Time for 1 dose., Disp: 1 mL, Rfl: 2  •  triamcinolone (KENALOG) 0.1 % cream, Apply topically to involved area up to twice a day as needed for itching, Disp: 30 g, Rfl: 2    Current Facility-Administered Medications:   •  albuterol (PROVENTIL HFA;VENTOLIN HFA) inhaler 2 puff, 2 puff, Inhalation, 4x Daily - RT, Ky Kasper MD     Objective     History of Present Illness  Pt here today for hospital follow up from [facility] on 12/23/22 with c/o neck pain. Pt having EKG in ER which showed SR with right atrial abnormality, vent rate 67, , QRS 82. Previous EKG from 2018 showed S. Arrhythmia. Pt having Chest xray while in ER which showed the heart size is normal and the lungs are clear with no effusions or hilar/mediastinal enlargement. There are no acute bone abnormalities. IMPRESSION: No acute abnormalities.  Pt having a CT head while in ER which showed left maxillary and bilateral ethmoid sinus disease. Remainder of the visualized paranasal sinuses and mastoid air cells are well aerated. No acute osseous abnormality. Mild prominence of CSF spaces and sulci. No intracranial hemorrhage, midline shift or hydrocephalus. No asymmetric hyperdense vessel to suggest acute infarct. No acute extra-axial collection. Visualized orbits are unremarkable. Atherosclerotic vascular calcification. No acute soft tissue abnormality. IMPRESSION: No acute intracranial abnormality.  Pt having a CT cervical spine done while in ER with results of no acute fracture or dislocation. Multilevel degenerative change  with moderate left neural foraminal narrowing at C3-C4 and moderate to severe right neural foraminal narrowing at C4-C5, C5-C6 and C6-C7. Straightening and mild reversal of the normal cervical lordosis.  Pt dx with Cervical paraspinal muscle spasm, DDD cervical, Neural foraminal stenosis of cervical spine, and Acetaminophen abuse/overuse. Pt was rx diclofenac 1 % gel Apply 2 g topically 2 times daily; lidocaine 5 % patch Place 1-3 patches onto the skin daily Remove & Discard patch within 12 hours or as directed; methocarbamol 750 MG tablet Take 1 tablet by mouth 3 times daily as needed; diclofenac 75 MG EC tablet Take 1 tablet by mouth 2 times daily as needed. Pt stating she can not take the methocarbamol due to it makes her loopy. Pt requesting change in muscle relaxer. Pt was directed to follow up with Dr. Mcclure but states she was told he was retiring and requesting change in referral.        Review of Systems   Constitutional: Negative.    Respiratory: Negative.    Cardiovascular: Negative.    Musculoskeletal: Positive for arthralgias (better than previous).       Physical Exam  Vitals reviewed.   HENT:      Head: Normocephalic.   Eyes:      Pupils: Pupils are equal, round, and reactive to light.   Cardiovascular:      Rate and Rhythm: Normal rate and regular rhythm.   Pulmonary:      Effort: Pulmonary effort is normal.      Breath sounds: Normal breath sounds.   Musculoskeletal:      Cervical back: Tenderness present. No swelling or edema. Pain with movement present. Decreased range of motion.   Skin:     General: Skin is warm and dry.   Neurological:      Mental Status: She is alert and oriented to person, place, and time.   Psychiatric:         Behavior: Behavior normal.         ASSESSMENT     Problems Addressed this Visit    None  Visit Diagnoses     Neural foraminal stenosis of cervical spine    -  Primary    Relevant Orders    Ambulatory Referral to Neurosurgery    DDD (degenerative disc disease), cervical         Relevant Orders    Ambulatory Referral to Neurosurgery    Cervicalgia        chronic    Relevant Medications    Baclofen 5 MG tablet    Other Relevant Orders    Ambulatory Referral to Neurosurgery    Abnormal EKG        Relevant Orders    Ambulatory Referral to Cardiology      Diagnoses       Codes Comments    Neural foraminal stenosis of cervical spine    -  Primary ICD-10-CM: M48.02  ICD-9-CM: 723.0     DDD (degenerative disc disease), cervical     ICD-10-CM: M50.30  ICD-9-CM: 722.4     Cervicalgia     ICD-10-CM: M54.2  ICD-9-CM: 723.1 chronic    Abnormal EKG     ICD-10-CM: R94.31  ICD-9-CM: 794.31           Current outpatient and discharge medications have been reconciled for the patient.  Reviewed by: CARMELA Flynn      PLAN    Patient Instructions   May use cold compress/ice pack 10-15 minutes at a time several times a day   May use warm compress/heating pad 10-15 minutes at at time several times a day  May use over the counter biofreeze and lidocaine patches as needed (wash off from skin before using heating pad)    Return if symptoms worsen or fail to improve, for Dr. Covarrubias as needed/as recommended.

## 2023-01-03 ENCOUNTER — TELEPHONE (OUTPATIENT)
Dept: FAMILY MEDICINE CLINIC | Facility: CLINIC | Age: 68
End: 2023-01-03

## 2023-01-03 PROCEDURE — 99284 EMERGENCY DEPT VISIT MOD MDM: CPT

## 2023-01-03 RX ORDER — SODIUM CHLORIDE 0.9 % (FLUSH) 0.9 %
10 SYRINGE (ML) INJECTION AS NEEDED
Status: DISCONTINUED | OUTPATIENT
Start: 2023-01-03 | End: 2023-01-05 | Stop reason: HOSPADM

## 2023-01-03 NOTE — TELEPHONE ENCOUNTER
Caller: Nanci Umanzor    Relationship to patient: Self    Best call back number: [phone]    Chief complaint: DISTENDED AND FULL STOMACH, FREQUENT URGE TO HAVE A BOWEL MOVEMENT, BLOOD IN STOOL.    Patient directed to call 911 or go to their nearest emergency room.     Patient verbalized understanding: [x] Yes  [] No  If no, why?     Additional notes: THE HUB ATTEMPTED TO WARM TRANSFER, BUT IT WAS UNSUCCESSFUL.

## 2023-01-03 NOTE — TELEPHONE ENCOUNTER
CALLED AND S/W PT, SHE IS HAVING PALE RED BLOOD IN HER STOOL, AND SOME STOMACH DISTENTION. PER DR. GARCIA, IF PATIENT IS HAVING PAIN OR FEELING OFF, SHE NEEDS TO GO TO ER.

## 2023-01-04 ENCOUNTER — APPOINTMENT (OUTPATIENT)
Dept: CARDIOLOGY | Facility: HOSPITAL | Age: 68
End: 2023-01-04
Payer: MEDICARE

## 2023-01-04 ENCOUNTER — APPOINTMENT (OUTPATIENT)
Dept: CT IMAGING | Facility: HOSPITAL | Age: 68
End: 2023-01-04
Payer: MEDICARE

## 2023-01-04 ENCOUNTER — HOSPITAL ENCOUNTER (OUTPATIENT)
Facility: HOSPITAL | Age: 68
Setting detail: OBSERVATION
Discharge: HOME | End: 2023-01-05
Attending: EMERGENCY MEDICINE | Admitting: HOSPITALIST
Payer: MEDICARE

## 2023-01-04 DIAGNOSIS — K52.9 COLITIS: ICD-10-CM

## 2023-01-04 DIAGNOSIS — K62.5 RECTAL BLEEDING: Primary | ICD-10-CM

## 2023-01-04 DIAGNOSIS — J20.9 ACUTE BRONCHITIS WITH BRONCHOSPASM: ICD-10-CM

## 2023-01-04 PROBLEM — R00.2 HEART PALPITATIONS: Status: ACTIVE | Noted: 2023-01-04

## 2023-01-04 LAB
ABO GROUP BLD: NORMAL
ADV 40+41 DNA STL QL NAA+NON-PROBE: NOT DETECTED
ALBUMIN SERPL-MCNC: 4.6 G/DL (ref 3.5–5.2)
ALBUMIN/GLOB SERPL: 2 G/DL
ALP SERPL-CCNC: 54 U/L (ref 39–117)
ALT SERPL W P-5'-P-CCNC: 33 U/L (ref 1–33)
ANION GAP SERPL CALCULATED.3IONS-SCNC: 10 MMOL/L (ref 5–15)
AORTIC DIMENSIONLESS INDEX: 0.7 (DI)
AST SERPL-CCNC: 26 U/L (ref 1–32)
ASTRO TYP 1-8 RNA STL QL NAA+NON-PROBE: NOT DETECTED
BASOPHILS # BLD AUTO: 0.03 10*3/MM3 (ref 0–0.2)
BASOPHILS NFR BLD AUTO: 0.3 % (ref 0–1.5)
BH CV ECHO MEAS - AO MAX PG: 11.4 MMHG
BH CV ECHO MEAS - AO MEAN PG: 6 MMHG
BH CV ECHO MEAS - AO ROOT DIAM: 2.5 CM
BH CV ECHO MEAS - AO V2 MAX: 168.7 CM/SEC
BH CV ECHO MEAS - AO V2 VTI: 37 CM
BH CV ECHO MEAS - AVA(I,D): 1.07 CM2
BH CV ECHO MEAS - EDV(CUBED): 85.9 ML
BH CV ECHO MEAS - EDV(MOD-SP2): 66 ML
BH CV ECHO MEAS - EDV(MOD-SP4): 70 ML
BH CV ECHO MEAS - EF(MOD-BP): 69 %
BH CV ECHO MEAS - EF(MOD-SP2): 68.2 %
BH CV ECHO MEAS - EF(MOD-SP4): 67.1 %
BH CV ECHO MEAS - ESV(CUBED): 21.9 ML
BH CV ECHO MEAS - ESV(MOD-SP2): 21 ML
BH CV ECHO MEAS - ESV(MOD-SP4): 23 ML
BH CV ECHO MEAS - FS: 36.6 %
BH CV ECHO MEAS - IVS/LVPW: 1.14 CM
BH CV ECHO MEAS - IVSD: 0.75 CM
BH CV ECHO MEAS - LAT PEAK E' VEL: 6.5 CM/SEC
BH CV ECHO MEAS - LV DIASTOLIC VOL/BSA (35-75): 49.9 CM2
BH CV ECHO MEAS - LV MASS(C)D: 93.4 GRAMS
BH CV ECHO MEAS - LV MAX PG: 7.9 MMHG
BH CV ECHO MEAS - LV MEAN PG: 3.4 MMHG
BH CV ECHO MEAS - LV SYSTOLIC VOL/BSA (12-30): 16.4 CM2
BH CV ECHO MEAS - LV V1 MAX: 140.2 CM/SEC
BH CV ECHO MEAS - LV V1 VTI: 26.3 CM
BH CV ECHO MEAS - LVIDD: 4.4 CM
BH CV ECHO MEAS - LVIDS: 2.8 CM
BH CV ECHO MEAS - LVOT AREA: 1.5 CM2
BH CV ECHO MEAS - LVOT DIAM: 1.38 CM
BH CV ECHO MEAS - LVPWD: 0.66 CM
BH CV ECHO MEAS - MED PEAK E' VEL: 8.1 CM/SEC
BH CV ECHO MEAS - MV A DUR: 0.09 SEC
BH CV ECHO MEAS - MV A MAX VEL: 103.7 CM/SEC
BH CV ECHO MEAS - MV DEC SLOPE: 320.6 CM/SEC2
BH CV ECHO MEAS - MV DEC TIME: 0.19 MSEC
BH CV ECHO MEAS - MV E MAX VEL: 71.6 CM/SEC
BH CV ECHO MEAS - MV E/A: 0.69
BH CV ECHO MEAS - MV MAX PG: 4.1 MMHG
BH CV ECHO MEAS - MV MEAN PG: 1.5 MMHG
BH CV ECHO MEAS - MV P1/2T: 63.9 MSEC
BH CV ECHO MEAS - MV V2 VTI: 22.7 CM
BH CV ECHO MEAS - MVA(P1/2T): 3.4 CM2
BH CV ECHO MEAS - MVA(VTI): 1.74 CM2
BH CV ECHO MEAS - PA ACC TIME: 0.11 SEC
BH CV ECHO MEAS - PA PR(ACCEL): 27.9 MMHG
BH CV ECHO MEAS - PA V2 MAX: 101.8 CM/SEC
BH CV ECHO MEAS - RAP SYSTOLE: 3 MMHG
BH CV ECHO MEAS - RV MAX PG: 1.28 MMHG
BH CV ECHO MEAS - RV V1 MAX: 56.6 CM/SEC
BH CV ECHO MEAS - RV V1 VTI: 14.4 CM
BH CV ECHO MEAS - RVSP: 24.6 MMHG
BH CV ECHO MEAS - SI(MOD-SP2): 32.1 ML/M2
BH CV ECHO MEAS - SI(MOD-SP4): 33.5 ML/M2
BH CV ECHO MEAS - SV(LVOT): 39.5 ML
BH CV ECHO MEAS - SV(MOD-SP2): 45 ML
BH CV ECHO MEAS - SV(MOD-SP4): 47 ML
BH CV ECHO MEAS - TAPSE (>1.6): 2.2 CM
BH CV ECHO MEAS - TR MAX PG: 21.6 MMHG
BH CV ECHO MEAS - TR MAX VEL: 232.1 CM/SEC
BH CV ECHO MEASUREMENTS AVERAGE E/E' RATIO: 9.81
BH CV XLRA - RV BASE: 2.5 CM
BH CV XLRA - TDI S': 7.8 CM/SEC
BILIRUB SERPL-MCNC: 0.6 MG/DL (ref 0–1.2)
BLD GP AB SCN SERPL QL: NEGATIVE
BUN SERPL-MCNC: 17 MG/DL (ref 8–23)
BUN/CREAT SERPL: 26.6 (ref 7–25)
C CAYETANENSIS DNA STL QL NAA+NON-PROBE: NOT DETECTED
C COLI+JEJ+UPSA DNA STL QL NAA+NON-PROBE: NOT DETECTED
C DIFF TOX GENS STL QL NAA+PROBE: NEGATIVE
CALCIUM SPEC-SCNC: 9.2 MG/DL (ref 8.6–10.5)
CHLORIDE SERPL-SCNC: 103 MMOL/L (ref 98–107)
CO2 SERPL-SCNC: 24 MMOL/L (ref 22–29)
CREAT SERPL-MCNC: 0.64 MG/DL (ref 0.57–1)
CRYPTOSP DNA STL QL NAA+NON-PROBE: NOT DETECTED
D-LACTATE SERPL-SCNC: 1.1 MMOL/L (ref 0.5–2)
DEPRECATED RDW RBC AUTO: 42.9 FL (ref 37–54)
E HISTOLYT DNA STL QL NAA+NON-PROBE: NOT DETECTED
EAEC PAA PLAS AGGR+AATA ST NAA+NON-PRB: NOT DETECTED
EC STX1+STX2 GENES STL QL NAA+NON-PROBE: NOT DETECTED
EGFRCR SERPLBLD CKD-EPI 2021: 95.8 ML/MIN/1.73
EOSINOPHIL # BLD AUTO: 0.04 10*3/MM3 (ref 0–0.4)
EOSINOPHIL NFR BLD AUTO: 0.4 % (ref 0.3–6.2)
EPEC EAE GENE STL QL NAA+NON-PROBE: NOT DETECTED
ERYTHROCYTE [DISTWIDTH] IN BLOOD BY AUTOMATED COUNT: 12.5 % (ref 12.3–15.4)
ETEC LTA+ST1A+ST1B TOX ST NAA+NON-PROBE: NOT DETECTED
G LAMBLIA DNA STL QL NAA+NON-PROBE: NOT DETECTED
GLOBULIN UR ELPH-MCNC: 2.3 GM/DL
GLUCOSE SERPL-MCNC: 119 MG/DL (ref 65–99)
HCT VFR BLD AUTO: 34 % (ref 34–46.6)
HCT VFR BLD AUTO: 38.8 % (ref 34–46.6)
HGB BLD-MCNC: 11.5 G/DL (ref 12–15.9)
HGB BLD-MCNC: 13.1 G/DL (ref 12–15.9)
HOLD SPECIMEN: NORMAL
HOLD SPECIMEN: NORMAL
IMM GRANULOCYTES # BLD AUTO: 0.03 10*3/MM3 (ref 0–0.05)
IMM GRANULOCYTES NFR BLD AUTO: 0.3 % (ref 0–0.5)
LEFT ATRIUM VOLUME INDEX: 18.5 ML/M2
LYMPHOCYTES # BLD AUTO: 1.1 10*3/MM3 (ref 0.7–3.1)
LYMPHOCYTES NFR BLD AUTO: 12.2 % (ref 19.6–45.3)
MAXIMAL PREDICTED HEART RATE: 151 BPM
MCH RBC QN AUTO: 32.3 PG (ref 26.6–33)
MCHC RBC AUTO-ENTMCNC: 33.8 G/DL (ref 31.5–35.7)
MCV RBC AUTO: 95.6 FL (ref 79–97)
MONOCYTES # BLD AUTO: 0.52 10*3/MM3 (ref 0.1–0.9)
MONOCYTES NFR BLD AUTO: 5.7 % (ref 5–12)
NEUTROPHILS NFR BLD AUTO: 7.33 10*3/MM3 (ref 1.7–7)
NEUTROPHILS NFR BLD AUTO: 81.1 % (ref 42.7–76)
NOROVIRUS GI+II RNA STL QL NAA+NON-PROBE: NOT DETECTED
NRBC BLD AUTO-RTO: 0 /100 WBC (ref 0–0.2)
P SHIGELLOIDES DNA STL QL NAA+NON-PROBE: NOT DETECTED
PLATELET # BLD AUTO: 311 10*3/MM3 (ref 140–450)
PMV BLD AUTO: 8.3 FL (ref 6–12)
POTASSIUM SERPL-SCNC: 4 MMOL/L (ref 3.5–5.2)
PROT SERPL-MCNC: 6.9 G/DL (ref 6–8.5)
QT INTERVAL: 427 MS
RBC # BLD AUTO: 4.06 10*6/MM3 (ref 3.77–5.28)
RH BLD: POSITIVE
RVA RNA STL QL NAA+NON-PROBE: NOT DETECTED
S ENT+BONG DNA STL QL NAA+NON-PROBE: NOT DETECTED
SAPO I+II+IV+V RNA STL QL NAA+NON-PROBE: NOT DETECTED
SHIGELLA SP+EIEC IPAH ST NAA+NON-PROBE: NOT DETECTED
SODIUM SERPL-SCNC: 137 MMOL/L (ref 136–145)
STRESS TARGET HR: 128 BPM
T&S EXPIRATION DATE: NORMAL
V CHOL+PARA+VUL DNA STL QL NAA+NON-PROBE: NOT DETECTED
V CHOLERAE DNA STL QL NAA+NON-PROBE: NOT DETECTED
WBC NRBC COR # BLD: 9.05 10*3/MM3 (ref 3.4–10.8)
WHOLE BLOOD HOLD COAG: NORMAL
WHOLE BLOOD HOLD SPECIMEN: NORMAL
Y ENTEROCOL DNA STL QL NAA+NON-PROBE: NOT DETECTED

## 2023-01-04 PROCEDURE — G0378 HOSPITAL OBSERVATION PER HR: HCPCS

## 2023-01-04 PROCEDURE — 85014 HEMATOCRIT: CPT | Performed by: NURSE PRACTITIONER

## 2023-01-04 PROCEDURE — 86900 BLOOD TYPING SEROLOGIC ABO: CPT

## 2023-01-04 PROCEDURE — 93306 TTE W/DOPPLER COMPLETE: CPT | Performed by: INTERNAL MEDICINE

## 2023-01-04 PROCEDURE — 99214 OFFICE O/P EST MOD 30 MIN: CPT | Performed by: PHYSICIAN ASSISTANT

## 2023-01-04 PROCEDURE — 94640 AIRWAY INHALATION TREATMENT: CPT

## 2023-01-04 PROCEDURE — 80053 COMPREHEN METABOLIC PANEL: CPT

## 2023-01-04 PROCEDURE — 94799 UNLISTED PULMONARY SVC/PX: CPT

## 2023-01-04 PROCEDURE — 74177 CT ABD & PELVIS W/CONTRAST: CPT

## 2023-01-04 PROCEDURE — 25010000002 PIPERACILLIN SOD-TAZOBACTAM PER 1 G: Performed by: PHYSICIAN ASSISTANT

## 2023-01-04 PROCEDURE — 25010000002 PERFLUTREN (DEFINITY) 8.476 MG IN SODIUM CHLORIDE (PF) 0.9 % 10 ML INJECTION: Performed by: STUDENT IN AN ORGANIZED HEALTH CARE EDUCATION/TRAINING PROGRAM

## 2023-01-04 PROCEDURE — 93306 TTE W/DOPPLER COMPLETE: CPT

## 2023-01-04 PROCEDURE — 36415 COLL VENOUS BLD VENIPUNCTURE: CPT

## 2023-01-04 PROCEDURE — 25010000002 IOPAMIDOL 61 % SOLUTION: Performed by: EMERGENCY MEDICINE

## 2023-01-04 PROCEDURE — 83605 ASSAY OF LACTIC ACID: CPT

## 2023-01-04 PROCEDURE — 93005 ELECTROCARDIOGRAM TRACING: CPT | Performed by: STUDENT IN AN ORGANIZED HEALTH CARE EDUCATION/TRAINING PROGRAM

## 2023-01-04 PROCEDURE — 96375 TX/PRO/DX INJ NEW DRUG ADDON: CPT

## 2023-01-04 PROCEDURE — 86850 RBC ANTIBODY SCREEN: CPT

## 2023-01-04 PROCEDURE — 86901 BLOOD TYPING SEROLOGIC RH(D): CPT

## 2023-01-04 PROCEDURE — 96365 THER/PROPH/DIAG IV INF INIT: CPT

## 2023-01-04 PROCEDURE — 93010 ELECTROCARDIOGRAM REPORT: CPT | Performed by: INTERNAL MEDICINE

## 2023-01-04 PROCEDURE — 94761 N-INVAS EAR/PLS OXIMETRY MLT: CPT

## 2023-01-04 PROCEDURE — 99203 OFFICE O/P NEW LOW 30 MIN: CPT | Performed by: STUDENT IN AN ORGANIZED HEALTH CARE EDUCATION/TRAINING PROGRAM

## 2023-01-04 PROCEDURE — 85018 HEMOGLOBIN: CPT | Performed by: NURSE PRACTITIONER

## 2023-01-04 PROCEDURE — 85025 COMPLETE CBC W/AUTO DIFF WBC: CPT

## 2023-01-04 PROCEDURE — 87507 IADNA-DNA/RNA PROBE TQ 12-25: CPT | Performed by: PHYSICIAN ASSISTANT

## 2023-01-04 PROCEDURE — 87493 C DIFF AMPLIFIED PROBE: CPT | Performed by: PHYSICIAN ASSISTANT

## 2023-01-04 RX ORDER — CHOLECALCIFEROL (VITAMIN D3) 50 MCG
2000 TABLET ORAL DAILY
COMMUNITY

## 2023-01-04 RX ORDER — ONDANSETRON 2 MG/ML
4 INJECTION INTRAMUSCULAR; INTRAVENOUS EVERY 6 HOURS PRN
Status: DISCONTINUED | OUTPATIENT
Start: 2023-01-04 | End: 2023-01-05 | Stop reason: HOSPADM

## 2023-01-04 RX ORDER — ONDANSETRON 4 MG/1
4 TABLET, FILM COATED ORAL EVERY 6 HOURS PRN
Status: DISCONTINUED | OUTPATIENT
Start: 2023-01-04 | End: 2023-01-05 | Stop reason: HOSPADM

## 2023-01-04 RX ORDER — PANTOPRAZOLE SODIUM 40 MG/1
40 TABLET, DELAYED RELEASE ORAL
Status: DISCONTINUED | OUTPATIENT
Start: 2023-01-04 | End: 2023-01-05 | Stop reason: HOSPADM

## 2023-01-04 RX ORDER — PANTOPRAZOLE SODIUM 40 MG/10ML
40 INJECTION, POWDER, LYOPHILIZED, FOR SOLUTION INTRAVENOUS ONCE
Status: COMPLETED | OUTPATIENT
Start: 2023-01-04 | End: 2023-01-04

## 2023-01-04 RX ORDER — BUDESONIDE AND FORMOTEROL FUMARATE DIHYDRATE 160; 4.5 UG/1; UG/1
2 AEROSOL RESPIRATORY (INHALATION)
Status: DISCONTINUED | OUTPATIENT
Start: 2023-01-04 | End: 2023-01-05 | Stop reason: HOSPADM

## 2023-01-04 RX ORDER — UREA 10 %
3 LOTION (ML) TOPICAL NIGHTLY PRN
Status: DISCONTINUED | OUTPATIENT
Start: 2023-01-04 | End: 2023-01-05 | Stop reason: HOSPADM

## 2023-01-04 RX ORDER — ALBUTEROL SULFATE 2.5 MG/3ML
2.5 SOLUTION RESPIRATORY (INHALATION) EVERY 6 HOURS PRN
Status: DISCONTINUED | OUTPATIENT
Start: 2023-01-04 | End: 2023-01-05 | Stop reason: HOSPADM

## 2023-01-04 RX ORDER — ACETAMINOPHEN 325 MG/1
650 TABLET ORAL EVERY 4 HOURS PRN
Status: DISCONTINUED | OUTPATIENT
Start: 2023-01-04 | End: 2023-01-05 | Stop reason: HOSPADM

## 2023-01-04 RX ORDER — ATORVASTATIN CALCIUM 20 MG/1
40 TABLET, FILM COATED ORAL DAILY
Status: DISCONTINUED | OUTPATIENT
Start: 2023-01-04 | End: 2023-01-05 | Stop reason: HOSPADM

## 2023-01-04 RX ORDER — AMLODIPINE BESYLATE 5 MG/1
5 TABLET ORAL DAILY
Status: DISCONTINUED | OUTPATIENT
Start: 2023-01-04 | End: 2023-01-05 | Stop reason: HOSPADM

## 2023-01-04 RX ORDER — SODIUM CHLORIDE 9 MG/ML
75 INJECTION, SOLUTION INTRAVENOUS CONTINUOUS
Status: DISCONTINUED | OUTPATIENT
Start: 2023-01-04 | End: 2023-01-05

## 2023-01-04 RX ADMIN — PANTOPRAZOLE SODIUM 40 MG: 40 TABLET, DELAYED RELEASE ORAL at 18:10

## 2023-01-04 RX ADMIN — BUDESONIDE AND FORMOTEROL FUMARATE DIHYDRATE 2 PUFF: 160; 4.5 AEROSOL RESPIRATORY (INHALATION) at 20:37

## 2023-01-04 RX ADMIN — PANTOPRAZOLE SODIUM 40 MG: 40 INJECTION, POWDER, FOR SOLUTION INTRAVENOUS at 06:16

## 2023-01-04 RX ADMIN — ATORVASTATIN CALCIUM 40 MG: 20 TABLET, FILM COATED ORAL at 18:10

## 2023-01-04 RX ADMIN — SODIUM CHLORIDE 1000 ML: 9 INJECTION, SOLUTION INTRAVENOUS at 04:42

## 2023-01-04 RX ADMIN — ACETAMINOPHEN 650 MG: 325 TABLET, FILM COATED ORAL at 21:23

## 2023-01-04 RX ADMIN — IOPAMIDOL 85 ML: 612 INJECTION, SOLUTION INTRAVENOUS at 05:07

## 2023-01-04 RX ADMIN — TAZOBACTAM SODIUM AND PIPERACILLIN SODIUM 3.38 G: 375; 3 INJECTION, SOLUTION INTRAVENOUS at 08:22

## 2023-01-04 RX ADMIN — PERFLUTREN 2 ML: 6.52 INJECTION, SUSPENSION INTRAVENOUS at 16:28

## 2023-01-04 RX ADMIN — SODIUM CHLORIDE 75 ML/HR: 9 INJECTION, SOLUTION INTRAVENOUS at 18:08

## 2023-01-04 RX ADMIN — Medication 3 MG: at 21:23

## 2023-01-04 RX ADMIN — AMLODIPINE BESYLATE 5 MG: 5 TABLET ORAL at 18:10

## 2023-01-04 NOTE — PROGRESS NOTES
Clinical Pharmacy Services: Medication History    Nanci Umanzor is a 67 y.o. female presenting to [facility] for   Chief Complaint   Patient presents with   • Rectal Bleeding       She  has a past medical history of Allergic (1990's), Ankle fracture, Anxiety, Arthritis, Asthma, Cancer ([facility]) (1992), Cataract (2017), Clavicle fracture, Colon polyp, COPD (chronic obstructive pulmonary disease) ([facility]) (2006), Depression, Diverticulosis, Fibula fracture, GERD (gastroesophageal reflux disease) (2016), Glaucoma (2006), Heart murmur, Hyperlipidemia, Hypertension, Low back pain (1980s), Osteopenia (2016), Pelvis fracture ([facility]), Pneumonia, Visual impairment (Since 1yrs of age), and Wrist fracture.    Allergies as of 01/03/2023 - Reviewed 01/03/2023   Allergen Reaction Noted   • Clarithromycin Shortness Of Breath and Palpitations 06/10/2016   • Fluarix [influenza virus vaccine] Myalgia 09/29/2022   • Pneumococcal vaccines Swelling 09/13/2019       Medication information was obtained from: Patient   Pharmacy and Phone Number:     Prior to Admission Medications     Prescriptions Last Dose Informant Patient Reported? Taking?    albuterol sulfate  (90 Base) MCG/ACT inhaler Past Week Self No Yes    INHALE TWO PUFFS BY MOUTH EVERY 4 HOURS AS NEEDED FOR WHEEZING    Patient taking differently:  Inhale 2 puffs 4 (Four) Times a Day.    amLODIPine (NORVASC) 5 MG tablet 1/3/2023 Self No Yes    TAKE ONE TABLET BY MOUTH DAILY    Patient taking differently:  Take 5 mg by mouth Daily.    atorvastatin (LIPITOR) 40 MG tablet 1/3/2023 Self No Yes    TAKE ONE TABLET BY MOUTH DAILY    Patient taking differently:  Take 40 mg by mouth Daily.    benazepril (LOTENSIN) 10 MG tablet 1/3/2023 Self No Yes    TAKE ONE TABLET BY MOUTH DAILY    Patient taking differently:  Take 10 mg by mouth Daily.    BIOTIN PO 1/3/2023 Self Yes Yes    Take 1 capsule by mouth Daily.    calcium citrate-vitamin d (CITRACAL) 200-250 MG-UNIT tablet tablet  1/3/2023 Self Yes Yes    Take 1 tablet by mouth Daily.    Cholecalciferol (Vitamin D) 50 MCG (2000 UT) tablet 1/3/2023 Self Yes Yes    Take 2,000 Units by mouth Daily.    fluticasone (FLONASE) 50 MCG/ACT nasal spray 1/3/2023 Self No Yes    2 sprays into the nostril(s) as directed by provider Daily for 14 days.    Fluticasone-Salmeterol (ADVAIR/WIXELA) 250-50 MCG/ACT DISKUS 1/3/2023 Self No Yes    INHALE ONE PUFF BY MOUTH TWICE A DAY    Patient taking differently:  Inhale 1 puff Daily.    Multiple Vitamins-Minerals (CENTRUM SILVER ADULT 50+ PO) 1/3/2023 Self Yes Yes    Take 1 tablet by mouth Daily.    Multiple Vitamins-Minerals (OCUVITE ADULT 50+ PO) 1/3/2023 Self Yes Yes    Take 1 tablet by mouth Daily.    Omega-3 Fatty Acids (FISH OIL PO) 1/3/2023 Self Yes Yes    Take 1 capsule by mouth Daily.    tretinoin (RETIN-A) 0.05 % cream Past Week Self No Yes    Apply  topically Every Night.    Patient taking differently:  Apply 1 application topically to the appropriate area as directed Every Other Day.    vitamin C (ASCORBIC ACID) 500 MG tablet 1/3/2023 Self Yes Yes    Take 1,000 mg by mouth Daily.    albuterol (PROVENTIL HFA;VENTOLIN HFA) inhaler 2 puff   No No    albuterol (PROVENTIL) (2.5 MG/3ML) 0.083% nebulizer solution More than a month Self No No    Take 2.5 mg by nebulization Every 4 (Four) Hours As Needed for Wheezing.    Prolia 60 MG/ML solution prefilled syringe syringe More than a month Self No No    Inject 1 mL under the skin into the appropriate area as directed 1 (One) Time for 1 dose.    Patient taking differently:  Inject 60 mg under the skin into the appropriate area as directed 1 (One) Time. Every six Months. Last dose: November 2022            Medication notes:     This medication list is complete to the best of my knowledge as of 1/4/2023    Please call if questions.    Ashwin Nathan  Medication History Technician  [phone]    1/4/2023 09:58 EST

## 2023-01-04 NOTE — CONSULTS
[facility]        Patient Name: Nanci Umanzor  Age/Sex: 67 y.o. female  : 1955  MRN: [id number]    Date of Admission: 2023  Date of Encounter Visit: 23  Encounter Provider: Sal Carlos MD  Referring Provider: David Owens MD  Place of Service: [facility] CARDIOLOGY  Patient Care Team:  Bowen Covarrubias MD as PCP - General (Internal Medicine)    Subjective:     Chief Complaint: Hematochezia    Reason for consult: Palpitations    History of Present Illness:  Nanci Umanzor is a 67 y.o. female who is currently hospitalized for complaints of bright red blood per rectum has been evaluate by GI for possible upper endoscopy.  She instantly reports that over the past several weeks has had increasing episodes of palpitations which she describes as a sensation of lightheadedness and upper extremity tingling that happen sporadically.  She was recently seen in the [facility] ED for these complaints.  At that time she did have an EKG which showed possible right atrial enlargement and poor R wave progression per her report.  This EKG is medically well for review.    Intermittently, unrelated to the episode of the numbness which might related to spasm, she has noted intermittent palpitation symptoms where she is accompanied lightheadedness.  She states that she is kept a log, which is at home, the symptoms described happen at work.  Accompanied with mild dyspnea and lightheadedness.  These happen sporadically, once every week or so.    She currently denies any active cardiac complaints except for this chronic problem.  No chest pains, no shortness of breath, no other active cardiac issues.  Cardiology was asked to see due to chronic palpitations.          Past Medical History:  Past Medical History:   Diagnosis Date   • Allergic    • Ankle fracture     Rt   Lt    • Anxiety    • Arthritis    • Asthma    • Cancer (HCC)      Cervical cone biopsy for Stage II abnormality   • Cataract    • Clavicle fracture     10/21/1970   • Colon polyp    • COPD (chronic obstructive pulmonary disease) ([facility])    • Depression    • Diverticulosis    • Fibula fracture     left    • GERD (gastroesophageal reflux disease)    • Glaucoma    • Heart murmur    • Hyperlipidemia    • Hypertension    • Low back pain    • Osteopenia    • Pelvis fracture ([facility])     10/21/1970   • Pneumonia    • Visual impairment Since 1yrs of age   • Wrist fracture            Past Surgical History:   Procedure Laterality Date   • CERVICAL CONIZATION     •  SECTION      ,,   • COLONOSCOPY N/A 2017    Procedure: COLONOSCOPY TO CECUM with cold polypectomy;  Surgeon: Alo Staton Jr., MD;  Location:  [facility];  Service:    • COLONOSCOPY N/A 2022    Procedure: COLONOSCOPY TO CECUM WITH COLD BIOPSY POLYPECTOMY;  Surgeon: Chuck Servin MD;  Location:  [facility];  Service: Gastroenterology;  Laterality: N/A;  PRE: HX COLON POLYPS  POST: DIVERTICULOSIS, POLYP   • ENDOMETRIAL ABLATION     • EYE SURGERY     • FINGER SURGERY     • FRACTURE SURGERY     • TUBAL ABDOMINAL LIGATION         Home Medications:   Facility-Administered Medications Prior to Admission   Medication Dose Route Frequency Provider Last Rate Last Admin   • albuterol (PROVENTIL HFA;VENTOLIN HFA) inhaler 2 puff  2 puff Inhalation 4x Daily - RT Ky Kasper MD         Medications Prior to Admission   Medication Sig Dispense Refill Last Dose   • albuterol sulfate  (90 Base) MCG/ACT inhaler INHALE TWO PUFFS BY MOUTH EVERY 4 HOURS AS NEEDED FOR WHEEZING (Patient taking differently: Inhale 2 puffs 4 (Four) Times a Day.) 18 g 5 Past Week   • amLODIPine (NORVASC) 5 MG tablet TAKE ONE TABLET BY MOUTH DAILY (Patient taking differently: Take 5 mg by mouth Daily.) 90 tablet 3 1/3/2023   • atorvastatin (LIPITOR) 40 MG tablet TAKE ONE  TABLET BY MOUTH DAILY (Patient taking differently: Take 40 mg by mouth Daily.) 90 tablet 2 1/3/2023   • benazepril (LOTENSIN) 10 MG tablet TAKE ONE TABLET BY MOUTH DAILY (Patient taking differently: Take 10 mg by mouth Daily.) 90 tablet 3 1/3/2023   • BIOTIN PO Take 1 capsule by mouth Daily.   1/3/2023   • calcium citrate-vitamin d (CITRACAL) 200-250 MG-UNIT tablet tablet Take 1 tablet by mouth Daily.   1/3/2023   • Cholecalciferol (Vitamin D) 50 MCG (2000 UT) tablet Take 2,000 Units by mouth Daily.   1/3/2023   • fluticasone (FLONASE) 50 MCG/ACT nasal spray 2 sprays into the nostril(s) as directed by provider Daily for 14 days. 16 g 0 1/3/2023   • Fluticasone-Salmeterol (ADVAIR/WIXELA) 250-50 MCG/ACT DISKUS INHALE ONE PUFF BY MOUTH TWICE A DAY (Patient taking differently: Inhale 1 puff Daily.) 60 each 11 1/3/2023   • Multiple Vitamins-Minerals (CENTRUM SILVER ADULT 50+ PO) Take 1 tablet by mouth Daily.   1/3/2023   • Multiple Vitamins-Minerals (OCUVITE ADULT 50+ PO) Take 1 tablet by mouth Daily.   1/3/2023   • Omega-3 Fatty Acids (FISH OIL PO) Take 1 capsule by mouth Daily.   1/3/2023   • tretinoin (RETIN-A) 0.05 % cream Apply  topically Every Night. (Patient taking differently: Apply 1 application topically to the appropriate area as directed Every Other Day.) 20 g 1 Past Week   • vitamin C (ASCORBIC ACID) 500 MG tablet Take 1,000 mg by mouth Daily.   1/3/2023   • albuterol (PROVENTIL) (2.5 MG/3ML) 0.083% nebulizer solution Take 2.5 mg by nebulization Every 4 (Four) Hours As Needed for Wheezing. 3 mL 2 More than a month   • Prolia 60 MG/ML solution prefilled syringe syringe Inject 1 mL under the skin into the appropriate area as directed 1 (One) Time for 1 dose. (Patient taking differently: Inject 60 mg under the skin into the appropriate area as directed 1 (One) Time. Every six Months. Last dose: November 2022) 1 mL 2 More than a month       Allergies:  Allergies   Allergen Reactions   • Clarithromycin Shortness  Of Breath and Palpitations   • Fluarix [Influenza Virus Vaccine] Myalgia   • Pneumococcal Vaccines Swelling       Past Social History:  Social History     Socioeconomic History   • Marital status:    • Number of children: 3   • Years of education: AS   Tobacco Use   • Smoking status: Former     Packs/day: 1.00     Years: 15.00     Pack years: 15.00     Types: Cigarettes     Quit date: 2013     Years since quittin.7   • Smokeless tobacco: Never   Vaping Use   • Vaping Use: Never used   Substance and Sexual Activity   • Alcohol use: Yes     Alcohol/week: 1.0 standard drink     Types: 1 Glasses of wine per week     Comment: WINE DAILY   • Drug use: No   • Sexual activity: Not Currently     Partners: Male     Birth control/protection: Post-menopausal       Past Family History:  Family History   Problem Relation Age of Onset   • Heart disease Mother         Mechanical aoritic valve, several heart attacks   • Hypertension Mother    • Hyperlipidemia Mother    • Rheum arthritis Mother    • Arthritis Mother    • Diabetes Father         Type II   • Hyperlipidemia Father    • Hypertension Father    • Heart disease Father    • Thyroid disease Father    • Hearing loss Father         Hearing aid   • Cancer Sister    • Depression Sister    • Glaucoma Sister    • Miscarriages / Stillbirths Sister    • Vision loss Sister    • Hypertension Brother    • Lung disease Brother    • Hypertension Brother    • Hypertension Brother    • Lung disease Daughter    • Asthma Daughter    • Heart disease Maternal Aunt    • Rheum arthritis Maternal Aunt    • Arthritis Maternal Aunt    • Heart disease Paternal Aunt    • Heart disease Maternal Grandmother    • Hyperlipidemia Maternal Grandmother    • Rheum arthritis Maternal Grandmother    • Stroke Maternal Grandmother    • Arthritis Maternal Grandmother    • Heart disease Maternal Grandfather    • Cancer Maternal Grandfather         Pancreatic   • Heart disease Paternal Grandmother     • Hyperlipidemia Paternal Grandmother    • Stroke Paternal Grandmother    • Heart disease Paternal Grandfather    • Cancer Paternal Grandfather         Lung   • Malig Hyperthermia Neg Hx        REVIEW OF SYSTEMS:   14 point ROS was performed and is negative except as outlined in HPI        Objective:   Temp:  [97.1 °F (36.2 °C)-98.5 °F (36.9 °C)] 97.1 °F (36.2 °C)  Heart Rate:  [] 73  Resp:  [18] 18  BP: ()/(77-90) 129/78     Intake/Output Summary (Last 24 hours) at 1/4/2023 1334  Last data filed at 1/4/2023 0854  Gross per 24 hour   Intake 50 ml   Output --   Net 50 ml     Body mass index is 22.19 kg/m².      01/03/23  2339   Weight: 49 kg (108 lb)     Weight change:     General Appearance:    Alert, cooperative, in no acute distress, resting in bed   Head:    Normocephalic, without obvious abnormality, atraumatic   Eyes:            Lids and lashes normal, conjunctivae and sclerae normal, no   icterus, no pallor, corneas clear, PERRLA   Ears:    Ears appear intact with no abnormalities noted   Throat:   No oral lesions, no thrush, oral mucosa moist   Neck:   No adenopathy, supple, trachea midline, no thyromegaly, no   carotid bruit, no JVD   Back:     No kyphosis present, no scoliosis present, no skin lesions, erythema or scars, no tenderness to percussion or palpation, range of motion normal   Lungs:     Clear to auscultation,respirations regular, even and unlabored    Heart:    Regular rhythm and normal rate, normal S1 and S2, no murmur, no gallop, no rub, no click   Chest Wall:    No abnormalities observed   Abdomen:     Normal bowel sounds, no masses, no organomegaly, soft, nontender, nondistended, no guarding, no rebound  tenderness   Extremities:   Moves all extremities well, no edema, no cyanosis, no redness   Pulses:   Pulses palpable and equal bilaterally. Normal radial, carotid, femoral, dorsalis pedis and posterior tibial pulses bilaterally. Normal abdominal aorta   Skin:  Psychiatric:   No  bleeding, bruising or rash    Alert and oriented x 3, normal mood and affect     Lab Review:   Results from last 7 days   Lab Units 01/04/23  0010   SODIUM mmol/L 137   POTASSIUM mmol/L 4.0   CHLORIDE mmol/L 103   CO2 mmol/L 24.0   BUN mg/dL 17   CREATININE mg/dL 0.64   GLUCOSE mg/dL 119*   CALCIUM mg/dL 9.2   AST (SGOT) U/L 26   ALT (SGPT) U/L 33         Results from last 7 days   Lab Units 01/04/23  0010   WBC 10*3/mm3 9.05   HEMOGLOBIN g/dL 13.1   HEMATOCRIT % 38.8   PLATELETS 10*3/mm3 311                   Invalid input(s): LDLCALC            Echo EF Estimated  No results found for: ECHOEFEST    EKG:   I personally viewed and interpreted the patient's EKG    EKG was normal.  There is no evidence of right atrial lodgment or an intraventricular conduction delay which was noted on the EKG at [facility].    Imaging:  Imaging Results (Most Recent)     Procedure Component Value Units Date/Time    CT Abdomen Pelvis With Contrast [[id number]] Collected: 01/04/23 0723     Updated: 01/04/23 0746    Narrative:      CT ABDOMEN AND PELVIS WITH IV CONTRAST     HISTORY: Abdominal pain, rectal bleeding     TECHNIQUE: Radiation dose reduction techniques were utilized, including  automated exposure control and exposure modulation based on body size.   3 mm images were obtained through the abdomen and pelvis after the  administration of IV contrast.      COMPARISON: CT abdomen pelvis 08/15/2020     FINDINGS:     There are small paraesophageal hernia. No findings of small bowel  obstruction are present. The appendix is unremarkable. Subcentimeter  hepatic lesions are too small to characterize. The gallbladder,  pancreas, spleen, adrenal glands have an unremarkable postcontrast CT  appearance. Subcentimeter renal lesions are too small to characterize.  There is no hydronephrosis. No abdominopelvic adenopathy by size  criteria. The bladder is decompressed and cannot be evaluated. There is  colonic diverticulosis which primarily  involves the distal sigmoid  colon. A long segment of overall hypodense circumferential wall  thickening involving the distal descending and proximal sigmoid colon is  present with surrounding fat stranding and soft tissue thickening. Small  amount of free fluid layers in the left paracolic gutter and pelvis. No  free intraperitoneal air is seen.     For the purposes of this dictation, the last well-formed disc space be  referred to as L5-S1. Severe burst compression deformity of the L5  vertebral body is present, as before. Superior endplate compression  deformity of T11 suggestive of a Schmorl's node is also grossly  unchanged. There is mild anterolisthesis of L3 on L4, as before.       Impression:      1.  Long segment of colitis involving the descending colon and proximal  sigmoid colon which given the appearance and distribution is most  suggestive of ischemic colitis. Infectious colitis and diverticulitis  are also possible but considered less likely. Gastroenterology  consultation is recommended. The above findings were discussed with Dr. Loomis by telephone by Ramos Castillo at approximately 7:25 AM on   01/04/2023  .  2.  Other findings as above     This report was finalized on 1/4/2023 7:43 AM by Dr. Ramos Castillo M.D.           Reviewed from January 4, 2023:  Normal LV systolic function EF 69%  Diastolic dysfunction, grade 1  Mild TR with RVSP 25.  Otherwise normal.    Assessment:     Active Hospital Problems    Diagnosis  POA   • **Colitis with rectal bleeding [K52.9, K62.5]  Yes      Resolved Hospital Problems   No resolved problems to display.          Plan:     1. Palpitations:   1. Nothing pathologic noted on telemetry or history.  2. TTE obtained, normal.  Mild diastolic dysfunction, but this might be related to age  3. Given her fleeting intermittent symptoms, please fit patient for 2 weeks Zio patch monitor, this might be able to capture an episode should it happen again.  4. None of the above  findings preclude her from a low risk endoscopic procedure.  Cardiac clearance is not required for low risk endoscopic procedures.  5. I reviewed CT chest from February 2021.  There are no coronary arterial calcifications but mild aortic valve calcification.  Unlikely cardiac disease contributing.  Hematochezia: Per GI, colonoscopy being considered.     Thank you for allowing me to participate in the care of Nanci Umanzor. Feel free to contact me directly with any further questions or concerns.  Cardiology will sign off at this time.  Would fit patient for 2-week Zio patch on discharge and she can see Dr. Malik as previously arranged.  The 2-week Holter results can be reviewed at that time.    Sal Carlos MD  [facility]  01/04/23  13:34 EST      EMR Dragon/Transcription disclaimer:   Much of this encounter note is an electronic transcription/translation of spoken language to printed text. The electronic translation of spoken language may permit erroneous, or at times, nonsensical words or phrases to be inadvertently transcribed; Although I have reviewed the note for such errors, some may still exist.

## 2023-01-04 NOTE — ED NOTES
Pt ambulatory to triage from home with c/o rectal bleeding this afternoon - states has been on voltaren and \"felt weird\" - states head was foggy, felt hot, couldn't focus.  Pt also states after rectal bleeding, pt heart started racing.  Pt wearing mask in triage. Triage personnel wore appropriate PPE

## 2023-01-04 NOTE — ED NOTES
..Nursing report ED to floor  Nanci Umanzor  67 y.o.  female    HPI :   Chief Complaint   Patient presents with    Rectal Bleeding       Admitting doctor:   David Owens MD    Admitting diagnosis:   The primary encounter diagnosis was Rectal bleeding. A diagnosis of Colitis was also pertinent to this visit.    Code status:   Current Code Status       Date Active Code Status Order ID Comments User Context       Not on file            Allergies:   Clarithromycin, Fluarix [influenza virus vaccine], and Pneumococcal vaccines    Isolation:   No active isolations    Intake and Output  No intake or output data in the 24 hours ending 01/04/23 0851    Weight:       01/03/23 2339   Weight: 49 kg (108 lb)       Most recent vitals:   Vitals:    01/03/23 2339 01/04/23 0625 01/04/23 0626   BP: 124/90  152/81   BP Location: Right arm     Patient Position: Standing     Pulse: 100 80 80   Resp: 18     Temp: 98 °F (36.7 °C)     TempSrc: Tympanic     SpO2: 96% 95% 95%   Weight: 49 kg (108 lb)     Height: 148.6 cm (58.5\")         Active LDAs/IV Access:   Lines, Drains & Airways       Active LDAs       Name Placement date Placement time Site Days    Peripheral IV 01/04/23 0428 Left Antecubital 01/04/23 0428  Antecubital  less than 1                    Labs (abnormal labs have a star):   Labs Reviewed   COMPREHENSIVE METABOLIC PANEL - Abnormal; Notable for the following components:       Result Value    Glucose 119 (*)     BUN/Creatinine Ratio 26.6 (*)     All other components within normal limits    Narrative:     GFR Normal >60  Chronic Kidney Disease <60  Kidney Failure <15     CBC WITH AUTO DIFFERENTIAL - Abnormal; Notable for the following components:    Neutrophil % 81.1 (*)     Lymphocyte % 12.2 (*)     Neutrophils, Absolute 7.33 (*)     All other components within normal limits   LACTIC ACID, PLASMA - Normal   GASTROINTESTINAL PANEL, PCR   CLOSTRIDIOIDES DIFFICILE TOXIN    Narrative:     The following orders were  created for panel order Clostridioides difficile Toxin - Stool, Per Rectum.  Procedure                               Abnormality         Status                     ---------                               -----------         ------                     Clostridioides difficile...[[id number]]                                                   Please view results for these tests on the individual orders.   CLOSTRIDIOIDES DIFFICILE TOXIN, PCR   RAINBOW DRAW    Narrative:     The following orders were created for panel order [place] Draw.  Procedure                               Abnormality         Status                     ---------                               -----------         ------                     Green Top (Gel)[[id number]]                                  Final result               Lavender Top[[id number]]                                     Final result               Gold Top - SST[[id number]]                                   Final result               Light Blue Top[[id number]]                                   Final result                 Please view results for these tests on the individual orders.   TYPE AND SCREEN   CBC AND DIFFERENTIAL    Narrative:     The following orders were created for panel order CBC & Differential.  Procedure                               Abnormality         Status                     ---------                               -----------         ------                     CBC Auto Differential[[id number]]        Abnormal            Final result                 Please view results for these tests on the individual orders.   GREEN TOP   LAVENDER TOP   GOLD TOP - SST   LIGHT BLUE TOP       EKG:   No orders to display       Meds given in ED:   Medications   sodium chloride 0.9 % flush 10 mL (has no administration in time range)   piperacillin-tazobactam (ZOSYN) 3.375 g in iso-osmotic dextrose 50 ml (premix) (3.375 g Intravenous New Bag 1/4/23 0822)   sodium chloride 0.9 % bolus 1,000 mL  (0 mL Intravenous Stopped 23 0702)   iopamidol (ISOVUE-300) 61 % injection 100 mL (85 mL Intravenous Given 23 1787)   pantoprazole (PROTONIX) injection 40 mg (40 mg Intravenous Given 23 8916)       Imaging results:  CT Abdomen Pelvis With Contrast    Result Date: 2023  1.  Long segment of colitis involving the descending colon and proximal sigmoid colon which given the appearance and distribution is most suggestive of ischemic colitis. Infectious colitis and diverticulitis are also possible but considered less likely. Gastroenterology consultation is recommended. The above findings were discussed with Dr. Loomis by telephone by Ramos Castillo at approximately 7:25 AM on 2023  . 2.  Other findings as above  This report was finalized on 2023 7:43 AM by Dr. Ramos Castillo M.D.       Ambulatory status:   - ad liat    Social issues:   Social History     Socioeconomic History    Marital status:     Number of children: 3    Years of education: AS   Tobacco Use    Smoking status: Former     Packs/day: 1.00     Years: 15.00     Pack years: 15.00     Types: Cigarettes     Quit date: 2013     Years since quittin.7    Smokeless tobacco: Never   Vaping Use    Vaping Use: Never used   Substance and Sexual Activity    Alcohol use: Yes     Alcohol/week: 1.0 standard drink     Types: 1 Glasses of wine per week     Comment: WINE DAILY    Drug use: No    Sexual activity: Not Currently     Partners: Male     Birth control/protection: Post-menopausal           Gemma Matos RN  23 08:51 EST

## 2023-01-04 NOTE — CONSULTS
[facility]  Initial Inpatient Consult Note    Referring Provider: Dr. Owens    Reason for Consultation: Colitis, hematochezia    Subjective     History of present illness:      Thank you for allowing us to participate in the care of this patient.    67 y.o. female patient of Dr. Servin with PMH significant for GERD, diverticulosis, COPD/asthma, hypertension, hyperlipidemia who presented to emergency department with complaints of rectal bleeding.  She reports dark stools for the last several days.  Yesterday morning symptoms began with generalized feeling of unwell wellness which progressed into lightheadedness and dizziness and was accompanied by sweats.  She also endorses upper abdominal bloating and some lower discomfort which was followed by multiple episodes of maroon blood per rectum mixed in with stool.  Now when she has a bowel movement it is strictly bloody without any feces.  No fevers, chills, nausea, vomiting, heartburn/reflux.  She reports she has been taking Voltaren daily and at one point was also taking naproxen and Tylenol in addition to this.  He has not been on any gastric prophylaxis.    Contrast-enhanced ET of abdomen and pelvis with a long segment colitis involving descending and proximal sigmoid concerning for ischemic colitis.    EGD 9/30/2022 notable for nonbleeding internal hemorrhoids, sigmoid colonic diverticulosis, tubular adenoma the transverse colon.  Recall 5 years.    Of note she also endorses palpitations which have been off and on for the last several months.  States she was seen and evaluated at [facility] 12/23 after numbness and tingling of extremities at which time she was told she has a bundle branch block. She was supposed to follow be seen by Dr. Malik yesterday but canceled due to feeling poorly.  Her appointment was subsequently rescheduled for 2/3/2023.    No family history of IBD or GI malignancies.  Former tobacco smoker.      Past  Medical History:  Past Medical History:   Diagnosis Date   • Allergic    • Ankle fracture     Rt   Lt    • Anxiety    • Arthritis    • Asthma    • Cancer ([facility])     Cervical cone biopsy for Stage II abnormality   • Cataract    • Clavicle fracture     10/21/1970   • Colon polyp    • COPD (chronic obstructive pulmonary disease) ([facility])    • Depression    • Diverticulosis    • Fibula fracture     left    • GERD (gastroesophageal reflux disease)    • Glaucoma    • Heart murmur    • Hyperlipidemia    • Hypertension    • Low back pain    • Osteopenia    • Pelvis fracture ([facility])     10/21/1970   • Pneumonia    • Visual impairment Since 1yrs of age   • Wrist fracture          Past Surgical History:  Past Surgical History:   Procedure Laterality Date   • CERVICAL CONIZATION     •  SECTION      ,,   • COLONOSCOPY N/A 2017    Procedure: COLONOSCOPY TO CECUM with cold polypectomy;  Surgeon: Alo Staton Jr., MD;  Location:  [facility];  Service:    • COLONOSCOPY N/A 2022    Procedure: COLONOSCOPY TO CECUM WITH COLD BIOPSY POLYPECTOMY;  Surgeon: Chuck Servin MD;  Location:  [facility];  Service: Gastroenterology;  Laterality: N/A;  PRE: HX COLON POLYPS  POST: DIVERTICULOSIS, POLYP   • ENDOMETRIAL ABLATION     • EYE SURGERY     • FINGER SURGERY     • FRACTURE SURGERY     • TUBAL ABDOMINAL LIGATION        Social History:   Social History     Tobacco Use   • Smoking status: Former     Packs/day: 1.00     Years: 15.00     Pack years: 15.00     Types: Cigarettes     Quit date: 2013     Years since quittin.7   • Smokeless tobacco: Never   Substance Use Topics   • Alcohol use: Yes     Alcohol/week: 1.0 standard drink     Types: 1 Glasses of wine per week     Comment: WINE DAILY      Family History:  Family History   Problem Relation Age of Onset   • Heart disease Mother         Mechanical aoritic valve, several heart  attacks   • Hypertension Mother    • Hyperlipidemia Mother    • Rheum arthritis Mother    • Arthritis Mother    • Diabetes Father         Type II   • Hyperlipidemia Father    • Hypertension Father    • Heart disease Father    • Thyroid disease Father    • Hearing loss Father         Hearing aid   • Cancer Sister    • Depression Sister    • Glaucoma Sister    • Miscarriages / Stillbirths Sister    • Vision loss Sister    • Hypertension Brother    • Lung disease Brother    • Hypertension Brother    • Hypertension Brother    • Lung disease Daughter    • Asthma Daughter    • Heart disease Maternal Aunt    • Rheum arthritis Maternal Aunt    • Arthritis Maternal Aunt    • Heart disease Paternal Aunt    • Heart disease Maternal Grandmother    • Hyperlipidemia Maternal Grandmother    • Rheum arthritis Maternal Grandmother    • Stroke Maternal Grandmother    • Arthritis Maternal Grandmother    • Heart disease Maternal Grandfather    • Cancer Maternal Grandfather         Pancreatic   • Heart disease Paternal Grandmother    • Hyperlipidemia Paternal Grandmother    • Stroke Paternal Grandmother    • Heart disease Paternal Grandfather    • Cancer Paternal Grandfather         Lung   • Malig Hyperthermia Neg Hx        Home Meds:  (Not in a hospital admission)    Current Meds:   albuterol sulfate HFA, 2 puff, Inhalation, 4x Daily - RT      Allergies:  Allergies   Allergen Reactions   • Clarithromycin Shortness Of Breath and Palpitations   • Fluarix [Influenza Virus Vaccine] Myalgia   • Pneumococcal Vaccines Swelling     Review of Systems   Constitutional:   No fevers, chills +sweats  Eye:   No recent visual problems, eye discharge, eye pain, redness   HENT:   No ear pain, nasal congestion, sore throat, voice changes   Respiratory:   No shortness of breath, cough, pain on breathing, sputum production   Cardiovascular:   No Chest pain, syncope, shortness of breath while laying flat  +palpitations  Gastrointestinal:   No nausea,  vomiting, constipation + diarrhea, melena, hematochezia  Genitourinary:   No hematuria, dysuria, incontinence   Hema/Lymph:   Negative for bruising tendency, swollen lymph glands, nosebleeds, history of anticoagulation   Endocrine:   Negative for excessive thirst, excessive hunger, excessive urination,   Musculoskeletal:   No back pain, neck pain, joint pain, muscle pain, decreased range of motion   Integumentary:   No rash, pruritus, abrasions   Neurologic: + lightheadedness, dizziness  Psychiatric:   No anxiety, depression, mood change    Objective     Vital Signs  Temp:  [98 °F (36.7 °C)-98.5 °F (36.9 °C)] 98.5 °F (36.9 °C)  Heart Rate:  [] 77  Resp:  [18] 18  BP: ()/(77-90) 97/77  Physical Exam:   Constitutional:   Well developed, well nourished. No acute distress.   Head:   Atraumatic, normocephalic.   Neck:   Supple and symmetric. Trachea midline.   ENT:   External ears and nose without lesion. Hearing grossly intact.   Eyes:   Pupils equal and round. Sclerae anicteric. Conjunctivae clear.   Respiratory:   Quiet, even, non-labored breathing. Clear to auscultation bilaterally.   Cardiovascular:   Regular rate and rhythm. No murmur, gallop or rub appreciated.   Gastrointestinal:   Soft, nondistended, mild tenderness to palpation lower abdomen. No rebound or guarding present. Bowel sounds present in all 4 quadrants.   Integumentary:   Skin warm and dry, not  jaundiced.   Neurological:   Alert and oriented to person, place and time. Fluid of speech.   Musculoskeletal:   Active range of motion all 4 extremities. No rashes or edema.   Psychological:   Appropriate mood and affect. Cooperative.    Results Review:   I reviewed the patient's new clinical results.    Results from last 7 days   Lab Units 01/04/23  0010   WBC 10*3/mm3 9.05   HEMOGLOBIN g/dL 13.1   HEMATOCRIT % 38.8   PLATELETS 10*3/mm3 311     Results from last 7 days   Lab Units 01/04/23  0010   SODIUM mmol/L 137   POTASSIUM mmol/L 4.0    CHLORIDE mmol/L 103   CO2 mmol/L 24.0   BUN mg/dL 17   CREATININE mg/dL 0.64   CALCIUM mg/dL 9.2   BILIRUBIN mg/dL 0.6   ALK PHOS U/L 54   ALT (SGPT) U/L 33   AST (SGOT) U/L 26   GLUCOSE mg/dL 119*         Lab Results   Lab Value Date/Time    LIPASE 46 12/23/2022 1153    LIPASE 40 08/15/2020 1116       Radiology:  CT Abdomen Pelvis With Contrast   Final Result   1.  Long segment of colitis involving the descending colon and proximal   sigmoid colon which given the appearance and distribution is most   suggestive of ischemic colitis. Infectious colitis and diverticulitis   are also possible but considered less likely. Gastroenterology   consultation is recommended. The above findings were discussed with Dr. Loomsi by telephone by Ramos Castillo at approximately 7:25 AM on    01/04/2023  .   2.  Other findings as above       This report was finalized on 1/4/2023 7:43 AM by Dr. Ramos Castillo M.D.              Assessment & Plan   Patient Active Problem List   Diagnosis   • Cervical nerve root disorder   • Essential hypertension   • Hyperlipidemia   • Reactive airway disease without complication   • Colon polyps   • Family history of breast cancer   • Hypovitaminosis D   • Age-related osteoporosis with current pathological fracture   • Closed compression fracture of fifth lumbar vertebra (HCC)   • Bunion   • History of compression fracture of spine   • Diverticulitis   • COPD (chronic obstructive pulmonary disease) with chronic bronchitis (HCC)   • Low back pain   • Neck pain   • History of adenomatous polyp of colon   • Personal history of tobacco use, presenting hazards to health   • Colitis with rectal bleeding       Assessment:  1. Hematochezia  2. Colitis on CT  3. Palpitations  4. GERD  5. Hypertension  6. Hyperlipidemia  7. Personal history of colon polyps    Plan:  · Stool studies negative.  · Increase pantoprazole to 40 mg p.o. twice daily.  · Recommend avoidance of NSAIDs  · Continue to monitor H&H and  stool output, transfuse as necessary per primary.  · Consult cardiology to obtain clearance for possible endoscopy given complaints of palpitations and recent abnormal EKG at outside hospital.     I discussed the patient's findings and my recommendations with patient.    Dragon dictation used throughout this note.            ANNMARIE Cullen  [facility]  [address] [place] [address]  Office: [phone]

## 2023-01-04 NOTE — H&P
Patient Name:  Nanci Umanzor  YOB: 1955  MRN:  [id number]  Admit Date:  1/4/2023  Patient Care Team:  Bowen Covarrubias MD as PCP - General (Internal Medicine)      Subjective   History Present Illness     Chief Complaint   Patient presents with   • Rectal Bleeding       Ms. Umanzor is a 67 y.o. former smoker with a history of COPD, GERD, hypertension, hyperlipidemia, cervical cancer who presents to [facility] ER with chief complaint of rectal bleeding admitted for colitis with rectal bleeding.    Patient reports history of colonoscopy with polyps precancerous; however, states most recent colonoscopy unremarkable for precancerous polyps following biopsy.  Developed abdominal pain bilateral lower quadrants with red blood per rectum beginning on 1/3/2023 without history of AC PTA; therefore, went to [facility] ER for further evaluation.    Recommendation pending hospital course.  Details below.    History of Present Illness  Review of Systems   Constitutional: Negative for chills and fever.   HENT: Negative for congestion and rhinorrhea.    Respiratory: Negative for cough and shortness of breath.    Cardiovascular: Negative for chest pain and leg swelling.   Gastrointestinal: Positive for abdominal pain. Negative for diarrhea, nausea and vomiting.   Endocrine: Negative for polydipsia, polyphagia and polyuria.   Genitourinary: Negative for difficulty urinating and dysuria.   Musculoskeletal: Negative for gait problem and myalgias.   Skin: Negative for rash and wound.   Neurological: Negative for syncope and light-headedness.   Psychiatric/Behavioral: Negative for confusion and hallucinations.        Personal History     Past Medical History:   Diagnosis Date   • Allergic 1990's   • Ankle fracture     Rt 2002  Lt 2004   • Anxiety    • Arthritis    • Asthma    • Cancer (HCC) 1992    Cervical cone biopsy for Stage II abnormality   • Cataract 2017   • Clavicle fracture     10/21/1970   • Colon polyp    •  COPD (chronic obstructive pulmonary disease) ([facility])    • Depression    • Diverticulosis    • Fibula fracture     left    • GERD (gastroesophageal reflux disease) 2016   • Glaucoma    • Heart murmur    • Hyperlipidemia    • Hypertension    • Low back pain    • Osteopenia    • Pelvis fracture ([facility])     10/21/1970   • Pneumonia    • Visual impairment Since 1yrs of age   • Wrist fracture          Past Surgical History:   Procedure Laterality Date   • CERVICAL CONIZATION     •  SECTION      ,,   • COLONOSCOPY N/A 2017    Procedure: COLONOSCOPY TO CECUM with cold polypectomy;  Surgeon: Alo Staton Jr., MD;  Location: [facility] ENDOSCOPY;  Service:    • COLONOSCOPY N/A 2022    Procedure: COLONOSCOPY TO CECUM WITH COLD BIOPSY POLYPECTOMY;  Surgeon: Chuck Servin MD;  Location: [facility] ENDOSCOPY;  Service: Gastroenterology;  Laterality: N/A;  PRE: HX COLON POLYPS  POST: DIVERTICULOSIS, POLYP   • ENDOMETRIAL ABLATION     • EYE SURGERY     • FINGER SURGERY     • FRACTURE SURGERY     • TUBAL ABDOMINAL LIGATION       Family History   Problem Relation Age of Onset   • Heart disease Mother         Mechanical aoritic valve, several heart attacks   • Hypertension Mother    • Hyperlipidemia Mother    • Rheum arthritis Mother    • Arthritis Mother    • Diabetes Father         Type II   • Hyperlipidemia Father    • Hypertension Father    • Heart disease Father    • Thyroid disease Father    • Hearing loss Father         Hearing aid   • Cancer Sister    • Depression Sister    • Glaucoma Sister    • Miscarriages / Stillbirths Sister    • Vision loss Sister    • Hypertension Brother    • Lung disease Brother    • Hypertension Brother    • Hypertension Brother    • Lung disease Daughter    • Asthma Daughter    • Heart disease Maternal Aunt    • Rheum arthritis Maternal Aunt    • Arthritis Maternal Aunt    • Heart disease Paternal Aunt    • Heart disease Maternal  Grandmother    • Hyperlipidemia Maternal Grandmother    • Rheum arthritis Maternal Grandmother    • Stroke Maternal Grandmother    • Arthritis Maternal Grandmother    • Heart disease Maternal Grandfather    • Cancer Maternal Grandfather         Pancreatic   • Heart disease Paternal Grandmother    • Hyperlipidemia Paternal Grandmother    • Stroke Paternal Grandmother    • Heart disease Paternal Grandfather    • Cancer Paternal Grandfather         Lung   • Malig Hyperthermia Neg Hx      Social History     Tobacco Use   • Smoking status: Former     Packs/day: 1.00     Years: 15.00     Pack years: 15.00     Types: Cigarettes     Quit date: 2013     Years since quittin.7   • Smokeless tobacco: Never   Vaping Use   • Vaping Use: Never used   Substance Use Topics   • Alcohol use: Yes     Alcohol/week: 1.0 standard drink     Types: 1 Glasses of wine per week     Comment: WINE DAILY   • Drug use: No     No current facility-administered medications on file prior to encounter.     Current Outpatient Medications on File Prior to Encounter   Medication Sig Dispense Refill   • albuterol sulfate  (90 Base) MCG/ACT inhaler INHALE TWO PUFFS BY MOUTH EVERY 4 HOURS AS NEEDED FOR WHEEZING (Patient taking differently: Inhale 2 puffs 4 (Four) Times a Day.) 18 g 5   • amLODIPine (NORVASC) 5 MG tablet TAKE ONE TABLET BY MOUTH DAILY (Patient taking differently: Take 5 mg by mouth Daily.) 90 tablet 3   • atorvastatin (LIPITOR) 40 MG tablet TAKE ONE TABLET BY MOUTH DAILY (Patient taking differently: Take 40 mg by mouth Daily.) 90 tablet 2   • benazepril (LOTENSIN) 10 MG tablet TAKE ONE TABLET BY MOUTH DAILY (Patient taking differently: Take 10 mg by mouth Daily.) 90 tablet 3   • BIOTIN PO Take 1 capsule by mouth Daily.     • calcium citrate-vitamin d (CITRACAL) 200-250 MG-UNIT tablet tablet Take 1 tablet by mouth Daily.     • Cholecalciferol (Vitamin D) 50 MCG (2000 UT) tablet Take 2,000 Units by mouth Daily.     • fluticasone  (FLONASE) 50 MCG/ACT nasal spray 2 sprays into the nostril(s) as directed by provider Daily for 14 days. 16 g 0   • Fluticasone-Salmeterol (ADVAIR/WIXELA) 250-50 MCG/ACT DISKUS INHALE ONE PUFF BY MOUTH TWICE A DAY (Patient taking differently: Inhale 1 puff Daily.) 60 each 11   • Multiple Vitamins-Minerals (CENTRUM SILVER ADULT 50+ PO) Take 1 tablet by mouth Daily.     • Multiple Vitamins-Minerals (OCUVITE ADULT 50+ PO) Take 1 tablet by mouth Daily.     • Omega-3 Fatty Acids (FISH OIL PO) Take 1 capsule by mouth Daily.     • tretinoin (RETIN-A) 0.05 % cream Apply  topically Every Night. (Patient taking differently: Apply 1 application topically to the appropriate area as directed Every Other Day.) 20 g 1   • vitamin C (ASCORBIC ACID) 500 MG tablet Take 1,000 mg by mouth Daily.     • albuterol (PROVENTIL) (2.5 MG/3ML) 0.083% nebulizer solution Take 2.5 mg by nebulization Every 4 (Four) Hours As Needed for Wheezing. 3 mL 2   • Prolia 60 MG/ML solution prefilled syringe syringe Inject 1 mL under the skin into the appropriate area as directed 1 (One) Time for 1 dose. (Patient taking differently: Inject 60 mg under the skin into the appropriate area as directed 1 (One) Time. Every six Months. Last dose: November 2022) 1 mL 2   • [DISCONTINUED] azithromycin (Zithromax Z-Ivtaliy) 250 MG tablet Take 2 tablets the first day, then 1 tablet daily for 4 days. 6 tablet 0   • [DISCONTINUED] Baclofen 5 MG tablet Take 5 mg by mouth At Night As Needed (cervicalgia). 14 tablet 1   • [DISCONTINUED] Cholecalciferol (VITAMIN D-3 PO) Take  by mouth.     • [DISCONTINUED] diclofenac (VOLTAREN) 75 MG EC tablet Take 75 mg by mouth.     • [DISCONTINUED] Diclofenac Sodium (VOLTAREN) 1 % gel gel Apply 4 g topically to the appropriate area as directed 4 (Four) Times a Day As Needed.     • [DISCONTINUED] Multiple Vitamins-Minerals (ZINC PO) Take  by mouth.     • [DISCONTINUED] triamcinolone (KENALOG) 0.1 % cream Apply topically to involved area up to  twice a day as needed for itching 30 g 2   • [DISCONTINUED] vitamin E 400 UNIT capsule Take 400 Units by mouth Daily.       Allergies   Allergen Reactions   • Clarithromycin Shortness Of Breath and Palpitations   • Fluarix [Influenza Virus Vaccine] Myalgia   • Pneumococcal Vaccines Swelling       Objective    Objective     Vital Signs  Temp:  [97.1 °F (36.2 °C)-98.5 °F (36.9 °C)] 97.1 °F (36.2 °C)  Heart Rate:  [] 73  Resp:  [18] 18  BP: ()/(77-90) 129/78  SpO2:  [93 %-96 %] 96 %  on   ;   Device (Oxygen Therapy): room air  Body mass index is 22.19 kg/m².    Physical Exam  Constitutional:       General: She is not in acute distress.     Appearance: She is not toxic-appearing.   HENT:      Head: Normocephalic.   Eyes:      Conjunctiva/sclera: Conjunctivae normal.   Cardiovascular:      Rate and Rhythm: Normal rate.      Heart sounds: Normal heart sounds.   Pulmonary:      Effort: Pulmonary effort is normal.      Breath sounds: Normal breath sounds.   Abdominal:      General: Bowel sounds are normal.      Palpations: Abdomen is soft.      Tenderness: There is abdominal tenderness (Bilateral lower quadrants).   Musculoskeletal:         General: No tenderness.      Cervical back: Normal range of motion and neck supple.      Right lower leg: No edema.      Left lower leg: No edema.   Skin:     General: Skin is warm and dry.   Neurological:      Mental Status: She is alert and oriented to person, place, and time.      Cranial Nerves: No cranial nerve deficit.   Psychiatric:         Behavior: Behavior normal.         Thought Content: Thought content normal.         Results Review:  I reviewed the patient's new clinical results.  I reviewed the patient's new imaging results and agree with the interpretation.  I reviewed the patient's other test results and agree with the interpretation  I personally viewed and interpreted the patient's EKG/Telemetry data  Discussed with ED provider.    Lab Results (last 24  hours)     Procedure Component Value Units Date/Time    CBC & Differential [[id number]]  (Abnormal) Collected: 01/04/23 0010    Specimen: Blood Updated: 01/04/23 0021    Narrative:      The following orders were created for panel order CBC & Differential.  Procedure                               Abnormality         Status                     ---------                               -----------         ------                     CBC Auto Differential[[id number]]        Abnormal            Final result                 Please view results for these tests on the individual orders.    Comprehensive Metabolic Panel [[id number]]  (Abnormal) Collected: 01/04/23 0010    Specimen: Blood Updated: 01/04/23 0037     Glucose 119 mg/dL      BUN 17 mg/dL      Creatinine 0.64 mg/dL      Sodium 137 mmol/L      Potassium 4.0 mmol/L      Chloride 103 mmol/L      CO2 24.0 mmol/L      Calcium 9.2 mg/dL      Total Protein 6.9 g/dL      Albumin 4.6 g/dL      ALT (SGPT) 33 U/L      AST (SGOT) 26 U/L      Alkaline Phosphatase 54 U/L      Total Bilirubin 0.6 mg/dL      Globulin 2.3 gm/dL      A/G Ratio 2.0 g/dL      BUN/Creatinine Ratio 26.6     Anion Gap 10.0 mmol/L      eGFR 95.8 mL/min/1.73      Comment: National Kidney Foundation and American Society of Nephrology (ASN) Task Force recommended calculation based on the Chronic Kidney Disease Epidemiology Collaboration (CKD-EPI) equation refit without adjustment for race.       Narrative:      GFR Normal >60  Chronic Kidney Disease <60  Kidney Failure <15      Lactic Acid, Plasma [[id number]]  (Normal) Collected: 01/04/23 0010    Specimen: Blood Updated: 01/04/23 0037     Lactate 1.1 mmol/L     CBC Auto Differential [[id number]]  (Abnormal) Collected: 01/04/23 0010    Specimen: Blood Updated: 01/04/23 0021     WBC 9.05 10*3/mm3      RBC 4.06 10*6/mm3      Hemoglobin 13.1 g/dL      Hematocrit 38.8 %      MCV 95.6 fL      MCH 32.3 pg      MCHC 33.8 g/dL      RDW 12.5 %      RDW-SD 42.9 fl      MPV  8.3 fL      Platelets 311 10*3/mm3      Neutrophil % 81.1 %      Lymphocyte % 12.2 %      Monocyte % 5.7 %      Eosinophil % 0.4 %      Basophil % 0.3 %      Immature Grans % 0.3 %      Neutrophils, Absolute 7.33 10*3/mm3      Lymphocytes, Absolute 1.10 10*3/mm3      Monocytes, Absolute 0.52 10*3/mm3      Eosinophils, Absolute 0.04 10*3/mm3      Basophils, Absolute 0.03 10*3/mm3      Immature Grans, Absolute 0.03 10*3/mm3      nRBC 0.0 /100 WBC     Gastrointestinal Panel, PCR - Stool, Per Rectum [[id number]]  (Normal) Collected: 01/04/23 0948    Specimen: Stool from Per Rectum Updated: 01/04/23 1116     Campylobacter Not Detected     Plesiomonas shigelloides Not Detected     Salmonella Not Detected     Vibrio Not Detected     Vibrio cholerae Not Detected     Yersinia enterocolitica Not Detected     Enteroaggregative E. coli (EAEC) Not Detected     Enteropathogenic E. coli (EPEC) Not Detected     Enterotoxigenic E. coli (ETEC) lt/st Not Detected     Shiga-like toxin-producing E. coli (STEC) stx1/stx2 Not Detected     Shigella/Enteroinvasive E. coli (EIEC) Not Detected     Cryptosporidium Not Detected     Cyclospora cayetanensis Not Detected     Entamoeba histolytica Not Detected     Giardia lamblia Not Detected     Adenovirus F40/41 Not Detected     Astrovirus Not Detected     Norovirus GI/GII Not Detected     Rotavirus A Not Detected     Sapovirus (I, II, IV or V) Not Detected    Narrative:      If Aeromonas, Staphylococcus aureus or Bacillus cereus are suspected, please order [id number]: Stool Culture, Aeromonas, S aureus, B Cereus.    Clostridioides difficile Toxin - Stool, Per Rectum [[id number]]  (Normal) Collected: 01/04/23 0948    Specimen: Stool from Per Rectum Updated: 01/04/23 1046    Narrative:      The following orders were created for panel order Clostridioides difficile Toxin - Stool, Per Rectum.  Procedure                               Abnormality         Status                     ---------                                -----------         ------                     Clostridioides difficile...[[id number]]  Normal              Final result                 Please view results for these tests on the individual orders.    Clostridioides difficile Toxin, PCR - Stool, Per Rectum [[id number]]  (Normal) Collected: 01/04/23 0948    Specimen: Stool from Per Rectum Updated: 01/04/23 1046     C. Difficile Toxins by PCR Negative    Narrative:      The result indicates the absence of toxigenic C. difficile from stool specimen.           Imaging Results (Last 24 Hours)     Procedure Component Value Units Date/Time    CT Abdomen Pelvis With Contrast [[id number]] Collected: 01/04/23 0723     Updated: 01/04/23 0746    Narrative:      CT ABDOMEN AND PELVIS WITH IV CONTRAST     HISTORY: Abdominal pain, rectal bleeding     TECHNIQUE: Radiation dose reduction techniques were utilized, including  automated exposure control and exposure modulation based on body size.   3 mm images were obtained through the abdomen and pelvis after the  administration of IV contrast.      COMPARISON: CT abdomen pelvis 08/15/2020     FINDINGS:     There are small paraesophageal hernia. No findings of small bowel  obstruction are present. The appendix is unremarkable. Subcentimeter  hepatic lesions are too small to characterize. The gallbladder,  pancreas, spleen, adrenal glands have an unremarkable postcontrast CT  appearance. Subcentimeter renal lesions are too small to characterize.  There is no hydronephrosis. No abdominopelvic adenopathy by size  criteria. The bladder is decompressed and cannot be evaluated. There is  colonic diverticulosis which primarily involves the distal sigmoid  colon. A long segment of overall hypodense circumferential wall  thickening involving the distal descending and proximal sigmoid colon is  present with surrounding fat stranding and soft tissue thickening. Small  amount of free fluid layers in the left paracolic gutter and  pelvis. No  free intraperitoneal air is seen.     For the purposes of this dictation, the last well-formed disc space be  referred to as L5-S1. Severe burst compression deformity of the L5  vertebral body is present, as before. Superior endplate compression  deformity of T11 suggestive of a Schmorl's node is also grossly  unchanged. There is mild anterolisthesis of L3 on L4, as before.       Impression:      1.  Long segment of colitis involving the descending colon and proximal  sigmoid colon which given the appearance and distribution is most  suggestive of ischemic colitis. Infectious colitis and diverticulitis  are also possible but considered less likely. Gastroenterology  consultation is recommended. The above findings were discussed with Dr. Loomis by telephone by Ramos Castillo at approximately 7:25 AM on   01/04/2023  .  2.  Other findings as above     This report was finalized on 1/4/2023 7:43 AM by Dr. Ramos Castillo M.D.                 ECG 12 Lead Dyspnea   Preliminary Result   HEART RATE= 70  bpm   RR Interval= 857  ms   CT Interval= 143  ms   P Horizontal Axis= 28  deg   P Front Axis= 49  deg   QRSD Interval= 85  ms   QT Interval= 427  ms   QRS Axis= 44  deg   T Wave Axis= 63  deg   - NORMAL ECG -   Sinus rhythm   Electronically Signed By:    Date and Time of Study: 2023-01-04 13:50:37           Assessment/Plan     Active Hospital Problems    Diagnosis  POA   • **Colitis with rectal bleeding [K52.9, K62.5]  Yes   • Heart palpitations [R00.2]  Yes   • Essential hypertension [I10]  Yes   • COPD (chronic obstructive pulmonary disease) with chronic bronchitis (HCC) [J44.9]  Yes      Resolved Hospital Problems   No resolved problems to display.       Ms. Umanzor is a 67 y.o. former smoker with a history of COPD, GERD, hypertension, hyperlipidemia, cervical cancer who presents to [facility], [place], operated by [facility] ER with chief complaint of rectal bleeding admitted for colitis with rectal bleeding.      Colitis with rectal bleeding:   Suspected on CT.  GI following recommend PPI twice daily, avoid NSAIDs, and request cardiology consult to obtain clearance for possible endoscopy given recent complaints of cardiac palpitations.  GI PCR and C. difficile PCR unremarkable/negative/normal.  Suspect ischemic colitis vs microscopic colitis.  Provide gentle IV fluid hydration for now.  Monitor off empiric antibiotic therapy.  Serum hemoglobin 13.1.  Monitor serial H&H.  Transfuse for serum hemoglobin less than 8.      Essential hypertension: BP acceptable at present.  Continue amlodipine per home dose regimen.  Hold ACE inhibitor for now.  Monitor blood pressure for changes.        COPD (chronic obstructive pulmonary disease) with chronic bronchitis (HCC): No adventitious breath sounds on auscultation.  Oxygen saturation 96% on room air.  Continue inhaled corticosteroid/LABA.  Provide bronchodilators as needed.      Heart palpitations: EKG NSR.  See plan above.      · I discussed the patient's findings and my recommendations with patient, RN, & Dr. Owens.    VTE Prophylaxis - SCDs.  Code Status - Full code.       CARMELA Farley  [facility]  01/04/23  14:50 EST

## 2023-01-04 NOTE — ED PROVIDER NOTES
MD ATTESTATION NOTE    The DANIELA and I have discussed this patient's history, physical exam, and treatment plan.    I provided a substantive portion of the care of this patient. I personally performed the physical exam, in its entirety. The attached note describes my personal findings.      Nanci Umanzor is a 67 y.o. female who presents to the ED c/o rectal bleeding.  States onset yesterday.  Reports some pain and soreness.  Patient has prior history of diverticulosis.  No chest pain no fever no rest of breath dizziness or lightheadedness.  Patient is not anticoagulated      On exam:  GENERAL: not distressed  HENT: nares patent  EYES: no scleral icterus  CV: regular rhythm, regular rate  RESPIRATORY: normal effort  ABDOMEN: soft, mild generalized tenderness there is no rebound or guarding  MUSCULOSKELETAL: no deformity  NEURO: alert, moves all extremities, follows commands  SKIN: warm, dry    Labs  Recent Results (from the past 24 hour(s))   Gastrointestinal Panel, PCR - Stool, Per Rectum    Collection Time: 01/04/23  9:48 AM    Specimen: Per Rectum; Stool   Result Value Ref Range    Campylobacter Not Detected Not Detected    Plesiomonas shigelloides Not Detected Not Detected    Salmonella Not Detected Not Detected    Vibrio Not Detected Not Detected    Vibrio cholerae Not Detected Not Detected    Yersinia enterocolitica Not Detected Not Detected    Enteroaggregative E. coli (EAEC) Not Detected Not Detected    Enteropathogenic E. coli (EPEC) Not Detected Not Detected    Enterotoxigenic E. coli (ETEC) lt/st Not Detected Not Detected    Shiga-like toxin-producing E. coli (STEC) stx1/stx2 Not Detected Not Detected    Shigella/Enteroinvasive E. coli (EIEC) Not Detected Not Detected    Cryptosporidium Not Detected Not Detected    Cyclospora cayetanensis Not Detected Not Detected    Entamoeba histolytica Not Detected Not Detected    Giardia lamblia Not Detected Not Detected    Adenovirus F40/41 Not Detected Not Detected     Astrovirus Not Detected Not Detected    Norovirus GI/GII Not Detected Not Detected    Rotavirus A Not Detected Not Detected    Sapovirus (I, II, IV or V) Not Detected Not Detected   Clostridioides difficile Toxin, PCR - Stool, Per Rectum    Collection Time: 01/04/23  9:48 AM    Specimen: Per Rectum; Stool   Result Value Ref Range    C. Difficile Toxins by PCR Negative Negative   ECG 12 Lead Dyspnea    Collection Time: 01/04/23  1:50 PM   Result Value Ref Range    QT Interval 427 ms   Adult Transthoracic Echo Complete W/ Cont if Necessary Per Protocol    Collection Time: 01/04/23  4:27 PM   Result Value Ref Range    Target HR (85%) 128 bpm    Max. Pred. HR (100%) 151 bpm    EF(MOD-bp) 69.0 %    LVIDd 4.4 cm    LVIDs 2.8 cm    IVSd 0.75 cm    LVPWd 0.66 cm    FS 36.6 %    IVS/LVPW 1.14 cm    ESV(cubed) 21.9 ml    LV Sys Vol (BSA corrected) 16.4 cm2    EDV(cubed) 85.9 ml    LV Kurtz Vol (BSA corrected) 49.9 cm2    LVOT area 1.50 cm2    LV mass(C)d 93.4 grams    LVOT diam 1.38 cm    EDV(MOD-sp2) 66.0 ml    EDV(MOD-sp4) 70.0 ml    ESV(MOD-sp2) 21.0 ml    ESV(MOD-sp4) 23.0 ml    SV(MOD-sp2) 45.0 ml    SV(MOD-sp4) 47.0 ml    SI(MOD-sp2) 32.1 ml/m2    SI(MOD-sp4) 33.5 ml/m2    EF(MOD-sp2) 68.2 %    EF(MOD-sp4) 67.1 %    MV E max sergio 71.6 cm/sec    MV A max sergio 103.7 cm/sec    MV dec time 0.19 msec    MV E/A 0.69     MV A dur 0.09 sec    LA ESV Index (BP) 18.5 ml/m2    Med Peak E' Sergio 8.1 cm/sec    Lat Peak E' Sergio 6.5 cm/sec    Avg E/e' ratio 9.81     SV(LVOT) 39.5 ml    RV Base 2.5 cm    RV S' 7.8 cm/sec    LV V1 max 140.2 cm/sec    LV V1 max PG 7.9 mmHg    LV V1 mean PG 3.4 mmHg    LV V1 VTI 26.3 cm    Ao pk sergio 168.7 cm/sec    Ao max PG 11.4 mmHg    Ao mean PG 6.0 mmHg    Ao V2 VTI 37.0 cm    ADALBERTO(I,D) 1.07 cm2    MV max PG 4.1 mmHg    MV mean PG 1.50 mmHg    MV V2 VTI 22.7 cm    MV P1/2t 63.9 msec    MVA(P1/2t) 3.4 cm2    MVA(VTI) 1.74 cm2    MV dec slope 320.6 cm/sec2    TR max sergio 232.1 cm/sec    TR max PG 21.6 mmHg     RVSP(TR) 24.6 mmHg    RAP systole 3.0 mmHg    RV V1 max PG 1.28 mmHg    RV V1 max 56.6 cm/sec    RV V1 VTI 14.4 cm    PA V2 max 101.8 cm/sec    PA acc time 0.11 sec    PA pr(Accel) 27.9 mmHg    Ao root diam 2.5 cm    TAPSE (>1.6) 2.20 cm    Dimensionless Index 0.70 (DI)   Hemoglobin & Hematocrit, Blood    Collection Time: 01/04/23  6:29 PM    Specimen: Blood   Result Value Ref Range    Hemoglobin 11.5 (L) 12.0 - 15.9 g/dL    Hematocrit 34.0 34.0 - 46.6 %   Hemoglobin & Hematocrit, Blood    Collection Time: 01/04/23 11:57 PM    Specimen: Blood   Result Value Ref Range    Hemoglobin 11.0 (L) 12.0 - 15.9 g/dL    Hematocrit 32.0 (L) 34.0 - 46.6 %       Radiology  Adult Transthoracic Echo Complete W/ Cont if Necessary Per Protocol    Result Date: 1/4/2023  •  Left ventricular systolic function is normal. Calculated left ventricular EF = 69% •  Left ventricular diastolic function is consistent with (grade I) impaired relaxation. •  Mild tricuspid valve regurgitation is present. •  Estimated right ventricular systolic pressure from tricuspid regurgitation is normal (<35 mmHg). Calculated right ventricular systolic pressure from tricuspid regurgitation is 25 mmHg.       Medical Decision Making:  ED Course as of 01/05/23 0706   Wed Jan 04, 2023   0400 Telemetry visualized after patient had a bowel movement noted to be a large amount of dark blood [SHANNAN]   0424 WBC: 9.05 [SHANNAN]   0424 Hemoglobin: 13.1 [SHANNAN]   0424 Hematocrit: 38.8 [SHANNAN]   0424 Platelets: 311 [SHANNAN]   0425 Glucose(!): 119 [SHANNAN]   0425 BUN: 17 [SHANNAN]   0425 Creatinine: 0.64 [SHANNAN]   0425 Sodium: 137 [SHANNAN]   0425 Potassium: 4.0 [SHANNAN]   0425 Chloride: 103 [SHANNAN]   0425 CO2: 24.0 [SHANNAN]   0425 ABO Type: B [SHANNAN]   0425 RH type: Positive [SHANNAN]   0539 Patient's blood work is unremarkable and hemoglobin is normal. [SHANNAN]   0540 CT images viewed by me, patient appears to have some stranding in the left lower quadrant adjacent to to the sigmoid colon. [SHANNAN]   0603  in the toilet. [SHANNAN]   9302  Patient rechecked, discussed concern for possible diverticulitis and plan for probable admission if she does indeed have diverticulitis with rectal bleeding.  Patient expressed understanding and agrees with plan. [SHANNAN]   5697 Patient history, ER presentation and evaluation discussed with and turned over to PARK Albarado PA-C, pending CT results and possible admission for diverticulitis with rectal bleeding. [SHANNAN]   1384 CT Abdomen Pelvis With Contrast  Concern for ischemic colitis given distribution of inflammatory change however patient's lactic acid is within normal limits.  I have less suspicion for ischemic colitis and more for diverticulitis or inflammatory process.  We will consult GI and plan for hospital admission.  We will initiate Zosyn. [DC]   0804 Discussed case with Dr. Mcknight, GI, who recommends obtaining GI panel and C. difficile.  They will see in consult.  He agrees with plan of care. [DC]      ED Course User Index  [DC] Courtney Albarado PA  [SHANNAN] Adelfo Welch PA       PPE: Both the patient and I wore a surgical mask throughout the entire patient encounter.     Diagnosis  Final diagnoses:   Rectal bleeding   Colitis        David Loomis MD  01/04/23 0701       David Loomis MD  01/04/23 0727       David Loomis MD  01/05/23 0706

## 2023-01-04 NOTE — PLAN OF CARE
Goal Outcome Evaluation:  Plan of Care Reviewed With: patient           Outcome Evaluation: Pt from ER this afternoon d/t rectal bleeding. Pt experiencing multiple loose, bright red stools. Stool panel negative. NPO at midnight for endoscopy. VSS.

## 2023-01-04 NOTE — ED PROVIDER NOTES
EMERGENCY DEPARTMENT ENCOUNTER    Room Number:  02/02  Date of encounter:  1/4/2023  PCP: Bowen Covarrubias MD  Patient Care Team:  Bowen Covarrubias MD as PCP - General (Internal Medicine)   Independent Historians: Patient    HPI:  Chief Complaint: Rectal bleeding  A complete HPI/ROS/PMH/PSH/SH/FH are unobtainable due to: N/A    Chronic or social conditions impacting patient care (social determinants of health): None    Context: Nanci Umanzor is a 67 y.o. female with past medical history of HTN, HLD, COPD/asthma, diverticulosis and GERD who arrives to the ED with complaint of rectal bleeding.  Patient states that her symptoms started yesterday afternoon when she became hot, sweaty, and lightheaded and then passed large amounts of dark blood when she went to the bathroom.  Patient states that she has had some upper abdominal distention and \"soreness\" as well as pain in the lower abdomen.  Patient states that she is concerned because she has a history of diverticulosis and also because she has been taking Voltaren since 12/23/2022.  Patient denies any fever or chills, no chest pain, or shortness of breath.  Does not take any blood thinners.    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   • Essential hypertension 06/10/2016   • Hyperlipidemia 06/10/2016   • Reactive airway disease without complication 06/10/2016   • Colon polyps 04/05/2017   • Family history of breast cancer 04/05/2017   • Hypovitaminosis D 04/05/2017   • Age-related osteoporosis with current pathological fracture 08/09/2017   • Closed compression fracture of fifth lumbar vertebra (HCC) 08/09/2017   • Bunion 11/05/2018   • History of compression fracture of spine 09/12/2019   • Diverticulitis 08/19/2020   • COPD (chronic obstructive pulmonary disease)  with chronic bronchitis ([facility]) 1999   • Low back pain    • Neck pain    • History of adenomatous polyp of colon 2022   • Personal history of tobacco use, presenting hazards to health 2022     Resolved Ambulatory Problems     Diagnosis Date Noted   • Drug therapy 06/10/2016   • Pruritus 2017   • Acute bronchitis with bronchospasm 2017   • Cellulitis of left upper extremity 2018     Past Medical History:   Diagnosis Date   • Allergic    • Ankle fracture    • Anxiety    • Arthritis    • Asthma    • Cancer ([facility])    • Cataract    • Clavicle fracture    • Colon polyp    • COPD (chronic obstructive pulmonary disease) ([facility])    • Depression    • Diverticulosis    • Fibula fracture    • GERD (gastroesophageal reflux disease)    • Glaucoma    • Heart murmur    • Hypertension    • Osteopenia    • Pelvis fracture ([facility])    • Pneumonia    • Visual impairment Since 1yrs of age   • Wrist fracture        The patient qualifies to receive the vaccine, but they have not yet received it.    PAST SURGICAL HISTORY  Past Surgical History:   Procedure Laterality Date   • CERVICAL CONIZATION     •  SECTION      ,,   • COLONOSCOPY N/A 2017    Procedure: COLONOSCOPY TO CECUM with cold polypectomy;  Surgeon: Alo Staton Jr., MD;  Location: [facility] ENDOSCOPY;  Service:    • COLONOSCOPY N/A 2022    Procedure: COLONOSCOPY TO CECUM WITH COLD BIOPSY POLYPECTOMY;  Surgeon: Chuck Servin MD;  Location: [facility] ENDOSCOPY;  Service: Gastroenterology;  Laterality: N/A;  PRE: HX COLON POLYPS  POST: DIVERTICULOSIS, POLYP   • ENDOMETRIAL ABLATION     • EYE SURGERY     • FINGER SURGERY     • FRACTURE SURGERY     • TUBAL ABDOMINAL LIGATION           FAMILY HISTORY  Family History   Problem Relation Age of Onset   • Heart disease Mother         Mechanical aoritic valve, several heart attacks   • Hypertension Mother    • Hyperlipidemia Mother     • Rheum arthritis Mother    • Arthritis Mother    • Diabetes Father         Type II   • Hyperlipidemia Father    • Hypertension Father    • Heart disease Father    • Thyroid disease Father    • Hearing loss Father         Hearing aid   • Cancer Sister    • Depression Sister    • Glaucoma Sister    • Miscarriages / Stillbirths Sister    • Vision loss Sister    • Hypertension Brother    • Lung disease Brother    • Hypertension Brother    • Hypertension Brother    • Lung disease Daughter    • Asthma Daughter    • Heart disease Maternal Aunt    • Rheum arthritis Maternal Aunt    • Arthritis Maternal Aunt    • Heart disease Paternal Aunt    • Heart disease Maternal Grandmother    • Hyperlipidemia Maternal Grandmother    • Rheum arthritis Maternal Grandmother    • Stroke Maternal Grandmother    • Arthritis Maternal Grandmother    • Heart disease Maternal Grandfather    • Cancer Maternal Grandfather         Pancreatic   • Heart disease Paternal Grandmother    • Hyperlipidemia Paternal Grandmother    • Stroke Paternal Grandmother    • Heart disease Paternal Grandfather    • Cancer Paternal Grandfather         Lung   • Malig Hyperthermia Neg Hx          SOCIAL HISTORY  Social History     Socioeconomic History   • Marital status:    • Number of children: 3   • Years of education: AS   Tobacco Use   • Smoking status: Former     Packs/day: 1.00     Years: 15.00     Pack years: 15.00     Types: Cigarettes     Quit date: 2013     Years since quittin.7   • Smokeless tobacco: Never   Vaping Use   • Vaping Use: Never used   Substance and Sexual Activity   • Alcohol use: Yes     Alcohol/week: 1.0 standard drink     Types: 1 Glasses of wine per week     Comment: WINE DAILY   • Drug use: No   • Sexual activity: Not Currently     Partners: Male     Birth control/protection: Post-menopausal         ALLERGIES  Clarithromycin, Fluarix [influenza virus vaccine], and Pneumococcal vaccines        REVIEW OF SYSTEMS  Review of  Systems     All systems reviewed and negative except for those discussed in HPI.       PHYSICAL EXAM    I have reviewed the triage vital signs and nursing notes.    ED Triage Vitals [01/03/23 2339]   Temp Heart Rate Resp BP SpO2   98 °F (36.7 °C) 100 18 124/90 96 %      Temp src Heart Rate Source Patient Position BP Location FiO2 (%)   Tympanic Monitor Standing Right arm --       Physical Exam    GENERAL: Alert and orient x4, no distress  HENT: normocephalic, atraumatic  EYES: no scleral icterus, PERRL, EOMI  CV: regular rhythm, tachycardic  RESPIRATORY: normal effort, CTAB  ABDOMEN: mild lower abdominal tenderness with guarding, no rebound, normal bowel sounds  MUSCULOSKELETAL: no deformity  NEURO: alert, moves all extremities, no focal neuro deficits, follows commands  SKIN: warm, dry, no rash   Psych: Appropriate mood and affect      Nursing notes and vital signs reviewed      LAB RESULTS  Recent Results (from the past 24 hour(s))   Comprehensive Metabolic Panel    Collection Time: 01/04/23 12:10 AM    Specimen: Blood   Result Value Ref Range    Glucose 119 (H) 65 - 99 mg/dL    BUN 17 8 - 23 mg/dL    Creatinine 0.64 0.57 - 1.00 mg/dL    Sodium 137 136 - 145 mmol/L    Potassium 4.0 3.5 - 5.2 mmol/L    Chloride 103 98 - 107 mmol/L    CO2 24.0 22.0 - 29.0 mmol/L    Calcium 9.2 8.6 - 10.5 mg/dL    Total Protein 6.9 6.0 - 8.5 g/dL    Albumin 4.6 3.5 - 5.2 g/dL    ALT (SGPT) 33 1 - 33 U/L    AST (SGOT) 26 1 - 32 U/L    Alkaline Phosphatase 54 39 - 117 U/L    Total Bilirubin 0.6 0.0 - 1.2 mg/dL    Globulin 2.3 gm/dL    A/G Ratio 2.0 g/dL    BUN/Creatinine Ratio 26.6 (H) 7.0 - 25.0    Anion Gap 10.0 5.0 - 15.0 mmol/L    eGFR 95.8 >60.0 mL/min/1.73   Lactic Acid, Plasma    Collection Time: 01/04/23 12:10 AM    Specimen: Blood   Result Value Ref Range    Lactate 1.1 0.5 - 2.0 mmol/L   Green Top (Gel)    Collection Time: 01/04/23 12:10 AM   Result Value Ref Range    Extra Tube Hold for add-ons.    Lavender Top    Collection  Time: 01/04/23 12:10 AM   Result Value Ref Range    Extra Tube hold for add-on    Gold Top - SST    Collection Time: 01/04/23 12:10 AM   Result Value Ref Range    Extra Tube Hold for add-ons.    Light Blue Top    Collection Time: 01/04/23 12:10 AM   Result Value Ref Range    Extra Tube Hold for add-ons.    CBC Auto Differential    Collection Time: 01/04/23 12:10 AM    Specimen: Blood   Result Value Ref Range    WBC 9.05 3.40 - 10.80 10*3/mm3    RBC 4.06 3.77 - 5.28 10*6/mm3    Hemoglobin 13.1 12.0 - 15.9 g/dL    Hematocrit 38.8 34.0 - 46.6 %    MCV 95.6 79.0 - 97.0 fL    MCH 32.3 26.6 - 33.0 pg    MCHC 33.8 31.5 - 35.7 g/dL    RDW 12.5 12.3 - 15.4 %    RDW-SD 42.9 37.0 - 54.0 fl    MPV 8.3 6.0 - 12.0 fL    Platelets 311 140 - 450 10*3/mm3    Neutrophil % 81.1 (H) 42.7 - 76.0 %    Lymphocyte % 12.2 (L) 19.6 - 45.3 %    Monocyte % 5.7 5.0 - 12.0 %    Eosinophil % 0.4 0.3 - 6.2 %    Basophil % 0.3 0.0 - 1.5 %    Immature Grans % 0.3 0.0 - 0.5 %    Neutrophils, Absolute 7.33 (H) 1.70 - 7.00 10*3/mm3    Lymphocytes, Absolute 1.10 0.70 - 3.10 10*3/mm3    Monocytes, Absolute 0.52 0.10 - 0.90 10*3/mm3    Eosinophils, Absolute 0.04 0.00 - 0.40 10*3/mm3    Basophils, Absolute 0.03 0.00 - 0.20 10*3/mm3    Immature Grans, Absolute 0.03 0.00 - 0.05 10*3/mm3    nRBC 0.0 0.0 - 0.2 /100 WBC   Type & Screen    Collection Time: 01/04/23 12:53 AM    Specimen: Blood   Result Value Ref Range    ABO Type B     RH type Positive     Antibody Screen Negative     T&S Expiration Date 1/7/2023 11:59:59 PM        Ordered the above labs and independently reviewed the results.        RADIOLOGY  No Radiology Exams Resulted Within Past 24 Hours    I ordered the above noted radiological studies. Reviewed by me and discussed with radiologist.  See dictation for official radiology interpretation.      PROCEDURES    Procedures      MEDICATIONS GIVEN IN ER    Medications   sodium chloride 0.9 % flush 10 mL (has no administration in time range)    pantoprazole (PROTONIX) injection 40 mg (has no administration in time range)   sodium chloride 0.9 % bolus 1,000 mL (1,000 mL Intravenous New Bag 1/4/23 7132)   iopamidol (ISOVUE-300) 61 % injection 100 mL (85 mL Intravenous Given 1/4/23 0507)         PROGRESS, DATA ANALYSIS, CONSULTS, AND MEDICAL DECISION MAKING    All labs have been independently reviewed by me.  All radiology studies have been reviewed by me and discussed with radiologist dictating the report.   EKG's independently viewed and interpreted by me.  Discussion below represents my analysis of pertinent findings related to patient's condition, differential diagnosis, treatment plan and final disposition.    DDx:  Includes, but is not limited to GI bleed, gastritis, peptic ulcer disease, duodenitis, GERD, diverticular bleeding, rectal bleeding.    ED Course as of 01/06/23 2036 Wed Jan 04, 2023   0400 Telemetry visualized after patient had a bowel movement noted to be a large amount of dark blood [SHANNAN]   0424 WBC: 9.05 [SHANNAN]   0424 Hemoglobin: 13.1 [SHANNAN]   0424 Hematocrit: 38.8 [SHANNAN]   0424 Platelets: 311 [SHANNAN]   0425 Glucose(!): 119 [SHANNAN]   0425 BUN: 17 [SHANNAN]   0425 Creatinine: 0.64 [SHANNAN]   0425 Sodium: 137 [SHANNAN]   0425 Potassium: 4.0 [SHANNAN]   0425 Chloride: 103 [SHANNAN]   0425 CO2: 24.0 [SHANNAN]   0425 ABO Type: B [SHANNAN]   0425 RH type: Positive [SHANNAN]   0539 Patient's blood work is unremarkable and hemoglobin is normal. [SHANNAN]   0540 CT images viewed by me, patient appears to have some stranding in the left lower quadrant adjacent to to the sigmoid colon. [SHANNAN]   0603  in the toilet. [SHANNAN]   0638 Patient rechecked, discussed concern for possible diverticulitis and plan for probable admission if she does indeed have diverticulitis with rectal bleeding.  Patient expressed understanding and agrees with plan. [SHANNAN]   0659 Patient history, ER presentation and evaluation discussed with and turned over to PARK Albarado PA-C, pending CT results and possible admission for diverticulitis  with rectal bleeding. [SHANNAN]   5623 CT Abdomen Pelvis With Contrast  Concern for ischemic colitis given distribution of inflammatory change however patient's lactic acid is within normal limits.  I have less suspicion for ischemic colitis and more for diverticulitis or inflammatory process.  We will consult GI and plan for hospital admission.  We will initiate Zosyn. [DC]   0804 Discussed case with Dr. Mcknight, GI, who recommends obtaining GI panel and C. difficile.  They will see in consult.  He agrees with plan of care. [DC]      ED Course User Index  [DC] Courtney Albarado PA  [SHANNAN] Adelfo Welch PA       MDM:  Admitted for diverticulitis/colits and GI consultation.    PPE:  The patient wore a mask and I wore a mask and all appropriate PPE throughout the entire patient encounter.      AS OF 06:03 EST VITALS:    BP - 124/90  HR - 100  TEMP - 98 °F (36.7 °C) (Tympanic)  O2 SATS - 96%          DISPOSITION  ADMISSION    Discussed treatment plan and reason for admission with pt/family and admitting physician.  Pt/family voiced understanding of the plan for admission for further testing/treatment as needed.         Note Disclaimer: At [facility], we believe that sharing information builds trust and better relationships. You are receiving this note because you recently visited [facility]. It is possible you will see health information before a provider has talked with you about it. This kind of information can be easy to misunderstand. To help you fully understand what it means for your health, we urge you to discuss this note with your provider.     Adelfo Welch PA  01/06/23 2036

## 2023-01-05 ENCOUNTER — READMISSION MANAGEMENT (OUTPATIENT)
Dept: CALL CENTER | Facility: HOSPITAL | Age: 68
End: 2023-01-05
Payer: MEDICARE

## 2023-01-05 ENCOUNTER — APPOINTMENT (OUTPATIENT)
Dept: CARDIOLOGY | Facility: HOSPITAL | Age: 68
End: 2023-01-05
Payer: MEDICARE

## 2023-01-05 VITALS
HEIGHT: 58 IN | HEART RATE: 70 BPM | TEMPERATURE: 97.7 F | SYSTOLIC BLOOD PRESSURE: 118 MMHG | DIASTOLIC BLOOD PRESSURE: 78 MMHG | WEIGHT: 108 LBS | BODY MASS INDEX: 22.67 KG/M2 | OXYGEN SATURATION: 98 % | RESPIRATION RATE: 16 BRPM

## 2023-01-05 LAB
ANION GAP SERPL CALCULATED.3IONS-SCNC: 10.3 MMOL/L (ref 5–15)
BUN SERPL-MCNC: 5 MG/DL (ref 8–23)
BUN/CREAT SERPL: 11.1 (ref 7–25)
CALCIUM SPEC-SCNC: 7.8 MG/DL (ref 8.6–10.5)
CHLORIDE SERPL-SCNC: 110 MMOL/L (ref 98–107)
CO2 SERPL-SCNC: 24.7 MMOL/L (ref 22–29)
CREAT SERPL-MCNC: 0.45 MG/DL (ref 0.57–1)
DEPRECATED RDW RBC AUTO: 41.3 FL (ref 37–54)
EGFRCR SERPLBLD CKD-EPI 2021: 104.3 ML/MIN/1.73
ERYTHROCYTE [DISTWIDTH] IN BLOOD BY AUTOMATED COUNT: 11.8 % (ref 12.3–15.4)
GLUCOSE SERPL-MCNC: 69 MG/DL (ref 65–99)
HCT VFR BLD AUTO: 32 % (ref 34–46.6)
HCT VFR BLD AUTO: 34.1 % (ref 34–46.6)
HGB BLD-MCNC: 11 G/DL (ref 12–15.9)
HGB BLD-MCNC: 11.6 G/DL (ref 12–15.9)
MCH RBC QN AUTO: 32.7 PG (ref 26.6–33)
MCHC RBC AUTO-ENTMCNC: 34 G/DL (ref 31.5–35.7)
MCV RBC AUTO: 96.1 FL (ref 79–97)
PLATELET # BLD AUTO: 257 10*3/MM3 (ref 140–450)
PMV BLD AUTO: 8.8 FL (ref 6–12)
POTASSIUM SERPL-SCNC: 3.5 MMOL/L (ref 3.5–5.2)
RBC # BLD AUTO: 3.55 10*6/MM3 (ref 3.77–5.28)
SODIUM SERPL-SCNC: 145 MMOL/L (ref 136–145)
WBC NRBC COR # BLD: 10.81 10*3/MM3 (ref 3.4–10.8)

## 2023-01-05 PROCEDURE — 85027 COMPLETE CBC AUTOMATED: CPT | Performed by: STUDENT IN AN ORGANIZED HEALTH CARE EDUCATION/TRAINING PROGRAM

## 2023-01-05 PROCEDURE — 94799 UNLISTED PULMONARY SVC/PX: CPT

## 2023-01-05 PROCEDURE — 93246 EXT ECG>7D<15D RECORDING: CPT

## 2023-01-05 PROCEDURE — 99214 OFFICE O/P EST MOD 30 MIN: CPT | Performed by: INTERNAL MEDICINE

## 2023-01-05 PROCEDURE — G0378 HOSPITAL OBSERVATION PER HR: HCPCS

## 2023-01-05 PROCEDURE — 94664 DEMO&/EVAL PT USE INHALER: CPT

## 2023-01-05 PROCEDURE — 94761 N-INVAS EAR/PLS OXIMETRY MLT: CPT

## 2023-01-05 PROCEDURE — 80048 BASIC METABOLIC PNL TOTAL CA: CPT | Performed by: STUDENT IN AN ORGANIZED HEALTH CARE EDUCATION/TRAINING PROGRAM

## 2023-01-05 RX ORDER — CIPROFLOXACIN 500 MG/1
500 TABLET, FILM COATED ORAL 2 TIMES DAILY
Qty: 10 TABLET | Refills: 0 | Status: SHIPPED | OUTPATIENT
Start: 2023-01-05 | End: 2023-01-10

## 2023-01-05 RX ORDER — METRONIDAZOLE 500 MG/1
500 TABLET ORAL 2 TIMES DAILY
Qty: 10 TABLET | Refills: 0 | Status: SHIPPED | OUTPATIENT
Start: 2023-01-05 | End: 2023-01-10

## 2023-01-05 RX ORDER — PANTOPRAZOLE SODIUM 40 MG/1
40 TABLET, DELAYED RELEASE ORAL
Qty: 60 TABLET | Refills: 0 | Status: SHIPPED | OUTPATIENT
Start: 2023-01-05 | End: 2023-01-19 | Stop reason: SDUPTHER

## 2023-01-05 RX ADMIN — Medication 10 ML: at 08:22

## 2023-01-05 RX ADMIN — BUDESONIDE AND FORMOTEROL FUMARATE DIHYDRATE 2 PUFF: 160; 4.5 AEROSOL RESPIRATORY (INHALATION) at 06:55

## 2023-01-05 NOTE — PLAN OF CARE
Goal Outcome Evaluation:   AOx4. VSS. Room air. Reports decreased abdominal pain, rated 1 out of 10 and described as dull. Continues to pass blood but reports decreased frequency and amount. Pleasant and cooperative. Discharging to home this afternoon.

## 2023-01-05 NOTE — PLAN OF CARE
Goal Outcome Evaluation:  Plan of Care Reviewed With: patient        Progress: no change  Outcome Evaluation: Pt resting in bed, AO x4, on room air. NPO since midnight for endoscopy today. Pt states bright red stools frequency has decreased throughout night, IV infusing NS @ 75ml/hr. ambulating independently, VSS

## 2023-01-05 NOTE — OUTREACH NOTE
Prep Survey    Flowsheet Row Responses   [facility] patient discharged from? [facility]   Is LACE score < 7 ? Yes   Eligibility [facility]   Date of Admission 01/04/23   Date of Discharge 01/05/23   Discharge Disposition Home or Self Care   Discharge diagnosis colitis with rectal bleeding, heart paloptations, COPD   Does the patient have one of the following disease processes/diagnoses(primary or secondary)? Other   Does the patient have Home health ordered? No   Is there a DME ordered? No   Prep survey completed? Yes          NAYELI MASSEY - Registered Nurse

## 2023-01-05 NOTE — CASE MANAGEMENT/SOCIAL WORK
Continued Stay Note  [facility]     Patient Name: Nanci Umanzor  MRN: [id number]  Today's Date: 1/5/2023    Admit Date: 1/4/2023    Plan: Return home alone   Discharge Plan     Row Name 01/05/23 1335       Plan    Plan Return home alone    Patient/Family in Agreement with Plan yes    Plan Comments DC orders noted. Met with patient at bedside who stated she lives at home alone and was ind with ADLs prior to admission. Patient denies h/o HH/SNF and has a nebulizer at home. Patient reports she does not have difficulty affording medications. Patient drove herself here and stated she feels like she can drive herself home. Patient reports no DC needs. CCP to follow. Giovanny MASSEY RN               Discharge Codes    No documentation.               Expected Discharge Date and Time     Expected Discharge Date Expected Discharge Time    Jan 5, 2023             Giovanny Camarena RN

## 2023-01-05 NOTE — DISCHARGE SUMMARY
Patient Name: Nanci Umanzor  : 1955  MRN: [id number]    Date of Admission: 2023  Date of Discharge:  2023  Primary Care Physician: Bowen Covarrubias MD      Chief Complaint:   Rectal Bleeding      Discharge Diagnoses     Active Hospital Problems    Diagnosis  POA   • **Colitis with rectal bleeding [K52.9, K62.5]  Yes   • Heart palpitations [R00.2]  Yes   • Essential hypertension [I10]  Yes   • COPD (chronic obstructive pulmonary disease) with chronic bronchitis (HCC) [J44.9]  Yes      Resolved Hospital Problems   No resolved problems to display.        Hospital Course     Ms. Umanzor is a 67 y.o. female with a history of COPD, GERD, hypertension, hyperlipidemia who presented to [facility] initially complaining of rectal bleeding.  Please see the admitting history and physical for further details.  She was found to have colitis with rectal bleeding and was admitted to the hospital for further evaluation and treatment.      Gastroenterology evaluated patient for rectal bleeding and given patient's hemoglobin stability and improvement of abdominal pain following IV fluid hydration recommend outpatient follow-up for endoscopy procedure with 5-day course of ciprofloxacin and Flagyl at discharge.      Cardiology evaluated patient for frequent palpitation recommend Zio patch at discharge with follow-up Dr. Malik for Zio patch interpretation.    Patient appears medically stable for discharge home on 2023--tolerating diet and physical activity as well--with eagerness to discharge and agreed with plan for follow-up as outlined above to include primary care provider for continued monitoring CBC.    Day of Discharge       Physical Exam:  Temp:  [97 °F (36.1 °C)-97.7 °F (36.5 °C)] 97.7 °F (36.5 °C)  Heart Rate:  [68-82] 70  Resp:  [16-17] 16  BP: (106-137)/(68-83) 118/78  Body mass index is 22.57 kg/m².     Physical Exam  Constitutional:       General: She is not in acute distress.      Appearance: She is not toxic-appearing.   HENT:      Head: Normocephalic and atraumatic.   Eyes:      Extraocular Movements: Extraocular movements intact.      Conjunctiva/sclera: Conjunctivae normal.   Cardiovascular:      Rate and Rhythm: Normal rate.      Heart sounds: Normal heart sounds.   Pulmonary:      Effort: Pulmonary effort is normal.      Breath sounds: Normal breath sounds.   Abdominal:      General: Bowel sounds are normal.      Palpations: Abdomen is soft.   Musculoskeletal:      Cervical back: Normal range of motion and neck supple.      Right lower leg: No edema.      Left lower leg: No edema.   Skin:     General: Skin is warm and dry.   Neurological:      Mental Status: She is alert and oriented to person, place, and time.   Psychiatric:         Behavior: Behavior normal.         Thought Content: Thought content normal.         Consultants     Consult Orders (all) (From admission, onward)     Start     Ordered    01/04/23 1242  Inpatient Cardiology Consult  Once        Specialty:  Cardiology  Provider:  Rashid Malik III, MD    01/04/23 1243    01/04/23 0756  Gastroenterology (on-call MD unless specified)  Once        Specialty:  Gastroenterology  Provider:  Adelfo Mcknight MD    01/04/23 0755    01/04/23 0754  [facility] (on-call MD unless specified) Details  Once        Specialty:  Hospitalist  Provider:  David Owens MD    01/04/23 0754              Procedures     * Surgery not found *      Imaging Results (All)     Procedure Component Value Units Date/Time    CT Abdomen Pelvis With Contrast [[id number]] Collected: 01/04/23 0723     Updated: 01/04/23 0746    Narrative:      CT ABDOMEN AND PELVIS WITH IV CONTRAST     HISTORY: Abdominal pain, rectal bleeding     TECHNIQUE: Radiation dose reduction techniques were utilized, including  automated exposure control and exposure modulation based on body size.   3 mm images were obtained through the abdomen and pelvis after the  administration of IV  contrast.      COMPARISON: CT abdomen pelvis 08/15/2020     FINDINGS:     There are small paraesophageal hernia. No findings of small bowel  obstruction are present. The appendix is unremarkable. Subcentimeter  hepatic lesions are too small to characterize. The gallbladder,  pancreas, spleen, adrenal glands have an unremarkable postcontrast CT  appearance. Subcentimeter renal lesions are too small to characterize.  There is no hydronephrosis. No abdominopelvic adenopathy by size  criteria. The bladder is decompressed and cannot be evaluated. There is  colonic diverticulosis which primarily involves the distal sigmoid  colon. A long segment of overall hypodense circumferential wall  thickening involving the distal descending and proximal sigmoid colon is  present with surrounding fat stranding and soft tissue thickening. Small  amount of free fluid layers in the left paracolic gutter and pelvis. No  free intraperitoneal air is seen.     For the purposes of this dictation, the last well-formed disc space be  referred to as L5-S1. Severe burst compression deformity of the L5  vertebral body is present, as before. Superior endplate compression  deformity of T11 suggestive of a Schmorl's node is also grossly  unchanged. There is mild anterolisthesis of L3 on L4, as before.       Impression:      1.  Long segment of colitis involving the descending colon and proximal  sigmoid colon which given the appearance and distribution is most  suggestive of ischemic colitis. Infectious colitis and diverticulitis  are also possible but considered less likely. Gastroenterology  consultation is recommended. The above findings were discussed with Dr. Loomis by telephone by Ramos Castillo at approximately 7:25 AM on   01/04/2023  .  2.  Other findings as above     This report was finalized on 1/4/2023 7:43 AM by Dr. Ramos Castillo M.D.             Results for orders placed during the hospital encounter of 01/04/23    Adult  Transthoracic Echo Complete W/ Cont if Necessary Per Protocol    Interpretation Summary  •  Left ventricular systolic function is normal. Calculated left ventricular EF = 69%  •  Left ventricular diastolic function is consistent with (grade I) impaired relaxation.  •  Mild tricuspid valve regurgitation is present.  •  Estimated right ventricular systolic pressure from tricuspid regurgitation is normal (<35 mmHg). Calculated right ventricular systolic pressure from tricuspid regurgitation is 25 mmHg.    Pertinent Labs     Results from last 7 days   Lab Units 01/05/23  0723 01/04/23  2357 01/04/23  1829 01/04/23  0010   WBC 10*3/mm3 10.81*  --   --  9.05   HEMOGLOBIN g/dL 11.6* 11.0* 11.5* 13.1   PLATELETS 10*3/mm3 257  --   --  311     Results from last 7 days   Lab Units 01/05/23  0723 01/04/23  0010   SODIUM mmol/L 145 137   POTASSIUM mmol/L 3.5 4.0   CHLORIDE mmol/L 110* 103   CO2 mmol/L 24.7 24.0   BUN mg/dL 5* 17   CREATININE mg/dL 0.45* 0.64   GLUCOSE mg/dL 69 119*   EGFR mL/min/1.73 104.3 95.8     Results from last 7 days   Lab Units 01/04/23  0010   ALBUMIN g/dL 4.6   BILIRUBIN mg/dL 0.6   ALK PHOS U/L 54   AST (SGOT) U/L 26   ALT (SGPT) U/L 33     Results from last 7 days   Lab Units 01/05/23  0723 01/04/23  0010   CALCIUM mg/dL 7.8* 9.2   ALBUMIN g/dL  --  4.6               Invalid input(s): LDLCALC          Test Results Pending at Discharge       Discharge Details        Discharge Medications      New Medications      Instructions Start Date   ciprofloxacin 500 MG tablet  Commonly known as: Cipro   500 mg, Oral, 2 Times Daily      metroNIDAZOLE 500 MG tablet  Commonly known as: Flagyl   500 mg, Oral, 2 Times Daily      pantoprazole 40 MG EC tablet  Commonly known as: PROTONIX   40 mg, Oral, 2 Times Daily Before Meals         Changes to Medications      Instructions Start Date   albuterol (2.5 MG/3ML) 0.083% nebulizer solution  Commonly known as: PROVENTIL  What changed: Another medication with the same  name was changed. Make sure you understand how and when to take each.   2.5 mg, Nebulization, Every 4 Hours PRN      albuterol sulfate  (90 Base) MCG/ACT inhaler  Commonly known as: PROVENTIL HFA;VENTOLIN HFA;PROAIR HFA  What changed:   · how to take this  · when to take this   INHALE TWO PUFFS BY MOUTH EVERY 4 HOURS AS NEEDED FOR WHEEZING      Fluticasone-Salmeterol 250-50 MCG/ACT DISKUS  Commonly known as: ADVAIR/WIXELA  What changed: when to take this   INHALE ONE PUFF BY MOUTH TWICE A DAY      Prolia 60 MG/ML solution prefilled syringe syringe  Generic drug: denosumab  What changed: additional instructions   60 mg, Subcutaneous, Once         Continue These Medications      Instructions Start Date   amLODIPine 5 MG tablet  Commonly known as: NORVASC   TAKE ONE TABLET BY MOUTH DAILY      atorvastatin 40 MG tablet  Commonly known as: LIPITOR   TAKE ONE TABLET BY MOUTH DAILY      benazepril 10 MG tablet  Commonly known as: LOTENSIN   TAKE ONE TABLET BY MOUTH DAILY      BIOTIN PO   1 capsule, Oral, Daily      calcium citrate-vitamin d 200-250 MG-UNIT tablet tablet  Commonly known as: CITRACAL   1 tablet, Oral, Daily      FISH OIL PO   1 capsule, Oral, Daily      fluticasone 50 MCG/ACT nasal spray  Commonly known as: FLONASE   2 sprays, Nasal, Daily      multivitamin with minerals tablet tablet   1 tablet, Oral, Daily      vitamin C 500 MG tablet  Commonly known as: ASCORBIC ACID   1,000 mg, Oral, Daily      Vitamin D 50 MCG (2000 UT) tablet   2,000 Units, Oral, Daily         ASK your doctor about these medications      Instructions Start Date   tretinoin 0.05 % cream  Commonly known as: RETIN-A   Topical, Nightly             Allergies   Allergen Reactions   • Clarithromycin Shortness Of Breath and Palpitations   • Fluarix [Influenza Virus Vaccine] Myalgia   • Pneumococcal Vaccines Swelling       Discharge Disposition:  Home or Self Care      Discharge Diet:  No active diet order      Discharge Activity:    Activity Instructions     Activity as Tolerated            CODE STATUS:    Code Status and Medical Interventions:   Ordered at: 01/04/23 1123     Level Of Support Discussed With:    Patient     Code Status (Patient has no pulse and is not breathing):    CPR (Attempt to Resuscitate)     Medical Interventions (Patient has pulse or is breathing):    Full       Future Appointments   Date Time Provider Department Center   1/26/2023  1:00 PM Christy Jacob APRN [facility]   2/3/2023  1:00 PM Rashid Malik III, MD [facility]   5/24/2023  9:30 AM [facility]   5/30/2023  1:15 PM Bowen Covarrubias MD [facility]     Additional Instructions for the Follow-ups that You Need to Schedule     Discharge Follow-up with PCP   As directed       Currently Documented PCP:    Bowen Covarrubias MD    PCP Phone Number:    [phone]     Follow Up Details: Please call to schedule a one week or earliest available follow-up appointment PCP; BP, BMP, CBC monitoring            Follow-up Information     Bowen Covarrubias MD .    Specialty: Internal Medicine  Why: Please call to schedule a one week or earliest available follow-up appointment PCP; BP, BMP, CBC monitoring  Contact information:  [address] [facility] [id number] [phone]             Rashid Malik III, MD. Call in 2 week(s).    Specialty: Cardiology  Why: Follow-up Zio patch results  Contact information:  [address] [address]  [phone]                         Additional Instructions for the Follow-ups that You Need to Schedule     Discharge Follow-up with PCP   As directed       Currently Documented PCP:    Bowen Covarrubias MD    PCP Phone Number:    [phone]     Follow Up Details: Please call to schedule a one week or earliest available follow-up appointment PCP; BP, BMP, CBC monitoring           Time Spent on Discharge:  Greater than 30 minutes      CARMELA Farley  [facility]  01/05/23  11:42 EST

## 2023-01-06 ENCOUNTER — TRANSITIONAL CARE MANAGEMENT TELEPHONE ENCOUNTER (OUTPATIENT)
Dept: CALL CENTER | Facility: HOSPITAL | Age: 68
End: 2023-01-06
Payer: MEDICARE

## 2023-01-06 NOTE — OUTREACH NOTE
Call Center TCM Note    Flowsheet Row Responses   [facility] patient discharged from? [place]   Does the patient have one of the following disease processes/diagnoses(primary or secondary)? Other   TCM attempt successful? Yes   Call start time 1123   Call end time 1139   Discharge diagnosis colitis with rectal bleeding, heart paloptations, COPD   Person spoke with today (if not patient) and relationship Patient   Meds reviewed with patient/caregiver? Yes   Prescription comments No questions or concerns at this time   Is the patient taking all medications as directed (includes completed medication regime)? Yes   Comments Scheduled hospital with pcp on 01-12 @ 115   Does the patient have an appointment with their PCP within 7 days of discharge? No   Nursing Interventions Assisted patient with making appointment per protocol   Has home health visited the patient within 72 hours of discharge? N/A   Psychosocial issues? No   Did the patient receive a copy of their discharge instructions? Yes   Nursing interventions Reviewed instructions with patient   What is the patient's perception of their health status since discharge? Improving   Is the patient/caregiver able to teach back signs and symptoms related to disease process for when to call PCP? Yes   Is the patient/caregiver able to teach back signs and symptoms related to disease process for when to call 911? Yes   Is the patient/caregiver able to teach back the hierarchy of who to call/visit for symptoms/problems? PCP, Specialist, Home health nurse, Urgent Care, ED, 911 Yes   TCM call completed? Yes   Wrap up additional comments Educated on BRAT DIET, and ACTIVA- Probiotic. discuss anxiety- heart palpitations with pcp ( Has holter monitor on)   Call end time 1139   Would this patient benefit from a Referral to Amb Social Work? No   Is the patient interested in additional calls from an ambulatory ?  NOTE:  applies to high risk patients requiring  additional follow-up. No          Antonette Zabala RN    1/6/2023, 11:40 EST

## 2023-01-06 NOTE — CASE MANAGEMENT/SOCIAL WORK
Case Management Discharge Note      Final Note: DC'd home. Giovanny MASSEY RN         Selected Continued Care - Discharged on 1/5/2023 Admission date: 1/4/2023 - Discharge disposition: Home or Self Care    Destination    No services have been selected for the patient.              Durable Medical Equipment    No services have been selected for the patient.              Dialysis/Infusion    No services have been selected for the patient.              Home Medical Care    No services have been selected for the patient.              Therapy    No services have been selected for the patient.              Community Resources    No services have been selected for the patient.              Community & DME    No services have been selected for the patient.                  Transportation Services  Private: Car    Final Discharge Disposition Code: 01 - home or self-care

## 2023-01-06 NOTE — PROGRESS NOTES
[facility]  Inpatient Progress Note    Reason for Followup:  colitis    Subjective:  Patient reports she feels much improved today.  Fewer BMs with small amount of blood.  Pain is better.  She is able to tolerate PO intake.     Current Facility-Administered Medications:   •  albuterol (PROVENTIL HFA;VENTOLIN HFA) inhaler 2 puff, 2 puff, Inhalation, 4x Daily - RT, Ky Kasper MD    Current Outpatient Medications:   •  albuterol sulfate  (90 Base) MCG/ACT inhaler, INHALE TWO PUFFS BY MOUTH EVERY 4 HOURS AS NEEDED FOR WHEEZING (Patient taking differently: Inhale 2 puffs 4 (Four) Times a Day.), Disp: 18 g, Rfl: 5  •  amLODIPine (NORVASC) 5 MG tablet, TAKE ONE TABLET BY MOUTH DAILY (Patient taking differently: Take 5 mg by mouth Daily.), Disp: 90 tablet, Rfl: 3  •  atorvastatin (LIPITOR) 40 MG tablet, TAKE ONE TABLET BY MOUTH DAILY (Patient taking differently: Take 40 mg by mouth Daily.), Disp: 90 tablet, Rfl: 2  •  benazepril (LOTENSIN) 10 MG tablet, TAKE ONE TABLET BY MOUTH DAILY (Patient taking differently: Take 10 mg by mouth Daily.), Disp: 90 tablet, Rfl: 3  •  BIOTIN PO, Take 1 capsule by mouth Daily., Disp: , Rfl:   •  calcium citrate-vitamin d (CITRACAL) 200-250 MG-UNIT tablet tablet, Take 1 tablet by mouth Daily., Disp: , Rfl:   •  Cholecalciferol (Vitamin D) 50 MCG (2000 UT) tablet, Take 2,000 Units by mouth Daily., Disp: , Rfl:   •  fluticasone (FLONASE) 50 MCG/ACT nasal spray, 2 sprays into the nostril(s) as directed by provider Daily for 14 days., Disp: 16 g, Rfl: 0  •  Fluticasone-Salmeterol (ADVAIR/WIXELA) 250-50 MCG/ACT DISKUS, INHALE ONE PUFF BY MOUTH TWICE A DAY (Patient taking differently: Inhale 1 puff Daily.), Disp: 60 each, Rfl: 11  •  Multiple Vitamins-Minerals (CENTRUM SILVER ADULT 50+ PO), Take 1 tablet by mouth Daily., Disp: , Rfl:   •  Omega-3 Fatty Acids (FISH OIL PO), Take 1 capsule by mouth Daily., Disp: , Rfl:   •  pantoprazole (PROTONIX) 40 MG EC  tablet, Take 1 tablet by mouth 2 (Two) Times a Day Before Meals for 30 days., Disp: 60 tablet, Rfl: 0  •  tretinoin (RETIN-A) 0.05 % cream, Apply  topically Every Night. (Patient taking differently: Apply 1 application topically to the appropriate area as directed Every Other Day.), Disp: 20 g, Rfl: 1  •  vitamin C (ASCORBIC ACID) 500 MG tablet, Take 1,000 mg by mouth Daily., Disp: , Rfl:   •  albuterol (PROVENTIL) (2.5 MG/3ML) 0.083% nebulizer solution, Take 2.5 mg by nebulization Every 4 (Four) Hours As Needed for Wheezing., Disp: 3 mL, Rfl: 2  •  ciprofloxacin (Cipro) 500 MG tablet, Take 1 tablet by mouth 2 (Two) Times a Day for 5 days., Disp: 10 tablet, Rfl: 0  •  metroNIDAZOLE (Flagyl) 500 MG tablet, Take 1 tablet by mouth 2 (Two) Times a Day for 5 days., Disp: 10 tablet, Rfl: 0  •  Prolia 60 MG/ML solution prefilled syringe syringe, Inject 1 mL under the skin into the appropriate area as directed 1 (One) Time for 1 dose. (Patient taking differently: Inject 60 mg under the skin into the appropriate area as directed 1 (One) Time. Every six Months. Last dose: November 2022), Disp: 1 mL, Rfl: 2  Review of Systems:   Gen: No fever or chills  Resp: No cough or SOA  CV: No chest pain or palpitations  GI: improved abd pain, no nausea, vomiting      Objective     Vital Signs  Temp:  [97 °F (36.1 °C)-97.7 °F (36.5 °C)] 97.7 °F (36.5 °C)  Heart Rate:  [68-82] 70  Resp:  [16-17] 16  BP: (106-118)/(68-78) 118/78  Body mass index is 22.57 kg/m².  No intake or output data in the 24 hours ending 01/05/23 1940  No intake/output data recorded.     Physical Exam:   General: patient awake, alert and cooperative   Eyes: Normal lids and lashes, no scleral icterus   Neck: supple, normal ROM   Skin: warm and dry, not jaundiced   Cardiovascular: regular rate, regular rhythm, well-perfused extremities   Pulm: Equal expansion bilaterally, no increased WOB   Abdomen: soft, mild ttp llq, nondistended;    Extremities: no rash or edema               Neuro: A&O, no obvious sign of focal deficit   Psychiatric: Normal mood and behavior; memory intact       Results Review:     I reviewed the patient's new clinical results.    Results from last 7 days   Lab Units 01/05/23  0723 01/04/23  2357 01/04/23  1829 01/04/23  0010   WBC 10*3/mm3 10.81*  --   --  9.05   HEMOGLOBIN g/dL 11.6* 11.0* 11.5* 13.1   HEMATOCRIT % 34.1 32.0* 34.0 38.8   PLATELETS 10*3/mm3 257  --   --  311     Results from last 7 days   Lab Units 01/05/23  0723 01/04/23  0010   SODIUM mmol/L 145 137   POTASSIUM mmol/L 3.5 4.0   CHLORIDE mmol/L 110* 103   CO2 mmol/L 24.7 24.0   BUN mg/dL 5* 17   CREATININE mg/dL 0.45* 0.64   CALCIUM mg/dL 7.8* 9.2   BILIRUBIN mg/dL  --  0.6   ALK PHOS U/L  --  54   ALT (SGPT) U/L  --  33   AST (SGOT) U/L  --  26   GLUCOSE mg/dL 69 119*         Lab Results   Lab Value Date/Time    LIPASE 46 12/23/2022 1153    LIPASE 40 08/15/2020 1116       Radiology:  IMPRESSION:  1.  Long segment of colitis involving the descending colon and proximal  sigmoid colon which given the appearance and distribution is most  suggestive of ischemic colitis. Infectious colitis and diverticulitis  are also possible but considered less likely.      Assessment & Plan   Assessment:   1. Hematochezia  2. Left Sided Colitis on CT  3. Palpitations  4. GERD  5. Hypertension  6. Hyperlipidemia  7. Personal history of colon polyps       Plan:   - Stool studies negative, still may be infectious vs ischemic, less likely IBD given recent colonoscopy in September  - Continue PPI   - Avoid NSAIDs  - Symptoms much improved today, would discharge on course of cipro and Flagyl  - Will request follow up in GI clinic in 4-6 weeks  - Discussed worrisome signs/symptoms with patient that would warrant return to hospital for evaluation. All of her questions were answered at this time.    I discussed the patient's findings and my recommendations with patient, nursing staff and primary care  team.

## 2023-01-09 ENCOUNTER — TELEPHONE (OUTPATIENT)
Dept: NEUROSURGERY | Facility: CLINIC | Age: 68
End: 2023-01-09
Payer: MEDICARE

## 2023-01-12 ENCOUNTER — OFFICE VISIT (OUTPATIENT)
Dept: FAMILY MEDICINE CLINIC | Facility: CLINIC | Age: 68
End: 2023-01-12
Payer: MEDICARE

## 2023-01-12 VITALS
RESPIRATION RATE: 16 BRPM | DIASTOLIC BLOOD PRESSURE: 74 MMHG | HEIGHT: 58 IN | SYSTOLIC BLOOD PRESSURE: 116 MMHG | TEMPERATURE: 98.2 F | HEART RATE: 74 BPM | OXYGEN SATURATION: 100 % | WEIGHT: 108 LBS | BODY MASS INDEX: 22.67 KG/M2

## 2023-01-12 DIAGNOSIS — I10 ESSENTIAL HYPERTENSION: Primary | ICD-10-CM

## 2023-01-12 DIAGNOSIS — K52.9 COLITIS WITH RECTAL BLEEDING: ICD-10-CM

## 2023-01-12 DIAGNOSIS — K62.5 COLITIS WITH RECTAL BLEEDING: ICD-10-CM

## 2023-01-12 DIAGNOSIS — R00.2 HEART PALPITATIONS: ICD-10-CM

## 2023-01-12 PROCEDURE — 99214 OFFICE O/P EST MOD 30 MIN: CPT | Performed by: INTERNAL MEDICINE

## 2023-01-12 NOTE — PROGRESS NOTES
Subjective   Nanic Umanzor is a 67 y.o. female. Patient is here today for follow-up from hospitalization for rectal bleeding on January 5 at [facility].  Patient was having abdominal pains and profuse rectal bleeding and was admitted and found to have colitis.  She improved rapidly and was discharged on antibiotics which she has completed.  Since discharge she is feeling much better and has been back to work.  She has had no further bleeding and no abdominal pains.  She does have follow-up soon with gastroenterology for repeat colonoscopy and also has cardiology follow-up for some palpitations.  Basically today she is feeling well    Chief Complaint   Patient presents with   • Rectal Bleeding     1/5/23  ER       (Not on file)-  Risk for Readmission (LACE) Score: 3 (1/5/2023  6:00 AM)           Vitals:    01/12/23 1327   BP: 116/74   Pulse: 74   Resp: 16   Temp: 98.2 °F (36.8 °C)   SpO2: 100%     The following portions of the patient's history were reviewed and updated as appropriate: allergies, current medications, past family history, past medical history, past social history, past surgical history and problem list.    Past Medical History:   Diagnosis Date   • Allergic 1990's   • Ankle fracture     Rt 2002  Lt 2004   • Anxiety    • Arthritis    • Asthma    • Cancer ([facility]) 1992    Cervical cone biopsy for Stage II abnormality   • Cataract 2017   • Clavicle fracture     10/21/1970   • Colon polyp    • COPD (chronic obstructive pulmonary disease) (Former[facility]) 2006   • Depression    • Diverticulosis    • Fibula fracture     left 2002   • GERD (gastroesophageal reflux disease) 2016   • Glaucoma 2006   • Heart murmur    • Hyperlipidemia    • Hypertension    • Low back pain 1980s   • Osteopenia 2016   • Pelvis fracture ([facility])     10/21/1970   • Pneumonia    • Visual impairment Since 1yrs of age   • Wrist fracture     2007      Allergies   Allergen Reactions   • Clarithromycin Shortness Of Breath and Palpitations   •  Fluarix [Influenza Virus Vaccine] Myalgia   • Pneumococcal Vaccines Swelling      Social History     Socioeconomic History   • Marital status:    • Number of children: 3   • Years of education: AS   Tobacco Use   • Smoking status: Former     Packs/day: 1.00     Years: 15.00     Pack years: 15.00     Types: Cigarettes     Quit date: 2013     Years since quittin.7   • Smokeless tobacco: Never   Vaping Use   • Vaping Use: Never used   Substance and Sexual Activity   • Alcohol use: Yes     Alcohol/week: 1.0 standard drink     Types: 1 Glasses of wine per week     Comment: WINE DAILY   • Drug use: No   • Sexual activity: Not Currently     Partners: Male     Birth control/protection: Post-menopausal        Current Outpatient Medications:   •  albuterol (PROVENTIL) (2.5 MG/3ML) 0.083% nebulizer solution, Take 2.5 mg by nebulization Every 4 (Four) Hours As Needed for Wheezing., Disp: 3 mL, Rfl: 2  •  albuterol sulfate  (90 Base) MCG/ACT inhaler, INHALE TWO PUFFS BY MOUTH EVERY 4 HOURS AS NEEDED FOR WHEEZING (Patient taking differently: Inhale 2 puffs 4 (Four) Times a Day.), Disp: 18 g, Rfl: 5  •  amLODIPine (NORVASC) 5 MG tablet, TAKE ONE TABLET BY MOUTH DAILY (Patient taking differently: Take 5 mg by mouth Daily.), Disp: 90 tablet, Rfl: 3  •  atorvastatin (LIPITOR) 40 MG tablet, TAKE ONE TABLET BY MOUTH DAILY (Patient taking differently: Take 40 mg by mouth Daily.), Disp: 90 tablet, Rfl: 2  •  benazepril (LOTENSIN) 10 MG tablet, TAKE ONE TABLET BY MOUTH DAILY (Patient taking differently: Take 10 mg by mouth Daily.), Disp: 90 tablet, Rfl: 3  •  BIOTIN PO, Take 1 capsule by mouth Daily., Disp: , Rfl:   •  calcium citrate-vitamin d (CITRACAL) 200-250 MG-UNIT tablet tablet, Take 1 tablet by mouth Daily., Disp: , Rfl:   •  Cholecalciferol (Vitamin D) 50 MCG (2000 UT) tablet, Take 2,000 Units by mouth Daily., Disp: , Rfl:   •  Fluticasone-Salmeterol (ADVAIR/WIXELA) 250-50 MCG/ACT DISKUS, INHALE ONE PUFF BY  MOUTH TWICE A DAY (Patient taking differently: Inhale 1 puff Daily.), Disp: 60 each, Rfl: 11  •  Multiple Vitamins-Minerals (CENTRUM SILVER ADULT 50+ PO), Take 1 tablet by mouth Daily., Disp: , Rfl:   •  Omega-3 Fatty Acids (FISH OIL PO), Take 1 capsule by mouth Daily., Disp: , Rfl:   •  pantoprazole (PROTONIX) 40 MG EC tablet, Take 1 tablet by mouth 2 (Two) Times a Day Before Meals for 30 days., Disp: 60 tablet, Rfl: 0  •  tretinoin (RETIN-A) 0.05 % cream, Apply  topically Every Night. (Patient taking differently: Apply 1 application topically to the appropriate area as directed Every Other Day.), Disp: 20 g, Rfl: 1  •  vitamin C (ASCORBIC ACID) 500 MG tablet, Take 1,000 mg by mouth Daily., Disp: , Rfl:   •  fluticasone (FLONASE) 50 MCG/ACT nasal spray, 2 sprays into the nostril(s) as directed by provider Daily for 14 days., Disp: 16 g, Rfl: 0  •  Prolia 60 MG/ML solution prefilled syringe syringe, Inject 1 mL under the skin into the appropriate area as directed 1 (One) Time for 1 dose. (Patient taking differently: Inject 60 mg under the skin into the appropriate area as directed 1 (One) Time. Every six Months. Last dose: November 2022), Disp: 1 mL, Rfl: 2    Current Facility-Administered Medications:   •  albuterol (PROVENTIL HFA;VENTOLIN HFA) inhaler 2 puff, 2 puff, Inhalation, 4x Daily - RT, Ky Kasper MD     Objective     History of Present Illness     Review of Systems    Physical Exam  Vitals and nursing note reviewed.   Constitutional:       General: She is not in acute distress.     Appearance: Normal appearance. She is not ill-appearing.   HENT:      Head: Normocephalic and atraumatic.   Cardiovascular:      Rate and Rhythm: Normal rate and regular rhythm.      Heart sounds: Normal heart sounds.   Pulmonary:      Effort: Pulmonary effort is normal. No respiratory distress.      Breath sounds: Normal breath sounds. No wheezing or rales.   Abdominal:      General: Abdomen is flat. Bowel sounds are  normal.      Tenderness: There is no abdominal tenderness.   Musculoskeletal:         General: Normal range of motion.   Skin:     General: Skin is warm and dry.   Neurological:      General: No focal deficit present.      Mental Status: She is alert and oriented to person, place, and time.   Psychiatric:         Mood and Affect: Mood normal.         Behavior: Behavior normal.         ASSESSMENT #1-follow-up on recent hospitalization January 5 for colitis with rectal bleeding.  No further symptoms since discharge     Problems Addressed this Visit        Cardiac and Vasculature    Essential hypertension - Primary    Relevant Orders    CBC & Differential    Basic Metabolic Panel    Heart palpitations       Gastrointestinal Abdominal     Colitis with rectal bleeding    Relevant Orders    CBC & Differential    Basic Metabolic Panel   Diagnoses       Codes Comments    Essential hypertension    -  Primary ICD-10-CM: I10  ICD-9-CM: 401.9     Colitis with rectal bleeding     ICD-10-CM: K52.9, K62.5  ICD-9-CM: 558.9, 569.3     Heart palpitations     ICD-10-CM: R00.2  ICD-9-CM: 785.1           Current outpatient and discharge medications have been reconciled for the patient.  Reviewed by: Bowen Covarrubias MD      PLAN I have ordered a CBC and BMP to be drawn today just to follow-up and make sure she is stable.  She has cardiology and gastroenterology appointments coming up.  She is also scheduled to see me in May with labs and will keep that appointment.  She will continue current medicines as now    There are no Patient Instructions on file for this visit.  No follow-ups on file.

## 2023-01-13 LAB
BASOPHILS # BLD AUTO: 0.1 X10E3/UL (ref 0–0.2)
BASOPHILS NFR BLD AUTO: 1 %
BUN SERPL-MCNC: 10 MG/DL (ref 8–27)
BUN/CREAT SERPL: 19 (ref 12–28)
CALCIUM SERPL-MCNC: 9.3 MG/DL (ref 8.7–10.3)
CHLORIDE SERPL-SCNC: 99 MMOL/L (ref 96–106)
CO2 SERPL-SCNC: 25 MMOL/L (ref 20–29)
CREAT SERPL-MCNC: 0.54 MG/DL (ref 0.57–1)
EGFRCR SERPLBLD CKD-EPI 2021: 100 ML/MIN/1.73
EOSINOPHIL # BLD AUTO: 0.4 X10E3/UL (ref 0–0.4)
EOSINOPHIL NFR BLD AUTO: 7 %
ERYTHROCYTE [DISTWIDTH] IN BLOOD BY AUTOMATED COUNT: 11.9 % (ref 11.7–15.4)
GLUCOSE SERPL-MCNC: 96 MG/DL (ref 70–99)
HCT VFR BLD AUTO: 37.1 % (ref 34–46.6)
HGB BLD-MCNC: 12.7 G/DL (ref 11.1–15.9)
IMM GRANULOCYTES # BLD AUTO: 0 X10E3/UL (ref 0–0.1)
IMM GRANULOCYTES NFR BLD AUTO: 0 %
LYMPHOCYTES # BLD AUTO: 1.5 X10E3/UL (ref 0.7–3.1)
LYMPHOCYTES NFR BLD AUTO: 24 %
MCH RBC QN AUTO: 32.2 PG (ref 26.6–33)
MCHC RBC AUTO-ENTMCNC: 34.2 G/DL (ref 31.5–35.7)
MCV RBC AUTO: 94 FL (ref 79–97)
MONOCYTES # BLD AUTO: 0.7 X10E3/UL (ref 0.1–0.9)
MONOCYTES NFR BLD AUTO: 10 %
NEUTROPHILS # BLD AUTO: 3.8 X10E3/UL (ref 1.4–7)
NEUTROPHILS NFR BLD AUTO: 58 %
PLATELET # BLD AUTO: 418 X10E3/UL (ref 150–450)
POTASSIUM SERPL-SCNC: 4.5 MMOL/L (ref 3.5–5.2)
RBC # BLD AUTO: 3.95 X10E6/UL (ref 3.77–5.28)
SODIUM SERPL-SCNC: 138 MMOL/L (ref 134–144)
WBC # BLD AUTO: 6.5 X10E3/UL (ref 3.4–10.8)

## 2023-01-13 NOTE — TELEPHONE ENCOUNTER
Caller: [facility] [phone] [place], [place] - [address]  AT [facility] [phone] [facility] [phone] FX    Relationship: Pharmacy    Best call back number: [phone]    Requested Prescriptions:   Requested Prescriptions     Pending Prescriptions Disp Refills   • albuterol (PROVENTIL) (2.5 MG/3ML) 0.083% nebulizer solution 3 mL 2     Sig: Take 2.5 mg by nebulization Every 4 (Four) Hours As Needed for Wheezing.        Pharmacy where request should be sent: [facility] [id number] [place], [place] - [address]  AT [facility] [phone] [facility] [phone] FX     Additional details provided by patient: PHARMACY STATES THAT PATIENT IS OUT OF MEDICATION    Does the patient have less than a 3 day supply:  [x] Yes  [] No    Would you like a call back once the refill request has been completed: [] Yes [x] No    If the office needs to give you a call back, can they leave a voicemail: [] Yes [x] No    Colin Leo Rep   01/13/23 11:17 EST

## 2023-01-16 RX ORDER — ALBUTEROL SULFATE 2.5 MG/3ML
2.5 SOLUTION RESPIRATORY (INHALATION) EVERY 4 HOURS PRN
Qty: 3 ML | Refills: 2 | Status: SHIPPED | OUTPATIENT
Start: 2023-01-16

## 2023-01-19 ENCOUNTER — OFFICE VISIT (OUTPATIENT)
Dept: GASTROENTEROLOGY | Facility: CLINIC | Age: 68
End: 2023-01-19
Payer: MEDICARE

## 2023-01-19 VITALS
SYSTOLIC BLOOD PRESSURE: 112 MMHG | HEART RATE: 84 BPM | DIASTOLIC BLOOD PRESSURE: 70 MMHG | WEIGHT: 103.4 LBS | BODY MASS INDEX: 21.7 KG/M2 | HEIGHT: 58 IN | TEMPERATURE: 96.8 F | OXYGEN SATURATION: 96 %

## 2023-01-19 DIAGNOSIS — K62.5 COLITIS WITH RECTAL BLEEDING: Primary | ICD-10-CM

## 2023-01-19 DIAGNOSIS — K52.9 COLITIS WITH RECTAL BLEEDING: Primary | ICD-10-CM

## 2023-01-19 PROCEDURE — 99213 OFFICE O/P EST LOW 20 MIN: CPT | Performed by: INTERNAL MEDICINE

## 2023-01-19 RX ORDER — PANTOPRAZOLE SODIUM 40 MG/1
40 TABLET, DELAYED RELEASE ORAL DAILY
Qty: 30 TABLET | Refills: 1 | Status: SHIPPED | OUTPATIENT
Start: 2023-01-19 | End: 2023-03-20

## 2023-01-19 NOTE — PROGRESS NOTES
Chief Complaint   Patient presents with   • Hospital Follow Up Visit       Nanci mUanzor is a  67 y.o. female here for a follow up visit for follow-up acute left-sided colitis.    HPI     Patient 67-year-old female with hypertension, hyperlipidemia and COPD presented to the hospital in January for acute left-sided colitis with rectal bleeding.  Patient's symptoms rapidly resolved likely related to NSAID use.  Patient currently reports normalization of the stool with no further bleeding.  Does reports decreased appetite likely secondary to the NSAID effects.  Patient reports no continues to improve daily.    Past Medical History:   Diagnosis Date   • Allergic 1990's   • Ankle fracture     Rt 2002  Lt 2004   • Anxiety    • Arthritis    • Asthma    • Cancer ([facility]) 1992    Cervical cone biopsy for Stage II abnormality   • Cataract 2017   • Clavicle fracture     10/21/1970   • Colon polyp    • COPD (chronic obstructive pulmonary disease) ([facility]) 2006   • Depression    • Diverticulitis of colon 2019   • Diverticulosis    • Fibula fracture     left 2002   • GERD (gastroesophageal reflux disease) 2016   • Glaucoma 2006   • Heart murmur    • Hyperlipidemia    • Hypertension    • Low back pain 1980s   • Osteopenia 2016   • Pelvis fracture ([facility])     10/21/1970   • Pneumonia    • Ulcerative colitis ([facility]) 01/04/23   • Visual impairment Since 1yrs of age   • Wrist fracture     2007         Current Outpatient Medications:   •  albuterol (PROVENTIL) (2.5 MG/3ML) 0.083% nebulizer solution, Take 2.5 mg by nebulization Every 4 (Four) Hours As Needed for Wheezing., Disp: 3 mL, Rfl: 2  •  albuterol sulfate  (90 Base) MCG/ACT inhaler, INHALE TWO PUFFS BY MOUTH EVERY 4 HOURS AS NEEDED FOR WHEEZING (Patient taking differently: Inhale 2 puffs 4 (Four) Times a Day.), Disp: 18 g, Rfl: 5  •  amLODIPine (NORVASC) 5 MG tablet, TAKE ONE TABLET BY MOUTH DAILY (Patient taking differently: Take 5 mg by mouth Daily.), Disp: 90 tablet, Rfl:  3  •  atorvastatin (LIPITOR) 40 MG tablet, TAKE ONE TABLET BY MOUTH DAILY (Patient taking differently: Take 40 mg by mouth Daily.), Disp: 90 tablet, Rfl: 2  •  benazepril (LOTENSIN) 10 MG tablet, TAKE ONE TABLET BY MOUTH DAILY (Patient taking differently: Take 10 mg by mouth Daily.), Disp: 90 tablet, Rfl: 3  •  BIOTIN PO, Take 1 capsule by mouth Daily., Disp: , Rfl:   •  calcium citrate-vitamin d (CITRACAL) 200-250 MG-UNIT tablet tablet, Take 1 tablet by mouth Daily., Disp: , Rfl:   •  Cholecalciferol (Vitamin D) 50 MCG (2000 UT) tablet, Take 2,000 Units by mouth Daily., Disp: , Rfl:   •  Fluticasone-Salmeterol (ADVAIR/WIXELA) 250-50 MCG/ACT DISKUS, INHALE ONE PUFF BY MOUTH TWICE A DAY (Patient taking differently: Inhale 1 puff Daily.), Disp: 60 each, Rfl: 11  •  Multiple Vitamins-Minerals (CENTRUM SILVER ADULT 50+ PO), Take 1 tablet by mouth Daily., Disp: , Rfl:   •  Omega-3 Fatty Acids (FISH OIL PO), Take 1 capsule by mouth Daily., Disp: , Rfl:   •  pantoprazole (PROTONIX) 40 MG EC tablet, Take 1 tablet by mouth Daily for 60 days., Disp: 30 tablet, Rfl: 1  •  tretinoin (RETIN-A) 0.05 % cream, Apply  topically Every Night. (Patient taking differently: Apply 1 application topically to the appropriate area as directed Every Other Day.), Disp: 20 g, Rfl: 1  •  vitamin C (ASCORBIC ACID) 500 MG tablet, Take 1,000 mg by mouth Daily., Disp: , Rfl:   •  fluticasone (FLONASE) 50 MCG/ACT nasal spray, 2 sprays into the nostril(s) as directed by provider Daily for 14 days., Disp: 16 g, Rfl: 0  •  Prolia 60 MG/ML solution prefilled syringe syringe, Inject 1 mL under the skin into the appropriate area as directed 1 (One) Time for 1 dose. (Patient taking differently: Inject 60 mg under the skin into the appropriate area as directed 1 (One) Time. Every six Months. Last dose: November 2022), Disp: 1 mL, Rfl: 2    Current Facility-Administered Medications:   •  albuterol (PROVENTIL HFA;VENTOLIN HFA) inhaler 2 puff, 2 puff,  Inhalation, 4x Daily - RT, Ky Kasper MD    Allergies   Allergen Reactions   • Clarithromycin Shortness Of Breath and Palpitations   • Fluarix [Influenza Virus Vaccine] Myalgia   • Pneumococcal Vaccines Swelling       Social History     Socioeconomic History   • Marital status:    • Number of children: 3   • Years of education: AS   Tobacco Use   • Smoking status: Former     Packs/day: 1.00     Years: 15.00     Pack years: 15.00     Types: Cigarettes     Start date: 1995     Quit date: 2013     Years since quittin.8   • Smokeless tobacco: Never   • Tobacco comments:     Started smoking after separation from my .   Vaping Use   • Vaping Use: Never used   Substance and Sexual Activity   • Alcohol use: Yes     Alcohol/week: 1.0 standard drink     Types: 1 Glasses of wine per week     Comment: Red wine   • Drug use: Never   • Sexual activity: Not Currently     Partners: Male     Birth control/protection: Post-menopausal       Family History   Problem Relation Age of Onset   • Heart disease Mother         Mechanical aoritic valve, several heart attacks   • Hypertension Mother    • Hyperlipidemia Mother    • Rheum arthritis Mother    • Arthritis Mother    • Colon polyps Mother    • Diabetes Father         Type II   • Hyperlipidemia Father    • Hypertension Father    • Heart disease Father    • Thyroid disease Father    • Hearing loss Father         Hearing aid   • Cancer Sister    • Depression Sister    • Glaucoma Sister    • Miscarriages / Stillbirths Sister    • Vision loss Sister    • Hypertension Brother    • Lung disease Brother    • Hypertension Brother    • Hypertension Brother    • Lung disease Daughter    • Asthma Daughter    • Heart disease Maternal Aunt    • Rheum arthritis Maternal Aunt    • Arthritis Maternal Aunt    • Heart disease Paternal Aunt    • Heart disease Maternal Grandmother    • Hyperlipidemia Maternal Grandmother    • Rheum arthritis Maternal Grandmother    •  Stroke Maternal Grandmother    • Arthritis Maternal Grandmother    • Heart disease Maternal Grandfather    • Cancer Maternal Grandfather         Pancreatic   • Stomach cancer Maternal Grandfather    • Heart disease Paternal Grandmother    • Hyperlipidemia Paternal Grandmother    • Stroke Paternal Grandmother    • Heart disease Paternal Grandfather    • Cancer Paternal Grandfather         Lung   • Crohn's disease Sister    • Malig Hyperthermia Neg Hx        Review of Systems   Constitutional: Positive for appetite change and unexpected weight change. Negative for activity change, chills, diaphoresis, fatigue and fever.   Respiratory: Negative.    Cardiovascular: Negative.    Gastrointestinal: Negative.    Musculoskeletal: Negative.    Skin: Negative.    Hematological: Negative.        Vitals:    01/19/23 1335   BP: 112/70   Pulse: 84   Temp: 96.8 °F (36 °C)   SpO2: 96%       Physical Exam  Vitals reviewed.   Constitutional:       Appearance: Normal appearance. She is well-developed and normal weight.   HENT:      Head: Normocephalic and atraumatic.   Eyes:      General: No scleral icterus.     Pupils: Pupils are equal, round, and reactive to light.   Cardiovascular:      Rate and Rhythm: Normal rate and regular rhythm.      Heart sounds: Normal heart sounds.   Pulmonary:      Effort: Pulmonary effort is normal. No respiratory distress.      Breath sounds: Normal breath sounds.   Abdominal:      General: Bowel sounds are normal. There is no distension.      Palpations: Abdomen is soft. There is no mass.      Tenderness: There is no abdominal tenderness.      Hernia: No hernia is present.   Skin:     General: Skin is warm and dry.      Coloration: Skin is not jaundiced.      Findings: No rash.   Neurological:      General: No focal deficit present.      Mental Status: She is alert and oriented to person, place, and time.      Cranial Nerves: No cranial nerve deficit.   Psychiatric:         Behavior: Behavior normal.          Thought Content: Thought content normal.         Judgment: Judgment normal.         Office Visit on 01/12/2023   Component Date Value Ref Range Status   • WBC 01/12/2023 6.5  3.4 - 10.8 x10E3/uL Final   • RBC 01/12/2023 3.95  3.77 - 5.28 x10E6/uL Final   • Hemoglobin 01/12/2023 12.7  11.1 - 15.9 g/dL Final   • Hematocrit 01/12/2023 37.1  34.0 - 46.6 % Final   • MCV 01/12/2023 94  79 - 97 fL Final   • MCH 01/12/2023 32.2  26.6 - 33.0 pg Final   • MCHC 01/12/2023 34.2  31.5 - 35.7 g/dL Final   • RDW 01/12/2023 11.9  11.7 - 15.4 % Final   • Platelets 01/12/2023 418  150 - 450 x10E3/uL Final   • Neutrophil Rel % 01/12/2023 58  Not Estab. % Final   • Lymphocyte Rel % 01/12/2023 24  Not Estab. % Final   • Monocyte Rel % 01/12/2023 10  Not Estab. % Final   • Eosinophil Rel % 01/12/2023 7  Not Estab. % Final   • Basophil Rel % 01/12/2023 1  Not Estab. % Final   • Neutrophils Absolute 01/12/2023 3.8  1.4 - 7.0 x10E3/uL Final   • Lymphocytes Absolute 01/12/2023 1.5  0.7 - 3.1 x10E3/uL Final   • Monocytes Absolute 01/12/2023 0.7  0.1 - 0.9 x10E3/uL Final   • Eosinophils Absolute 01/12/2023 0.4  0.0 - 0.4 x10E3/uL Final   • Basophils Absolute 01/12/2023 0.1  0.0 - 0.2 x10E3/uL Final   • Immature Granulocyte Rel % 01/12/2023 0  Not Estab. % Final   • Immature Grans Absolute 01/12/2023 0.0  0.0 - 0.1 x10E3/uL Final   • Glucose 01/12/2023 96  70 - 99 mg/dL Final   • BUN 01/12/2023 10  8 - 27 mg/dL Final   • Creatinine 01/12/2023 0.54 (L)  0.57 - 1.00 mg/dL Final   • EGFR Result 01/12/2023 100  >59 mL/min/1.73 Final   • BUN/Creatinine Ratio 01/12/2023 19  12 - 28 Final   • Sodium 01/12/2023 138  134 - 144 mmol/L Final   • Potassium 01/12/2023 4.5  3.5 - 5.2 mmol/L Final   • Chloride 01/12/2023 99  96 - 106 mmol/L Final   • Total CO2 01/12/2023 25  20 - 29 mmol/L Final   • Calcium 01/12/2023 9.3  8.7 - 10.3 mg/dL Final   Admission on 01/04/2023, Discharged on 01/05/2023   Component Date Value Ref Range Status   • Glucose  01/04/2023 119 (H)  65 - 99 mg/dL Final   • BUN 01/04/2023 17  8 - 23 mg/dL Final   • Creatinine 01/04/2023 0.64  0.57 - 1.00 mg/dL Final   • Sodium 01/04/2023 137  136 - 145 mmol/L Final   • Potassium 01/04/2023 4.0  3.5 - 5.2 mmol/L Final   • Chloride 01/04/2023 103  98 - 107 mmol/L Final   • CO2 01/04/2023 24.0  22.0 - 29.0 mmol/L Final   • Calcium 01/04/2023 9.2  8.6 - 10.5 mg/dL Final   • Total Protein 01/04/2023 6.9  6.0 - 8.5 g/dL Final   • Albumin 01/04/2023 4.6  3.5 - 5.2 g/dL Final   • ALT (SGPT) 01/04/2023 33  1 - 33 U/L Final   • AST (SGOT) 01/04/2023 26  1 - 32 U/L Final   • Alkaline Phosphatase 01/04/2023 54  39 - 117 U/L Final   • Total Bilirubin 01/04/2023 0.6  0.0 - 1.2 mg/dL Final   • Globulin 01/04/2023 2.3  gm/dL Final   • A/G Ratio 01/04/2023 2.0  g/dL Final   • BUN/Creatinine Ratio 01/04/2023 26.6 (H)  7.0 - 25.0 Final   • Anion Gap 01/04/2023 10.0  5.0 - 15.0 mmol/L Final   • eGFR 01/04/2023 95.8  >60.0 mL/min/1.73 Final   • ABO Type 01/04/2023 B   Final   • RH type 01/04/2023 Positive   Final   • Antibody Screen 01/04/2023 Negative   Final   • T&S Expiration Date 01/04/2023 1/7/2023 11:59:59 PM   Final   • Lactate 01/04/2023 1.1  0.5 - 2.0 mmol/L Final   • Extra Tube 01/04/2023 Hold for add-ons.   Final   • Extra Tube 01/04/2023 hold for add-on   Final   • Extra Tube 01/04/2023 Hold for add-ons.   Final   • Extra Tube 01/04/2023 Hold for add-ons.   Final   • WBC 01/04/2023 9.05  3.40 - 10.80 10*3/mm3 Final   • RBC 01/04/2023 4.06  3.77 - 5.28 10*6/mm3 Final   • Hemoglobin 01/04/2023 13.1  12.0 - 15.9 g/dL Final   • Hematocrit 01/04/2023 38.8  34.0 - 46.6 % Final   • MCV 01/04/2023 95.6  79.0 - 97.0 fL Final   • MCH 01/04/2023 32.3  26.6 - 33.0 pg Final   • MCHC 01/04/2023 33.8  31.5 - 35.7 g/dL Final   • RDW 01/04/2023 12.5  12.3 - 15.4 % Final   • RDW-SD 01/04/2023 42.9  37.0 - 54.0 fl Final   • MPV 01/04/2023 8.3  6.0 - 12.0 fL Final   • Platelets 01/04/2023 311  140 - 450 10*3/mm3 Final    • Neutrophil % 01/04/2023 81.1 (H)  42.7 - 76.0 % Final   • Lymphocyte % 01/04/2023 12.2 (L)  19.6 - 45.3 % Final   • Monocyte % 01/04/2023 5.7  5.0 - 12.0 % Final   • Eosinophil % 01/04/2023 0.4  0.3 - 6.2 % Final   • Basophil % 01/04/2023 0.3  0.0 - 1.5 % Final   • Immature Grans % 01/04/2023 0.3  0.0 - 0.5 % Final   • Neutrophils, Absolute 01/04/2023 7.33 (H)  1.70 - 7.00 10*3/mm3 Final   • Lymphocytes, Absolute 01/04/2023 1.10  0.70 - 3.10 10*3/mm3 Final   • Monocytes, Absolute 01/04/2023 0.52  0.10 - 0.90 10*3/mm3 Final   • Eosinophils, Absolute 01/04/2023 0.04  0.00 - 0.40 10*3/mm3 Final   • Basophils, Absolute 01/04/2023 0.03  0.00 - 0.20 10*3/mm3 Final   • Immature Grans, Absolute 01/04/2023 0.03  0.00 - 0.05 10*3/mm3 Final   • nRBC 01/04/2023 0.0  0.0 - 0.2 /100 WBC Final   • Campylobacter 01/04/2023 Not Detected  Not Detected Final   • Plesiomonas shigelloides 01/04/2023 Not Detected  Not Detected Final   • Salmonella 01/04/2023 Not Detected  Not Detected Final   • Vibrio 01/04/2023 Not Detected  Not Detected Final   • Vibrio cholerae 01/04/2023 Not Detected  Not Detected Final   • Yersinia enterocolitica 01/04/2023 Not Detected  Not Detected Final   • Enteroaggregative E. coli (EAEC) 01/04/2023 Not Detected  Not Detected Final   • Enteropathogenic E. coli (EPEC) 01/04/2023 Not Detected  Not Detected Final   • Enterotoxigenic E. coli (ETEC) lt/* 01/04/2023 Not Detected  Not Detected Final   • Shiga-like toxin-producing E. coli* 01/04/2023 Not Detected  Not Detected Final   • Shigella/Enteroinvasive E. coli (E* 01/04/2023 Not Detected  Not Detected Final   • Cryptosporidium 01/04/2023 Not Detected  Not Detected Final   • Cyclospora cayetanensis 01/04/2023 Not Detected  Not Detected Final   • Entamoeba histolytica 01/04/2023 Not Detected  Not Detected Final   • Giardia lamblia 01/04/2023 Not Detected  Not Detected Final   • Adenovirus F40/41 01/04/2023 Not Detected  Not Detected Final   • Astrovirus  01/04/2023 Not Detected  Not Detected Final   • Norovirus GI/GII 01/04/2023 Not Detected  Not Detected Final   • Rotavirus A 01/04/2023 Not Detected  Not Detected Final   • Sapovirus (I, II, IV or V) 01/04/2023 Not Detected  Not Detected Final   • C. Difficile Toxins by PCR 01/04/2023 Negative  Negative Final   • QT Interval 01/04/2023 427  ms Final   • Target HR (85%) 01/04/2023 128  bpm Final   • Max. Pred. HR (100%) 01/04/2023 151  bpm Final   • EF(MOD-bp) 01/04/2023 69.0  % Final   • LVIDd 01/04/2023 4.4  cm Final   • LVIDs 01/04/2023 2.8  cm Final   • IVSd 01/04/2023 0.75  cm Final   • LVPWd 01/04/2023 0.66  cm Final   • FS 01/04/2023 36.6  % Final   • IVS/LVPW 01/04/2023 1.14  cm Final   • ESV(cubed) 01/04/2023 21.9  ml Final   • LV Sys Vol (BSA corrected) 01/04/2023 16.4  cm2 Final   • EDV(cubed) 01/04/2023 85.9  ml Final   • LV Kurtz Vol (BSA corrected) 01/04/2023 49.9  cm2 Final   • LVOT area 01/04/2023 1.50  cm2 Final   • LV mass(C)d 01/04/2023 93.4  grams Final   • LVOT diam 01/04/2023 1.38  cm Final   • EDV(MOD-sp2) 01/04/2023 66.0  ml Final   • EDV(MOD-sp4) 01/04/2023 70.0  ml Final   • ESV(MOD-sp2) 01/04/2023 21.0  ml Final   • ESV(MOD-sp4) 01/04/2023 23.0  ml Final   • SV(MOD-sp2) 01/04/2023 45.0  ml Final   • SV(MOD-sp4) 01/04/2023 47.0  ml Final   • SI(MOD-sp2) 01/04/2023 32.1  ml/m2 Final   • SI(MOD-sp4) 01/04/2023 33.5  ml/m2 Final   • EF(MOD-sp2) 01/04/2023 68.2  % Final   • EF(MOD-sp4) 01/04/2023 67.1  % Final   • MV E max sergio 01/04/2023 71.6  cm/sec Final   • MV A max sergio 01/04/2023 103.7  cm/sec Final   • MV dec time 01/04/2023 0.19  msec Final   • MV E/A 01/04/2023 0.69   Final   • MV A dur 01/04/2023 0.09  sec Final   • LA ESV Index (BP) 01/04/2023 18.5  ml/m2 Final   • Med Peak E' Sergio 01/04/2023 8.1  cm/sec Final   • Lat Peak E' Sergio 01/04/2023 6.5  cm/sec Final   • Avg E/e' ratio 01/04/2023 9.81   Final   • SV(LVOT) 01/04/2023 39.5  ml Final   • RV Base 01/04/2023 2.5  cm Final   • RV S'  01/04/2023 7.8  cm/sec Final   • LV V1 max 01/04/2023 140.2  cm/sec Final   • LV V1 max PG 01/04/2023 7.9  mmHg Final   • LV V1 mean PG 01/04/2023 3.4  mmHg Final   • LV V1 VTI 01/04/2023 26.3  cm Final   • Ao pk kitty 01/04/2023 168.7  cm/sec Final   • Ao max PG 01/04/2023 11.4  mmHg Final   • Ao mean PG 01/04/2023 6.0  mmHg Final   • Ao V2 VTI 01/04/2023 37.0  cm Final   • ADALBERTO(I,D) 01/04/2023 1.07  cm2 Final   • MV max PG 01/04/2023 4.1  mmHg Final   • MV mean PG 01/04/2023 1.50  mmHg Final   • MV V2 VTI 01/04/2023 22.7  cm Final   • MV P1/2t 01/04/2023 63.9  msec Final   • MVA(P1/2t) 01/04/2023 3.4  cm2 Final   • MVA(VTI) 01/04/2023 1.74  cm2 Final   • MV dec slope 01/04/2023 320.6  cm/sec2 Final   • TR max kitty 01/04/2023 232.1  cm/sec Final   • TR max PG 01/04/2023 21.6  mmHg Final   • RVSP(TR) 01/04/2023 24.6  mmHg Final   • RAP systole 01/04/2023 3.0  mmHg Final   • RV V1 max PG 01/04/2023 1.28  mmHg Final   • RV V1 max 01/04/2023 56.6  cm/sec Final   • RV V1 VTI 01/04/2023 14.4  cm Final   • PA V2 max 01/04/2023 101.8  cm/sec Final   • PA acc time 01/04/2023 0.11  sec Final   • PA pr(Accel) 01/04/2023 27.9  mmHg Final   • Ao root diam 01/04/2023 2.5  cm Final   • TAPSE (>1.6) 01/04/2023 2.20  cm Final   • Dimensionless Index 01/04/2023 0.70  (DI) Final   • Hemoglobin 01/04/2023 11.5 (L)  12.0 - 15.9 g/dL Final   • Hematocrit 01/04/2023 34.0  34.0 - 46.6 % Final   • Hemoglobin 01/04/2023 11.0 (L)  12.0 - 15.9 g/dL Final   • Hematocrit 01/04/2023 32.0 (L)  34.0 - 46.6 % Final   • Glucose 01/05/2023 69  65 - 99 mg/dL Final   • BUN 01/05/2023 5 (L)  8 - 23 mg/dL Final   • Creatinine 01/05/2023 0.45 (L)  0.57 - 1.00 mg/dL Final   • Sodium 01/05/2023 145  136 - 145 mmol/L Final   • Potassium 01/05/2023 3.5  3.5 - 5.2 mmol/L Final   • Chloride 01/05/2023 110 (H)  98 - 107 mmol/L Final   • CO2 01/05/2023 24.7  22.0 - 29.0 mmol/L Final   • Calcium 01/05/2023 7.8 (L)  8.6 - 10.5 mg/dL Final   • BUN/Creatinine Ratio  01/05/2023 11.1  7.0 - 25.0 Final   • Anion Gap 01/05/2023 10.3  5.0 - 15.0 mmol/L Final   • eGFR 01/05/2023 104.3  >60.0 mL/min/1.73 Final   • WBC 01/05/2023 10.81 (H)  3.40 - 10.80 10*3/mm3 Final   • RBC 01/05/2023 3.55 (L)  3.77 - 5.28 10*6/mm3 Final   • Hemoglobin 01/05/2023 11.6 (L)  12.0 - 15.9 g/dL Final   • Hematocrit 01/05/2023 34.1  34.0 - 46.6 % Final   • MCV 01/05/2023 96.1  79.0 - 97.0 fL Final   • MCH 01/05/2023 32.7  26.6 - 33.0 pg Final   • MCHC 01/05/2023 34.0  31.5 - 35.7 g/dL Final   • RDW 01/05/2023 11.8 (L)  12.3 - 15.4 % Final   • RDW-SD 01/05/2023 41.3  37.0 - 54.0 fl Final   • MPV 01/05/2023 8.8  6.0 - 12.0 fL Final   • Platelets 01/05/2023 257  140 - 450 10*3/mm3 Final   • Target HR (85%) 01/05/2023 128  bpm In process   • Max. Pred. HR (100%) 01/05/2023 151  bpm In process       Diagnoses and all orders for this visit:    1. Colitis with rectal bleeding (Primary)    Other orders  -     pantoprazole (PROTONIX) 40 MG EC tablet; Take 1 tablet by mouth Daily for 60 days.  Dispense: 30 tablet; Refill: 1      Patient 67-year-old female with history of hypertension, hyperlipidemia and COPD status post episode of acute left-sided colitis now resolved symptomatically.  No further bleeding noted.  Patient still somewhat queasy with eating, keeping down a heart healthy diet.  Symptomatically began after patient was on NSAIDs now discontinued.  At this point patient did not have any indication of ulcerative colitis.  Patient's acute colitis likely secondary to ischemia due to combination of NSAID and dehydration.  We will continue proton pump inhibitor due to the high-dose NSAID use for total of 3 months.  We will monitor clinically and have patient follow-up here in 1 month.  Hemoglobin returned back to normal on 12 January show no indication of ongoing blood loss seen.

## 2023-01-20 ENCOUNTER — TELEPHONE (OUTPATIENT)
Dept: FAMILY MEDICINE CLINIC | Facility: CLINIC | Age: 68
End: 2023-01-20

## 2023-01-20 NOTE — TELEPHONE ENCOUNTER
Caller: Nanci Umanzor    Relationship: Self    Best call back number: [phone]    What orders are you requesting (i.e. lab or imaging): CT SCAN    In what timeframe would the patient need to come in: ASAP    Where will you receive your lab/imaging services: [facility] DR[facility] BURNING RECOMMENDS     Additional notes: PATIENT STATES THAT SHE WENT TO [facility] ON 12/23 AND ON THE VISIT NOTES THAT SHE HAD A SMALL NODULE ON THE APEX ON HER RIGHT LUNG. SHE STATED THAT THERE WERE ALSO RHYTHM  PROBLEMS WITH HER HEART, AND SHE IS GOING TO SEE A CARDIOLOGIST. SHE STATES THAT THE VISIT SUMMARY SAID THAT IT WAS PROBABLY POST INFLAMMATORY. SHE STATES THAT SHE HAD A CHEST X RAY, BUT WOULD ALSO LIKE A CHEST CT SCAN.       PLEASE ADVISE PATIENT ON NEXT STEPS FOR CARE.

## 2023-01-24 DIAGNOSIS — R91.8 ABNORMALITY OF LUNG ON CXR: Primary | ICD-10-CM

## 2023-01-24 LAB
MAXIMAL PREDICTED HEART RATE: 151 BPM
STRESS TARGET HR: 128 BPM

## 2023-01-24 PROCEDURE — 93248 EXT ECG>7D<15D REV&INTERPJ: CPT | Performed by: INTERNAL MEDICINE

## 2023-01-27 NOTE — TELEPHONE ENCOUNTER
HUB TO READ: CALLED AND LEFT DETAILED MESSAGE FOR PT ADVISING THAT DR. GARCIA ORDERED A CT SCAN AND HE WANTS HER TO FOLLOW UP AFTER SHE HAS IT DONE TO GO OVER THE RESULTS. ADVISED PT ON [employer] THAT ONCE [employer] SCHEDULING CALLS HER AND SHE HAS THE APPT SET UP, TO PLEASE CALL OUR OFFICE TO SET UP THE FOLLOW UP APPT.

## 2023-02-03 ENCOUNTER — OFFICE VISIT (OUTPATIENT)
Dept: CARDIOLOGY | Facility: CLINIC | Age: 68
End: 2023-02-03
Payer: MEDICARE

## 2023-02-03 VITALS
WEIGHT: 105.6 LBS | SYSTOLIC BLOOD PRESSURE: 116 MMHG | HEART RATE: 73 BPM | BODY MASS INDEX: 22.17 KG/M2 | OXYGEN SATURATION: 93 % | HEIGHT: 58 IN | DIASTOLIC BLOOD PRESSURE: 70 MMHG

## 2023-02-03 DIAGNOSIS — K52.9 COLITIS WITH RECTAL BLEEDING: ICD-10-CM

## 2023-02-03 DIAGNOSIS — Z09 HOSPITAL DISCHARGE FOLLOW-UP: Primary | ICD-10-CM

## 2023-02-03 DIAGNOSIS — K62.5 COLITIS WITH RECTAL BLEEDING: ICD-10-CM

## 2023-02-03 PROCEDURE — 99213 OFFICE O/P EST LOW 20 MIN: CPT | Performed by: INTERNAL MEDICINE

## 2023-02-03 PROCEDURE — 93000 ELECTROCARDIOGRAM COMPLETE: CPT | Performed by: INTERNAL MEDICINE

## 2023-02-21 ENCOUNTER — OFFICE VISIT (OUTPATIENT)
Dept: GASTROENTEROLOGY | Facility: CLINIC | Age: 68
End: 2023-02-21
Payer: MEDICARE

## 2023-02-21 VITALS
WEIGHT: 103.8 LBS | HEART RATE: 76 BPM | BODY MASS INDEX: 21.79 KG/M2 | HEIGHT: 58 IN | TEMPERATURE: 96 F | SYSTOLIC BLOOD PRESSURE: 109 MMHG | DIASTOLIC BLOOD PRESSURE: 72 MMHG

## 2023-02-21 DIAGNOSIS — K52.9 COLITIS WITH RECTAL BLEEDING: Primary | ICD-10-CM

## 2023-02-21 DIAGNOSIS — Z86.010 HISTORY OF ADENOMATOUS POLYP OF COLON: ICD-10-CM

## 2023-02-21 DIAGNOSIS — K62.5 COLITIS WITH RECTAL BLEEDING: Primary | ICD-10-CM

## 2023-02-21 PROCEDURE — 99213 OFFICE O/P EST LOW 20 MIN: CPT | Performed by: NURSE PRACTITIONER

## 2023-02-21 RX ORDER — VITAMIN E 268 MG
400 CAPSULE ORAL DAILY
COMMUNITY

## 2023-02-21 NOTE — PROGRESS NOTES
Chief Complaint   Patient presents with   • Colitis with rectal bleeding       HPI    Nanci Umanzor is a  68 y.o. female here for a follow up visit for colitis.    This patient follows with Dr. Servin, new to me.    History of hypertension, hyperlipidemia and COPD.    Seen by Dr. Servin 1 month ago for acute left-sided colitis likely secondary to NSAIDs and dehydration hospital follow-up.  Patient was doing well in the office after she stopped using NSAIDs.  She was also started on Protonix 40 mg once daily.    Today she reports regular bowel movements without difficulty.  No rectal pain or rectal bleeding.  She has been avoiding NSAIDs.  She finds Protonix efficacious as queasiness has almost completely resolved.  She has been able to eat roy meals but is avoiding rich foods for now.    Recent hemoglobin up to 12.7.    Reviewed colonoscopy dated 2022 -- TA polyp, diverticulosis, nonbleeding internal hemorrhoids recall 5 years.    Past Medical History:   Diagnosis Date   • Allergic    • Ankle fracture     Rt   Lt    • Anxiety    • Arthritis    • Asthma    • Cancer ([facility])     Cervical cone biopsy for Stage II abnormality   • Cataract    • Clavicle fracture     10/21/1970   • Colon polyp    • COPD (chronic obstructive pulmonary disease) ([facility])    • Depression    • Diverticulitis of colon    • Diverticulosis    • Fibula fracture     left    • GERD (gastroesophageal reflux disease)    • Glaucoma    • Heart murmur    • Hyperlipidemia    • Hypertension    • Low back pain    • Osteopenia    • Pelvis fracture ([facility])     10/21/1970   • Pneumonia    • Ulcerative colitis ([facility]) 23   • Visual impairment Since 1yrs of age   • Wrist fracture            Past Surgical History:   Procedure Laterality Date   • CERVICAL CONIZATION     •  SECTION      ,,   • COLONOSCOPY N/A 2017    Procedure: COLONOSCOPY TO CECUM with cold polypectomy;   Surgeon: Alo Staton Jr., MD;  Location: [facility] ENDOSCOPY;  Service:    • COLONOSCOPY N/A 2022    Procedure: COLONOSCOPY TO CECUM WITH COLD BIOPSY POLYPECTOMY;  Surgeon: Chuck Servin MD;  Location: [facility] ENDOSCOPY;  Service: Gastroenterology;  Laterality: N/A;  PRE: HX COLON POLYPS  POST: DIVERTICULOSIS, POLYP   • ENDOMETRIAL ABLATION     • EYE SURGERY     • FINGER SURGERY     • FRACTURE SURGERY     • HEMORRHOIDECTOMY     • TUBAL ABDOMINAL LIGATION         Scheduled Meds: albuterol sulfate HFA, 2 puff, Inhalation, 4x Daily - RT        Continuous Infusions:      PRN Meds:     Allergies   Allergen Reactions   • Clarithromycin Shortness Of Breath and Palpitations   • Fluarix [Influenza Virus Vaccine] Myalgia   • Pneumococcal Vaccines Swelling       Social History     Socioeconomic History   • Marital status:    • Number of children: 3   • Years of education: AS   Tobacco Use   • Smoking status: Former     Packs/day: 1.00     Years: 15.00     Pack years: 15.00     Types: Cigarettes     Start date: 1995     Quit date: 2013     Years since quittin.8   • Smokeless tobacco: Never   • Tobacco comments:     Started smoking after separation from my .   Vaping Use   • Vaping Use: Never used   Substance and Sexual Activity   • Alcohol use: Yes     Alcohol/week: 1.0 standard drink     Types: 1 Glasses of wine per week     Comment: Red wine   • Drug use: Never   • Sexual activity: Not Currently     Partners: Male     Birth control/protection: Post-menopausal       Family History   Problem Relation Age of Onset   • Heart disease Mother         Mechanical aoritic valve, several heart attacks   • Hypertension Mother    • Hyperlipidemia Mother    • Rheum arthritis Mother    • Arthritis Mother    • Colon polyps Mother    • Diabetes Father         Type II   • Hyperlipidemia Father    • Hypertension Father    • Heart disease Father    • Thyroid disease Father    • Hearing  loss Father         Hearing aid   • Cancer Sister    • Depression Sister    • Glaucoma Sister    • Miscarriages / Stillbirths Sister    • Vision loss Sister    • Hypertension Brother    • Lung disease Brother    • Hypertension Brother    • Hypertension Brother    • Lung disease Daughter    • Asthma Daughter    • Heart disease Maternal Aunt    • Rheum arthritis Maternal Aunt    • Arthritis Maternal Aunt    • Heart disease Paternal Aunt    • Heart disease Maternal Grandmother    • Hyperlipidemia Maternal Grandmother    • Rheum arthritis Maternal Grandmother    • Stroke Maternal Grandmother    • Arthritis Maternal Grandmother    • Heart disease Maternal Grandfather    • Cancer Maternal Grandfather         Pancreatic   • Stomach cancer Maternal Grandfather    • Heart disease Paternal Grandmother    • Hyperlipidemia Paternal Grandmother    • Stroke Paternal Grandmother    • Heart disease Paternal Grandfather    • Cancer Paternal Grandfather         Lung   • Crohn's disease Sister    • Malig Hyperthermia Neg Hx        Review of Systems   Constitutional: Negative for activity change, appetite change, fatigue, fever and unexpected weight change.   HENT: Negative for trouble swallowing.    Respiratory: Negative for apnea, cough, choking, chest tightness, shortness of breath and wheezing.    Cardiovascular: Negative for chest pain, palpitations and leg swelling.   Gastrointestinal: Negative for abdominal distention, abdominal pain, anal bleeding, blood in stool, constipation, diarrhea, nausea, rectal pain and vomiting.       Vitals:    02/21/23 1328   BP: 109/72   Pulse: 76   Temp: 96 °F (35.6 °C)       Physical Exam  Constitutional:       Appearance: She is well-developed.   Abdominal:      General: Bowel sounds are normal. There is no distension.      Palpations: Abdomen is soft. There is no mass.      Tenderness: There is no abdominal tenderness. There is no guarding.      Hernia: No hernia is present.   Skin:     General:  Skin is warm and dry.      Capillary Refill: Capillary refill takes less than 2 seconds.   Neurological:      Mental Status: She is alert and oriented to person, place, and time.   Psychiatric:         Behavior: Behavior normal.     Assessment    Diagnoses and all orders for this visit:    1. Colitis with rectal bleeding (Primary)    2. History of adenomatous polyp of colon  Overview:  Added automatically from request for surgery [id number]    Kaylee Christine seems to be doing well.  No further issues with rectal bleeding since the hospital stay.  She is avoiding NSAIDs fortunately.  She would like to continue PPI therapy for a total of 3 months then taper off slowly as recommended on last office visit.  At this point we will see her back in 6 months or sooner if needed.         CARMELA Vasquez  [facility]  [address] [place] [address]  Office: [phone]

## 2023-02-28 ENCOUNTER — TELEPHONE (OUTPATIENT)
Dept: FAMILY MEDICINE CLINIC | Facility: CLINIC | Age: 68
End: 2023-02-28

## 2023-02-28 NOTE — TELEPHONE ENCOUNTER
Caller: Nanci Umanzor    Relationship to patient: Self    Patient is needing: PATIENT IS CALLING BACK STATING SHE WAS TOLD THAT A DETAILED MESSAGE HAD BEEN LEFT FOR HER REGARDING THE SPOT THAT WAS FOUND ON HER LUNG WHEN SHE WAS ADMITTED TO [facility] IN December.  PATIENT STATES THAT SHE HAS NOT LOCATED ANY MESSAGE FROM THE OFFICE REGARDING THE RESULTS, AND SHE WOULD LIKE A CALL ON HER HOME PHONE TO ADVISE HER OF THE RESULTS AS SHE IS CONCERNED.  CONTACT PATIENT  [phone].

## 2023-02-28 NOTE — TELEPHONE ENCOUNTER
CALLED AND S/W PT AND ADVISED I LEFT MESSAGE ON 01/27/2023, DR. GARCIA WANTS PATIENT TO HAVE CT SCAN THEN FOLLOW UP WITH HIM. I SENT LILIYA A MESSAGE REGARDING SENDING CT REFERRAL. PT WILL MAKE APPT ONCE CT HAS BEEN SCHEDULED SO SHE CAN FOLLOW UP.

## 2023-03-24 ENCOUNTER — TELEPHONE (OUTPATIENT)
Dept: FAMILY MEDICINE CLINIC | Facility: CLINIC | Age: 68
End: 2023-03-24
Payer: MEDICARE

## 2023-03-24 DIAGNOSIS — R91.8 ABNORMALITY OF LUNG ON CXR: Primary | ICD-10-CM

## 2023-03-24 NOTE — TELEPHONE ENCOUNTER
DR. HERNANDEZ,     ON 01/24/2023 YOU ORDERED A CT OF THE CHEST WITH AND WITHOUT CONTRAST ON THIS PATIENT FOR AN ABNORMALITY OF LUNG SEEN ON CHEST X-RAY. REYMUNDO SENT IT THIS MONTH SAYING THAT IT EITHER NEEDS TO BE A CT CHEST WITH CONTRAST OR A CT CHEST WITHOUT CONTRAST, BUT NOT BOTH. PLEASE ADVISE WHAT YOU WOULD LIKE ME TO ORDER? THANK YOU!

## 2023-03-28 NOTE — TELEPHONE ENCOUNTER
Order for ct scan of the chest with contrast has been ordered and sent to Dr. Covarrubias to sign.

## 2023-04-03 RX ORDER — DENOSUMAB 60 MG/ML
INJECTION SUBCUTANEOUS
Qty: 1 ML | Refills: 2 | Status: SHIPPED | OUTPATIENT
Start: 2023-04-03

## 2023-04-19 ENCOUNTER — TELEPHONE (OUTPATIENT)
Dept: GASTROENTEROLOGY | Facility: CLINIC | Age: 68
End: 2023-04-19

## 2023-04-19 NOTE — TELEPHONE ENCOUNTER
[phone]. CALL ANYTIME AND OK TO LEAVE VM IF NO ANSWER.      PATIENT STATED THAT HER STOOL IS BLACK AND TAR LIKE Please advise

## 2023-04-19 NOTE — TELEPHONE ENCOUNTER
Provider: JARAD ISSA    Caller: SHERLYN KRISHNA    Relationship to Patient: SELF    Phone Number: [phone]. CALL ANYTIME AND OK TO LEAVE VM IF NO ANSWER.    Reason for Call: PATIENT STATED THAT HER STOOL IS BLACK AND TAR LIKE AND SHE WAS ADVISED IF IT IS EVER LIKE THAT, SHE NEEDS TO CALL THE OFFICE. SHE IS WANTING A CALL BACK ASAP.

## 2023-04-21 NOTE — TELEPHONE ENCOUNTER
"Pt is reporting that Wednesday she passed \"black plato consistency stool\"  This continued into Thursday and today she reports her bowels are a \"dark brown, no longer black\"    Pt stated she has been weaning off Protonix and stated she was feeling nauseous Monday and Tuesday and took some Pepto.  Wednesday she wasn't \"feeling quite right\" stated her heart rate was higher than usual that am, she denies temp.  Stated she had to leave work early that day due to \"feeling weak\"  She also stated she has been dealing with neck and back issues and has a lot of pinched nerves and didn't know if the weakness she was experiencing was r/t nerve compression?    Today she is reporting she is feeling much better.  I did let her know Pepto can cause dark stools.  "

## 2023-04-21 NOTE — TELEPHONE ENCOUNTER
Provider: JARAD ISSA     Caller: SHERLYN KRISHNA     Relationship to Patient: SELF     Phone Number: [phone]. CALL ANYTIME AND OK TO LEAVE VM IF NO ANSWER.     Reason for Call: PATIENT STATED THAT HER STOOL IS BLACK AND TAR LIKE AND SHE WAS ADVISED IF IT IS EVER LIKE THAT, SHE NEEDS TO CALL THE OFFICE. SHE IS WANTING A CALL BACK ASAP.

## 2023-04-24 DIAGNOSIS — R19.5 DARK STOOLS: Primary | ICD-10-CM

## 2023-04-25 NOTE — TELEPHONE ENCOUNTER
Called pt cell phone, could not leave msg,  box not set up.     Called home # spoke with pt and advised of Nancy MELTON's note. Pt verbalized understanding.

## 2023-05-04 ENCOUNTER — LAB (OUTPATIENT)
Dept: LAB | Facility: HOSPITAL | Age: 68
End: 2023-05-04
Payer: MEDICARE

## 2023-05-04 LAB
ALBUMIN SERPL-MCNC: 4.2 G/DL (ref 3.5–5.2)
ALBUMIN/GLOB SERPL: 1.5 G/DL
ALP SERPL-CCNC: 50 U/L (ref 39–117)
ALT SERPL W P-5'-P-CCNC: 36 U/L (ref 1–33)
ANION GAP SERPL CALCULATED.3IONS-SCNC: 10.7 MMOL/L (ref 5–15)
AST SERPL-CCNC: 29 U/L (ref 1–32)
BASOPHILS # BLD AUTO: 0.06 10*3/MM3 (ref 0–0.2)
BASOPHILS NFR BLD AUTO: 0.7 % (ref 0–1.5)
BILIRUB SERPL-MCNC: 0.3 MG/DL (ref 0–1.2)
BUN SERPL-MCNC: 16 MG/DL (ref 8–23)
BUN/CREAT SERPL: 23.2 (ref 7–25)
CALCIUM SPEC-SCNC: 9.5 MG/DL (ref 8.6–10.5)
CHLORIDE SERPL-SCNC: 99 MMOL/L (ref 98–107)
CO2 SERPL-SCNC: 28.3 MMOL/L (ref 22–29)
CREAT SERPL-MCNC: 0.69 MG/DL (ref 0.57–1)
DEPRECATED RDW RBC AUTO: 47.2 FL (ref 37–54)
EGFRCR SERPLBLD CKD-EPI 2021: 93.5 ML/MIN/1.73
EOSINOPHIL # BLD AUTO: 0.52 10*3/MM3 (ref 0–0.4)
EOSINOPHIL NFR BLD AUTO: 6.4 % (ref 0.3–6.2)
ERYTHROCYTE [DISTWIDTH] IN BLOOD BY AUTOMATED COUNT: 13.5 % (ref 12.3–15.4)
GLOBULIN UR ELPH-MCNC: 2.8 GM/DL
GLUCOSE SERPL-MCNC: 116 MG/DL (ref 65–99)
HCT VFR BLD AUTO: 37 % (ref 34–46.6)
HGB BLD-MCNC: 12.7 G/DL (ref 12–15.9)
IMM GRANULOCYTES # BLD AUTO: 0.02 10*3/MM3 (ref 0–0.05)
IMM GRANULOCYTES NFR BLD AUTO: 0.2 % (ref 0–0.5)
LYMPHOCYTES # BLD AUTO: 1.9 10*3/MM3 (ref 0.7–3.1)
LYMPHOCYTES NFR BLD AUTO: 23.3 % (ref 19.6–45.3)
MCH RBC QN AUTO: 32.6 PG (ref 26.6–33)
MCHC RBC AUTO-ENTMCNC: 34.3 G/DL (ref 31.5–35.7)
MCV RBC AUTO: 94.9 FL (ref 79–97)
MONOCYTES # BLD AUTO: 0.61 10*3/MM3 (ref 0.1–0.9)
MONOCYTES NFR BLD AUTO: 7.5 % (ref 5–12)
NEUTROPHILS NFR BLD AUTO: 5.06 10*3/MM3 (ref 1.7–7)
NEUTROPHILS NFR BLD AUTO: 61.9 % (ref 42.7–76)
NRBC BLD AUTO-RTO: 0 /100 WBC (ref 0–0.2)
PLATELET # BLD AUTO: 314 10*3/MM3 (ref 140–450)
PMV BLD AUTO: 8.2 FL (ref 6–12)
POTASSIUM SERPL-SCNC: 3.8 MMOL/L (ref 3.5–5.2)
PROT SERPL-MCNC: 7 G/DL (ref 6–8.5)
RBC # BLD AUTO: 3.9 10*6/MM3 (ref 3.77–5.28)
SODIUM SERPL-SCNC: 138 MMOL/L (ref 136–145)
WBC NRBC COR # BLD: 8.17 10*3/MM3 (ref 3.4–10.8)

## 2023-05-04 PROCEDURE — 85025 COMPLETE CBC W/AUTO DIFF WBC: CPT | Performed by: NURSE PRACTITIONER

## 2023-05-04 PROCEDURE — 80053 COMPREHEN METABOLIC PANEL: CPT | Performed by: NURSE PRACTITIONER

## 2023-05-12 ENCOUNTER — TELEPHONE (OUTPATIENT)
Dept: FAMILY MEDICINE CLINIC | Facility: CLINIC | Age: 68
End: 2023-05-12

## 2023-05-12 NOTE — TELEPHONE ENCOUNTER
Caller: Nanci Umanzor    Relationship: Self    Best call back number:[phone]    What medication are you requesting: ANY MEDICATION TO HELP    What are your current symptoms: NAUSEA,TIRED,CHEST CONGESTION,PHLEGM    How long have you been experiencing symptoms: 5/11/23    Have you had these symptoms before:    [x] Yes  [] No    Have you been treated for these symptoms before:   [x] Yes  [] No    If a prescription is needed, what is your preferred pharmacy and phone number:  [facility] [id number] - [facility] [address] AT [facility] - [phone] [facility] [phone]   [phone]    Additional notes:DENISE IS REQUESTING IF SOMETHING COULD BE CALLED IN FOR HER OR IF SHE WILL NEED TO COME IN FOR AN APPOINTMENT. PATIENT REQUESTING A CALLBACK WITH UPDATES.        ”

## 2023-05-16 ENCOUNTER — HOSPITAL ENCOUNTER (OUTPATIENT)
Dept: CT IMAGING | Facility: HOSPITAL | Age: 68
Discharge: HOME | End: 2023-05-16
Admitting: INTERNAL MEDICINE
Payer: MEDICARE

## 2023-05-16 DIAGNOSIS — R91.8 ABNORMALITY OF LUNG ON CXR: ICD-10-CM

## 2023-05-16 PROCEDURE — 71260 CT THORAX DX C+: CPT

## 2023-05-16 PROCEDURE — 25510000001 IOPAMIDOL 61 % SOLUTION: Performed by: INTERNAL MEDICINE

## 2023-05-16 RX ADMIN — IOPAMIDOL 75 ML: 612 INJECTION, SOLUTION INTRAVENOUS at 15:18

## 2023-05-17 ENCOUNTER — TELEPHONE (OUTPATIENT)
Dept: FAMILY MEDICINE CLINIC | Facility: CLINIC | Age: 68
End: 2023-05-17

## 2023-05-17 NOTE — TELEPHONE ENCOUNTER
ASSESSMENT/PLAN:    Right ELBOW INJURY FROM CONTUSION   AGAINST A SCREEN DOOR    Final x-ray =IS NEGATIVE Ace wraps   Referral to ortho please call for appointment.  Recommended over the counter symptomatic treatments.       Patient is to follow-up if symptoms persist or worsen over the next couple of days.      ELBOW POST MOLD  .REFER PATIENT TO   ORTHO       FOR FURTHER EVALUATION AND TREATMENT;    PLEASE CALL THE SPECIALIST FOR AN APPOINTMENT, I HAVE  PROVIDED YOU WITH A REFERRAL    FOLLOW UP WITH YOUR PCP OR GO TO ER  IMMEDIATELY IF   NEEDED AS DISCUSSED.       TYLENOL  for pain  Cool compresses to the area for the next 48-72-hour    FOLLOW UP WITH PRIMARY DOC IN TWO DAYS  GO TO ED FOR LOSS OF ELBOW SENSATION, IF discoloration, CHEST PAIN SHORTNESS OF BREATH OR DIZZINESS.      Narrative & Impression   EXAM: XR ELBOW 3 VIEWS RIGHT 01/24/2023     INDICATION: Right elbow pain following injury.     COMPARISON: None.     Three views of right elbow are submitted.     FINDINGS: No definitive acute fracture, dislocation, or significant osseous  abnormality is seen.     IMPRESSION:  No definitive acute osseous abnormality of visualized right  elbow.  Recommend followup as clinically warranted.        I can't schedule appointments, sorry

## 2023-05-17 NOTE — TELEPHONE ENCOUNTER
PATIENT CALLED TO CHECK THE STATUS OF THIS REQUEST - SHE IS WORRIED THAT IS SOMEBODY DOESN'T REACH OUT TO HER SOON, THE LABS WON'T BE RESCHEDULED IN TIME.    HUB ATTEMPTED WT.    PLEASE ADVISE  [phone]

## 2023-05-17 NOTE — TELEPHONE ENCOUNTER
Attempted to contact pt regarding rescheduling labs. Unable to leave a VM due to mailbox not being set up.

## 2023-05-17 NOTE — TELEPHONE ENCOUNTER
Hub staff attempted to follow warm transfer process and was unsuccessful     Caller: Nanci Umanzor    Relationship to patient: Self    Best call back number: [phone]     Patient is needing: LAB APPOINTMENT ON 05/24/23 RESCHEDULED

## 2023-05-31 DIAGNOSIS — E78.5 HYPERLIPIDEMIA, UNSPECIFIED HYPERLIPIDEMIA TYPE: ICD-10-CM

## 2023-05-31 DIAGNOSIS — I10 ESSENTIAL HYPERTENSION: Primary | ICD-10-CM

## 2023-06-01 LAB
ALBUMIN SERPL-MCNC: 4.4 G/DL (ref 3.8–4.8)
ALBUMIN/GLOB SERPL: 1.9 {RATIO} (ref 1.2–2.2)
ALP SERPL-CCNC: 45 IU/L (ref 44–121)
ALT SERPL-CCNC: 36 IU/L (ref 0–32)
AST SERPL-CCNC: 26 IU/L (ref 0–40)
BASOPHILS # BLD AUTO: 0 X10E3/UL (ref 0–0.2)
BASOPHILS NFR BLD AUTO: 1 %
BILIRUB SERPL-MCNC: 0.4 MG/DL (ref 0–1.2)
BUN SERPL-MCNC: 9 MG/DL (ref 8–27)
BUN/CREAT SERPL: 15 (ref 12–28)
CALCIUM SERPL-MCNC: 9.1 MG/DL (ref 8.7–10.3)
CHLORIDE SERPL-SCNC: 99 MMOL/L (ref 96–106)
CHOLEST SERPL-MCNC: 158 MG/DL (ref 100–199)
CHOLEST/HDLC SERPL: 2.2 RATIO (ref 0–4.4)
CO2 SERPL-SCNC: 22 MMOL/L (ref 20–29)
CREAT SERPL-MCNC: 0.59 MG/DL (ref 0.57–1)
EGFRCR SERPLBLD CKD-EPI 2021: 97 ML/MIN/1.73
EOSINOPHIL # BLD AUTO: 0.3 X10E3/UL (ref 0–0.4)
EOSINOPHIL NFR BLD AUTO: 5 %
ERYTHROCYTE [DISTWIDTH] IN BLOOD BY AUTOMATED COUNT: 12.3 % (ref 11.7–15.4)
GLOBULIN SER CALC-MCNC: 2.3 G/DL (ref 1.5–4.5)
GLUCOSE SERPL-MCNC: 77 MG/DL (ref 70–99)
HCT VFR BLD AUTO: 37.7 % (ref 34–46.6)
HDLC SERPL-MCNC: 71 MG/DL
HGB BLD-MCNC: 12.7 G/DL (ref 11.1–15.9)
IMM GRANULOCYTES # BLD AUTO: 0 X10E3/UL (ref 0–0.1)
IMM GRANULOCYTES NFR BLD AUTO: 0 %
LDLC SERPL CALC-MCNC: 70 MG/DL (ref 0–99)
LYMPHOCYTES # BLD AUTO: 1.4 X10E3/UL (ref 0.7–3.1)
LYMPHOCYTES NFR BLD AUTO: 20 %
MCH RBC QN AUTO: 32 PG (ref 26.6–33)
MCHC RBC AUTO-ENTMCNC: 33.7 G/DL (ref 31.5–35.7)
MCV RBC AUTO: 95 FL (ref 79–97)
MONOCYTES # BLD AUTO: 0.5 X10E3/UL (ref 0.1–0.9)
MONOCYTES NFR BLD AUTO: 7 %
NEUTROPHILS # BLD AUTO: 4.8 X10E3/UL (ref 1.4–7)
NEUTROPHILS NFR BLD AUTO: 67 %
PLATELET # BLD AUTO: 436 X10E3/UL (ref 150–450)
POTASSIUM SERPL-SCNC: 4.6 MMOL/L (ref 3.5–5.2)
PROT SERPL-MCNC: 6.7 G/DL (ref 6–8.5)
RBC # BLD AUTO: 3.97 X10E6/UL (ref 3.77–5.28)
SODIUM SERPL-SCNC: 139 MMOL/L (ref 134–144)
TRIGL SERPL-MCNC: 90 MG/DL (ref 0–149)
VLDLC SERPL CALC-MCNC: 17 MG/DL (ref 5–40)
WBC # BLD AUTO: 7.1 X10E3/UL (ref 3.4–10.8)

## 2023-06-06 ENCOUNTER — OFFICE VISIT (OUTPATIENT)
Dept: FAMILY MEDICINE CLINIC | Facility: CLINIC | Age: 68
End: 2023-06-06
Payer: MEDICARE

## 2023-06-06 VITALS
TEMPERATURE: 97.5 F | HEART RATE: 72 BPM | BODY MASS INDEX: 21.41 KG/M2 | SYSTOLIC BLOOD PRESSURE: 126 MMHG | DIASTOLIC BLOOD PRESSURE: 60 MMHG | OXYGEN SATURATION: 90 % | HEIGHT: 58 IN | WEIGHT: 102 LBS | RESPIRATION RATE: 16 BRPM

## 2023-06-06 DIAGNOSIS — Z78.0 POST-MENOPAUSAL: ICD-10-CM

## 2023-06-06 DIAGNOSIS — I10 ESSENTIAL HYPERTENSION: Primary | ICD-10-CM

## 2023-06-06 DIAGNOSIS — E78.5 HYPERLIPIDEMIA, UNSPECIFIED HYPERLIPIDEMIA TYPE: ICD-10-CM

## 2023-06-06 DIAGNOSIS — M80.00XS AGE-RELATED OSTEOPOROSIS WITH CURRENT PATHOLOGICAL FRACTURE, SEQUELA: ICD-10-CM

## 2023-06-06 DIAGNOSIS — E55.9 HYPOVITAMINOSIS D: ICD-10-CM

## 2023-06-06 DIAGNOSIS — J44.9 COPD (CHRONIC OBSTRUCTIVE PULMONARY DISEASE) WITH CHRONIC BRONCHITIS: ICD-10-CM

## 2023-06-06 DIAGNOSIS — T78.40XA ALLERGIC DISORDER, INITIAL ENCOUNTER: ICD-10-CM

## 2023-06-06 DIAGNOSIS — H65.03 BILATERAL ACUTE SEROUS OTITIS MEDIA, RECURRENCE NOT SPECIFIED: ICD-10-CM

## 2023-06-06 RX ORDER — FLUTICASONE PROPIONATE 50 MCG
2 SPRAY, SUSPENSION (ML) NASAL DAILY
Qty: 16 G | Refills: 11 | Status: SHIPPED | OUTPATIENT
Start: 2023-06-06 | End: 2023-06-20

## 2023-06-06 NOTE — PROGRESS NOTES
The ABCs of the Annual Wellness Visit  Subsequent Medicare Wellness Visit    Subjective    Nanci Umanzor is a 68 y.o. female who presents for a Subsequent Medicare Wellness Visit.    The following portions of the patient's history were reviewed and   updated as appropriate: allergies, current medications, past family history, past medical history, past social history, past surgical history, and problem list.    Compared to one year ago, the patient feels her physical   health is worse.    Compared to one year ago, the patient feels her mental   health is the same.    Recent Hospitalizations:  This patient has had a [facility] admission record on file within the last 365 days.    Current Medical Providers:  Patient Care Team:  Bowen Covarrubias MD as PCP - General (Internal Medicine)    Outpatient Medications Prior to Visit   Medication Sig Dispense Refill    albuterol (PROVENTIL) (2.5 MG/3ML) 0.083% nebulizer solution Take 2.5 mg by nebulization Every 4 (Four) Hours As Needed for Wheezing. 3 mL 2    albuterol sulfate  (90 Base) MCG/ACT inhaler INHALE TWO PUFFS BY MOUTH EVERY 4 HOURS AS NEEDED FOR WHEEZING (Patient taking differently: Inhale 2 puffs 4 (Four) Times a Day.) 18 g 5    amLODIPine (NORVASC) 5 MG tablet TAKE ONE TABLET BY MOUTH DAILY (Patient taking differently: Take 1 tablet by mouth Daily.) 90 tablet 3    atorvastatin (LIPITOR) 40 MG tablet TAKE ONE TABLET BY MOUTH DAILY (Patient taking differently: Take 1 tablet by mouth Daily.) 90 tablet 2    benazepril (LOTENSIN) 10 MG tablet TAKE ONE TABLET BY MOUTH DAILY (Patient taking differently: Take 1 tablet by mouth Daily.) 90 tablet 3    BIOTIN PO Take 1 capsule by mouth Daily.      calcium citrate-vitamin d (CITRACAL) 200-250 MG-UNIT tablet tablet Take 1 tablet by mouth Daily.      Cholecalciferol (Vitamin D) 50 MCG (2000 UT) tablet Take 2,000 Units by mouth Daily.      Fluticasone-Salmeterol (ADVAIR/WIXELA) 250-50 MCG/ACT DISKUS INHALE ONE  PUFF BY MOUTH TWICE A DAY (Patient taking differently: Inhale 1 puff Daily.) 60 each 11    Multiple Vitamins-Minerals (CENTRUM SILVER ADULT 50+ PO) Take 1 tablet by mouth Daily.      Multiple Vitamins-Minerals (ZINC PO) Take  by mouth.      Omega-3 Fatty Acids (FISH OIL PO) Take 1 capsule by mouth Daily.      Prolia 60 MG/ML solution prefilled syringe syringe INJECT 1 ML UNDER THE SKIN INTO THE APPROPRIATE AREA EVERY 6 MONTHS ONE TIME FOR 1 DOSE AS DIRECTED. 1 mL 2    tretinoin (RETIN-A) 0.05 % cream Apply  topically Every Night. (Patient taking differently: Apply 1 application topically to the appropriate area as directed Every Other Day.) 20 g 1    TURMERIC PO Take  by mouth.      vitamin C (ASCORBIC ACID) 500 MG tablet Take 2 tablets by mouth Daily.      vitamin E 400 UNIT capsule Take 1 capsule by mouth Daily.      Zinc 50 MG capsule       Ascorbic Acid 500 MG chewable tablet Chew 1,000 mg Daily.      fluticasone (FLONASE) 50 MCG/ACT nasal spray 2 sprays into the nostril(s) as directed by provider Daily for 14 days. 16 g 0    Turmeric (QC TUMERIC COMPLEX PO) Take  by mouth. (Patient not taking: Reported on 6/6/2023)       Facility-Administered Medications Prior to Visit   Medication Dose Route Frequency Provider Last Rate Last Admin    albuterol (PROVENTIL HFA;VENTOLIN HFA) inhaler 2 puff  2 puff Inhalation 4x Daily - RT Ky Kasper MD           No opioid medication identified on active medication list. I have reviewed chart for other potential  high risk medication/s and harmful drug interactions in the elderly.        Aspirin is not on active medication list.  Aspirin use is not indicated based on review of current medical condition/s. Risk of harm outweighs potential benefits.  .    Patient Active Problem List   Diagnosis    Cervical nerve root disorder    Essential hypertension    Hyperlipidemia    Reactive airway disease without complication    Colon polyps    Family history of breast cancer     "Hypovitaminosis D    Age-related osteoporosis with current pathological fracture    Closed compression fracture of fifth lumbar vertebra    Bunion    History of compression fracture of spine    Diverticulitis    COPD (chronic obstructive pulmonary disease) with chronic bronchitis (HCC)    Low back pain    Neck pain    History of adenomatous polyp of colon    Personal history of tobacco use, presenting hazards to [facility] with rectal bleeding    Heart palpitations     Advance Care Planning   Advance Care Planning     Advance Directive is not on file.  ACP discussion was held with the patient during this visit. Patient has an advance directive (not in EMR), copy requested.     Objective    There were no vitals filed for this visit.  Estimated body mass index is 21.69 kg/m² as calculated from the following:    Height as of 23: 147.3 cm (58\").    Weight as of 23: 47.1 kg (103 lb 12.8 oz).    BMI is within normal parameters. No other follow-up for BMI required.      Does the patient have evidence of cognitive impairment? No    Lab Results   Component Value Date    CHLPL 158 2023    TRIG 90 2023    HDL 71 2023    LDL 70 2023    VLDL 17 2023        HEALTH RISK ASSESSMENT    Smoking Status:  Social History     Tobacco Use   Smoking Status Former    Packs/day: 1.00    Years: 15.00    Pack years: 15.00    Types: Cigarettes    Start date: 1995    Quit date: 2013    Years since quitting: 10.1   Smokeless Tobacco Never   Tobacco Comments    Started smoking after separation from my .     Alcohol Consumption:  Social History     Substance and Sexual Activity   Alcohol Use Yes    Alcohol/week: 1.0 standard drink    Types: 1 Glasses of wine per week    Comment: Red wine     Fall Risk Screen:    STEADI Fall Risk Assessment was completed, and patient is at LOW risk for falls.Assessment completed on:2023    Depression Screenin/6/2023     2:11 PM   PHQ-2/PHQ-9 " Depression Screening   Little Interest or Pleasure in Doing Things 1-->several days   Feeling Down, Depressed or Hopeless 0-->not at all   PHQ-9: Brief Depression Severity Measure Score 1       Health Habits and Functional and Cognitive Screenin/6/2023     2:11 PM   Functional & Cognitive Status   Do you have difficulty preparing food and eating? No   Do you have difficulty bathing yourself, getting dressed or grooming yourself? No   Do you have difficulty using the toilet? No   Do you have difficulty moving around from place to place? No   Do you have trouble with steps or getting out of a bed or a chair? No   Current Diet Well Balanced Diet   Dental Exam Up to date   Eye Exam Not up to date   Exercise (times per week) 3 times per week   Current Exercises Include Gardening;House Cleaning;Walking   Do you need help using the phone?  No   Are you deaf or do you have serious difficulty hearing?  No   Do you need help with transportation? No   Do you need help shopping? No   Do you need help preparing meals?  No   Do you need help with housework?  No   Do you need help with laundry? No   Do you need help taking your medications? No   Do you need help managing money? No   Do you ever drive or ride in a car without wearing a seat belt? No   Have you felt unusual stress, anger or loneliness in the last month? No   Who do you live with? Alone   If you need help, do you have trouble finding someone available to you? No   Have you been bothered in the last four weeks by sexual problems? No   Do you have difficulty concentrating, remembering or making decisions? No       Age-appropriate Screening Schedule:  Refer to the list below for future screening recommendations based on patient's age, sex and/or medical conditions. Orders for these recommended tests are listed in the plan section. The patient has been provided with a written plan.    Health Maintenance   Topic Date Due    COVID-19 Vaccine (1) Never done     ZOSTER VACCINE (2 of 2) 02/26/2013    PAP SMEAR  Never done    ANNUAL WELLNESS VISIT  05/13/2023    DXA SCAN  07/13/2023    TDAP/TD VACCINES (2 - Td or Tdap) 01/01/2024    LIPID PANEL  05/31/2024    MAMMOGRAM  08/09/2024    COLORECTAL CANCER SCREENING  09/30/2027    HEPATITIS C SCREENING  Completed                  CMS Preventative Services Quick Reference  Risk Factors Identified During Encounter  Immunizations Discussed/Encouraged: Shingrix, COVID19, and she is also due for a bone density test because of her osteoporosis and a gynecology referral  The above risks/problems have been discussed with the patient.  Pertinent information has been shared with the patient in the After Visit Summary.  An After Visit Summary and PPPS were made available to the patient.    Follow Up:   Next Medicare Wellness visit to be scheduled in 1 year.       Additional E&M Note during same encounter follows:  Patient has multiple medical problems which are significant and separately identifiable that require additional work above and beyond the Medicare Wellness Visit.      Chief Complaint  Medicare Wellness-subsequent and Follow-up (Go over ct scan, feeling tired a lot. )    Subjective        HPI  aNnci Umanzor is also being seen today for environmental allergies, hyperlipidemia, hypertension, vitamin D deficiency and history of osteoporosis.  She generally is feeling well although is been having more allergy symptoms as she is currently taking care of 5 cats.  She also complains of some general malaise.         Objective   Vital Signs:  There were no vitals taken for this visit.    Physical Exam  Vitals and nursing note reviewed.   Constitutional:       General: She is not in acute distress.     Appearance: Normal appearance. She is not ill-appearing.   HENT:      Head: Normocephalic and atraumatic.   Cardiovascular:      Rate and Rhythm: Normal rate and regular rhythm.      Heart sounds: Normal heart sounds.   Pulmonary:       Effort: Pulmonary effort is normal. No respiratory distress.      Breath sounds: Normal breath sounds. No wheezing or rales.   Skin:     General: Skin is warm and dry.   Neurological:      General: No focal deficit present.      Mental Status: She is alert and oriented to person, place, and time.   Psychiatric:         Mood and Affect: Mood normal.         Behavior: Behavior normal.                       Assessment and Plan CBC is normal.  CMP had a minimally elevated ALT of 36 that stable and was otherwise normal and lipid panel has total cholesterol 158, HDL 71 and LDL 70.  #1-environmental allergies, needs to restart her Flonase and an over-the-counter antihistamine  #2-hyperlipidemia, diet controlled and on medication  #3-hypertension controlled on medication  #4-vitamin D deficiency, asymptomatic  #5-osteoporosis, received Prolia injection today and is due a bone density test  #6-history of COPD, stable  #7-malaise and fatigue, uncertain etiology       Diagnoses and all orders for this visit:    1. Essential hypertension (Primary)    2. Hyperlipidemia, unspecified hyperlipidemia type    3. Hypovitaminosis D    4. COPD (chronic obstructive pulmonary disease) with chronic bronchitis (HCC)    5. Age-related osteoporosis with current pathological fracture, sequela             Follow Up the patient received a Prolia injection today.  I refilled her medications and she will continue on the same medicines.  I am referring her to gynecology as she is overdue for a routine exam.  I ordered a bone density test as she is coming due July 13.  I would like to recheck her in 6 months with a CBC, CMP, lipid panel, TSH and free T4 and vitamin D level       No follow-ups on file.  Patient was given instructions and counseling regarding her condition or for health maintenance advice. Please see specific information pulled into the AVS if appropriate.

## 2023-08-21 ENCOUNTER — HOSPITAL ENCOUNTER (OUTPATIENT)
Dept: BONE DENSITY | Facility: HOSPITAL | Age: 68
Discharge: HOME | End: 2023-08-21
Admitting: INTERNAL MEDICINE
Payer: MEDICARE

## 2023-08-21 DIAGNOSIS — Z78.0 POST-MENOPAUSAL: ICD-10-CM

## 2023-08-21 DIAGNOSIS — M80.00XS AGE-RELATED OSTEOPOROSIS WITH CURRENT PATHOLOGICAL FRACTURE, SEQUELA: ICD-10-CM

## 2023-08-21 PROCEDURE — 77080 DXA BONE DENSITY AXIAL: CPT

## 2023-08-28 ENCOUNTER — OFFICE VISIT (OUTPATIENT)
Dept: GASTROENTEROLOGY | Facility: CLINIC | Age: 68
End: 2023-08-28
Payer: MEDICARE

## 2023-08-28 VITALS
OXYGEN SATURATION: 92 % | HEART RATE: 77 BPM | TEMPERATURE: 97.7 F | SYSTOLIC BLOOD PRESSURE: 127 MMHG | WEIGHT: 103.2 LBS | HEIGHT: 58 IN | DIASTOLIC BLOOD PRESSURE: 80 MMHG | BODY MASS INDEX: 21.66 KG/M2

## 2023-08-28 DIAGNOSIS — R10.13 EPIGASTRIC PAIN: ICD-10-CM

## 2023-08-28 DIAGNOSIS — R13.10 DYSPHAGIA, UNSPECIFIED TYPE: ICD-10-CM

## 2023-08-28 DIAGNOSIS — R11.0 NAUSEA: Primary | ICD-10-CM

## 2023-08-28 PROCEDURE — 99214 OFFICE O/P EST MOD 30 MIN: CPT | Performed by: NURSE PRACTITIONER

## 2023-08-28 PROCEDURE — 3074F SYST BP LT 130 MM HG: CPT | Performed by: NURSE PRACTITIONER

## 2023-08-28 PROCEDURE — 1159F MED LIST DOCD IN RCRD: CPT | Performed by: NURSE PRACTITIONER

## 2023-08-28 PROCEDURE — 1160F RVW MEDS BY RX/DR IN RCRD: CPT | Performed by: NURSE PRACTITIONER

## 2023-08-28 PROCEDURE — 3079F DIAST BP 80-89 MM HG: CPT | Performed by: NURSE PRACTITIONER

## 2023-08-28 RX ORDER — PANTOPRAZOLE SODIUM 40 MG/1
40 TABLET, DELAYED RELEASE ORAL DAILY
Qty: 30 TABLET | Refills: 3 | Status: SHIPPED | OUTPATIENT
Start: 2023-08-28

## 2023-08-28 NOTE — PROGRESS NOTES
Chief Complaint   Patient presents with    Nausea       HPI    Nanci Umanzor is a  68 y.o. female here for a follow up visit for nausea.    This patient follows with Dr. Servin and myself.    She has a history of hypertension, hyperlipidemia, COPD and left-sided colitis secondary to NSAIDs requiring hospitalization earlier this year.    She returns today with complaints of nausea, epigastric discomfort and a few episodes of esophageal dysphagia to solids.  No vomiting.  Some mild early satiety.  Her appetite comes and goes.  She reports altered taste and aversions to meat after episode of colitis earlier this year.  No weight loss.    Bowels movements are regular without issue.  No rectal bleeding.  She eats Activia yogurt which helps.    Reviewed colonoscopy dated 2022 - TA polyp, diverticulosis, nonbleeding internal hemorrhoids recall 5 years.       Past Medical History:   Diagnosis Date    Allergic 's    Ankle fracture     Rt   Lt     Anxiety     Arthritis     Asthma     Cancer 1992    Cervical cone biopsy for Stage II abnormality    Cataract     Clavicle fracture     10/21/1970    Colon polyp     COPD (chronic obstructive pulmonary disease)     Depression     Diverticulitis of colon 2019    Diverticulosis     Fibula fracture     left     GERD (gastroesophageal reflux disease) 2016    Glaucoma 2006    Heart murmur     Hyperlipidemia     Hypertension     Low back pain 1980s    Osteopenia 2016    Pelvis fracture     10/21/1970    Pneumonia     Ulcerative colitis 21    Visual impairment Since 1yrs of age    Wrist fracture     2007       Past Surgical History:   Procedure Laterality Date    CERVICAL CONIZATION       SECTION      ,,    COLONOSCOPY N/A 2017    Procedure: COLONOSCOPY TO CECUM with cold polypectomy;  Surgeon: Alo Staton Jr., MD;  Location: [facility] ENDOSCOPY;  Service:     COLONOSCOPY N/A 2022    Procedure: COLONOSCOPY TO CECUM WITH  COLD BIOPSY POLYPECTOMY;  Surgeon: Chuck Servin MD;  Location: [facility] ENDOSCOPY;  Service: Gastroenterology;  Laterality: N/A;  PRE: HX COLON POLYPS  POST: DIVERTICULOSIS, POLYP    ENDOMETRIAL ABLATION  2003    EYE SURGERY  2006    FINGER SURGERY      FRACTURE SURGERY  2002    HEMORRHOIDECTOMY  2015    TUBAL ABDOMINAL LIGATION  1992       Scheduled Meds: albuterol sulfate HFA, 2 puff, Inhalation, 4x Daily - RT        Continuous Infusions:      PRN Meds:     Allergies   Allergen Reactions    Clarithromycin Shortness Of Breath and Palpitations    Fluarix [Influenza Virus Vaccine] Myalgia    Pneumococcal Vaccines Swelling       Social History     Socioeconomic History    Marital status:     Number of children: 3    Years of education: AS   Tobacco Use    Smoking status: Former     Packs/day: 1.00     Years: 15.00     Pack years: 15.00     Types: Cigarettes     Start date: 9/22/1995     Quit date: 4/1/2013     Years since quitting: 10.4    Smokeless tobacco: Never    Tobacco comments:     Started smoking after separation from my .   Vaping Use    Vaping Use: Never used   Substance and Sexual Activity    Alcohol use: Yes     Alcohol/week: 1.0 standard drink     Types: 1 Glasses of wine per week     Comment: Red wine    Drug use: Never    Sexual activity: Not Currently     Partners: Male     Birth control/protection: Post-menopausal       Family History   Problem Relation Age of Onset    Heart disease Mother         Mechanical aortic valve, several heart attacks    Hypertension Mother     Hyperlipidemia Mother     Rheum arthritis Mother     Arthritis Mother     Colon polyps Mother     Stroke Mother     Diabetes Father         Type I    Hyperlipidemia Father     Hypertension Father     Heart disease Father         Afib    Thyroid disease Father     Hearing loss Father         Hearing aid    Cancer Sister     Depression Sister     Glaucoma Sister     Miscarriages / Stillbirths Sister     Vision loss  Sister     Crohn's disease Sister     Hypertension Brother     Lung disease Brother     Asthma Brother     Hypertension Brother     Hypertension Brother     Heart disease Maternal Aunt         Afib    Rheum arthritis Maternal Aunt     Arthritis Maternal Aunt     Heart disease Paternal Aunt     Heart disease Maternal Grandmother     Hyperlipidemia Maternal Grandmother     Rheum arthritis Maternal Grandmother     Stroke Maternal Grandmother     Arthritis Maternal Grandmother     Hypertension Maternal Grandmother     Heart disease Maternal Grandfather     Cancer Maternal Grandfather         Pancreatic    Stomach cancer Maternal Grandfather     Heart disease Paternal Grandmother     Hyperlipidemia Paternal Grandmother     Stroke Paternal Grandmother     Hypertension Paternal Grandmother     Heart disease Paternal Grandfather     Cancer Paternal Grandfather         Lung    Lung disease Daughter     Asthma Daughter     Malig Hyperthermia Neg Hx        Review of Systems   HENT:  Positive for trouble swallowing.    Gastrointestinal:  Positive for abdominal pain and nausea.     Vitals:    08/28/23 1354   BP: 127/80   Pulse: 77   Temp: 97.7 øF (36.5 øC)   SpO2: 92%       Physical Exam  Constitutional:       Appearance: She is well-developed.   Abdominal:      General: Bowel sounds are normal. There is no distension.      Palpations: Abdomen is soft. There is no mass.      Tenderness: There is no abdominal tenderness. There is no guarding.      Hernia: No hernia is present.   Skin:     General: Skin is warm and dry.      Capillary Refill: Capillary refill takes less than 2 seconds.   Neurological:      Mental Status: She is alert and oriented to person, place, and time.   Psychiatric:         Behavior: Behavior normal.     Assessment    Diagnoses and all orders for this visit:    1. Nausea (Primary)  -     FL Upper GI With Air Contrast; Future    2. Dysphagia, unspecified type  -     FL Upper GI With Air Contrast; Future    3.  Epigastric pain  -     FL Upper GI With Air Contrast; Future    Other orders  -     pantoprazole (PROTONIX) 40 MG EC tablet; Take 1 tablet by mouth Daily.  Dispense: 30 tablet; Refill: 3       Plan    Arrange upper GI series for further evaluation of symptoms  Begin Protonix 40 mg once daily  Follow an antireflux diet  Follow-up and further recommendations pending imaging         CARMELA Vasquez  [facility]  [address] [place] [address]  Office: [phone]

## 2023-09-05 ENCOUNTER — TRANSCRIBE ORDERS (OUTPATIENT)
Dept: ADMINISTRATIVE | Facility: HOSPITAL | Age: 68
End: 2023-09-05
Payer: MEDICARE

## 2023-09-05 DIAGNOSIS — Z12.31 VISIT FOR SCREENING MAMMOGRAM: Primary | ICD-10-CM

## 2023-09-20 ENCOUNTER — TELEPHONE (OUTPATIENT)
Dept: FAMILY MEDICINE CLINIC | Facility: CLINIC | Age: 68
End: 2023-09-20
Payer: MEDICARE

## 2023-09-20 DIAGNOSIS — M54.2 NECK PAIN: Primary | ICD-10-CM

## 2023-09-20 NOTE — TELEPHONE ENCOUNTER
Pt called in stating that she is having pain in her upper back. Pt would like a new referral to ortho.

## 2023-09-28 ENCOUNTER — OFFICE VISIT (OUTPATIENT)
Dept: FAMILY MEDICINE CLINIC | Facility: CLINIC | Age: 68
End: 2023-09-28
Payer: MEDICARE

## 2023-09-28 VITALS
OXYGEN SATURATION: 95 % | WEIGHT: 105.8 LBS | TEMPERATURE: 97.2 F | HEART RATE: 66 BPM | SYSTOLIC BLOOD PRESSURE: 112 MMHG | RESPIRATION RATE: 16 BRPM | DIASTOLIC BLOOD PRESSURE: 80 MMHG | BODY MASS INDEX: 22.21 KG/M2 | HEIGHT: 58 IN

## 2023-09-28 DIAGNOSIS — M54.2 NECK PAIN: Primary | ICD-10-CM

## 2023-09-28 DIAGNOSIS — J44.89 COPD (CHRONIC OBSTRUCTIVE PULMONARY DISEASE) WITH CHRONIC BRONCHITIS: ICD-10-CM

## 2023-09-28 DIAGNOSIS — M79.2 RADICULAR PAIN IN RIGHT ARM: ICD-10-CM

## 2023-09-28 DIAGNOSIS — I10 ESSENTIAL HYPERTENSION: ICD-10-CM

## 2023-09-28 DIAGNOSIS — M54.12 CERVICAL NERVE ROOT DISORDER: ICD-10-CM

## 2023-09-28 DIAGNOSIS — J45.20 MILD INTERMITTENT REACTIVE AIRWAY DISEASE WITHOUT COMPLICATION: ICD-10-CM

## 2023-09-28 PROCEDURE — 99213 OFFICE O/P EST LOW 20 MIN: CPT | Performed by: INTERNAL MEDICINE

## 2023-09-28 PROCEDURE — 3074F SYST BP LT 130 MM HG: CPT | Performed by: INTERNAL MEDICINE

## 2023-09-28 PROCEDURE — 3079F DIAST BP 80-89 MM HG: CPT | Performed by: INTERNAL MEDICINE

## 2023-09-28 NOTE — PROGRESS NOTES
Subjective   Nanci Umanzor is a 68 y.o. female. Patient is here today for continuing problems with neck and right arm pain.  Patient will get pain in the right arm with some numbness along the triceps area.  She continues with upper scapular area and right neck pain.  She had an MRI in March which shows significant degenerative disease and neuroforaminal stenosis.  She has had physical therapy but its not been helping, sometimes even seems to flare her pain up.  She is interested in seeing pain management and getting possibly some epidural shots  Chief Complaint   Patient presents with    Pain     NECK AND BACK          Vitals:    09/28/23 1434   BP: 112/80   Pulse: 66   Resp: 16   Temp: 97.2 °F (36.2 °C)   SpO2: 95%     Body mass index is 22.12 kg/m².  The following portions of the patient's history were reviewed and updated as appropriate: allergies, current medications, past family history, past medical history, past social history, past surgical history and problem list.    Past Medical History:   Diagnosis Date    Allergic 1990's    Ankle fracture     Rt 2002  Lt 2004    Anxiety     Arthritis     Asthma     Cancer 1992    Cervical cone biopsy for Stage II abnormality    Cataract 2017    Clavicle fracture     10/21/1970    Colon polyp     COPD (chronic obstructive pulmonary disease) 2006    Depression     Diverticulitis of colon 2019    Diverticulosis     Fibula fracture     left 2002    GERD (gastroesophageal reflux disease) 2016    Glaucoma 2006    Heart murmur     Hyperlipidemia     Hypertension     Low back pain 1980s    Osteopenia 2016    Pelvis fracture     10/21/1970    Pneumonia     Ulcerative colitis 01/04/23    Visual impairment Since 1yrs of age    Wrist fracture     2007      Allergies   Allergen Reactions    Clarithromycin Shortness Of Breath and Palpitations    Fluarix [Influenza Virus Vaccine] Myalgia    Pneumococcal Vaccines Swelling      Social History     Socioeconomic History    Marital  status:     Number of children: 3    Years of education: AS   Tobacco Use    Smoking status: Former     Packs/day: 1.00     Years: 15.00     Pack years: 15.00     Types: Cigarettes     Start date: 9/22/1995     Quit date: 4/1/2013     Years since quitting: 10.4    Smokeless tobacco: Never    Tobacco comments:     Started smoking after separation from my .   Vaping Use    Vaping Use: Never used   Substance and Sexual Activity    Alcohol use: Yes     Alcohol/week: 1.0 standard drink     Types: 1 Glasses of wine per week     Comment: Red wine    Drug use: Never    Sexual activity: Not Currently     Partners: Male     Birth control/protection: Post-menopausal        Current Outpatient Medications:     albuterol (PROVENTIL) (2.5 MG/3ML) 0.083% nebulizer solution, Take 2.5 mg by nebulization Every 4 (Four) Hours As Needed for Wheezing., Disp: 3 mL, Rfl: 2    albuterol sulfate  (90 Base) MCG/ACT inhaler, INHALE TWO PUFFS BY MOUTH EVERY 4 HOURS AS NEEDED FOR WHEEZING (Patient taking differently: Inhale 2 puffs 4 (Four) Times a Day.), Disp: 18 g, Rfl: 5    amLODIPine (NORVASC) 5 MG tablet, TAKE ONE TABLET BY MOUTH DAILY (Patient taking differently: Take 1 tablet by mouth Daily.), Disp: 90 tablet, Rfl: 3    atorvastatin (LIPITOR) 40 MG tablet, TAKE ONE TABLET BY MOUTH DAILY (Patient taking differently: Take 1 tablet by mouth Daily.), Disp: 90 tablet, Rfl: 2    benazepril (LOTENSIN) 10 MG tablet, TAKE ONE TABLET BY MOUTH DAILY (Patient taking differently: Take 1 tablet by mouth Daily.), Disp: 90 tablet, Rfl: 3    BIOTIN PO, Take 1 capsule by mouth Daily., Disp: , Rfl:     calcium citrate-vitamin d (CITRACAL) 200-250 MG-UNIT tablet tablet, Take 1 tablet by mouth Daily., Disp: , Rfl:     Cholecalciferol (Vitamin D) 50 MCG (2000 UT) tablet, Take 1 tablet by mouth Daily., Disp: , Rfl:     Fluticasone-Salmeterol (ADVAIR/WIXELA) 250-50 MCG/ACT DISKUS, INHALE ONE PUFF BY MOUTH TWICE A DAY (Patient taking  differently: Inhale 1 puff Daily.), Disp: 60 each, Rfl: 11    Multiple Vitamins-Minerals (CENTRUM SILVER ADULT 50+ PO), Take 1 tablet by mouth Daily., Disp: , Rfl:     Multiple Vitamins-Minerals (ZINC PO), Take  by mouth., Disp: , Rfl:     pantoprazole (PROTONIX) 40 MG EC tablet, Take 1 tablet by mouth Daily., Disp: 30 tablet, Rfl: 3    Prolia 60 MG/ML solution prefilled syringe syringe, INJECT 1 ML UNDER THE SKIN INTO THE APPROPRIATE AREA EVERY 6 MONTHS ONE TIME FOR 1 DOSE AS DIRECTED., Disp: 1 mL, Rfl: 2    tretinoin (RETIN-A) 0.05 % cream, Apply  topically Every Night. (Patient taking differently: Apply 1 application  topically to the appropriate area as directed Every Other Day.), Disp: 20 g, Rfl: 1    TURMERIC PO, Take  by mouth., Disp: , Rfl:     vitamin C (ASCORBIC ACID) 500 MG tablet, Take 2 tablets by mouth Daily., Disp: , Rfl:     fluticasone (FLONASE) 50 MCG/ACT nasal spray, 2 sprays into the nostril(s) as directed by provider Daily for 14 days., Disp: 16 g, Rfl: 11    Omega-3 Fatty Acids (FISH OIL PO), Take 1 capsule by mouth Daily. (Patient not taking: Reported on 9/28/2023), Disp: , Rfl:     Turmeric (QC TUMERIC COMPLEX PO), Take  by mouth. (Patient not taking: Reported on 6/6/2023), Disp: , Rfl:     vitamin E 400 UNIT capsule, Take 1 capsule by mouth Daily. (Patient not taking: Reported on 8/28/2023), Disp: , Rfl:     Zinc 50 MG capsule, , Disp: , Rfl:     Current Facility-Administered Medications:     albuterol (PROVENTIL HFA;VENTOLIN HFA) inhaler 2 puff, 2 puff, Inhalation, 4x Daily - RT, Ky Kasper MD     Objective     History of Present Illness     Review of Systems    Physical Exam  Vitals and nursing note reviewed.   Constitutional:       General: She is not in acute distress.     Appearance: Normal appearance. She is not ill-appearing.   HENT:      Head: Normocephalic and atraumatic.   Cardiovascular:      Rate and Rhythm: Normal rate and regular rhythm.      Heart sounds: Normal heart  sounds.   Pulmonary:      Effort: Pulmonary effort is normal. No respiratory distress.      Breath sounds: Normal breath sounds. No wheezing or rales.   Skin:     General: Skin is warm and dry.   Neurological:      General: No focal deficit present.      Mental Status: She is alert and oriented to person, place, and time.   Psychiatric:         Mood and Affect: Mood normal.         Behavior: Behavior normal.       Assessment    ASSESSMENT #1-radicular pain in the right arm  #2-cervical disc disease and degenerative changes  #3-hypertension controlled    Problems Addressed this Visit          Cardiac and Vasculature    Essential hypertension       Musculoskeletal and Injuries    Neck pain - Primary    Relevant Orders    Ambulatory Referral to Pain Management Clinic       Neuro    Cervical nerve root disorder    Relevant Orders    Ambulatory Referral to Pain Management Clinic       Pulmonary and Pneumonias    Reactive airway disease without complication    COPD (chronic obstructive pulmonary disease) with chronic bronchitis     Other Visit Diagnoses       Radicular pain in right arm        Relevant Orders    Ambulatory Referral to Pain Management Clinic          Diagnoses         Codes Comments    Neck pain    -  Primary ICD-10-CM: M54.2  ICD-9-CM: 723.1     Cervical nerve root disorder     ICD-10-CM: M54.12  ICD-9-CM: 723.4     Radicular pain in right arm     ICD-10-CM: M79.2  ICD-9-CM: 729.2     Mild intermittent reactive airway disease without complication     ICD-10-CM: J45.20  ICD-9-CM: 493.90     COPD (chronic obstructive pulmonary disease) with chronic bronchitis     ICD-10-CM: J44.9  ICD-9-CM: 491.20     Essential hypertension     ICD-10-CM: I10  ICD-9-CM: 401.9             PLAN I am going to refer the patient to pain management.  They probably will try epidural shots.  I encouraged the patient to get the shingles immunizations, consider the flu shot and also recommended RSV and COVID vaccinations.  She is  already scheduled for follow-up with me in November and will keep that appointment    There are no Patient Instructions on file for this visit.  No follow-ups on file.

## 2023-10-03 ENCOUNTER — OFFICE VISIT (OUTPATIENT)
Dept: PAIN MEDICINE | Facility: CLINIC | Age: 68
End: 2023-10-03
Payer: MEDICARE

## 2023-10-03 ENCOUNTER — PREP FOR SURGERY (OUTPATIENT)
Dept: SURGERY | Facility: SURGERY CENTER | Age: 68
End: 2023-10-03
Payer: MEDICARE

## 2023-10-03 VITALS
BODY MASS INDEX: 22.46 KG/M2 | RESPIRATION RATE: 18 BRPM | WEIGHT: 107 LBS | DIASTOLIC BLOOD PRESSURE: 80 MMHG | SYSTOLIC BLOOD PRESSURE: 118 MMHG | HEART RATE: 61 BPM | HEIGHT: 58 IN | OXYGEN SATURATION: 95 % | TEMPERATURE: 96.4 F

## 2023-10-03 DIAGNOSIS — M48.02 CERVICAL STENOSIS OF SPINAL CANAL: Primary | ICD-10-CM

## 2023-10-03 DIAGNOSIS — M54.12 CERVICAL RADICULOPATHY: ICD-10-CM

## 2023-10-03 DIAGNOSIS — M48.02 CERVICAL STENOSIS OF SPINAL CANAL: ICD-10-CM

## 2023-10-03 DIAGNOSIS — M50.30 DISC DISEASE, DEGENERATIVE, CERVICAL: Primary | ICD-10-CM

## 2023-10-03 PROCEDURE — 1125F AMNT PAIN NOTED PAIN PRSNT: CPT

## 2023-10-03 PROCEDURE — 99214 OFFICE O/P EST MOD 30 MIN: CPT

## 2023-10-03 PROCEDURE — 3079F DIAST BP 80-89 MM HG: CPT

## 2023-10-03 PROCEDURE — 1160F RVW MEDS BY RX/DR IN RCRD: CPT

## 2023-10-03 PROCEDURE — 1159F MED LIST DOCD IN RCRD: CPT

## 2023-10-03 PROCEDURE — 3074F SYST BP LT 130 MM HG: CPT

## 2023-10-03 RX ORDER — SODIUM CHLORIDE 0.9 % (FLUSH) 0.9 %
10 SYRINGE (ML) INJECTION AS NEEDED
OUTPATIENT
Start: 2023-10-03

## 2023-10-03 RX ORDER — SODIUM CHLORIDE 0.9 % (FLUSH) 0.9 %
10 SYRINGE (ML) INJECTION EVERY 12 HOURS SCHEDULED
OUTPATIENT
Start: 2023-10-03

## 2023-10-03 NOTE — PROGRESS NOTES
"CHIEF COMPLAINT  Neck pain     Subjective   Nanci Umanzor is a 68 y.o. female.   She presents to the office for evaluation of neck pain. She was referred here by Dr. Bowen Covarrubias.    Patient reports that neck pain started about a year ago. No inciting event. Pain has been waxing and waning since onset. Today pain is 6/10VAS in severity. Pain is located on the right side of her neck and radiates into right shoulder and down posterior right arm. Describes this pain as a nearly continuous aching, burning, and stabbing. Pain is worsened by prolonged standing/sitting, bending/twisting, cold weather, and sometimes \"nothing specific, pain is just there\". Pain improves with rest/reposition, heat/ice, and OTC Tylenol/Aleve or CBD cream. She has completed in the past with minimal improvement. She continue with HEP and stretching as tolerated.     Unable to tolerate muscle relaxants     Past pain medications: Tramadol, Hydrocodone - makes patient feel agitated    Procedures:  3/16/23 - C7-T1 BESSIE - Dr. Clifford ([facility]) - 50% relief x 4 weeks     Past therapies:  Physical Therapy: Yes - no benefit  Chiropractor: No  Massage Therapy: No  TENS: No  Neck or back surgery: No  Past pain management: [facility]    Neck Pain   This is a chronic problem. The current episode started more than 1 year ago. The problem occurs constantly. The problem has been gradually worsening. The pain is associated with an unknown factor. The pain is present in the right side. The quality of the pain is described as aching, burning and stabbing. The pain is at a severity of 6/10. The pain is moderate. The symptoms are aggravated by position, twisting and bending. Stiffness is present In the morning. Associated symptoms include numbness (right arm), tingling (right arm) and weakness. Pertinent negatives include no headaches. She has tried acetaminophen, NSAIDs, heat and ice (HEP,) for the symptoms.      PEG Assessment "   What number best describes your pain on average in the past week?10  What number best describes how, during the past week, pain has interfered with your enjoyment of life?10  What number best describes how, during the past week, pain has interfered with your general activity?  10      Current Outpatient Medications:     albuterol (PROVENTIL) (2.5 MG/3ML) 0.083% nebulizer solution, Take 2.5 mg by nebulization Every 4 (Four) Hours As Needed for Wheezing., Disp: 3 mL, Rfl: 2    albuterol sulfate  (90 Base) MCG/ACT inhaler, INHALE TWO PUFFS BY MOUTH EVERY 4 HOURS AS NEEDED FOR WHEEZING (Patient taking differently: Inhale 2 puffs 4 (Four) Times a Day.), Disp: 18 g, Rfl: 5    amLODIPine (NORVASC) 5 MG tablet, TAKE ONE TABLET BY MOUTH DAILY (Patient taking differently: Take 1 tablet by mouth Daily.), Disp: 90 tablet, Rfl: 3    atorvastatin (LIPITOR) 40 MG tablet, TAKE ONE TABLET BY MOUTH DAILY (Patient taking differently: Take 1 tablet by mouth Daily.), Disp: 90 tablet, Rfl: 2    benazepril (LOTENSIN) 10 MG tablet, TAKE ONE TABLET BY MOUTH DAILY (Patient taking differently: Take 1 tablet by mouth Daily.), Disp: 90 tablet, Rfl: 3    BIOTIN PO, Take 1 capsule by mouth Daily., Disp: , Rfl:     calcium citrate-vitamin d (CITRACAL) 200-250 MG-UNIT tablet tablet, Take 1 tablet by mouth Daily., Disp: , Rfl:     Cholecalciferol (Vitamin D) 50 MCG (2000 UT) tablet, Take 1 tablet by mouth Daily., Disp: , Rfl:     Fluticasone-Salmeterol (ADVAIR/WIXELA) 250-50 MCG/ACT DISKUS, INHALE ONE PUFF BY MOUTH TWICE A DAY (Patient taking differently: Inhale 1 puff Daily.), Disp: 60 each, Rfl: 11    Multiple Vitamins-Minerals (CENTRUM SILVER ADULT 50+ PO), Take 1 tablet by mouth Daily., Disp: , Rfl:     Multiple Vitamins-Minerals (ZINC PO), Take  by mouth., Disp: , Rfl:     Omega-3 Fatty Acids (FISH OIL PO), Take 1 capsule by mouth Daily., Disp: , Rfl:     pantoprazole (PROTONIX) 40 MG EC tablet, Take 1 tablet by mouth Daily., Disp: 30  tablet, Rfl: 3    Prolia 60 MG/ML solution prefilled syringe syringe, INJECT 1 ML UNDER THE SKIN INTO THE APPROPRIATE AREA EVERY 6 MONTHS ONE TIME FOR 1 DOSE AS DIRECTED., Disp: 1 mL, Rfl: 2    tretinoin (RETIN-A) 0.05 % cream, Apply  topically Every Night. (Patient taking differently: Apply 1 application  topically to the appropriate area as directed Every Other Day.), Disp: 20 g, Rfl: 1    Turmeric (QC TUMERIC COMPLEX PO), Take  by mouth., Disp: , Rfl:     TURMERIC PO, Take  by mouth., Disp: , Rfl:     vitamin C (ASCORBIC ACID) 500 MG tablet, Take 2 tablets by mouth Daily., Disp: , Rfl:     vitamin E 400 UNIT capsule, Take 1 capsule by mouth Daily., Disp: , Rfl:     Zinc 50 MG capsule, , Disp: , Rfl:     fluticasone (FLONASE) 50 MCG/ACT nasal spray, 2 sprays into the nostril(s) as directed by provider Daily for 14 days., Disp: 16 g, Rfl: 11    Ibuprofen 3 %, Gabapentin 10 %, Baclofen 2 %, lidocaine 4 %, Ketamine HCl 4 %, Apply 1-2 g topically to the appropriate area as directed 3 (Three) to 4 (Four) times daily., Disp: 90 g, Rfl: 0    Current Facility-Administered Medications:     albuterol (PROVENTIL HFA;VENTOLIN HFA) inhaler 2 puff, 2 puff, Inhalation, 4x Daily - RT, Ky Kasper MD    The following portions of the patient's history were reviewed and updated as appropriate: allergies, current medications, past family history, past medical history, past social history, past surgical history, and problem list.    REVIEW OF PERTINENT MEDICAL DATA  Reviewed office note from Dr. Bowen Covarrubias from 9/28/2023.  Patient presents today with continuing complaints of neck and right arm pain.  MRI in March showed significant degenerative disease and neuroforaminal stenosis.  She completed PT in the past with minimal relief.  She was referred to pain management for consideration of epidural injections.    RADIOLOGY REPORT     FACILITY:  [facility]   UNIT/AGE/GENDER: F.MRI  OP      AGE:68 Y           SEX:F   PATIENT NAME/:  SHERLYN KRISHNA LEE    1955   UNIT NUMBER:  [id number]   ACCOUNT NUMBER:  [id number]   ACCESSION NUMBER:  [id number]     MRI OF THE CERVICAL SPINE WITHOUT CONTRAST     DATE: 2023     COMPARISON: 2022     INDICATION: Cervical radiculopathy and chronic neck pain - to evaluate levels of canal and foraminal stenosis.        FINDINGS: Chronic 2 mm grade 1 degenerative anterolisthesis of C3 on C4. Alignment of the cervical spine is otherwise normal. There is solid bony fusion through the right C2-C3 facet joint and bony fusion between the C2 and C3 spinous processes. There is    moderate to severe disc narrowing and degenerative disc disease at C4-C5, C5-C6, and C6-C7. Vertebral body heights are normal. Chronic degenerative endplate changes at C4-C5, C5-C6, and C6-C7. There are degenerative changes at the anterior C1-C2   articulation with associated bone-on-bone articulation and bone edema. There are no areas of abnormal signal intensity in the visualized spinal cord. There is no Chiari malformation.     C2-C3: Unremarkable disc. Right-sided facet fusion and hypertrophy. No central canal stenosis. Mild to moderate right neural foraminal narrowing. There is no left neural foraminal narrowing     C3-C4: Mild anterolisthesis. This bulge. Facet arthropathy, left greater than right. Mild central canal stenosis. Moderate left and mild-to-moderate right neural foraminal narrowing     C4-C5: Degenerative disc disease with broad-based posterior disc osteophyte complex and bilateral uncovertebral spurring. Bilateral facet arthropathy. Mild central canal stenosis with minimal indentation of the anterior spinal cord. Severe right and   moderate to severe left neural foraminal narrowing     C5-C6: Degenerative disease with broad-based posterior disc osteophyte complex and bilateral vertebral spurring. Mild central canal stenosis. Minimal indentation of the anterior spinal cord.  "Severe right and mild to moderate left neural foraminal   narrowing     C6-C7: Degenerative disease with small broad-based posterior disc osteophyte complex. Bilateral uncovertebral spurring. No central canal stenosis. Moderate right and mild left neural foraminal narrowing     C7-T1: Mild anterolisthesis due to moderate bilateral facet arthropathy. No central canal stenosis. Mild bilateral neural foraminal narrowing.     IMPRESSION:   1. No acute abnormalities.   2. Moderate to severe degenerative disc disease at C4-C5, C5-C6, and C6-C7.   3. Multilevel bilateral neural foraminal narrowing, greatest on the right at C4-C5 and C5-C6. Details above.   4. Severe degenerative changes at the anterior C1-C2 articulation with associated bone edema.     Dictated by: Dinesh Gray M.D.     Images and Report reviewed and interpreted by: Dinesh Gray M.D.     <PS><Electronically signed by: Dinesh Gray M.D.>  03/12/2023 0834     D: 03/12/2023 0824   T: 03/12/2023 0824     Review of Systems   Constitutional:  Positive for activity change and fatigue.   Gastrointestinal:  Negative for abdominal pain, constipation and diarrhea.   Genitourinary:  Negative for difficulty urinating and dysuria.   Musculoskeletal:  Positive for neck pain.   Neurological:  Positive for tingling (right arm), weakness and numbness (right arm). Negative for dizziness, light-headedness and headaches.   Psychiatric/Behavioral:  Positive for sleep disturbance. Negative for agitation and suicidal ideas. The patient is not nervous/anxious.      I have reviewed and confirmed the accuracy of the ROS as documented by the MA/LPN/RN CARMELA Jordan    Vitals:    10/03/23 1433   BP: 118/80   BP Location: Right arm   Patient Position: Sitting   Cuff Size: Adult   Pulse: 61   Resp: 18   Temp: 96.4 °F (35.8 °C)   SpO2: 95%   Weight: 48.5 kg (107 lb)   Height: 147.3 cm (57.99\")   PainSc:   6     Objective     Physical Exam  Constitutional:       " Appearance: Normal appearance.   HENT:      Head: Normocephalic.   Cardiovascular:      Rate and Rhythm: Normal rate and regular rhythm.   Pulmonary:      Effort: Pulmonary effort is normal.      Breath sounds: Normal breath sounds.   Musculoskeletal:      Cervical back: Normal range of motion. Tenderness present. Pain with movement and muscular tenderness present. Decreased range of motion.      Comments: + cervical flexion/extension compression test  + Right sided Spurling test   Skin:     General: Skin is warm and dry.      Capillary Refill: Capillary refill takes less than 2 seconds.   Neurological:      General: No focal deficit present.      Mental Status: She is alert and oriented to person, place, and time.   Psychiatric:         Mood and Affect: Mood normal.         Behavior: Behavior normal.         Thought Content: Thought content normal.         Cognition and Memory: Cognition normal.     Assessment & Plan   Diagnoses and all orders for this visit:    1. Disc disease, degenerative, cervical (Primary)    2. Cervical stenosis of spinal canal    3. Cervical radiculopathy  -     Ibuprofen 3 %, Gabapentin 10 %, Baclofen 2 %, lidocaine 4 %, Ketamine HCl 4 %; Apply 1-2 g topically to the appropriate area as directed 3 (Three) to 4 (Four) times daily.  Dispense: 90 g; Refill: 0    --- BESSIE  ---  Indications for epidural injection:  Plan is to proceed with epidural at the appropriate level.  If the patient receives significant pain reduction and improvement in function and the plan will be to repeat the epidural when the pain worsens.  If a second epidural provides at least 6 weeks of sustained improvement that includes both pain reduction and improvement in function then an epidural injection could be repeated once again at the same level.  This is a mutual decision between the clinician and the patient that includes discussions including risks and benefits in detail as well as alternative therapies.  Patient's  questions were answered to their satisfaction and to their understanding.  ---  --- Discussed a trial of Gabapentin but patient declined at this time.   --- Brief discussion about cervical MBB/RFA due to moderate to severe degenerative disease at C4-C5, C5-C6, and C6-C7 that was noted on Cervical MRI from 3/11/23  --- May consider updating thoracic imaging if mid-upper back pain were to worsen  --- Prescription for compound pain cream sent to [facility]   --- Follow-up for procedure     --- Nanci Umanzor reports a pain score of 6.  Given her pain assessment as noted, treatment options were discussed and the following options were decided upon as a follow-up plan to address the patient's pain: prescription for compound pain cream and patient scheduled for BESSIE.    Pain / Disability Scale  The scale used for measurement of pain and/or disability for this patient was the [place] back pain disability scale.  The score was 34 on 10/03/2023    ANUM REPORT  As the clinician, I personally reviewed the ANUM from 10/3/23 while the patient was in the office today.    Dictated utilizing Dragon dictation.

## 2023-10-03 NOTE — PATIENT INSTRUCTIONS
---  Indications for epidural injection:  Plan is to proceed with epidural at the appropriate level.  If the patient receives significant pain reduction and improvement in function and the plan will be to repeat the epidural when the pain worsens.  If a second epidural provides at least 6 weeks of sustained improvement that includes both pain reduction and improvement in function then an epidural injection could be repeated once again at the same level.  This is a mutual decision between the clinician and the patient that includes discussions including risks and benefits in detail as well as alternative therapies.  Patient's questions were answered to their satisfaction and to their understanding.  ---

## 2023-10-05 DIAGNOSIS — E78.01 FAMILIAL HYPERCHOLESTEROLEMIA: ICD-10-CM

## 2023-10-05 RX ORDER — ATORVASTATIN CALCIUM 40 MG/1
TABLET, FILM COATED ORAL
Qty: 90 TABLET | Refills: 1 | Status: SHIPPED | OUTPATIENT
Start: 2023-10-05

## 2023-10-17 ENCOUNTER — HOSPITAL ENCOUNTER (OUTPATIENT)
Dept: GENERAL RADIOLOGY | Facility: HOSPITAL | Age: 68
Discharge: HOME | End: 2023-10-17
Payer: MEDICARE

## 2023-10-17 ENCOUNTER — HOSPITAL ENCOUNTER (OUTPATIENT)
Dept: MAMMOGRAPHY | Facility: HOSPITAL | Age: 68
Discharge: HOME | End: 2023-10-17
Payer: MEDICARE

## 2023-10-17 DIAGNOSIS — R11.0 NAUSEA: ICD-10-CM

## 2023-10-17 DIAGNOSIS — R13.10 DYSPHAGIA, UNSPECIFIED TYPE: ICD-10-CM

## 2023-10-17 DIAGNOSIS — R10.13 EPIGASTRIC PAIN: ICD-10-CM

## 2023-10-17 DIAGNOSIS — Z12.31 VISIT FOR SCREENING MAMMOGRAM: ICD-10-CM

## 2023-10-17 PROCEDURE — 77063 BREAST TOMOSYNTHESIS BI: CPT

## 2023-10-17 PROCEDURE — 77067 SCR MAMMO BI INCL CAD: CPT

## 2023-10-17 PROCEDURE — 74246 X-RAY XM UPR GI TRC 2CNTRST: CPT

## 2023-10-17 RX ADMIN — BARIUM SULFATE 135 ML: 980 POWDER, FOR SUSPENSION ORAL at 11:02

## 2023-10-18 ENCOUNTER — TELEPHONE (OUTPATIENT)
Dept: GASTROENTEROLOGY | Facility: CLINIC | Age: 68
End: 2023-10-18
Payer: MEDICARE

## 2023-10-18 NOTE — TELEPHONE ENCOUNTER
----- Message from CARMELA Vasquez sent at 10/18/2023 12:59 PM EDT -----  Please inform the patient upper GI series is essentially normal other than some very minimal reflux have symptoms improved with recent addition of PPI therapy?

## 2023-10-18 NOTE — PROGRESS NOTES
Please inform the patient upper GI series is essentially normal other than some very minimal reflux have symptoms improved with recent addition of PPI therapy?

## 2023-10-20 NOTE — TELEPHONE ENCOUNTER
Called pt and advised of Nancy MELTON's note.  Pt verbalized understanding.    Pt states the pantoprazole is helping a lot.  Advised pt to call back next month and make a f/u appt to see LINH Cruz in January.     Update sent to LINH Cruz.

## 2023-11-01 ENCOUNTER — TELEPHONE (OUTPATIENT)
Dept: PAIN MEDICINE | Facility: CLINIC | Age: 68
End: 2023-11-01

## 2023-11-01 NOTE — TELEPHONE ENCOUNTER
Hub staff attempted to follow warm transfer process and was unsuccessful     Caller: Nanci Umanzor    Relationship to patient: Self    Best call back number: [phone]    Patient is needing: PATIENT WOULD LIKE A CALL TO CONFIRM THAT THE TIME OF HER PROCEDURE IS 10:00. SHE STATES IT IS NOT ON MY CHART SO SHE WANTS TO BE SURE. PLEASE CALL BACK AND OKAY TO LEAVE A VOICEMAIL WITH INFORMATION IF NEEDED.

## 2023-11-01 NOTE — TELEPHONE ENCOUNTER
DELETE AFTER REVIEWING: Telephone encounter to be sent to the clinical pool     Caller: Nanci Umanzor    Relationship to patient: Self    Best call back number: [phone]    Chief complaint: NECK PAIN    Type of visit: PROCEDURE     Requested date: ASAP      If rescheduling, when is the original appointment: PATIENT THOUGHT SHE WAS SCHEDULED FOR 11/01/23 - SHE TOOK OFF WORK TO ONLY FIND OUT SHE WAS NOT SCHEDULED      Additional notes:PATIENT HAS TINGLING AND NUMBNESS IN FINGERS , WOULD LIKE TO BE SCHEDULED ASAP.  PLEASE CALL PATIENT TO DISCUSS TINGLING AND NUMBNESS  AND TO SCHEDULE CERVICAL EPIDURAL STEROID INJECTION CPT: 13929  ASAP

## 2023-11-06 ENCOUNTER — TRANSCRIBE ORDERS (OUTPATIENT)
Dept: SURGERY | Facility: SURGERY CENTER | Age: 68
End: 2023-11-06
Payer: MEDICARE

## 2023-11-06 DIAGNOSIS — Z41.9 SURGERY, ELECTIVE: Primary | ICD-10-CM

## 2023-11-06 PROBLEM — M48.02 CERVICAL STENOSIS OF SPINAL CANAL: Status: ACTIVE | Noted: 2023-10-03

## 2023-11-06 PROBLEM — M54.12 CERVICAL RADICULOPATHY: Status: ACTIVE | Noted: 2023-10-03

## 2023-11-09 ENCOUNTER — TELEPHONE (OUTPATIENT)
Dept: FAMILY MEDICINE CLINIC | Facility: CLINIC | Age: 68
End: 2023-11-09

## 2023-11-09 DIAGNOSIS — R91.8 ABNORMAL CT LUNG SCREENING: Primary | ICD-10-CM

## 2023-11-09 NOTE — TELEPHONE ENCOUNTER
Caller: Nanci Umanzor    Relationship: Self    Best call back number: [phone]      What orders are you requesting (i.e. lab or imaging): FOLLOW UP CT SCAN OF LUNGS    In what timeframe would the patient need to come in: AS SOON AS POSSIBLE    Where will you receive your lab/imaging services: [facility]    Additional notes: PATIENT STATES THAT SHE NEEDS DR. DANIELE GARCIA TO PUT IN AN ORDER FOR A FOLLOW UP CT SCAN OF HER LUNGS.  SHE SAYS THAT A SPOT WAS FOUND AT HER LAST SCAN AND SHE IS SUPPOSED TO HAVE A FOLLOW UP SCAN AT 6 MONTHS.    PLEASE ADVISE.

## 2023-11-10 RX ORDER — AMLODIPINE BESYLATE 5 MG/1
TABLET ORAL
Qty: 90 TABLET | Refills: 3 | Status: SHIPPED | OUTPATIENT
Start: 2023-11-10

## 2023-11-10 RX ORDER — BENAZEPRIL HYDROCHLORIDE 10 MG/1
TABLET ORAL
Qty: 90 TABLET | Refills: 3 | Status: SHIPPED | OUTPATIENT
Start: 2023-11-10

## 2023-11-15 ENCOUNTER — TELEPHONE (OUTPATIENT)
Dept: FAMILY MEDICINE CLINIC | Facility: CLINIC | Age: 68
End: 2023-11-15
Payer: MEDICARE

## 2023-11-15 RX ORDER — DIAZEPAM 5 MG/1
10 TABLET ORAL ONCE AS NEEDED
Status: DISCONTINUED | OUTPATIENT
Start: 2023-11-16 | End: 2023-11-16 | Stop reason: HOSPADM

## 2023-11-15 NOTE — TELEPHONE ENCOUNTER
Caller: Nanci Umanzor    Relationship to patient: Self    Best call back number: [phone]    Chief complaint: FASTING LABS     Type of visit: LABS     Requested date: 12/11/2023    If rescheduling, when is the original appointment: 12/06/2023     Additional notes:PATIENT STATES SHE IS WANTING TO HAVE HER LABS AND APPOINTMENT WITH DR. GARCIA DONE IN THE SAME WEEK DUE TO NEEDING TO GO OUT OF TOWN TO HELP HER DAD.     PATIENT  IS REQUESTING A CALL BACK.

## 2023-11-16 ENCOUNTER — HOSPITAL ENCOUNTER (OUTPATIENT)
Facility: SURGERY CENTER | Age: 68
Setting detail: HOSPITAL OUTPATIENT SURGERY
Discharge: HOME | End: 2023-11-16
Attending: ANESTHESIOLOGY | Admitting: ANESTHESIOLOGY
Payer: MEDICARE

## 2023-11-16 ENCOUNTER — HOSPITAL ENCOUNTER (OUTPATIENT)
Dept: GENERAL RADIOLOGY | Facility: SURGERY CENTER | Age: 68
Setting detail: HOSPITAL OUTPATIENT SURGERY
End: 2023-11-16
Payer: MEDICARE

## 2023-11-16 VITALS
DIASTOLIC BLOOD PRESSURE: 95 MMHG | TEMPERATURE: 98.7 F | HEIGHT: 59 IN | WEIGHT: 107 LBS | HEART RATE: 78 BPM | SYSTOLIC BLOOD PRESSURE: 147 MMHG | RESPIRATION RATE: 16 BRPM | OXYGEN SATURATION: 99 % | BODY MASS INDEX: 21.57 KG/M2

## 2023-11-16 DIAGNOSIS — M48.02 CERVICAL STENOSIS OF SPINAL CANAL: ICD-10-CM

## 2023-11-16 DIAGNOSIS — Z41.9 SURGERY, ELECTIVE: ICD-10-CM

## 2023-11-16 DIAGNOSIS — M54.12 CERVICAL RADICULOPATHY: ICD-10-CM

## 2023-11-16 PROCEDURE — 25510000001 IOPAMIDOL 61 % SOLUTION 30 ML VIAL: Performed by: ANESTHESIOLOGY

## 2023-11-16 PROCEDURE — 77002 NEEDLE LOCALIZATION BY XRAY: CPT

## 2023-11-16 PROCEDURE — 25010000002 BUPIVACAINE (PF) 0.5 % SOLUTION 10 ML VIAL: Performed by: ANESTHESIOLOGY

## 2023-11-16 PROCEDURE — 25010000002 METHYLPREDNISOLONE PER 80 MG: Performed by: ANESTHESIOLOGY

## 2023-11-16 PROCEDURE — 76000 FLUOROSCOPY <1 HR PHYS/QHP: CPT

## 2023-11-16 PROCEDURE — 62321 NJX INTERLAMINAR CRV/THRC: CPT | Performed by: ANESTHESIOLOGY

## 2023-11-16 RX ORDER — SODIUM CHLORIDE 0.9 % (FLUSH) 0.9 %
10 SYRINGE (ML) INJECTION AS NEEDED
Status: DISCONTINUED | OUTPATIENT
Start: 2023-11-16 | End: 2023-11-16 | Stop reason: HOSPADM

## 2023-11-16 RX ORDER — SODIUM CHLORIDE 0.9 % (FLUSH) 0.9 %
10 SYRINGE (ML) INJECTION EVERY 12 HOURS SCHEDULED
Status: DISCONTINUED | OUTPATIENT
Start: 2023-11-16 | End: 2023-11-16 | Stop reason: HOSPADM

## 2023-11-16 NOTE — OP NOTE
Cervical Epidural Steroid Injection @ C5-C6  [facility]    PREOPERATIVE DIAGNOSIS:  Cervical Degenerative Disc Disease, Cervical Spinal Stenosis, and Right Cervical Radiculopathy  POSTOPERATIVE DIAGNOSIS:  Same as preop diagnosis    PROCEDURE:   Cervical Epidural Steroid Injection, Therapeutic Translaminar Injection, with epidurogram, at  C5-C6 level    PRE-PROCEDURE DISCUSSION WITH PATIENT:    Risks and complications were discussed with the patient prior to starting the procedure and informed consent was obtained.  We discussed various topics including but not limited to bleeding, infection, injury, paralysis, nerve injury, dural puncture, coma, death, worsening of clinical picture, lack of pain relief, and postprocedural soreness.    SURGEON:  Chidi Portillo MD    REASON FOR PROCEDURE:   Degenerative changes are noted in the area., Stenotic area is noted, and is likely contributing to this chronic &/or recurrent pain. , and Radiating pattern of pain is likely consistent with degenerative changes in the area.    SEDATION:  Patient declined administration of moderate sedation   and , but an IV access was obtained for safety.  ANESTHETIC:  Marcaine 0.25%  STEROID:   Methylprednisolone (DEPO MEDROL) 80mg/ml    DESCRIPTON OF PROCEDURE:    After obtaining informed consent, I.V. was started in the preop area.   The patient was taken to the operating room and placed in the prone position.  EKG, blood pressure, and pulse oximeter were monitored throughout,  by the RN under my guidance. All pressure points were well padded.  The cervicothoracic spine area was prepped with Chloraprep and draped in a sterile fashion.  Under fluoroscopic guidance, the aforementioned interlaminar space was identified. Skin and subcutaneous tissues were anesthetized with 1% lidocaine in the middle of the space. A Tuohy needle was introduced through the skin and advanced to this interlaminar space and into the epidural space  under fluoroscopic guidance and verified with loss-of-resistance technique to air.  After confirming the position of the needle with the fluoroscope with all the views, and after aspiration was confirmed negative for blood and CSF, 1.5 mL of Omnipaque was injected.  After seeing appropriate epidurogram with lateral and PA views, a total of 3 cc solution was injected, consisting of 1cc of local anesthetic as above, with normal saline and injectable steroid as above.     ESTIMATED BLOOD LOSS:  <5 mL  SPECIMENS:  None    COMPLICATIONS:     No complications were noted., There was no indication of vascular uptake on live injection of contrast dye., There was no indication of intrathecal uptake on live injection of contrast dye., There was not any evidence of dural puncture.  , and The patient did not have any signs of postprocedure numbness nor weakness.    TOLERANCE & DISCHARGE CONDITION:    The patient tolerated the procedure well.  The patient was transported to the recovery area without difficulties.  The patient was discharged to home under the care of family in stable and satisfactory condition.    PLAN OF CARE:  The patient was given our standard instruction sheet.  The patient will Return to clinic 4-6 wks.  The patient will resume all medications as per the medication reconciliation sheet.

## 2023-11-16 NOTE — DISCHARGE INSTRUCTIONS
[facility] - Post-procedure Instructions          --  While there are no absolute restrictions, it is recommended that you do not perform strenuous activity today. In the morning, you may resume your level of activity as before your block.    --  If you have a band-aid at your injection site, please remove it later today. Observe the area for any redness, swelling, pus-like drainage, or a temperature over 101°. If any of these symptoms occur, please call your doctor at [phone]. If after office hours, leave a message and the on-call provider will return your call.    --  Ice may be applied to your injection site. It is recommended you avoid direct heat (heating pad; hot tub) for 1-2 days.    --  Call [facility] at [phone] if you experience persistent headache, persistent bleeding from the injection site, or severe pain not relieved by heat or oral medication.    --  Do not make important decisions today.    --  Due to the effects of the block and/or the I.V. Sedation, DO NOT drive or operate hazardous machinery for 12 hours.  Local anesthetics may cause numbness after procedure and precautions must be taken with regards to operating equipment as well as with walking, even if ambulating with assistance of another person or with an assistive device.    --  Do not drink alcohol for 12 hours.    -- You may return to work tomorrow, or as directed by your referring doctor.    --  Occasionally you may notice a slight increase in your pain after the procedure. This should start to improve within the next 24-48 hours. Radiofrequency ablation procedure pain may last 3-4 weeks.    --  It may take as long as 3-4 days before you notice a gradual improvement in your pain and/or other symptoms.    -- You may continue to take your prescribed pain medication as needed.    --  Some normal possible side effects of steroid use could include fluid retention, increased blood sugar, dull headache,  increased sweating, increased appetite, mood swings and flushing.    --  Diabetics are recommended to watch their blood glucose level closely for 24-48 hours after the injection.    --  Must stay in PACU for 20 min upon arrival and prove no leg weakness before being discharged.    --  IN THE EVENT OF A LIFE THREATENING EMERGENCY, (CHEST PAIN, BREATHING DIFFICULTIES, PARALYSIS…) YOU SHOULD GO TO YOUR NEAREST EMERGENCY ROOM.    --  You should be contacted by our office within 2-3 days to schedule follow up or next appointment date.  If not contacted within 7 days, please call the office at [phone]

## 2023-11-16 NOTE — H&P
Brief Pre-procedural / Pre-operative H&P        -----    Pertinent Diagnosis:   Right cervical radiculopathy and cervical spinal stenosis cervical degenerative disc disease    Proposed Procedure: Cervical epidural steroid injection  Likely at C5-C6      Subjective   Nanci Umanzor is a 68 y.o. female  who presents for intervention.  She has a history of neck pain.      History of Present Illness     Has been dealing with the neck pain for about a year.  Right side of the neck and there is radiating through the right shoulder and down the right arm especially posteriorly.  Prolonged standing and sitting and prolonged activities will flared up and it can flare up on its own without a specific triggering event.  She continues a home exercise and did not have a lot of improvement with physical therapy.  She has not tolerated some opioids well.  She had modest relief from a cervical epidural several months ago at another facility but of note it was not performed at her worst level of pathology.    -------    The following portions of the patient's history were reviewed and updated as appropriate: allergies, current medications, past family history, past medical history, past social history, past surgical history and problem list.    Allergies   Allergen Reactions    Clarithromycin Shortness Of Breath and Palpitations    Fluarix [Influenza Virus Vaccine] Myalgia    Pneumococcal Vaccines Swelling         Current Facility-Administered Medications:     diazePAM (VALIUM) tablet 10 mg, 10 mg, Oral, Once PRN, Chidi Portillo MD    sodium chloride 0.9 % flush 10 mL, 10 mL, Intravenous, Q12H, Chidi Portillo MD    sodium chloride 0.9 % flush 10 mL, 10 mL, Intravenous, PRN, Chidi Portillo MD    No current facility-administered medications on file prior to encounter.     Current Outpatient Medications on File Prior to Encounter   Medication Sig Dispense Refill    albuterol sulfate  (90 Base) MCG/ACT inhaler  INHALE TWO PUFFS BY MOUTH EVERY 4 HOURS AS NEEDED FOR WHEEZING (Patient taking differently: Inhale 2 puffs 4 (Four) Times a Day.) 18 g 5    atorvastatin (LIPITOR) 40 MG tablet TAKE ONE TABLET BY MOUTH DAILY 90 tablet 1    BIOTIN PO Take 1 capsule by mouth Daily.      calcium citrate-vitamin d (CITRACAL) 200-250 MG-UNIT tablet tablet Take 1 tablet by mouth Daily.      Cholecalciferol (Vitamin D) 50 MCG (2000 UT) tablet Take 1 tablet by mouth Daily.      Multiple Vitamins-Minerals (CENTRUM SILVER ADULT 50+ PO) Take 1 tablet by mouth Daily.      Omega-3 Fatty Acids (FISH OIL PO) Take 1 capsule by mouth Daily.      pantoprazole (PROTONIX) 40 MG EC tablet Take 1 tablet by mouth Daily. 30 tablet 3    tretinoin (RETIN-A) 0.05 % cream Apply  topically Every Night. (Patient taking differently: Apply 1 application  topically to the appropriate area as directed Every Other Day.) 20 g 1    Turmeric (QC TUMERIC COMPLEX PO) Take  by mouth.      vitamin C (ASCORBIC ACID) 500 MG tablet Take 2 tablets by mouth Daily.      vitamin E 400 UNIT capsule Take 1 capsule by mouth Daily.      albuterol (PROVENTIL) (2.5 MG/3ML) 0.083% nebulizer solution Take 2.5 mg by nebulization Every 4 (Four) Hours As Needed for Wheezing. 3 mL 2    fluticasone (FLONASE) 50 MCG/ACT nasal spray 2 sprays into the nostril(s) as directed by provider Daily for 14 days. 16 g 11    Fluticasone-Salmeterol (ADVAIR/WIXELA) 250-50 MCG/ACT DISKUS INHALE ONE PUFF BY MOUTH TWICE A DAY (Patient taking differently: Inhale 1 puff Daily.) 60 each 11    Ibuprofen 3 %, Gabapentin 10 %, Baclofen 2 %, lidocaine 4 %, Ketamine HCl 4 % Apply 1-2 g topically to the appropriate area as directed 3 (Three) to 4 (Four) times daily. 90 g 0    Multiple Vitamins-Minerals (ZINC PO) Take  by mouth.      Prolia 60 MG/ML solution prefilled syringe syringe INJECT 1 ML UNDER THE SKIN INTO THE APPROPRIATE AREA EVERY 6 MONTHS ONE TIME FOR 1 DOSE AS DIRECTED. 1 mL 2    TURMERIC PO Take  by mouth.       Zinc 50 MG capsule          Patient Active Problem List   Diagnosis    Cervical nerve root disorder    Essential hypertension    Hyperlipidemia    Reactive airway disease without complication    Colon polyps    Family history of breast cancer    Hypovitaminosis D    Age-related osteoporosis with current pathological fracture    Closed compression fracture of fifth lumbar vertebra    Bunion    History of compression fracture of spine    Diverticulitis    COPD (chronic obstructive pulmonary disease) with chronic bronchitis    Low back pain    Neck pain    History of adenomatous polyp of colon    Personal history of tobacco use, presenting hazards to health    Colitis with rectal bleeding    Heart palpitations    Allergic    Post-menopausal    Cervical stenosis of spinal canal    Cervical radiculopathy       Past Medical History:   Diagnosis Date    Allergic 's    Ankle fracture     Rt   Lt     Ankylosing spondylitis of site in spine     Anxiety     Arthritis     Asthma     Cancer 1992    Cervical cone biopsy for Stage II abnormality    Cataract 2017    Cervical disc disorder     Cervical radiculopathy 10/3/2023    Chronic pain disorder     Clavicle fracture     10/21/1970    Colon polyp     COPD (chronic obstructive pulmonary disease) 2006    Depression     Diverticulitis of colon 2019    Diverticulosis     Fibula fracture     left 2002    GERD (gastroesophageal reflux disease) 2016    Glaucoma 2006    Heart murmur     Hyperlipidemia     Hypertension     Joint pain     Low back pain 1980s    Lumbosacral disc disease     Neck pain     Osteoarthritis     Osteopenia 2016    Pelvis fracture     10/21/1970    Pneumonia     Spinal stenosis     Ulcerative colitis 21    Visual impairment Since 1yrs of age    Wrist fracture     2007       Past Surgical History:   Procedure Laterality Date    CERVICAL CONIZATION       SECTION      ,,1992    COLONOSCOPY N/A 2017    Procedure:  COLONOSCOPY TO CECUM with cold polypectomy;  Surgeon: Alo Staton Jr., MD;  Location:  [facility];  Service:     COLONOSCOPY N/A 09/30/2022    Procedure: COLONOSCOPY TO CECUM WITH COLD BIOPSY POLYPECTOMY;  Surgeon: Chuck Servin MD;  Location: [facility];  Service: Gastroenterology;  Laterality: N/A;  PRE: HX COLON POLYPS  POST: DIVERTICULOSIS, POLYP    ENDOMETRIAL ABLATION  2003    EPIDURAL BLOCK      EYE SURGERY  2006    FINGER SURGERY      FRACTURE SURGERY  2002    HEMORRHOIDECTOMY  2015    TRIGGER POINT INJECTION      TUBAL ABDOMINAL LIGATION  1992       Family History   Problem Relation Age of Onset    Heart disease Mother         Mechanical aortic valve, several heart attacks    Hypertension Mother     Hyperlipidemia Mother     Rheum arthritis Mother     Arthritis Mother     Colon polyps Mother     Stroke Mother     Osteoporosis Mother     Seizures Mother     Diabetes Father         Type I    Hyperlipidemia Father     Hypertension Father     Heart disease Father         Afib    Thyroid disease Father     Hearing loss Father         Hearing aid    Cancer Sister     Depression Sister     Glaucoma Sister     Miscarriages / Stillbirths Sister     Vision loss Sister     Crohn's disease Sister     Hypertension Brother     Lung disease Brother     Asthma Brother     Hypertension Brother     Hypertension Brother     Heart disease Maternal Aunt         Afib    Rheum arthritis Maternal Aunt     Arthritis Maternal Aunt     Heart disease Paternal Aunt     Heart disease Maternal Grandmother     Hyperlipidemia Maternal Grandmother     Rheum arthritis Maternal Grandmother     Stroke Maternal Grandmother     Arthritis Maternal Grandmother     Hypertension Maternal Grandmother     Osteoporosis Maternal Grandmother     Heart disease Maternal Grandfather     Cancer Maternal Grandfather         Pancreatic    Stomach cancer Maternal Grandfather     Heart disease Paternal Grandmother     Hyperlipidemia Paternal  "Grandmother     Stroke Paternal Grandmother     Hypertension Paternal Grandmother     Heart disease Paternal Grandfather     Cancer Paternal Grandfather         Lung    Lung disease Daughter     Asthma Daughter     Hyperlipidemia Maternal Uncle     Hyperlipidemia Brother     Hyperlipidemia Daughter     Stroke Maternal Aunt     Malig Hyperthermia Neg Hx        Social History     Socioeconomic History    Marital status:     Number of children: 3    Years of education: AS   Tobacco Use    Smoking status: Former     Packs/day: 1.00     Years: 15.00     Additional pack years: 0.00     Total pack years: 15.00     Types: Cigarettes     Start date: 1994     Quit date: 2014     Years since quittin.6    Smokeless tobacco: Never    Tobacco comments:     Started smoking after separation from my .   Vaping Use    Vaping Use: Never used   Substance and Sexual Activity    Alcohol use: Yes     Alcohol/week: 2.0 standard drinks of alcohol     Types: 2 Glasses of wine per week     Comment: Red wine    Drug use: Never    Sexual activity: Not Currently     Partners: Male     Birth control/protection: Post-menopausal       -------       Review of Systems  No Fever, No Chills, No ear pain, No sinus pressure or drainage, No eye pain or drainage, No cough, No SOB, No chest tightness nor chest pain, no palpitations.          Vitals:    23 1320   BP: 126/89   BP Location: Left arm   Pulse: 83   Resp: 16   Temp: 97.3 °F (36.3 °C)   TempSrc: Temporal   SpO2: 98%   Weight: 48.5 kg (107 lb)   Height: 148.6 cm (58.5\")         Objective   Physical Exam  VSS, NNR, NCAT, NMNA, NRD, AAOx3.      -------    Assessment & Plan:  - as noted above, the stated intervention is indicated  - Follow-up plan will be noted in the operative report        OV 1 mo      EMR Dragon/Transcription disclaimer:   Typed items in this encounter note may have been created by electronic transcription/translation software which converts spoken " language to printed text. The electronic translation of spoken language may permit erroneous, or at times, nonsensical words or phrases to be inadvertently transcribed; Although I have reviewed the note for such errors, some may still exist.

## 2023-11-19 ENCOUNTER — HOSPITAL ENCOUNTER (OUTPATIENT)
Facility: HOSPITAL | Age: 68
Discharge: HOME | End: 2023-11-19
Admitting: INTERNAL MEDICINE
Payer: MEDICARE

## 2023-11-19 DIAGNOSIS — R91.8 ABNORMAL CT LUNG SCREENING: ICD-10-CM

## 2023-11-19 PROCEDURE — 71250 CT THORAX DX C-: CPT

## 2023-11-27 RX ORDER — ALBUTEROL SULFATE 90 UG/1
AEROSOL, METERED RESPIRATORY (INHALATION)
Qty: 8.5 G | Refills: 0 | Status: SHIPPED | OUTPATIENT
Start: 2023-11-27

## 2023-11-28 ENCOUNTER — OFFICE VISIT (OUTPATIENT)
Dept: PAIN MEDICINE | Facility: CLINIC | Age: 68
End: 2023-11-28
Payer: MEDICARE

## 2023-11-28 ENCOUNTER — PREP FOR SURGERY (OUTPATIENT)
Dept: SURGERY | Facility: SURGERY CENTER | Age: 68
End: 2023-11-28
Payer: MEDICARE

## 2023-11-28 VITALS
SYSTOLIC BLOOD PRESSURE: 118 MMHG | HEIGHT: 59 IN | OXYGEN SATURATION: 98 % | WEIGHT: 106.8 LBS | DIASTOLIC BLOOD PRESSURE: 82 MMHG | HEART RATE: 74 BPM | TEMPERATURE: 97.3 F | BODY MASS INDEX: 21.53 KG/M2

## 2023-11-28 DIAGNOSIS — M25.511 ACUTE PAIN OF RIGHT SHOULDER: ICD-10-CM

## 2023-11-28 DIAGNOSIS — M89.8X1 CHRONIC SCAPULAR PAIN: ICD-10-CM

## 2023-11-28 DIAGNOSIS — M50.30 DISC DISEASE, DEGENERATIVE, CERVICAL: Primary | ICD-10-CM

## 2023-11-28 DIAGNOSIS — G89.29 CHRONIC SCAPULAR PAIN: ICD-10-CM

## 2023-11-28 DIAGNOSIS — G89.29 CHRONIC SCAPULAR PAIN: Primary | ICD-10-CM

## 2023-11-28 DIAGNOSIS — M89.8X1 CHRONIC SCAPULAR PAIN: Primary | ICD-10-CM

## 2023-11-28 DIAGNOSIS — M48.02 CERVICAL STENOSIS OF SPINAL CANAL: ICD-10-CM

## 2023-11-28 DIAGNOSIS — M54.12 CERVICAL RADICULOPATHY: ICD-10-CM

## 2023-11-28 PROCEDURE — 1159F MED LIST DOCD IN RCRD: CPT

## 2023-11-28 PROCEDURE — 1160F RVW MEDS BY RX/DR IN RCRD: CPT

## 2023-11-28 PROCEDURE — 3074F SYST BP LT 130 MM HG: CPT

## 2023-11-28 PROCEDURE — 3079F DIAST BP 80-89 MM HG: CPT

## 2023-11-28 PROCEDURE — 1125F AMNT PAIN NOTED PAIN PRSNT: CPT

## 2023-11-28 PROCEDURE — 99214 OFFICE O/P EST MOD 30 MIN: CPT

## 2023-11-28 RX ORDER — GABAPENTIN 300 MG/1
CAPSULE ORAL
Qty: 90 CAPSULE | Refills: 0 | Status: SHIPPED | OUTPATIENT
Start: 2023-11-28

## 2023-11-28 NOTE — PROGRESS NOTES
"CHIEF COMPLAINT  Neck pain    Subjective   Nanci Umanzor is a 68 y.o. female  who presents to the office for follow-up of procedure.  She completed a C5-C6 BESSIE on 11/16/23 performed by Dr. Portillo for management of neck pain. Patient reports 85% ongoing relief from the procedure. She reports that she is has better ROM.     Today pain is 9/10VAS in severity. Her main complain today is right shoulder pain and right posterior arm pain. Describes this pain as a nearly continuous aching, burning, and stabbing. Pain is worsened by prolonged prolonged position, elevation of right arm, reaching overhead, cold weather, and sometimes \"nothing specific, pain is just there\". Pain improves with rest/reposition, heat/ice, and OTC Tylenol/Aleve or CBD cream. She has completed in the past with minimal improvement. She continue with HEP and stretching as tolerated.      Unable to tolerate muscle relaxants      Past pain medications: Tramadol, Hydrocodone - makes patient feel agitated     Procedures:  11/16/23 - C5-C6 BESSIE - 85% ongoing relief  3/16/23 - C7-T1 BESSIE - Dr. Clifford ([facility]) - 50% relief x 4 weeks     Neck Pain   This is a chronic problem. The current episode started more than 1 year ago. The problem occurs constantly. The problem has been gradually worsening. The pain is associated with an unknown factor. The pain is present in the right side. The quality of the pain is described as aching, burning and stabbing. The pain is at a severity of 6/10. The pain is moderate. The symptoms are aggravated by position, twisting and bending. Stiffness is present In the morning. Associated symptoms include numbness (right shoulder, upper arm), tingling (right arm) and weakness (right hand). Pertinent negatives include no fever or headaches. She has tried acetaminophen, NSAIDs, heat and ice (HEP,) for the symptoms.   PEG Assessment   What number best describes your pain on average in the past week?9  What number " "best describes how, during the past week, pain has interfered with your enjoyment of life?10  What number best describes how, during the past week, pain has interfered with your general activity?  9    The following portions of the patient's history were reviewed and updated as appropriate: allergies, current medications, past family history, past medical history, past social history, past surgical history, and problem list.    Review of Systems   Constitutional:  Negative for chills, fatigue and fever.   Respiratory:  Negative for cough and shortness of breath.    Gastrointestinal:  Positive for abdominal pain. Negative for constipation and diarrhea.   Genitourinary:  Negative for difficulty urinating and dysuria.   Musculoskeletal:  Positive for neck pain (mild).   Neurological:  Positive for tingling (right arm), weakness (right hand) and numbness (right shoulder, upper arm). Negative for dizziness, light-headedness and headaches.   Psychiatric/Behavioral:  Positive for sleep disturbance.      I have reviewed and confirmed the accuracy of the ROS as documented by the MA/LPN/RN CARMELA Jordan     Vitals:    11/28/23 1140   BP: 118/82   BP Location: Left arm   Patient Position: Sitting   Pulse: 74   Temp: 97.3 °F (36.3 °C)   TempSrc: Temporal   SpO2: 98%   Weight: 48.4 kg (106 lb 12.8 oz)   Height: 148.6 cm (58.5\")   PainSc:   9   PainLoc: Shoulder     Objective   Physical Exam  Constitutional:       Appearance: Normal appearance.   HENT:      Head: Normocephalic.   Cardiovascular:      Rate and Rhythm: Normal rate and regular rhythm.   Pulmonary:      Effort: Pulmonary effort is normal.      Breath sounds: Normal breath sounds.   Musculoskeletal:        Arms:       Cervical back: Normal range of motion. Tenderness present. No pain with movement or muscular tenderness. Normal range of motion.      Comments: + cervical flexion/extension compression test  + Right sided Spurling test   Skin:     General: Skin " is warm and dry.      Capillary Refill: Capillary refill takes less than 2 seconds.   Neurological:      General: No focal deficit present.      Mental Status: She is alert and oriented to person, place, and time.   Psychiatric:         Mood and Affect: Mood normal. Mood is anxious.         Behavior: Behavior normal.         Thought Content: Thought content normal.         Cognition and Memory: Cognition normal.       Assessment & Plan   Diagnoses and all orders for this visit:    1. Disc disease, degenerative, cervical (Primary)    2. Cervical stenosis of spinal canal    3. Cervical radiculopathy  -     gabapentin (NEURONTIN) 300 MG capsule; Take 1 capsule by mouth at night x 7 days. If tolerating well, may increase to 1 capsule twice a day with goal of taking medication three times per day as tolerated.  Dispense: 90 capsule; Refill: 0  -     Ibuprofen 3 %, Gabapentin 10 %, Baclofen 2 %, lidocaine 4 %, Ketamine HCl 4 %; Apply 1-2 g topically to the appropriate area as directed 3 (Three) to 4 (Four) times daily.  Dispense: 90 g; Refill: 0    4. Chronic scapular pain  -     Ibuprofen 3 %, Gabapentin 10 %, Baclofen 2 %, lidocaine 4 %, Ketamine HCl 4 %; Apply 1-2 g topically to the appropriate area as directed 3 (Three) to 4 (Four) times daily.  Dispense: 90 g; Refill: 0    5. Acute pain of right shoulder    --- Right Supra-Scapular Nerve Block  Reviewed the procedure at length with the patient.  Included in the review was expectations, complications, risk and benefits.The procedure was described in detail and the risks, benefits and alternatives were discussed with the patient (including but not limited to: bleeding, infection, nerve damage, worsening of pain, inability to perform injection, paralysis, seizures, coma, no pain relief and death) who agreed to proceed.  Discussed the potential for sedation if warranted/wanted.  The procedure will plan to be performed at [facility] [place] with fluoroscopic  guidance(unless ultrasound is indicated) and could potentially have steroids and contrast dye used. Questions were answered and in a way the patient could understand.  Patient verbalized understanding and wishes to proceed.  This intervention will be ordered.  Discussed with patient that all procedures are part of a multimodal plan of care and include either formal PT or a home exercise program.  Patient has no evidence of coagulopathy or current infection.  --- Trial Gabapentin. The patient will be started on a trial of gabapentin. she will be started on gabapentin 300 mg once nightly for one week. Will then increase to twice daily for one week. Patient will then increase to three times daily as tolerated. Reviewed potential side effects, including somnolence and dizziness.   --- Trial compound pain cream. Prescription sent to [facility]  --- Will obtain right shoulder x-ray if no relief from procedure  --- Follow-up for procedure     Nanci Umanzor reports a pain score of 9.  Given her pain assessment as noted, treatment options were discussed and the following options were decided upon as a follow-up plan to address the patient's pain: continuation of current treatment plan for pain and prescription for non-opiod analgesics. Patient scheduled for a right supra-scapular nerve block.        ANUM REPORT  As part of the patient's treatment plan, I am prescribing controlled substances. The patient has been made aware of appropriate use of such medications, including potential risk of somnolence, limited ability to drive and/or work safely, and the potential for dependence or overdose. It has also been made clear that these medications are for use by this patient only, without concomitant use of alcohol or other substances unless prescribed.     Patient has completed prescribing agreement detailing terms of continued prescribing of controlled substances, including monitoring ANUM reports, urine drug  screening, and pill counts if necessary. The patient is aware that inappropriate use will results in cessation of prescribing such medications.    As the clinician, I personally reviewed the ANUM from 11/28/23 while the patient was in the office today.    History and physical exam exhibit continued safe and appropriate use of controlled substances.    Dictated utilizing Dragon dictation.

## 2023-11-30 ENCOUNTER — TRANSCRIBE ORDERS (OUTPATIENT)
Dept: SURGERY | Facility: SURGERY CENTER | Age: 68
End: 2023-11-30
Payer: MEDICARE

## 2023-11-30 DIAGNOSIS — Z41.9 SURGERY, ELECTIVE: Primary | ICD-10-CM

## 2023-11-30 PROBLEM — G89.29 CHRONIC SCAPULAR PAIN: Status: ACTIVE | Noted: 2023-11-28

## 2023-11-30 PROBLEM — M89.8X1 CHRONIC SCAPULAR PAIN: Status: ACTIVE | Noted: 2023-11-28

## 2023-11-30 PROBLEM — M25.511 ACUTE PAIN OF RIGHT SHOULDER: Status: ACTIVE | Noted: 2023-11-28

## 2023-12-11 DIAGNOSIS — E55.9 VITAMIN D DEFICIENCY: ICD-10-CM

## 2023-12-11 DIAGNOSIS — I10 ESSENTIAL HYPERTENSION: Primary | ICD-10-CM

## 2023-12-11 DIAGNOSIS — Z13.29 SCREENING FOR THYROID DISORDER: ICD-10-CM

## 2023-12-12 LAB
25(OH)D3+25(OH)D2 SERPL-MCNC: 62.9 NG/ML (ref 30–100)
ALBUMIN SERPL-MCNC: 4.7 G/DL (ref 3.9–4.9)
ALBUMIN/GLOB SERPL: 2.1 {RATIO} (ref 1.2–2.2)
ALP SERPL-CCNC: 50 IU/L (ref 44–121)
ALT SERPL-CCNC: 33 IU/L (ref 0–32)
AST SERPL-CCNC: 28 IU/L (ref 0–40)
BASOPHILS # BLD AUTO: 0.1 X10E3/UL (ref 0–0.2)
BASOPHILS NFR BLD AUTO: 1 %
BILIRUB SERPL-MCNC: 0.4 MG/DL (ref 0–1.2)
BUN SERPL-MCNC: 13 MG/DL (ref 8–27)
BUN/CREAT SERPL: 20 (ref 12–28)
CALCIUM SERPL-MCNC: 9.8 MG/DL (ref 8.7–10.3)
CHLORIDE SERPL-SCNC: 98 MMOL/L (ref 96–106)
CHOLEST SERPL-MCNC: 218 MG/DL (ref 100–199)
CO2 SERPL-SCNC: 23 MMOL/L (ref 20–29)
CREAT SERPL-MCNC: 0.64 MG/DL (ref 0.57–1)
EGFRCR SERPLBLD CKD-EPI 2021: 95 ML/MIN/1.73
EOSINOPHIL # BLD AUTO: 0.7 X10E3/UL (ref 0–0.4)
EOSINOPHIL NFR BLD AUTO: 10 %
ERYTHROCYTE [DISTWIDTH] IN BLOOD BY AUTOMATED COUNT: 12.4 % (ref 11.7–15.4)
GLOBULIN SER CALC-MCNC: 2.2 G/DL (ref 1.5–4.5)
GLUCOSE SERPL-MCNC: 90 MG/DL (ref 70–99)
HCT VFR BLD AUTO: 38.5 % (ref 34–46.6)
HDLC SERPL-MCNC: 90 MG/DL
HGB BLD-MCNC: 13 G/DL (ref 11.1–15.9)
IMM GRANULOCYTES # BLD AUTO: 0 X10E3/UL (ref 0–0.1)
IMM GRANULOCYTES NFR BLD AUTO: 0 %
LDLC SERPL CALC-MCNC: 113 MG/DL (ref 0–99)
LDLC/HDLC SERPL: 1.3 RATIO (ref 0–3.2)
LYMPHOCYTES # BLD AUTO: 1.8 X10E3/UL (ref 0.7–3.1)
LYMPHOCYTES NFR BLD AUTO: 25 %
MCH RBC QN AUTO: 32 PG (ref 26.6–33)
MCHC RBC AUTO-ENTMCNC: 33.8 G/DL (ref 31.5–35.7)
MCV RBC AUTO: 95 FL (ref 79–97)
MONOCYTES # BLD AUTO: 0.5 X10E3/UL (ref 0.1–0.9)
MONOCYTES NFR BLD AUTO: 7 %
NEUTROPHILS # BLD AUTO: 4.2 X10E3/UL (ref 1.4–7)
NEUTROPHILS NFR BLD AUTO: 57 %
PLATELET # BLD AUTO: 369 X10E3/UL (ref 150–450)
POTASSIUM SERPL-SCNC: 5.1 MMOL/L (ref 3.5–5.2)
PROT SERPL-MCNC: 6.9 G/DL (ref 6–8.5)
RBC # BLD AUTO: 4.06 X10E6/UL (ref 3.77–5.28)
SODIUM SERPL-SCNC: 136 MMOL/L (ref 134–144)
TRIGL SERPL-MCNC: 84 MG/DL (ref 0–149)
TSH SERPL DL<=0.005 MIU/L-ACNC: 2.77 UIU/ML (ref 0.45–4.5)
VLDLC SERPL CALC-MCNC: 15 MG/DL (ref 5–40)
WBC # BLD AUTO: 7.3 X10E3/UL (ref 3.4–10.8)

## 2023-12-13 ENCOUNTER — OFFICE VISIT (OUTPATIENT)
Dept: FAMILY MEDICINE CLINIC | Facility: CLINIC | Age: 68
End: 2023-12-13
Payer: MEDICARE

## 2023-12-13 VITALS
DIASTOLIC BLOOD PRESSURE: 82 MMHG | HEIGHT: 59 IN | RESPIRATION RATE: 16 BRPM | OXYGEN SATURATION: 95 % | HEART RATE: 67 BPM | WEIGHT: 109 LBS | SYSTOLIC BLOOD PRESSURE: 122 MMHG | TEMPERATURE: 97.9 F | BODY MASS INDEX: 21.97 KG/M2

## 2023-12-13 DIAGNOSIS — M54.50 CHRONIC BILATERAL LOW BACK PAIN WITHOUT SCIATICA: ICD-10-CM

## 2023-12-13 DIAGNOSIS — M54.12 CERVICAL RADICULOPATHY: ICD-10-CM

## 2023-12-13 DIAGNOSIS — I10 ESSENTIAL HYPERTENSION: ICD-10-CM

## 2023-12-13 DIAGNOSIS — M48.02 CERVICAL STENOSIS OF SPINAL CANAL: ICD-10-CM

## 2023-12-13 DIAGNOSIS — M80.00XS AGE-RELATED OSTEOPOROSIS WITH CURRENT PATHOLOGICAL FRACTURE, SEQUELA: ICD-10-CM

## 2023-12-13 DIAGNOSIS — E78.5 HYPERLIPIDEMIA, UNSPECIFIED HYPERLIPIDEMIA TYPE: Primary | ICD-10-CM

## 2023-12-13 DIAGNOSIS — J44.89 COPD (CHRONIC OBSTRUCTIVE PULMONARY DISEASE) WITH CHRONIC BRONCHITIS: ICD-10-CM

## 2023-12-13 DIAGNOSIS — E55.9 HYPOVITAMINOSIS D: ICD-10-CM

## 2023-12-13 DIAGNOSIS — M54.2 NECK PAIN: ICD-10-CM

## 2023-12-13 DIAGNOSIS — G89.29 CHRONIC BILATERAL LOW BACK PAIN WITHOUT SCIATICA: ICD-10-CM

## 2023-12-13 PROCEDURE — 99214 OFFICE O/P EST MOD 30 MIN: CPT | Performed by: INTERNAL MEDICINE

## 2023-12-13 PROCEDURE — 3074F SYST BP LT 130 MM HG: CPT | Performed by: INTERNAL MEDICINE

## 2023-12-13 PROCEDURE — 3079F DIAST BP 80-89 MM HG: CPT | Performed by: INTERNAL MEDICINE

## 2023-12-13 NOTE — PROGRESS NOTES
Subjective   Nanci Umanzor is a 68 y.o. female. Patient is here today for follow-up on her hypertension, hyperlipidemia, vitamin D deficiency, osteoporosis and neck pain.  Patient is receiving soon and another injection in the neck which helped quite a bit before.  She is generally stable otherwise  Chief Complaint   Patient presents with    Hypertension          Vitals:    12/13/23 1414   BP: 122/82   Pulse: 67   Resp: 16   Temp: 97.9 °F (36.6 °C)   SpO2: 95%     Body mass index is 22.39 kg/m².  The following portions of the patient's history were reviewed and updated as appropriate: allergies, current medications, past family history, past medical history, past social history, past surgical history and problem list.    Past Medical History:   Diagnosis Date    Allergic 1990's    Ankle fracture     Rt 2002  Lt 2004    Ankylosing spondylitis of site in spine     Anxiety     Arthritis     Asthma     Cancer 1992    Cervical cone biopsy for Stage II abnormality    Cataract 2017    Cervical disc disorder     Cervical radiculopathy 10/03/2023    Chronic pain disorder     Clavicle fracture     10/21/1970    Colon polyp     COPD (chronic obstructive pulmonary disease) 2006    Depression     Diverticulitis of colon 2019    Diverticulosis     Fibula fracture     left 2002    GERD (gastroesophageal reflux disease) 2016    Glaucoma 2006    Heart murmur     Hyperlipidemia     Hypertension     Joint pain     Low back pain 1980s    Lumbosacral disc disease     Neck pain     Osteoarthritis     Osteopenia 2016    Pelvis fracture     10/21/1970    Pneumonia     Reflex sympathetic dystrophy     Spinal stenosis     Ulcerative colitis 01/04/23    Visual impairment Since 1yrs of age    Wrist fracture     2007      Allergies   Allergen Reactions    Clarithromycin Shortness Of Breath and Palpitations    Fluarix [Influenza Virus Vaccine] Myalgia    Pneumococcal Vaccines Swelling      Social History     Socioeconomic History    Marital  status:     Number of children: 3    Years of education: AS   Tobacco Use    Smoking status: Former     Packs/day: 1.00     Years: 15.00     Additional pack years: 0.00     Total pack years: 15.00     Types: Cigarettes     Start date: 1994     Quit date: 2014     Years since quittin.7    Smokeless tobacco: Never    Tobacco comments:     Started smoking after separation from my .   Vaping Use    Vaping Use: Never used   Substance and Sexual Activity    Alcohol use: Yes     Alcohol/week: 2.0 standard drinks of alcohol     Types: 2 Glasses of wine per week     Comment: Red wine    Drug use: Never    Sexual activity: Not Currently     Partners: Male     Birth control/protection: Post-menopausal        Current Outpatient Medications:     albuterol (PROVENTIL) (2.5 MG/3ML) 0.083% nebulizer solution, Take 2.5 mg by nebulization Every 4 (Four) Hours As Needed for Wheezing., Disp: 3 mL, Rfl: 2    albuterol sulfate  (90 Base) MCG/ACT inhaler, INHALE TWO PUFFS BY MOUTH EVERY 4 HOURS AS NEEDED FOR WHEEZING, Disp: 8.5 g, Rfl: 0    amLODIPine (NORVASC) 5 MG tablet, TAKE ONE TABLET BY MOUTH DAILY, Disp: 90 tablet, Rfl: 3    atorvastatin (LIPITOR) 40 MG tablet, TAKE ONE TABLET BY MOUTH DAILY, Disp: 90 tablet, Rfl: 1    benazepril (LOTENSIN) 10 MG tablet, TAKE ONE TABLET BY MOUTH DAILY, Disp: 90 tablet, Rfl: 3    BIOTIN PO, Take 1 capsule by mouth Daily., Disp: , Rfl:     calcium citrate-vitamin d (CITRACAL) 200-250 MG-UNIT tablet tablet, Take 1 tablet by mouth Daily., Disp: , Rfl:     Cholecalciferol (Vitamin D) 50 MCG (2000 UT) tablet, Take 1 tablet by mouth Daily., Disp: , Rfl:     Fluticasone-Salmeterol (ADVAIR/WIXELA) 250-50 MCG/ACT DISKUS, INHALE ONE PUFF BY MOUTH TWICE A DAY (Patient taking differently: Inhale 1 puff Daily.), Disp: 60 each, Rfl: 11    gabapentin (NEURONTIN) 300 MG capsule, Take 1 capsule by mouth at night x 7 days. If tolerating well, may increase to 1 capsule twice a day  with goal of taking medication three times per day as tolerated., Disp: 90 capsule, Rfl: 0    Ibuprofen 3 %, Gabapentin 10 %, Baclofen 2 %, lidocaine 4 %, Ketamine HCl 4 %, Apply 1-2 g topically to the appropriate area as directed 3 (Three) to 4 (Four) times daily., Disp: 90 g, Rfl: 0    Multiple Vitamins-Minerals (CENTRUM SILVER ADULT 50+ PO), Take 1 tablet by mouth Daily., Disp: , Rfl:     Multiple Vitamins-Minerals (ZINC PO), Take  by mouth., Disp: , Rfl:     Omega-3 Fatty Acids (FISH OIL PO), Take 1 capsule by mouth Daily., Disp: , Rfl:     pantoprazole (PROTONIX) 40 MG EC tablet, Take 1 tablet by mouth Daily., Disp: 30 tablet, Rfl: 3    Prolia 60 MG/ML solution prefilled syringe syringe, INJECT 1 ML UNDER THE SKIN INTO THE APPROPRIATE AREA EVERY 6 MONTHS ONE TIME FOR 1 DOSE AS DIRECTED., Disp: 1 mL, Rfl: 2    tretinoin (RETIN-A) 0.05 % cream, Apply  topically Every Night. (Patient taking differently: Apply 1 application  topically to the appropriate area as directed Every Other Day.), Disp: 20 g, Rfl: 1    Turmeric (QC TUMERIC COMPLEX PO), Take  by mouth., Disp: , Rfl:     vitamin C (ASCORBIC ACID) 500 MG tablet, Take 2 tablets by mouth Daily., Disp: , Rfl:     fluticasone (FLONASE) 50 MCG/ACT nasal spray, 2 sprays into the nostril(s) as directed by provider Daily for 14 days., Disp: 16 g, Rfl: 11    vitamin E 400 UNIT capsule, Take 1 capsule by mouth Daily. (Patient not taking: Reported on 12/13/2023), Disp: , Rfl:     Zinc 50 MG capsule, , Disp: , Rfl:     Current Facility-Administered Medications:     albuterol (PROVENTIL HFA;VENTOLIN HFA) inhaler 2 puff, 2 puff, Inhalation, 4x Daily - RT, Ky Kasper MD     Objective     History of Present Illness     Review of Systems    Physical Exam  Vitals and nursing note reviewed.   Constitutional:       General: She is not in acute distress.     Appearance: Normal appearance. She is not ill-appearing.   HENT:      Head: Normocephalic and atraumatic.    Cardiovascular:      Rate and Rhythm: Normal rate and regular rhythm.      Heart sounds: Normal heart sounds.   Pulmonary:      Effort: Pulmonary effort is normal. No respiratory distress.      Breath sounds: Normal breath sounds. No wheezing or rales.   Skin:     General: Skin is warm and dry.   Neurological:      General: No focal deficit present.      Mental Status: She is alert and oriented to person, place, and time.   Psychiatric:         Mood and Affect: Mood normal.         Behavior: Behavior normal.         Assessment    ASSESSMENT CBC is essentially normal.  CMP was normal aside from an ALT of 33.  Lipid panel was significantly higher with a total cholesterol 218, HDL 90 and  but the patient admits to probably missing her medication 3 days a week.  TSH is quite normal at 2.77 and vitamin D level was excellent at 62.9.  #1-hypertension controlled on medication  #2-hyperlipidemia not controlled but the patient missing medication  #3-vitamin D deficiency corrected with supplement  #4-chronic neck pain, upcoming injection scheduled  #5-history of osteoporosis, on Prolia injections    Problems Addressed this Visit          Cardiac and Vasculature    Essential hypertension    Hyperlipidemia - Primary       Endocrine and Metabolic    Hypovitaminosis D       Musculoskeletal and Injuries    Age-related osteoporosis with current pathological fracture    Low back pain    Neck pain       Neuro    Cervical stenosis of spinal canal    Cervical radiculopathy       Pulmonary and Pneumonias    COPD (chronic obstructive pulmonary disease) with chronic bronchitis     Diagnoses         Codes Comments    Hyperlipidemia, unspecified hyperlipidemia type    -  Primary ICD-10-CM: E78.5  ICD-9-CM: 272.4     Essential hypertension     ICD-10-CM: I10  ICD-9-CM: 401.9     Hypovitaminosis D     ICD-10-CM: E55.9  ICD-9-CM: 268.9     Neck pain     ICD-10-CM: M54.2  ICD-9-CM: 723.1     Chronic bilateral low back pain without  sciatica     ICD-10-CM: M54.50, G89.29  ICD-9-CM: 724.2, 338.29     Age-related osteoporosis with current pathological fracture, sequela     ICD-10-CM: M80.00XS  ICD-9-CM: 905.5, 733.01     Cervical stenosis of spinal canal     ICD-10-CM: M48.02  ICD-9-CM: 723.0     Cervical radiculopathy     ICD-10-CM: M54.12  ICD-9-CM: 723.4     COPD (chronic obstructive pulmonary disease) with chronic bronchitis     ICD-10-CM: J44.89  ICD-9-CM: 491.20             PLAN the patient will continue current medicines as now.  I did recommend flu, RSV, COVID-19 and shingles immunizations but the patient wants to defer those.  She can be rechecked in 6 months with a CBC, CMP, lipid panel, and vitamin D level    There are no Patient Instructions on file for this visit.  Return in about 6 months (around 6/13/2024) for with labs.

## 2023-12-21 DIAGNOSIS — J06.9 ACUTE URI: ICD-10-CM

## 2023-12-22 RX ORDER — GUAIFENESIN 600 MG/1
TABLET, EXTENDED RELEASE ORAL
Qty: 28 TABLET | Refills: 0 | Status: SHIPPED | OUTPATIENT
Start: 2023-12-22

## 2023-12-22 NOTE — TELEPHONE ENCOUNTER
Rx Refill Note  Requested Prescriptions     Pending Prescriptions Disp Refills    Mucus Relief 600 MG 12 hr tablet [Pharmacy Med Name: MUCUS RELIEF  MG TABLET] 28 tablet 0     Sig: TAKE TWO TABLETS BY MOUTH TWICE A DAY FOR 7 DAYS      Last office visit with prescribing clinician: 12/30/2022   Last telemedicine visit with prescribing clinician: Visit date not found   Next office visit with prescribing clinician: Visit date not found                         Would you like a call back once the refill request has been completed: [] Yes [] No    If the office needs to give you a call back, can they leave a voicemail: [] Yes [] No    Florina Schuster MA  12/22/23, 10:04 EST

## 2023-12-28 PROCEDURE — S0260 H&P FOR SURGERY: HCPCS | Performed by: ANESTHESIOLOGY

## 2023-12-29 ENCOUNTER — HOSPITAL ENCOUNTER (OUTPATIENT)
Dept: GENERAL RADIOLOGY | Facility: SURGERY CENTER | Age: 68
Setting detail: HOSPITAL OUTPATIENT SURGERY
End: 2023-12-29
Payer: MEDICARE

## 2023-12-29 ENCOUNTER — HOSPITAL ENCOUNTER (OUTPATIENT)
Facility: SURGERY CENTER | Age: 68
Setting detail: HOSPITAL OUTPATIENT SURGERY
Discharge: HOME | End: 2023-12-29
Attending: ANESTHESIOLOGY | Admitting: ANESTHESIOLOGY
Payer: MEDICARE

## 2023-12-29 VITALS
HEART RATE: 70 BPM | HEIGHT: 58 IN | BODY MASS INDEX: 22.88 KG/M2 | TEMPERATURE: 98.2 F | OXYGEN SATURATION: 99 % | RESPIRATION RATE: 16 BRPM | DIASTOLIC BLOOD PRESSURE: 75 MMHG | WEIGHT: 109 LBS | SYSTOLIC BLOOD PRESSURE: 151 MMHG

## 2023-12-29 DIAGNOSIS — M25.511 ACUTE PAIN OF RIGHT SHOULDER: ICD-10-CM

## 2023-12-29 DIAGNOSIS — M89.8X1 CHRONIC SCAPULAR PAIN: ICD-10-CM

## 2023-12-29 DIAGNOSIS — Z41.9 SURGERY, ELECTIVE: ICD-10-CM

## 2023-12-29 DIAGNOSIS — G89.29 CHRONIC SCAPULAR PAIN: ICD-10-CM

## 2023-12-29 PROCEDURE — 25010000002 METHYLPREDNISOLONE PER 80 MG: Performed by: ANESTHESIOLOGY

## 2023-12-29 PROCEDURE — 25510000001 IOPAMIDOL 61 % SOLUTION 30 ML VIAL: Performed by: ANESTHESIOLOGY

## 2023-12-29 PROCEDURE — 77002 NEEDLE LOCALIZATION BY XRAY: CPT

## 2023-12-29 PROCEDURE — 64418 NJX AA&/STRD SPRSCAP NRV: CPT | Performed by: ANESTHESIOLOGY

## 2023-12-29 PROCEDURE — 77002 NEEDLE LOCALIZATION BY XRAY: CPT | Performed by: ANESTHESIOLOGY

## 2023-12-29 PROCEDURE — 25010000002 BUPIVACAINE (PF) 0.5 % SOLUTION 10 ML VIAL: Performed by: ANESTHESIOLOGY

## 2023-12-29 NOTE — DISCHARGE INSTRUCTIONS
[facility] - Post-procedure Instructions          --  While there are no absolute restrictions, it is recommended that you do not perform strenuous activity today. In the morning, you may resume your level of activity as before your block.    --  If you have a band-aid at your injection site, please remove it later today. Observe the area for any redness, swelling, pus-like drainage, or a temperature over 101°. If any of these symptoms occur, please call your doctor at [phone]. If after office hours, leave a message and the on-call provider will return your call.    --  Ice may be applied to your injection site. It is recommended you avoid direct heat (heating pad; hot tub) for 1-2 days.    --  Call [facility] at [phone] if you experience persistent headache, persistent bleeding from the injection site, or severe pain not relieved by heat or oral medication.    --  Do not make important decisions today.    --  Due to the effects of the block and/or the I.V. Sedation, DO NOT drive or operate hazardous machinery for 12 hours.  Local anesthetics may cause numbness after procedure and precautions must be taken with regards to operating equipment as well as with walking, even if ambulating with assistance of another person or with an assistive device.    --  Do not drink alcohol for 12 hours.    -- You may return to work tomorrow, or as directed by your referring doctor.    --  Occasionally you may notice a slight increase in your pain after the procedure. This should start to improve within the next 24-48 hours. Radiofrequency ablation procedure pain may last 3-4 weeks.    --  It may take as long as 3-4 days before you notice a gradual improvement in your pain and/or other symptoms.    -- You may continue to take your prescribed pain medication as needed.    --  Some normal possible side effects of steroid use could include fluid retention, increased blood sugar, dull headache,  increased sweating, increased appetite, mood swings and flushing.    --  Diabetics are recommended to watch their blood glucose level closely for 24-48 hours after the injection.    --  Must stay in PACU for 20 min upon arrival and prove no leg weakness before being discharged.    --  IN THE EVENT OF A LIFE THREATENING EMERGENCY, (CHEST PAIN, BREATHING DIFFICULTIES, PARALYSIS…) YOU SHOULD GO TO YOUR NEAREST EMERGENCY ROOM.    --  You should be contacted by our office within 2-3 days to schedule follow up or next appointment date.  If not contacted within 7 days, please call the office at [phone]

## 2023-12-29 NOTE — OP NOTE
right suprascapular nerve block/steroid injection - posterior approach  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      SURGEON:  Chidi Portillo MD    REASON FOR PROCEDURE:  right shoulder suprascapular radiation of pain    SEDATION:  Patient declined administration of moderate sedation    ANESTHETIC:  Marcaine 0.5%  STEROID:   Methylprednisolone (DEPO MEDROL) 80mg/ml    DESCRIPTON OF PROCEDURE:  After obtaining informed consent, IV access was obtained in the preoperative area.  The patient was transported to the operative suite and placed in supine position.  EKG, blood pressure, and pulse oximetry were monitored.  Any and all sedation given was administered by the RN under my direct supervision and guidance.   The hip area was prepped in a wide diameter with Chloraprep and draped in a sterile fashion.     Under fluoroscopy guidance the suprascapular notch was located. Superficial skin and subcutaneous tissue was anesthetized with 1% Lidocaine.  A  22-gauge spinal needle was inserted under fluoroscopic guidance and strict aseptic technique, coming into contact with the scapula. Needle was walked off the scapula superiorly and deep until entering the suprascapular notch.   After confirming the position of the needle with fluoroscopy and ultrasound, 1-3 mL of Omnipaque was injected and after seeing appropriate spread into the notch and posterior to the scapula, a total of 5mL of injectate including the above listed local anesthetic  and steroid was injected.  Vital signs remained stable.  The onset of analgesia was noted.    ESTIMATED BLOOD LOSS:  minimal  SPECIMENS:  None    COMPLICATIONS:   No complications were noted., There was no indication of vascular uptake on live injection of contrast dye., and The patient did not have any signs of postprocedure numbness nor weakness.    TOLERANCE & DISCHARGE CONDITION:    The patient tolerated the procedure well.  The patient was transported to the recovery area without difficulties.  The patient was discharged to home under the care of a chaperone and in satisfactory condition.    PLAN OF CARE:  The patient was given our standard instruction sheet.  The patient will Return to clinic 3-4 wks  The patient will resume all medications as per the medication reconciliation sheet.

## 2023-12-29 NOTE — H&P
Brief Pre-procedural / Pre-operative H&P        -----    Pertinent Diagnosis:   Suprascapular neuralgia on the right.  Chronic scapular area pain.    Proposed Procedure: Right suprascapular nerve block      Subjective   Nanci Umanzor is a 68 y.o. female  who presents for intervention.  She has a history of right shoulder area pain.      History of Present Illness     She complains of some pain in the right shoulder across the right scapula.  There also is some radiation posteriorly in the right arm.  Aching and burning pains and stabbing pains.  Continuous pain.  Right arm elevation will flare the pain as well as reaching overhead.  Constant pain with flares with activity.  She has had poor response to muscle relaxants as well as hydrocodone and tramadol.    It is notable that she has a history of some neck pain and radicular symptoms in cervical epidural steroid injection has given her 85% sustained relief from that pain, but did not help appreciably with this pain.    There are some paresthetic type symptoms into the right upper extremity on occasion.    My partner previously noted a right suprascapular area tenderness recently on exam in the office.    -------    The following portions of the patient's history were reviewed and updated as appropriate: allergies, current medications, past family history, past medical history, past social history, past surgical history and problem list.    Allergies   Allergen Reactions    Clarithromycin Shortness Of Breath and Palpitations    Fluarix [Influenza Virus Vaccine] Myalgia    Pneumococcal Vaccines Swelling       No current facility-administered medications for this encounter.    No current facility-administered medications on file prior to encounter.     Current Outpatient Medications on File Prior to Encounter   Medication Sig Dispense Refill    albuterol (PROVENTIL) (2.5 MG/3ML) 0.083% nebulizer solution Take 2.5 mg by nebulization Every 4 (Four) Hours As Needed  for Wheezing. 3 mL 2    albuterol sulfate  (90 Base) MCG/ACT inhaler INHALE TWO PUFFS BY MOUTH EVERY 4 HOURS AS NEEDED FOR WHEEZING 8.5 g 0    amLODIPine (NORVASC) 5 MG tablet TAKE ONE TABLET BY MOUTH DAILY 90 tablet 3    atorvastatin (LIPITOR) 40 MG tablet TAKE ONE TABLET BY MOUTH DAILY 90 tablet 1    benazepril (LOTENSIN) 10 MG tablet TAKE ONE TABLET BY MOUTH DAILY 90 tablet 3    BIOTIN PO Take 1 capsule by mouth Daily.      calcium citrate-vitamin d (CITRACAL) 200-250 MG-UNIT tablet tablet Take 1 tablet by mouth Daily.      Fluticasone-Salmeterol (ADVAIR/WIXELA) 250-50 MCG/ACT DISKUS INHALE ONE PUFF BY MOUTH TWICE A DAY (Patient taking differently: Inhale 1 puff Daily.) 60 each 11    gabapentin (NEURONTIN) 300 MG capsule Take 1 capsule by mouth at night x 7 days. If tolerating well, may increase to 1 capsule twice a day with goal of taking medication three times per day as tolerated. 90 capsule 0    Ibuprofen 3 %, Gabapentin 10 %, Baclofen 2 %, lidocaine 4 %, Ketamine HCl 4 % Apply 1-2 g topically to the appropriate area as directed 3 (Three) to 4 (Four) times daily. 90 g 0    Multiple Vitamins-Minerals (CENTRUM SILVER ADULT 50+ PO) Take 1 tablet by mouth Daily.      pantoprazole (PROTONIX) 40 MG EC tablet Take 1 tablet by mouth Daily. 30 tablet 3    Prolia 60 MG/ML solution prefilled syringe syringe INJECT 1 ML UNDER THE SKIN INTO THE APPROPRIATE AREA EVERY 6 MONTHS ONE TIME FOR 1 DOSE AS DIRECTED. 1 mL 2    tretinoin (RETIN-A) 0.05 % cream Apply  topically Every Night. (Patient taking differently: Apply 1 application  topically to the appropriate area as directed Every Other Day.) 20 g 1    Turmeric (QC TUMERIC COMPLEX PO) Take  by mouth.      Cholecalciferol (Vitamin D) 50 MCG (2000 UT) tablet Take 1 tablet by mouth Daily.      fluticasone (FLONASE) 50 MCG/ACT nasal spray 2 sprays into the nostril(s) as directed by provider Daily for 14 days. 16 g 11    Multiple Vitamins-Minerals (ZINC PO) Take  by  mouth.      Omega-3 Fatty Acids (FISH OIL PO) Take 1 capsule by mouth Daily.      vitamin C (ASCORBIC ACID) 500 MG tablet Take 2 tablets by mouth Daily.      vitamin E 400 UNIT capsule Take 1 capsule by mouth Daily. (Patient not taking: Reported on 12/13/2023)      Zinc 50 MG capsule  (Patient not taking: Reported on 12/13/2023)         Patient Active Problem List   Diagnosis    Cervical nerve root disorder    Essential hypertension    Hyperlipidemia    Reactive airway disease without complication    Colon polyps    Family history of breast cancer    Hypovitaminosis D    Age-related osteoporosis with current pathological fracture    Closed compression fracture of fifth lumbar vertebra    Bunion    History of compression fracture of spine    Diverticulitis    COPD (chronic obstructive pulmonary disease) with chronic bronchitis    Low back pain    Neck pain    History of adenomatous polyp of colon    Personal history of tobacco use, presenting hazards to health    Colitis with rectal bleeding    Heart palpitations    Allergic    Post-menopausal    Cervical stenosis of spinal canal    Cervical radiculopathy    Chronic scapular pain    Acute pain of right shoulder       Past Medical History:   Diagnosis Date    Allergic 1990's    Ankle fracture     Rt 2002  Lt 2004    Ankylosing spondylitis of site in spine     Anxiety     Arthritis     Asthma     Cancer 1992    Cervical cone biopsy for Stage II abnormality    Cataract 2017    Cervical disc disorder     Cervical radiculopathy 10/03/2023    Chronic pain disorder     Clavicle fracture     10/21/1970    Colon polyp     COPD (chronic obstructive pulmonary disease) 2006    Depression     Diverticulitis of colon 2019    Diverticulosis     Fibula fracture     left 2002    GERD (gastroesophageal reflux disease) 2016    Glaucoma 2006    Heart murmur     Hyperlipidemia     Hypertension     Joint pain     Low back pain 1980s    Lumbosacral disc disease     Neck pain      Osteoarthritis     Osteopenia 2016    Pelvis fracture     10/21/1970    Pneumonia     Reflex sympathetic dystrophy     Spinal stenosis     Ulcerative colitis 21    Visual impairment Since 1yrs of age    Wrist fracture     2007       Past Surgical History:   Procedure Laterality Date    CERVICAL CONIZATION      CERVICAL EPIDURAL N/A 2023    Procedure: CERVICAL EPIDURAL STEROID INJECTION CPT: 39367;  Surgeon: Chidi Portillo MD;  Location: [facility] MAIN OR;  Service: Pain Management;  Laterality: N/A;     SECTION      ,,    COLONOSCOPY N/A 2017    Procedure: COLONOSCOPY TO CECUM with cold polypectomy;  Surgeon: Alo Staton Jr., MD;  Location: [facility] ENDOSCOPY;  Service:     COLONOSCOPY N/A 2022    Procedure: COLONOSCOPY TO CECUM WITH COLD BIOPSY POLYPECTOMY;  Surgeon: Chuck Servin MD;  Location: [facility] ENDOSCOPY;  Service: Gastroenterology;  Laterality: N/A;  PRE: HX COLON POLYPS  POST: DIVERTICULOSIS, POLYP    ENDOMETRIAL ABLATION      EPIDURAL BLOCK      EYE SURGERY      FINGER SURGERY      FRACTURE SURGERY      HEMORRHOIDECTOMY      TRIGGER POINT INJECTION      TUBAL ABDOMINAL LIGATION         Family History   Problem Relation Age of Onset    Heart disease Mother         Mechanical aortic valve, several heart attacks    Hypertension Mother     Hyperlipidemia Mother     Rheum arthritis Mother     Arthritis Mother     Colon polyps Mother     Stroke Mother     Osteoporosis Mother     Seizures Mother     GI problems Mother     Diabetes Father         Type I    Hyperlipidemia Father     Hypertension Father     Heart disease Father         Afib    Thyroid disease Father     Hearing loss Father         Hearing aid    Cancer Sister     Depression Sister     Glaucoma Sister     Miscarriages / Stillbirths Sister     Vision loss Sister     Osteoporosis Sister     Crohn's disease Sister     GI problems Sister     Hypertension Brother     Lung disease  Brother     Asthma Brother     Hypertension Brother     Hypertension Brother     Heart disease Maternal Aunt         Afib    Rheum arthritis Maternal Aunt     Arthritis Maternal Aunt     Heart disease Paternal Aunt     Heart disease Maternal Grandmother     Hyperlipidemia Maternal Grandmother     Rheum arthritis Maternal Grandmother     Stroke Maternal Grandmother     Arthritis Maternal Grandmother     Hypertension Maternal Grandmother     Osteoporosis Maternal Grandmother     Heart disease Maternal Grandfather     Cancer Maternal Grandfather         Pancreatic    Stomach cancer Maternal Grandfather     Heart disease Paternal Grandmother     Hyperlipidemia Paternal Grandmother     Stroke Paternal Grandmother     Hypertension Paternal Grandmother     Heart disease Paternal Grandfather     Cancer Paternal Grandfather         Lung    Lung disease Daughter     Asthma Daughter     Hyperlipidemia Maternal Uncle     Hyperlipidemia Brother     Hyperlipidemia Daughter     Stroke Maternal Aunt     Malig Hyperthermia Neg Hx        Social History     Socioeconomic History    Marital status:     Number of children: 3    Years of education: AS   Tobacco Use    Smoking status: Former     Packs/day: 1.00     Years: 15.00     Additional pack years: 0.00     Total pack years: 15.00     Types: Cigarettes     Start date: 1994     Quit date: 2014     Years since quittin.7    Smokeless tobacco: Never    Tobacco comments:     Started smoking after separation from my .   Vaping Use    Vaping Use: Never used   Substance and Sexual Activity    Alcohol use: Yes     Alcohol/week: 2.0 standard drinks of alcohol     Types: 2 Glasses of wine per week     Comment: Red wine    Drug use: Never    Sexual activity: Not Currently     Partners: Male     Birth control/protection: Post-menopausal       -------       Review of Systems  No Fever, No Chills, No ear pain, No sinus pressure or drainage, No eye pain or drainage, No  "cough, No SOB, No chest tightness nor chest pain, no palpitations.          Vitals:    12/29/23 0814   BP: 132/77   BP Location: Left arm   Patient Position: Sitting   Pulse: 80   Resp: 18   Temp: 97.6 °F (36.4 °C)   TempSrc: Temporal   SpO2: 94%   Weight: 49.4 kg (109 lb)   Height: 147.3 cm (58\")         Objective   Physical Exam  VSS, NNR, NCAT, NMNA, NRD, AAOx3.  No shoulder blade lesions    -------    Assessment & Plan:  - as noted above, the stated intervention is indicated  - Follow-up plan will be noted in the operative report        Has a follow-up in a few weeks      EMR Dragon/Transcription disclaimer:   Typed items in this encounter note may have been created by electronic transcription/translation software which converts spoken language to printed text. The electronic translation of spoken language may permit erroneous, or at times, nonsensical words or phrases to be inadvertently transcribed; Although I have reviewed the note for such errors, some may still exist.      "

## 2024-01-16 ENCOUNTER — TELEPHONE (OUTPATIENT)
Dept: PAIN MEDICINE | Facility: CLINIC | Age: 69
End: 2024-01-16

## 2024-01-19 ENCOUNTER — TELEMEDICINE (OUTPATIENT)
Dept: PAIN MEDICINE | Facility: CLINIC | Age: 69
End: 2024-01-19
Payer: MEDICARE

## 2024-01-19 DIAGNOSIS — M89.8X1 CHRONIC SCAPULAR PAIN: ICD-10-CM

## 2024-01-19 DIAGNOSIS — M48.02 CERVICAL STENOSIS OF SPINAL CANAL: ICD-10-CM

## 2024-01-19 DIAGNOSIS — M50.30 DISC DISEASE, DEGENERATIVE, CERVICAL: Primary | ICD-10-CM

## 2024-01-19 DIAGNOSIS — G89.29 CHRONIC SCAPULAR PAIN: ICD-10-CM

## 2024-01-19 DIAGNOSIS — M25.511 ACUTE PAIN OF RIGHT SHOULDER: ICD-10-CM

## 2024-01-19 DIAGNOSIS — M54.12 CERVICAL RADICULOPATHY: ICD-10-CM

## 2024-01-19 PROCEDURE — 1125F AMNT PAIN NOTED PAIN PRSNT: CPT

## 2024-01-19 PROCEDURE — 99213 OFFICE O/P EST LOW 20 MIN: CPT

## 2024-01-19 PROCEDURE — 1160F RVW MEDS BY RX/DR IN RCRD: CPT

## 2024-01-19 PROCEDURE — 1159F MED LIST DOCD IN RCRD: CPT

## 2024-01-19 NOTE — PROGRESS NOTES
"TELEMEDICINE - VIDEO VISIT  You have chosen to receive care through a telehealth visit.  Do you consent to use a video/audio connection for your medical care today? Yes    Location of patient: Home  Location of Provider: [facility]  Anyone else present: No  Type of Technology used- ZOOM through use of Epic/MyChart visit    CHIEF COMPLAINT  Neck pain    Subjective   Nanci Umanzor is a 68 y.o. female  who presents for a video visit for follow-up of PROCEDURE. Patient had a Right supra-scapular nerve block performed by Dr. Portillo on 12/29/23 for management of right shoulder pain. Patient reports 95% ongoing relief from the procedure. She reports that her ROM has improved and that she has been able to be more active. She is currently helping care for her dad who is on hospice at this time.     Today pain is 1/10VAS in severity (severity varies based on activity level). She continues to have intermittent neck pain and numbness/tingling to right arm. Right shoulder pain has improved since the procedure. Describes this pain as an intermittent aching, burning, and stabbing. Pain is worsened by prolonged prolonged position, elevation of right arm, reaching overhead, cold weather, and sometimes \"nothing specific, pain is just there\". Pain improves with rest/reposition, heat/ice, and OTC Tylenol/Aleve daily or CBD cream. She has completed in the past with minimal improvement. She continue with HEP and stretching as tolerated.     She was taking Gabapentin 600mg daily prior to procedure. She continues with Gabapentin 300mg daily as needed and reports that this has been helpful to her pain. No adverse reactions to medication.     Unable to tolerate muscle relaxants      Past pain medications: Tramadol, Hydrocodone - makes patient feel agitated     Procedures:  12/29/23 - Right supra-scapular nerve block - 95% ongoing relief   11/16/23 - C5-C6 BESSIE - 85% ongoing relief  3/16/23 - C7-T1 BESSIE -  " Venessa ([facility]) - 50% relief x 4 weeks     Neck Pain   This is a chronic problem. The current episode started more than 1 year ago. The problem occurs intermittently. The problem has been waxing and waning. The pain is associated with an unknown factor. The pain is present in the right side. The quality of the pain is described as aching, burning and stabbing. The pain is at a severity of 1/10. The pain is moderate. The symptoms are aggravated by position, twisting and bending. Stiffness is present In the morning. Associated symptoms include numbness (right shoulder, upper arm), tingling (right arm) and weakness (right hand). Pertinent negatives include no fever or headaches. She has tried acetaminophen, NSAIDs, heat and ice (HEP,) for the symptoms.     Review of Pertinent Medical Data ---  RADIOLOGY REPORT     FACILITY:  [facility]   UNIT/AGE/GENDER: F.MRI  OP      AGE:68 Y          SEX:F   PATIENT NAME/:  SHERLYN KRISHNA LEE    1955   UNIT NUMBER:  [id number]   ACCOUNT NUMBER:  [id number]   ACCESSION NUMBER:  [id number]     MRI OF THE CERVICAL SPINE WITHOUT CONTRAST     DATE: 2023     COMPARISON: 2022     INDICATION: Cervical radiculopathy and chronic neck pain - to evaluate levels of canal and foraminal stenosis.        FINDINGS: Chronic 2 mm grade 1 degenerative anterolisthesis of C3 on C4. Alignment of the cervical spine is otherwise normal. There is solid bony fusion through the right C2-C3 facet joint and bony fusion between the C2 and C3 spinous processes. There is    moderate to severe disc narrowing and degenerative disc disease at C4-C5, C5-C6, and C6-C7. Vertebral body heights are normal. Chronic degenerative endplate changes at C4-C5, C5-C6, and C6-C7. There are degenerative changes at the anterior C1-C2   articulation with associated bone-on-bone articulation and bone edema. There are no areas of abnormal signal intensity in the  visualized spinal cord. There is no Chiari malformation.     C2-C3: Unremarkable disc. Right-sided facet fusion and hypertrophy. No central canal stenosis. Mild to moderate right neural foraminal narrowing. There is no left neural foraminal narrowing     C3-C4: Mild anterolisthesis. This bulge. Facet arthropathy, left greater than right. Mild central canal stenosis. Moderate left and mild-to-moderate right neural foraminal narrowing     C4-C5: Degenerative disc disease with broad-based posterior disc osteophyte complex and bilateral uncovertebral spurring. Bilateral facet arthropathy. Mild central canal stenosis with minimal indentation of the anterior spinal cord. Severe right and   moderate to severe left neural foraminal narrowing     C5-C6: Degenerative disease with broad-based posterior disc osteophyte complex and bilateral vertebral spurring. Mild central canal stenosis. Minimal indentation of the anterior spinal cord. Severe right and mild to moderate left neural foraminal   narrowing     C6-C7: Degenerative disease with small broad-based posterior disc osteophyte complex. Bilateral uncovertebral spurring. No central canal stenosis. Moderate right and mild left neural foraminal narrowing     C7-T1: Mild anterolisthesis due to moderate bilateral facet arthropathy. No central canal stenosis. Mild bilateral neural foraminal narrowing.     IMPRESSION:   1. No acute abnormalities.   2. Moderate to severe degenerative disc disease at C4-C5, C5-C6, and C6-C7.   3. Multilevel bilateral neural foraminal narrowing, greatest on the right at C4-C5 and C5-C6. Details above.   4. Severe degenerative changes at the anterior C1-C2 articulation with associated bone edema.     Dictated by: Dinesh Gray M.D.     Images and Report reviewed and interpreted by: Dinesh Gray M.D.     <PS><Electronically signed by: Dinesh Gray M.D.>  03/12/2023 0834     D: 03/12/2023 0824   T: 03/12/2023 0824   The following portions  of the patient's history were reviewed and updated as appropriate: allergies, current medications, past family history, past medical history, past social history, past surgical history, and problem list.    Review of Systems   Constitutional:  Negative for fever.   Musculoskeletal:  Positive for neck pain.   Neurological:  Positive for tingling (right arm), weakness (right hand) and numbness (right shoulder, upper arm). Negative for headaches.     I have reviewed and confirmed the accuracy of the ROS as documented by the MA/LPN/RN Aaliyah Gomes, APRN    Vitals:    01/19/24 1138   PainSc:   1   PainLoc: Neck     Objective   Physical Exam  Constitutional:       Appearance: Normal appearance.   HENT:      Head: Normocephalic.   Pulmonary:      Effort: Pulmonary effort is normal.   Musculoskeletal:      Cervical back: Normal range of motion.   Skin:     Capillary Refill: Capillary refill takes less than 2 seconds.   Neurological:      General: No focal deficit present.      Mental Status: She is alert and oriented to person, place, and time.   Psychiatric:         Mood and Affect: Mood normal.         Behavior: Behavior normal.         Thought Content: Thought content normal.         Cognition and Memory: Cognition normal.       Assessment & Plan   Diagnoses and all orders for this visit:    1. Disc disease, degenerative, cervical (Primary)    2. Cervical stenosis of spinal canal    3. Cervical radiculopathy    4. Chronic scapular pain    5. Acute pain of right shoulder    ----------------  Our practice is offering alternative &/or electronic methods to continue to follow our patients while at the same time further the efforts toward social distancing, in accordance with our organizational policies, professional societies' guidance, and gubernatorial mandates.  I support the Healthy at Home campaign and in this visit I have counseled the patient on our needs to limit in-person office visits and physical encounters  with medical facilities whenever possible.  I have also educated the patient on the medical necessities of maintaining social distancing while we continue to function during this crisis period.      The patient agreed to a Video Visit.  ----------------    --- Continue Gabapentin. No refill needed at this time.   --- Follow-up as needed for increased pain and/or to repeat procedures       ANUM REPORT  As part of the patient's treatment plan, I am prescribing controlled substances. The patient has been made aware of appropriate use of such medications, including potential risk of somnolence, limited ability to drive and/or work safely, and the potential for dependence or overdose. It has also been made clear that these medications are for use by this patient only, without concomitant use of alcohol or other substances unless prescribed.     Patient has completed prescribing agreement detailing terms of continued prescribing of controlled substances, including monitoring ANUM reports, urine drug screening, and pill counts if necessary. The patient is aware that inappropriate use will results in cessation of prescribing such medications.    As the clinician, I personally reviewed the ANUM from 1/19/24 while the patient was in the office today.    History and physical exam exhibit continued safe and appropriate use of controlled substances.  -------  EMR Dragon/Transcription disclaimer:   Much of this encounter note is an electronic transcription/translation of spoken language to printed text. The electronic translation of spoken language may permit erroneous, or at times, nonsensical words or phrases to be inadvertently transcribed; Although I have reviewed the note for such errors, some may still exist.       This document is intended for medical expert use only. Reading of this document by patients and/or patient's family without participating medical staff guidance may result in misinterpretation and  unintended morbidity.   Any interpretation of such data is the responsibility of the patient and/or family member responsible for the patient in concert with their primary or specialist providers, not to be left for sources of online searches such as [employer], Pay by [employer]pping ([employer]) or similar queries. Relying on these approaches to knowledge may result in misinterpretation, misguided goals of care and even death should patients or family members try recommendations outside of the realm of professional medical care in a supervised way.

## 2024-01-22 NOTE — TELEPHONE ENCOUNTER
Caller: [facility] [phone] [place], [place] [address]  AT [facility] [phone] [facility] [phone] FX    Relationship: Pharmacy    Best call back number: [phone]/ LORRAINE    Requested Prescriptions:   Requested Prescriptions     Pending Prescriptions Disp Refills    albuterol sulfate  (90 Base) MCG/ACT inhaler 8.5 g 0     Si puffs Every 4 (Four) Hours As Needed for Wheezing.        Pharmacy where request should be sent: [facility] [id number] [place], [place] - [address] [facility] AT [facility] [phone] [facility] [phone] FX     Last office visit with prescribing clinician: Visit date not found   Last telemedicine visit with prescribing clinician: Visit date not found   Next office visit with prescribing clinician: 2024     Additional details provided by patient: PATIENT IS OUT OF MEDICATION    Does the patient have less than a 3 day supply:  [x] Yes  [] No    Would you like a call back once the refill request has been completed: [] Yes [x] No    If the office needs to give you a call back, can they leave a voicemail: [] Yes [x] No    Colin Canales Rep   24 11:38 EST

## 2024-01-23 RX ORDER — ALBUTEROL SULFATE 90 UG/1
2 AEROSOL, METERED RESPIRATORY (INHALATION) EVERY 4 HOURS PRN
Qty: 8.5 G | Refills: 11 | Status: SHIPPED | OUTPATIENT
Start: 2024-01-23

## 2024-01-23 NOTE — TELEPHONE ENCOUNTER
Rx Refill Note  Requested Prescriptions     Pending Prescriptions Disp Refills    albuterol sulfate  (90 Base) MCG/ACT inhaler 8.5 g 0     Si puffs Every 4 (Four) Hours As Needed for Wheezing.      Last office visit with prescribing clinician: Visit date not found   Last telemedicine visit with prescribing clinician: Visit date not found   Next office visit with prescribing clinician: 2024                         Would you like a call back once the refill request has been completed: [] Yes [] No    If the office needs to give you a call back, can they leave a voicemail: [] Yes [] No    Florina Schuster MA  24, 12:05 EST

## 2024-02-05 RX ORDER — BENAZEPRIL HYDROCHLORIDE 10 MG/1
10 TABLET ORAL DAILY
Qty: 90 TABLET | Refills: 1 | Status: SHIPPED | OUTPATIENT
Start: 2024-02-05

## 2024-02-05 NOTE — TELEPHONE ENCOUNTER
Caller: Noé Nanci Ghanshyam    Relationship: Self    Best call back number: [phone]     Requested Prescriptions:   Requested Prescriptions     Pending Prescriptions Disp Refills    benazepril (LOTENSIN) 10 MG tablet 90 tablet 3     Sig: Take 1 tablet by mouth Daily.        Pharmacy where request should be sent:      [facility]    [address]NM[address]ONAL[address]    [phone]      Last office visit with prescribing clinician: Visit date not found   Last telemedicine visit with prescribing clinician: Visit date not found   Next office visit with prescribing clinician: 6/21/2024     Additional details provided by patient:     PATIENT IS WANTING A 7 DAY EMERGENCY ORDER.  SHE IS OUT OF STATE, HER FATHER PASSED AWAY AND SHE HAS BEEN OUT OF HER MEDICATION 2 DAYS ALREADY.        Does the patient have less than a 3 day supply:  [x] Yes  [] No    Would you like a call back once the refill request has been completed: [x] Yes [] No    If the office needs to give you a call back, can they leave a voicemail: [] Yes [] No    Colin Jordan Rep   02/05/24 14:00 EST

## 2024-02-08 RX ORDER — FLUTICASONE PROPIONATE AND SALMETEROL 250; 50 UG/1; UG/1
1 POWDER RESPIRATORY (INHALATION) 2 TIMES DAILY
Qty: 60 EACH | Refills: 11 | Status: SHIPPED | OUTPATIENT
Start: 2024-02-08

## 2024-02-20 DIAGNOSIS — G89.29 CHRONIC SCAPULAR PAIN: ICD-10-CM

## 2024-02-20 DIAGNOSIS — M54.12 CERVICAL RADICULOPATHY: ICD-10-CM

## 2024-02-20 DIAGNOSIS — M89.8X1 CHRONIC SCAPULAR PAIN: ICD-10-CM

## 2024-02-20 DIAGNOSIS — E78.01 FAMILIAL HYPERCHOLESTEROLEMIA: ICD-10-CM

## 2024-02-20 NOTE — TELEPHONE ENCOUNTER
Caller: Nanci Umanzor    Relationship: Self    Best call back number: [phone]    Requested Prescriptions:   Requested Prescriptions     Pending Prescriptions Disp Refills    albuterol sulfate  (90 Base) MCG/ACT inhaler 8.5 g 11     Sig: Inhale 2 puffs Every 4 (Four) Hours As Needed for Wheezing.    tretinoin (RETIN-A) 0.05 % cream 20 g 1     Sig: Apply  topically to the appropriate area as directed Every Night.    pantoprazole (PROTONIX) 40 MG EC tablet 30 tablet 3     Sig: Take 1 tablet by mouth Daily.    Ibuprofen 3 %, Gabapentin 10 %, Baclofen 2 %, lidocaine 4 %, Ketamine HCl 4 % 90 g 0     Sig: Apply 1-2 g topically to the appropriate area as directed 3 (Three) to 4 (Four) times daily.    gabapentin (NEURONTIN) 300 MG capsule 90 capsule 0     Sig: Take 1 capsule by mouth at night x 7 days. If tolerating well, may increase to 1 capsule twice a day with goal of taking medication three times per day as tolerated.    Fluticasone-Salmeterol (ADVAIR/WIXELA) 250-50 MCG/ACT DISKUS 60 each 11     Sig: Inhale 1 puff 2 (Two) Times a Day.    Cholecalciferol (Vitamin D) 50 MCG (2000 UT) tablet       Sig: Take 1 tablet by mouth Daily.    benazepril (LOTENSIN) 10 MG tablet 90 tablet 1     Sig: Take 1 tablet by mouth Daily.    atorvastatin (LIPITOR) 40 MG tablet 90 tablet 1     Sig: Take 1 tablet by mouth Daily.    amLODIPine (NORVASC) 5 MG tablet 90 tablet 3     Sig: Take 1 tablet by mouth Daily.        Pharmacy where request should be sent: [id number] [phone] [facility] [phone] FX     Last office visit with prescribing clinician: Visit date not found   Last telemedicine visit with prescribing clinician: Visit date not found   Next office visit with prescribing clinician: 6/21/2024     Additional details provided by patient: WILL NEED CALLED IN, NEW PHARMACY     Does the patient have less than a 3 day supply:  [x] Yes  [] No    Would you like a call  back once the refill request has been completed: [] Yes [x] No    If the office needs to give you a call back, can they leave a voicemail: [] Yes [] No    Christina Akins   02/20/24 09:37 EST

## 2024-02-21 RX ORDER — FLUTICASONE PROPIONATE AND SALMETEROL 250; 50 UG/1; UG/1
1 POWDER RESPIRATORY (INHALATION) 2 TIMES DAILY
Qty: 60 EACH | Refills: 11 | OUTPATIENT
Start: 2024-02-21

## 2024-02-21 RX ORDER — TRETINOIN 0.5 MG/G
CREAM TOPICAL NIGHTLY
Qty: 20 G | Refills: 1 | OUTPATIENT
Start: 2024-02-21

## 2024-02-21 RX ORDER — PANTOPRAZOLE SODIUM 40 MG/1
40 TABLET, DELAYED RELEASE ORAL DAILY
Qty: 30 TABLET | Refills: 3 | OUTPATIENT
Start: 2024-02-21

## 2024-02-21 RX ORDER — BENAZEPRIL HYDROCHLORIDE 10 MG/1
10 TABLET ORAL DAILY
Qty: 90 TABLET | Refills: 1 | OUTPATIENT
Start: 2024-02-21

## 2024-02-21 RX ORDER — ATORVASTATIN CALCIUM 40 MG/1
40 TABLET, FILM COATED ORAL DAILY
Qty: 90 TABLET | Refills: 1 | OUTPATIENT
Start: 2024-02-21

## 2024-02-21 RX ORDER — ALBUTEROL SULFATE 90 UG/1
2 AEROSOL, METERED RESPIRATORY (INHALATION) EVERY 4 HOURS PRN
Qty: 8.5 G | Refills: 11 | OUTPATIENT
Start: 2024-02-21

## 2024-02-21 RX ORDER — GABAPENTIN 300 MG/1
CAPSULE ORAL
Qty: 90 CAPSULE | Refills: 0 | OUTPATIENT
Start: 2024-02-21

## 2024-02-21 RX ORDER — CHOLECALCIFEROL (VITAMIN D3) 50 MCG
2000 TABLET ORAL DAILY
OUTPATIENT
Start: 2024-02-21

## 2024-02-21 RX ORDER — AMLODIPINE BESYLATE 5 MG/1
5 TABLET ORAL DAILY
Qty: 90 TABLET | Refills: 3 | OUTPATIENT
Start: 2024-02-21

## 2024-02-21 NOTE — TELEPHONE ENCOUNTER
Rx Refill Note  Requested Prescriptions     Pending Prescriptions Disp Refills    albuterol sulfate  (90 Base) MCG/ACT inhaler 8.5 g 11     Sig: Inhale 2 puffs Every 4 (Four) Hours As Needed for Wheezing.    tretinoin (RETIN-A) 0.05 % cream 20 g 1     Sig: Apply  topically to the appropriate area as directed Every Night.    pantoprazole (PROTONIX) 40 MG EC tablet 30 tablet 3     Sig: Take 1 tablet by mouth Daily.    Ibuprofen 3 %, Gabapentin 10 %, Baclofen 2 %, lidocaine 4 %, Ketamine HCl 4 % 90 g 0     Sig: Apply 1-2 g topically to the appropriate area as directed 3 (Three) to 4 (Four) times daily.    gabapentin (NEURONTIN) 300 MG capsule 90 capsule 0     Sig: Take 1 capsule by mouth at night x 7 days. If tolerating well, may increase to 1 capsule twice a day with goal of taking medication three times per day as tolerated.    Fluticasone-Salmeterol (ADVAIR/WIXELA) 250-50 MCG/ACT DISKUS 60 each 11     Sig: Inhale 1 puff 2 (Two) Times a Day.    Cholecalciferol (Vitamin D) 50 MCG (2000 UT) tablet       Sig: Take 1 tablet by mouth Daily.    benazepril (LOTENSIN) 10 MG tablet 90 tablet 1     Sig: Take 1 tablet by mouth Daily.    atorvastatin (LIPITOR) 40 MG tablet 90 tablet 1     Sig: Take 1 tablet by mouth Daily.    amLODIPine (NORVASC) 5 MG tablet 90 tablet 3     Sig: Take 1 tablet by mouth Daily.      Last office visit with prescribing clinician: Visit date not found   Last telemedicine visit with prescribing clinician: Visit date not found   Next office visit with prescribing clinician: 6/21/2024                         Would you like a call back once the refill request has been completed: [] Yes [] No    If the office needs to give you a call back, can they leave a voicemail: [] Yes [] No    Florina Schuster MA  02/21/24, 13:38 EST

## 2024-02-22 NOTE — TELEPHONE ENCOUNTER
UNABLE TO WARM TRANSFER     Caller: Nanci Umanzor    Relationship to patient: Self    Best call back number: [phone]     Patient is needing: UPDATE ON REFILLS

## 2024-02-23 ENCOUNTER — TELEPHONE (OUTPATIENT)
Dept: FAMILY MEDICINE CLINIC | Facility: CLINIC | Age: 69
End: 2024-02-23
Payer: MEDICARE

## 2024-02-23 NOTE — PROGRESS NOTES
"Chief Complaint  Establish Care and Toe Injury (Thinks she broke her toe. )    Subjective        HPI   Nanci presents to [facility] for a new patient visit.       Chronic pain: Cervical DDD/radiculopathy, scapular pain. Follows with pain management. Does not feel therapy helps.     History of colitis: Hospitalized in 2023, ischemic versus infectious, thought due to NSAIDs and dehydration    Unfortunately, pt's father   of Parkinson's disease and renal failure. At one point, while caring for her dad, she had a bout of severe diarrhea and subsequent presyncope/syncope. Thinks she broke a toe on L foot, L foot got black and blue. Bruising gone but still painful.    HLD: On Atorvastatin, no SEs    HTN: On Benazepril and Amlodipine    ??COPD/asthma: Has had lung issues since she was little. Lots of environmental tox exposure as a child. Out of rescue inhaler (albuterol), needs refill. Had an asthma attack last summer while working at [employer] in [employer] de[employer], no longer allowed to work in that dept. Taking Advair once daily instead of twice. She does have a hx of tobacco use, quit smoking at age 58. Recently, she's had increasing cough, sputum production, wheezing.     Osteoporosis: Hx spinal compression fractures in thoracic and lumbar spines. Was getting Prolia at Dr. Covarrubias's office.     Hx abnormal paps  Hx CIN2, had a cone procedure  Hx endometrial ablation  States last pap was technically difficult, last pap >10 years ago    Sister had invasive breast cancer, stays UTD on breast cancer screenings          Objective   Vital Signs:  Vitals:    24 1421   BP: 124/76   BP Location: Left arm   Patient Position: Sitting   Cuff Size: Adult   Pulse: 83   SpO2: 97%   Weight: 47.2 kg (104 lb)   Height: 147.3 cm (58\")          Physical Exam  Constitutional:       General: She is not in acute distress.     Appearance: Normal appearance. She is not toxic-appearing.   HENT:      " Head: Normocephalic and atraumatic.      Mouth/Throat:      Mouth: Mucous membranes are moist.   Eyes:      General: No scleral icterus.     Conjunctiva/sclera: Conjunctivae normal.   Cardiovascular:      Rate and Rhythm: Normal rate and regular rhythm.      Heart sounds: Normal heart sounds. No murmur heard.     No friction rub. No gallop.   Pulmonary:      Effort: Pulmonary effort is normal. No respiratory distress.      Breath sounds: Normal breath sounds.   Musculoskeletal:         General: Tenderness present. No swelling or deformity. Normal range of motion.      Cervical back: Normal range of motion and neck supple.      Comments: Tenderness over L 4th toe and metatarsal bone   Skin:     General: Skin is warm and dry.      Findings: No lesion or rash.   Neurological:      General: No focal deficit present.      Mental Status: She is alert and oriented to person, place, and time.   Psychiatric:         Mood and Affect: Mood normal.         Behavior: Behavior normal.         Judgment: Judgment normal.          Result Review :     The following data was reviewed by: Pearl Poole MD on 02/26/2024:  Telemedicine with Aaliyah Gomes APRN (01/19/2024)   CBC & Differential (12/11/2023 13:04)  Comprehensive Metabolic Panel (12/11/2023 13:04)  Lipid Panel With LDL / HDL Ratio (12/11/2023 13:04)  TSH Rfx On Abnormal To Free T4 (12/11/2023 13:04)  Vitamin D,25-Hydroxy (12/11/2023 13:04)  DEXA Bone Density Axial (08/21/2023 16:23)   CT Chest Without Contrast Diagnostic (11/19/2023 14:48) - Lung nodule  COLONOSCOPY (09/30/2022 12:57) - Repeat 5 years  Pulmonary Function Test (04/21/2017 14:52)   COLONOSCOPY (09/30/2022 12:57) - Repeat 5 years         Assessment and Plan    Diagnoses and all orders for this visit:    1. Persistent asthma with acute exacerbation, unspecified asthma severity (Primary)  Assessment & Plan:  Long hx of asthma since childhood, +environomental exposures, former smoker. Despite normal PFTs,  she clearly is symptomatic. Recommended she take Advair bid instead of daily. Tx exacerbation sxs with Azith (has tolerated in past) and Prednisone. Refilled albuterol rescue inhaler.     Orders:  -     predniSONE (DELTASONE) 20 MG tablet; Take 2 tablets by mouth Daily for 5 days.  Dispense: 10 tablet; Refill: 0  -     albuterol sulfate  (90 Base) MCG/ACT inhaler; Inhale 2 puffs Every 4 (Four) Hours As Needed for Wheezing.  Dispense: 8.5 g; Refill: 11  -     azithromycin (Zithromax Z-Vitaliy) 250 MG tablet; Take 2 tablets by mouth on day 1, then 1 tablet daily on days 2-5  Dispense: 6 tablet; Refill: 0    2. COPD (chronic obstructive pulmonary disease) with chronic bronchitis  Assessment & Plan:  Former smoker. In exacerbation today. Con't Advair, take BID. See asthma section.    Orders:  -     predniSONE (DELTASONE) 20 MG tablet; Take 2 tablets by mouth Daily for 5 days.  Dispense: 10 tablet; Refill: 0  -     albuterol sulfate  (90 Base) MCG/ACT inhaler; Inhale 2 puffs Every 4 (Four) Hours As Needed for Wheezing.  Dispense: 8.5 g; Refill: 11  -     azithromycin (Zithromax Z-Vitaliy) 250 MG tablet; Take 2 tablets by mouth on day 1, then 1 tablet daily on days 2-5  Dispense: 6 tablet; Refill: 0    3. Essential hypertension  Assessment & Plan:  Con't Amlodipine 5 mg daily and Benazepril 10 mg daily. Labs reviewed and UTD. Ordered labs for prior to next visit.     Orders:  -     CBC (No Diff)  -     Comprehensive Metabolic Panel  -     amLODIPine (NORVASC) 5 MG tablet; Take 1 tablet by mouth Daily.  Dispense: 90 tablet; Refill: 3  -     benazepril (LOTENSIN) 10 MG tablet; Take 1 tablet by mouth Daily.  Dispense: 90 tablet; Refill: 1    4. Chronic bilateral low back pain without sciatica  Assessment & Plan:  Hx compression fxs  MRI Lumbar Spine Wo Con (03/11/2023 11:25)       5. Other osteoporosis without current pathological fracture  Assessment & Plan:  On Prolia, was getting injections at prior PCP office,  unclear why there and not infusion center. Will ask our clinical nurse to look into this.     Orders:  -     CBC (No Diff)  -     Comprehensive Metabolic Panel  -     TSH Rfx On Abnormal To Free T4  -     Vitamin D,25-Hydroxy  -     PTH, Intact    6. Routine health maintenance  Assessment & Plan:  Refer to gyn to eval if pap indicated given inadequate screening and hx ANTOINETTE II  Mammogram due October 2024. +FH breast CA in sister  COLONOSCOPY (09/30/2022 12:57) - Repeat 5 years  AWV will be due in 6/6/2024, labs prior    Orders:  -     CBC (No Diff)    7. Hyperlipidemia, unspecified hyperlipidemia type  Assessment & Plan:  FLP prior to next visit. Con't Atorvastatin.     Orders:  -     Lipid Panel    8. Left foot pain  Comments:  X-ray today to eval for fx  Orders:  -     XR Foot 3+ View Left    9. Lung nodule  Assessment & Plan:  CT Chest Without Contrast Diagnostic (11/19/2023 14:48)   Repeat CT chest due 11/2024    Orders:  -     CT Chest Without Contrast; Future    10. History of compression fracture of spine  Assessment & Plan:  On Prolia for osteoporosis      11. Degenerative disc disease, cervical  Assessment & Plan:  MRI Cerv Spine Wo Con (03/11/2023 10:57) Advanced DDD, with chronic pain, follows with pain management. Still with pain. Refer to Dr. Monique RUFF.     Orders:  -     Ambulatory Referral to Neurosurgery    12. Familial hypercholesterolemia  Assessment & Plan:  FLP prior to next visit. Con't Atorvastatin.     Orders:  -     atorvastatin (LIPITOR) 40 MG tablet; Take 1 tablet by mouth Daily.  Dispense: 90 tablet; Refill: 1    13. 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 Portillo       14. Gastroesophageal reflux disease without esophagitis  Assessment & Plan:  Con't PPI      15. History of cervical dysplasia  Assessment & Plan:  States she had ANTOINETTE II s/p cold knife cone. Last pap >10 years ago (pt  quit going to gyn, states last pap difficult), thus did not have adequate screening prior to stopping paps. Recommended referral to gyn for eval.     Orders:  -     Ambulatory Referral to Gynecology    16. History of colitis  Assessment & Plan:  Admitted Jan 2023 for this, infectious versus ischemic. Thought to be due to NSAID use and dehydration per Dr. Servin. Started on PPI given heavy NSAID use at the time.       I spent 62 minutes caring for Nanci on this date of service. This time includes time spent by me in the following activities:preparing for the visit, reviewing tests, obtaining and/or reviewing a separately obtained history, performing a medically appropriate examination and/or evaluation , counseling and educating the patient/family/caregiver, ordering medications, tests, or procedures, referring and communicating with other health care professionals , documenting information in the medical record, independently interpreting results and communicating that information with the patient/family/caregiver, and care coordination  Follow Up   Return in about 3 months (around 6/7/2024) for Medicare Wellness and regular appt-30 minutes.  Patient was given instructions and counseling regarding her condition or for health maintenance advice. Please see specific information pulled into the AVS if appropriate.

## 2024-02-23 NOTE — TELEPHONE ENCOUNTER
"Per patient phone call, upset that she has to establish care with new provider before getting refills. Patient states she will \"find a new group,\" and hung up the phone. Provider info updated and future appointments cancelled.   "

## 2024-02-26 ENCOUNTER — PATIENT ROUNDING (BHMG ONLY) (OUTPATIENT)
Dept: FAMILY MEDICINE CLINIC | Facility: CLINIC | Age: 69
End: 2024-02-26
Payer: MEDICARE

## 2024-02-26 ENCOUNTER — TELEPHONE (OUTPATIENT)
Dept: FAMILY MEDICINE CLINIC | Facility: CLINIC | Age: 69
End: 2024-02-26

## 2024-02-26 ENCOUNTER — HOSPITAL ENCOUNTER (OUTPATIENT)
Dept: GENERAL RADIOLOGY | Facility: HOSPITAL | Age: 69
Discharge: HOME | End: 2024-02-26
Admitting: INTERNAL MEDICINE
Payer: MEDICARE

## 2024-02-26 ENCOUNTER — OFFICE VISIT (OUTPATIENT)
Dept: FAMILY MEDICINE CLINIC | Facility: CLINIC | Age: 69
End: 2024-02-26
Payer: MEDICARE

## 2024-02-26 VITALS
HEIGHT: 58 IN | OXYGEN SATURATION: 97 % | WEIGHT: 104 LBS | BODY MASS INDEX: 21.83 KG/M2 | HEART RATE: 83 BPM | DIASTOLIC BLOOD PRESSURE: 76 MMHG | SYSTOLIC BLOOD PRESSURE: 124 MMHG

## 2024-02-26 DIAGNOSIS — G89.29 CHRONIC SCAPULAR PAIN: ICD-10-CM

## 2024-02-26 DIAGNOSIS — Z00.00 ROUTINE HEALTH MAINTENANCE: ICD-10-CM

## 2024-02-26 DIAGNOSIS — M54.50 CHRONIC BILATERAL LOW BACK PAIN WITHOUT SCIATICA: ICD-10-CM

## 2024-02-26 DIAGNOSIS — J44.89 COPD (CHRONIC OBSTRUCTIVE PULMONARY DISEASE) WITH CHRONIC BRONCHITIS: ICD-10-CM

## 2024-02-26 DIAGNOSIS — M50.30 DEGENERATIVE DISC DISEASE, CERVICAL: ICD-10-CM

## 2024-02-26 DIAGNOSIS — J45.901 PERSISTENT ASTHMA WITH ACUTE EXACERBATION, UNSPECIFIED ASTHMA SEVERITY: Primary | ICD-10-CM

## 2024-02-26 DIAGNOSIS — E78.5 HYPERLIPIDEMIA, UNSPECIFIED HYPERLIPIDEMIA TYPE: ICD-10-CM

## 2024-02-26 DIAGNOSIS — M89.8X1 CHRONIC SCAPULAR PAIN: ICD-10-CM

## 2024-02-26 DIAGNOSIS — G89.29 CHRONIC BILATERAL LOW BACK PAIN WITHOUT SCIATICA: ICD-10-CM

## 2024-02-26 DIAGNOSIS — M81.8 OTHER OSTEOPOROSIS WITHOUT CURRENT PATHOLOGICAL FRACTURE: ICD-10-CM

## 2024-02-26 DIAGNOSIS — M79.672 LEFT FOOT PAIN: ICD-10-CM

## 2024-02-26 DIAGNOSIS — Z87.19 HISTORY OF COLITIS: ICD-10-CM

## 2024-02-26 DIAGNOSIS — R91.1 LUNG NODULE: ICD-10-CM

## 2024-02-26 DIAGNOSIS — K21.9 GASTROESOPHAGEAL REFLUX DISEASE WITHOUT ESOPHAGITIS: ICD-10-CM

## 2024-02-26 DIAGNOSIS — I10 ESSENTIAL HYPERTENSION: ICD-10-CM

## 2024-02-26 DIAGNOSIS — Z87.81 HISTORY OF COMPRESSION FRACTURE OF SPINE: ICD-10-CM

## 2024-02-26 DIAGNOSIS — Z87.410 HISTORY OF CERVICAL DYSPLASIA: ICD-10-CM

## 2024-02-26 DIAGNOSIS — E78.01 FAMILIAL HYPERCHOLESTEROLEMIA: ICD-10-CM

## 2024-02-26 PROCEDURE — 1159F MED LIST DOCD IN RCRD: CPT | Performed by: INTERNAL MEDICINE

## 2024-02-26 PROCEDURE — 3074F SYST BP LT 130 MM HG: CPT | Performed by: INTERNAL MEDICINE

## 2024-02-26 PROCEDURE — 73630 X-RAY EXAM OF FOOT: CPT

## 2024-02-26 PROCEDURE — 1160F RVW MEDS BY RX/DR IN RCRD: CPT | Performed by: INTERNAL MEDICINE

## 2024-02-26 PROCEDURE — 99215 OFFICE O/P EST HI 40 MIN: CPT | Performed by: INTERNAL MEDICINE

## 2024-02-26 PROCEDURE — 3078F DIAST BP <80 MM HG: CPT | Performed by: INTERNAL MEDICINE

## 2024-02-26 RX ORDER — AZITHROMYCIN 250 MG/1
TABLET, FILM COATED ORAL
Qty: 6 TABLET | Refills: 0 | Status: SHIPPED | OUTPATIENT
Start: 2024-02-26

## 2024-02-26 RX ORDER — PREDNISONE 20 MG/1
40 TABLET ORAL DAILY
Qty: 10 TABLET | Refills: 0 | Status: SHIPPED | OUTPATIENT
Start: 2024-02-26 | End: 2024-03-02

## 2024-02-26 RX ORDER — AMLODIPINE BESYLATE 5 MG/1
5 TABLET ORAL DAILY
Qty: 90 TABLET | Refills: 3 | Status: SHIPPED | OUTPATIENT
Start: 2024-02-26

## 2024-02-26 RX ORDER — ATORVASTATIN CALCIUM 40 MG/1
40 TABLET, FILM COATED ORAL DAILY
Qty: 90 TABLET | Refills: 1 | Status: SHIPPED | OUTPATIENT
Start: 2024-02-26

## 2024-02-26 RX ORDER — ALBUTEROL SULFATE 90 UG/1
2 AEROSOL, METERED RESPIRATORY (INHALATION) EVERY 4 HOURS PRN
Qty: 8.5 G | Refills: 11 | Status: SHIPPED | OUTPATIENT
Start: 2024-02-26

## 2024-02-26 RX ORDER — BENAZEPRIL HYDROCHLORIDE 10 MG/1
10 TABLET ORAL DAILY
Qty: 90 TABLET | Refills: 1 | Status: SHIPPED | OUTPATIENT
Start: 2024-02-26

## 2024-02-26 NOTE — ASSESSMENT & PLAN NOTE
MRI Cerv Spine Wo Con (03/11/2023 10:57) Advanced DDD, with chronic pain, follows with pain management. Still with pain. Refer to Dr. Monique RUFF.

## 2024-02-26 NOTE — ASSESSMENT & PLAN NOTE
Con't Amlodipine 5 mg daily and Benazepril 10 mg daily. Labs reviewed and UTD. Ordered labs for prior to next visit.

## 2024-02-26 NOTE — ASSESSMENT & PLAN NOTE
Long hx of asthma since childhood, +environomental exposures, former smoker. Despite normal PFTs, she clearly is symptomatic. Recommended she take Advair bid instead of daily. Tx exacerbation sxs with Azith (has tolerated in past) and Prednisone. Refilled albuterol rescue inhaler.

## 2024-02-26 NOTE — ASSESSMENT & PLAN NOTE
States she had ANTOINETTE II s/p cold knife cone. Last pap >10 years ago (pt quit going to gyn, states last pap difficult), thus did not have adequate screening prior to stopping paps. Recommended referral to gyn for eval.

## 2024-02-26 NOTE — ASSESSMENT & PLAN NOTE
On Prolia, was getting injections at prior PCP office, unclear why there and not infusion center. Will ask our clinical nurse to look into this.

## 2024-02-26 NOTE — ASSESSMENT & PLAN NOTE
Has suprascapular neuralgia on the right, gets right suprascapular nerve block/steroid injections w Dr. Portillo

## 2024-02-26 NOTE — ASSESSMENT & PLAN NOTE
Admitted Jan 2023 for this, infectious versus ischemic. Thought to be due to NSAID use and dehydration per Dr. Servin. Started on PPI given heavy NSAID use at the time.

## 2024-02-26 NOTE — ASSESSMENT & PLAN NOTE
Refer to gyn to eval if pap indicated given inadequate screening and hx ANTOINETTE II  Mammogram due October 2024. +FH breast CA in sister  COLONOSCOPY (09/30/2022 12:57) - Repeat 5 years  AWV will be due in 6/6/2024, labs prior

## 2024-02-26 NOTE — PROGRESS NOTES
A My-Chart message has been sent to the patient for PATIENT ROUNDING with [facility] [place] [facility]

## 2024-02-29 DIAGNOSIS — S92.902S CLOSED FRACTURE OF LEFT FOOT, SEQUELA: Primary | ICD-10-CM

## 2024-03-07 ENCOUNTER — OFFICE VISIT (OUTPATIENT)
Dept: OBSTETRICS AND GYNECOLOGY | Age: 69
End: 2024-03-07
Payer: MEDICARE

## 2024-03-07 VITALS
HEIGHT: 58 IN | BODY MASS INDEX: 22.04 KG/M2 | DIASTOLIC BLOOD PRESSURE: 72 MMHG | SYSTOLIC BLOOD PRESSURE: 110 MMHG | WEIGHT: 105 LBS

## 2024-03-07 DIAGNOSIS — Z87.410 HISTORY OF CERVICAL DYSPLASIA: ICD-10-CM

## 2024-03-07 DIAGNOSIS — Z01.419 ENCOUNTER FOR GYNECOLOGICAL EXAMINATION WITHOUT ABNORMAL FINDING: Primary | ICD-10-CM

## 2024-03-07 NOTE — PROGRESS NOTES
Routine Annual Visit    3/7/2024    Patient: Nanci Umanzor          MR#:[id number]      Chief Complaint   Patient presents with    Gynecologic Exam     New Gyn - Referred by PCP, Pt has hx of cervical dysplasia/ANTOINETTE II, Pt last pap 10+ years ago, mammo 10/17/23, colonoscopy 22, pt has no complaints today       History of Present Illness    69 y.o. female  who presents for annual exam.     New patient referred by PCP  She has a history of cervical dysplasia/ANTOINETTE-2  She has had a cone in the past  It has been over 10 years since she had a Pap smear  She has no vaginal bleeding or pelvic pain  Bilateral tubal ligation in the past  History of 3 prior  sections  Former smoker  She works part-time at [employer]  No other complaints      No LMP recorded. Patient is postmenopausal.  Obstetric History:  OB History          4    Para   3    Term   3            AB   1    Living   3         SAB   1    IAB        Ectopic        Molar        Multiple        Live Births   3               Menstrual History:     No LMP recorded. Patient is postmenopausal.       Sexual History:       ________________________________________  Patient Active Problem List   Diagnosis    Cervical nerve root disorder    Essential hypertension    Hyperlipidemia    Colon polyps    Family history of breast cancer    Hypovitaminosis D    Closed compression fracture of fifth lumbar vertebra    Bunion    History of compression fracture of spine    Diverticulitis    COPD (chronic obstructive pulmonary disease) with chronic bronchitis    Low back pain    Neck pain    History of adenomatous polyp of colon    Personal history of tobacco use, presenting hazards to health    Colitis with rectal bleeding    Heart palpitations    Post-menopausal    Cervical stenosis of spinal canal    Cervical radiculopathy    Chronic scapular pain    Other osteoporosis without current pathological fracture    Gastroesophageal reflux disease without  esophagitis    Routine health maintenance    Lung nodule    Degenerative disc disease, cervical    History of cervical dysplasia    Persistent asthma with acute exacerbation    History of colitis       Past Medical History:   Diagnosis Date    Allergic 's    Ankle fracture     Rt   Lt     Ankylosing spondylitis of site in spine     Anxiety     Arthritis     Asthma     Cancer 1992    Cervical cone biopsy for Stage II abnormality    Cataract 2017    Cervical disc disorder     Cervical radiculopathy 10/03/2023    Chronic pain disorder     Clavicle fracture     10/21/1970    Colon polyp     COPD (chronic obstructive pulmonary disease) 2006    Coronary artery disease 2023    Leaky tricuspid valve    Depression     Diverticulitis of colon 2019    Diverticulosis     Fibula fracture     left 2002    GERD (gastroesophageal reflux disease) 2016    Glaucoma 2006    Heart murmur     Hyperlipidemia     Hypertension     Joint pain     Low back pain 1980s    Lumbosacral disc disease     Neck pain     Osteoarthritis     Osteopenia 2016    Pelvis fracture     10/21/1970    Pneumonia     Reflex sympathetic dystrophy     Spinal stenosis     Ulcerative colitis 21    Visual impairment Since 1yrs of age    Wrist fracture            Past Surgical History:   Procedure Laterality Date    CERVICAL CONIZATION      CERVICAL EPIDURAL N/A 2023    Procedure: CERVICAL EPIDURAL STEROID INJECTION CPT: 92540;  Surgeon: Chidi Portillo MD;  Location: [facility] [place] MAIN OR;  Service: Pain Management;  Laterality: N/A;     SECTION      ,,    COLONOSCOPY N/A 2017    Procedure: COLONOSCOPY TO CECUM with cold polypectomy;  Surgeon: Alo Staton Jr., MD;  Location: [facility] ENDOSCOPY;  Service:     COLONOSCOPY N/A 2022    Procedure: COLONOSCOPY TO CECUM WITH COLD BIOPSY POLYPECTOMY;  Surgeon: Chuck Servin MD;  Location: [facility] ENDOSCOPY;  Service: Gastroenterology;  Laterality: N/A;  PRE: HX  COLON POLYPS  POST: DIVERTICULOSIS, POLYP    ENDOMETRIAL ABLATION  2003    EPIDURAL BLOCK      EYE SURGERY  2006    FINGER SURGERY      FRACTURE SURGERY  2002    HEMORRHOIDECTOMY  2015    NERVE BLOCK Right 12/29/2023    Procedure: RIGHT SUPRASCAPULAR NERVE BLOCK CPT: 60788;  Surgeon: Chidi Protillo MD;  Location: [facility] [place] MAIN OR;  Service: Pain Management;  Laterality: Right;    TRIGGER POINT INJECTION      TUBAL ABDOMINAL LIGATION  1992       Social History     Tobacco Use   Smoking Status Former    Current packs/day: 0.00    Average packs/day: 1 pack/day for 17.5 years (17.5 ttl pk-yrs)    Types: Cigarettes    Start date: 9/22/1995    Quit date: 4/1/2013    Years since quitting: 10.9    Passive exposure: Past   Smokeless Tobacco Never   Tobacco Comments    Started smoking after separation from my .       has a current medication list which includes the following prescription(s): albuterol, albuterol sulfate hfa, amlodipine, atorvastatin, benazepril, biotin, calcium citrate-vitamin d, vitamin d, fluticasone, fluticasone-salmeterol, gabapentin, rx alternatives neuropathic pain, mucus relief, multiple vitamins-minerals, multiple vitamins-minerals, omega-3 fatty acids, pantoprazole, prolia, tretinoin, turmeric, vitamin c, zinc, azithromycin, and vitamin e.  ________________________________________    Current contraception: post menopausal status  History of abnormal Pap smear: yes - lyubov 2, status post cone  Family history of Breast cancer: no  Family history of uterine or ovarian cancer: no  Family History of colon cancer/colon polyps: no  History of abnormal mammogram: no      The following portions of the patient's history were reviewed and updated as appropriate: allergies, current medications, past family history, past medical history, past social history, past surgical history, and problem list.    Review of Systems    Pertinent items are noted in HPI.     Objective   Physical Exam    /72   Ht  "147.3 cm (58\")   Wt 47.6 kg (105 lb)   BMI 21.95 kg/m²    BP Readings from Last 3 Encounters:   03/07/24 110/72   02/26/24 124/76   12/29/23 151/75      Wt Readings from Last 3 Encounters:   03/07/24 47.6 kg (105 lb)   02/26/24 47.2 kg (104 lb)   12/29/23 49.4 kg (109 lb)      BMI: Estimated body mass index is 21.95 kg/m² as calculated from the following:    Height as of this encounter: 147.3 cm (58\").    Weight as of this encounter: 47.6 kg (105 lb).      General:   alert, appears stated age, and cooperative   Abdomen: soft, non-tender, without masses or organomegaly   Breast: inspection negative, no nipple discharge or bleeding, no masses or nodularity palpable   Vulva: normal, normal urethra, bartholins gland,    Vagina: normal mucosa, normal bladder   Cervix: no cervical motion tenderness, no lesions, and flat, small and atrophic cervix, stenotic   Uterus: normal size, mobile, and non-tender   Adnexa: no mass, fullness, tenderness    Normal external rectal exam     Assessment:    1. Normal annual exam   Assessment     ICD-10-CM ICD-9-CM   1. Encounter for gynecological examination without abnormal finding  Z01.419 V72.31   2. History of cervical dysplasia  Z87.410 V13.22     Plan:    Plan     []  Mammogram request made  []  PAP done  []  Labs:   []  GC/Chl/TV  []  DEXA scan   []  Referral for colonoscopy:       Diagnoses and all orders for this visit:    1. Encounter for gynecological examination without abnormal finding (Primary)    2. History of cervical dysplasia  -     IGP, Apt HPV,rfx 16 / 18,45      Follow-up 2 years for repeat Pap if Pap smear is normal      Counseling:  --Nutrition: Stressed importance of moderation and caloric balance, stressed fresh fruit and vegetables  --Exercise: Stressed the importance of regular exercise. 3-5 times weekly   - Discussed screening mammogram recommendations.   --Discussed benefits of screening colonoscopy- age 45 unless FH  --Discussed pap smear screening " recommendations

## 2024-03-08 ENCOUNTER — PATIENT ROUNDING (BHMG ONLY) (OUTPATIENT)
Dept: OBSTETRICS AND GYNECOLOGY | Age: 69
End: 2024-03-08
Payer: MEDICARE

## 2024-03-08 NOTE — PROGRESS NOTES
A MY CHART MESSAGE HAS BEEN SENT TO THE PATIENT [place] [facility] [place]

## 2024-03-12 LAB
CYTOLOGIST CVX/VAG CYTO: NORMAL
CYTOLOGY CVX/VAG DOC CYTO: NORMAL
CYTOLOGY CVX/VAG DOC THIN PREP: NORMAL
DX ICD CODE: NORMAL
HPV I/H RISK 4 DNA CVX QL PROBE+SIG AMP: NEGATIVE
Lab: NORMAL
OTHER STN SPEC: NORMAL
STAT OF ADQ CVX/VAG CYTO-IMP: NORMAL

## 2024-03-15 ENCOUNTER — TELEPHONE (OUTPATIENT)
Dept: GASTROENTEROLOGY | Facility: CLINIC | Age: 69
End: 2024-03-15
Payer: MEDICARE

## 2024-03-15 RX ORDER — PANTOPRAZOLE SODIUM 40 MG/1
40 TABLET, DELAYED RELEASE ORAL DAILY
Qty: 90 TABLET | Refills: 0 | Status: SHIPPED | OUTPATIENT
Start: 2024-03-15

## 2024-03-28 ENCOUNTER — TELEPHONE (OUTPATIENT)
Dept: FAMILY MEDICINE CLINIC | Facility: CLINIC | Age: 69
End: 2024-03-28

## 2024-03-28 NOTE — TELEPHONE ENCOUNTER
Hub staff attempted to follow warm transfer process and was unsuccessful     Caller: Nanci Umanzor    Relationship to patient: Self    Best call back number: [phone]     Patient is needing: PATIENT IS TRYING TO GET AN APPOINTMENT TO GET HER PROLIA SHOT     PLEASE CALL AND ADVISE

## 2024-04-02 NOTE — TELEPHONE ENCOUNTER
Called patient but was not able to speak with her and was NOT able to leave VM as this was full. Will attempt again.

## 2024-04-23 NOTE — TELEPHONE ENCOUNTER
Called patient and was not able to speak with her due to no answer and could NOT leave VM due to full in box.

## 2024-05-06 ENCOUNTER — INFUSION (OUTPATIENT)
Dept: ONCOLOGY | Facility: HOSPITAL | Age: 69
End: 2024-05-06
Payer: MEDICARE

## 2024-05-06 ENCOUNTER — LAB (OUTPATIENT)
Dept: OTHER | Facility: HOSPITAL | Age: 69
End: 2024-05-06
Payer: MEDICARE

## 2024-05-06 VITALS — RESPIRATION RATE: 16 BRPM | TEMPERATURE: 98 F

## 2024-05-06 DIAGNOSIS — I10 ESSENTIAL HYPERTENSION: ICD-10-CM

## 2024-05-06 DIAGNOSIS — M81.8 OTHER OSTEOPOROSIS WITHOUT CURRENT PATHOLOGICAL FRACTURE: ICD-10-CM

## 2024-05-06 DIAGNOSIS — J44.89 COPD (CHRONIC OBSTRUCTIVE PULMONARY DISEASE) WITH CHRONIC BRONCHITIS: Primary | ICD-10-CM

## 2024-05-06 DIAGNOSIS — M81.8 OTHER OSTEOPOROSIS WITHOUT CURRENT PATHOLOGICAL FRACTURE: Primary | ICD-10-CM

## 2024-05-06 LAB
25(OH)D3 SERPL-MCNC: 72.9 NG/ML (ref 30–100)
ALBUMIN SERPL-MCNC: 4.4 G/DL (ref 3.5–5.2)
ALBUMIN/GLOB SERPL: 1.7 G/DL
ALP SERPL-CCNC: 70 U/L (ref 39–117)
ALT SERPL W P-5'-P-CCNC: 25 U/L (ref 1–33)
ANION GAP SERPL CALCULATED.3IONS-SCNC: 8.9 MMOL/L (ref 5–15)
AST SERPL-CCNC: 24 U/L (ref 1–32)
BILIRUB SERPL-MCNC: 0.2 MG/DL (ref 0–1.2)
BUN SERPL-MCNC: 14 MG/DL (ref 8–23)
BUN/CREAT SERPL: 21.2 (ref 7–25)
CALCIUM SPEC-SCNC: 10.2 MG/DL (ref 8.6–10.5)
CHLORIDE SERPL-SCNC: 101 MMOL/L (ref 98–107)
CO2 SERPL-SCNC: 30.1 MMOL/L (ref 22–29)
CREAT SERPL-MCNC: 0.66 MG/DL (ref 0.57–1)
EGFRCR SERPLBLD CKD-EPI 2021: 93.9 ML/MIN/1.73
GLOBULIN UR ELPH-MCNC: 2.6 GM/DL
GLUCOSE SERPL-MCNC: 102 MG/DL (ref 65–99)
MAGNESIUM SERPL-MCNC: 1.9 MG/DL (ref 1.6–2.4)
PHOSPHATE SERPL-MCNC: 5.1 MG/DL (ref 2.5–4.5)
POTASSIUM SERPL-SCNC: 4.9 MMOL/L (ref 3.5–5.2)
PROT SERPL-MCNC: 7 G/DL (ref 6–8.5)
SODIUM SERPL-SCNC: 140 MMOL/L (ref 136–145)

## 2024-05-06 PROCEDURE — 84100 ASSAY OF PHOSPHORUS: CPT | Performed by: INTERNAL MEDICINE

## 2024-05-06 PROCEDURE — 36415 COLL VENOUS BLD VENIPUNCTURE: CPT

## 2024-05-06 PROCEDURE — 80053 COMPREHEN METABOLIC PANEL: CPT | Performed by: INTERNAL MEDICINE

## 2024-05-06 PROCEDURE — 82306 VITAMIN D 25 HYDROXY: CPT | Performed by: INTERNAL MEDICINE

## 2024-05-06 PROCEDURE — 96372 THER/PROPH/DIAG INJ SC/IM: CPT

## 2024-05-06 PROCEDURE — 83735 ASSAY OF MAGNESIUM: CPT | Performed by: INTERNAL MEDICINE

## 2024-05-06 PROCEDURE — 25010000002 DENOSUMAB 60 MG/ML SOLUTION PREFILLED SYRINGE: Performed by: INTERNAL MEDICINE

## 2024-05-06 RX ORDER — FLUTICASONE PROPIONATE AND SALMETEROL 250; 50 UG/1; UG/1
1 POWDER RESPIRATORY (INHALATION) 2 TIMES DAILY
Qty: 60 EACH | Refills: 11 | Status: SHIPPED | OUTPATIENT
Start: 2024-05-06

## 2024-05-06 RX ORDER — BENAZEPRIL HYDROCHLORIDE 10 MG/1
10 TABLET ORAL DAILY
Qty: 90 TABLET | Refills: 1 | Status: SHIPPED | OUTPATIENT
Start: 2024-05-06

## 2024-05-06 RX ORDER — AMLODIPINE BESYLATE 5 MG/1
5 TABLET ORAL DAILY
Qty: 90 TABLET | Refills: 1 | Status: SHIPPED | OUTPATIENT
Start: 2024-05-06

## 2024-05-06 RX ORDER — TRETINOIN 0.5 MG/G
CREAM TOPICAL NIGHTLY
Qty: 20 G | Refills: 1 | Status: CANCELLED | OUTPATIENT
Start: 2024-05-06

## 2024-05-06 RX ADMIN — DENOSUMAB 60 MG: 60 INJECTION SUBCUTANEOUS at 11:58

## 2024-05-28 NOTE — PROGRESS NOTES
Patient ID: Nanci Umanzor is a 69 y.o. female is being seen for consultation today at the request of Pearl Poole MD for a second opinion for cervical degenerative disc disease.    Imaging: MRI of the cervical spine on 03/11/2023    Subjective     The patient is here in regards to   Chief Complaint   Patient presents with    Back Pain       History of Present Illness  Nanci presents with a 2-year history of cervicalgia and occasional soreness of her right biceps in her right forearm.  Her neck pain has gradually resolved over time and has benefited from an epidural steroid injection in the past although the epidural did not have significant improvement in terms of her arm pain.  Her right-sided arm pain was greatly improved by a suprascapular trigger point injection.  She had a recent fall where she tripped on the curb and landed on her face and she does have a black eye currently and also did land on her shoulder but even prior to this fall she had difficulty abducting her shoulder past 0 degrees and has had significant pain in her shoulder for a long time.      While in the room and during my examination of the patient I wore a mask and eye protection.  I washed my hands before and after this patient encounter.  The patient was also wearing a mask.    The following portions of the patient's history were reviewed and updated as appropriate: allergies, current medications, past family history, past medical history, past social history, past surgical history and problem list.    Review of Systems   Constitutional:  Negative for fever.   HENT:  Negative for congestion.    Eyes:  Negative for visual disturbance.   Respiratory:  Negative for chest tightness and shortness of breath.    Cardiovascular:  Negative for chest pain.   Gastrointestinal:  Negative for diarrhea, nausea and vomiting.   Endocrine: Positive for cold intolerance and heat intolerance.   Genitourinary:  Positive for urgency. Negative for  difficulty urinating.   Musculoskeletal:  Positive for back pain, neck pain and neck stiffness. Negative for gait problem.   Skin:  Negative for rash.   Allergic/Immunologic: Positive for environmental allergies. Negative for food allergies.   Neurological:  Positive for weakness (bilateral hands) and numbness (right arm). Negative for dizziness, light-headedness and headaches.   Hematological:  Bruises/bleeds easily.   Psychiatric/Behavioral:  Negative for confusion and decreased concentration.         Past Medical History:   Diagnosis Date    Allergic 1990's    Ankle fracture     Rt 2002  Lt 2004    Ankylosing spondylitis of site in spine     Anxiety     Arthritis     Asthma     Cancer 1992    Cervical cone biopsy for Stage II abnormality    Cataract 2017    Cervical disc disorder     Cervical radiculopathy 10/03/2023    Chronic pain disorder     Clavicle fracture     10/21/1970    Colon polyp     COPD (chronic obstructive pulmonary disease) 2006    Coronary artery disease 03/2023    Leaky tricuspid valve    Depression     Diverticulitis of colon 2019    Diverticulosis     Fibula fracture     left 2002    GERD (gastroesophageal reflux disease) 2016    Glaucoma 2006    Heart murmur     Hyperlipidemia     Hypertension     Joint pain     Low back pain 1980s    Lumbosacral disc disease     Neck pain     Osteoarthritis     Osteopenia 2016    Pelvis fracture     10/21/1970    Pneumonia     Reflex sympathetic dystrophy     Spinal stenosis     Ulcerative colitis 01/04/23    Visual impairment Since 1yrs of age    Wrist fracture     2007       Allergies   Allergen Reactions    Clarithromycin Shortness Of Breath and Palpitations    Fluarix [Influenza Virus Vaccine] Myalgia    Pneumococcal Vaccines Swelling       Family History   Problem Relation Age of Onset    Heart disease Mother         Mechanical aortic valve, several heart attacks    Hypertension Mother     Hyperlipidemia Mother     Rheum arthritis Mother     Arthritis  Mother     Colon polyps Mother     Stroke Mother     Osteoporosis Mother     Seizures Mother     GI problems Mother     Diabetes Father         Type I    Hyperlipidemia Father     Hypertension Father     Heart disease Father         Afib    Thyroid disease Father     Hearing loss Father         Hearing aid    COPD Father     Cancer Sister         Invasive Breast Cancer    Depression Sister     Glaucoma Sister     Miscarriages / Stillbirths Sister     Vision loss Sister     Osteoporosis Sister     Crohn's disease Sister     GI problems Sister     Depression Sister     Hypertension Brother     Lung disease Brother     Asthma Brother     Hypertension Brother     Asthma Brother     Hyperlipidemia Brother     Hypertension Brother     Hyperlipidemia Brother     Lung disease Daughter     Asthma Daughter     Hyperlipidemia Daughter     Heart disease Maternal Aunt         Afib    Rheum arthritis Maternal Aunt     Arthritis Maternal Aunt     Stroke Maternal Aunt     Hyperlipidemia Maternal Uncle     Heart disease Maternal Uncle     Heart disease Paternal Aunt     Heart disease Maternal Grandmother     Hyperlipidemia Maternal Grandmother     Rheum arthritis Maternal Grandmother     Stroke Maternal Grandmother     Arthritis Maternal Grandmother     Hypertension Maternal Grandmother     Osteoporosis Maternal Grandmother     Heart disease Maternal Grandfather     Cancer Maternal Grandfather         Pancreatic    Stomach cancer Maternal Grandfather     Heart disease Paternal Grandmother     Hyperlipidemia Paternal Grandmother     Stroke Paternal Grandmother     Hypertension Paternal Grandmother     Heart disease Paternal Grandfather     Cancer Paternal Grandfather         Lung    Malig Hyperthermia Neg Hx        Social History     Socioeconomic History    Marital status:     Number of children: 3    Years of education: AS   Tobacco Use    Smoking status: Former     Current packs/day: 0.00     Average packs/day: 1 pack/day  for 17.5 years (17.5 ttl pk-yrs)     Types: Cigarettes     Start date: 1995     Quit date: 2013     Years since quittin.1     Passive exposure: Past    Smokeless tobacco: Never    Tobacco comments:     Started smoking after separation from my .   Vaping Use    Vaping status: Never Used   Substance and Sexual Activity    Alcohol use: Yes     Alcohol/week: 2.0 standard drinks of alcohol     Types: 2 Glasses of wine per week     Comment: Red wine    Drug use: Never    Sexual activity: Not Currently     Partners: Male     Birth control/protection: Post-menopausal       Past Surgical History:   Procedure Laterality Date    CERVICAL CONIZATION      CERVICAL EPIDURAL N/A 2023    Procedure: CERVICAL EPIDURAL STEROID INJECTION CPT: 72095;  Surgeon: Chidi Portillo MD;  Location: [facility] [place] MAIN OR;  Service: Pain Management;  Laterality: N/A;     SECTION      ,,    COLONOSCOPY N/A 2017    Procedure: COLONOSCOPY TO CECUM with cold polypectomy;  Surgeon: Alo Staton Jr., MD;  Location: [facility] ENDOSCOPY;  Service:     COLONOSCOPY N/A 2022    Procedure: COLONOSCOPY TO CECUM WITH COLD BIOPSY POLYPECTOMY;  Surgeon: Chuck Servin MD;  Location: [facility] ENDOSCOPY;  Service: Gastroenterology;  Laterality: N/A;  PRE: HX COLON POLYPS  POST: DIVERTICULOSIS, POLYP    ENDOMETRIAL ABLATION      EPIDURAL BLOCK      EYE SURGERY  2006    FINGER SURGERY      FRACTURE SURGERY  2002    HEMORRHOIDECTOMY  2015    NERVE BLOCK Right 2023    Procedure: RIGHT SUPRASCAPULAR NERVE BLOCK CPT: 78575;  Surgeon: Chidi Portillo MD;  Location: [facility] [place] MAIN OR;  Service: Pain Management;  Laterality: Right;    TRIGGER POINT INJECTION      TUBAL ABDOMINAL LIGATION           Objective     Vitals:    24 1138   BP: 142/82   Pulse: 74   Resp: 16   Temp: 97 °F (36.1 °C)   SpO2: 98%     Body mass index is 21.9 kg/m².    Physical Exam  Constitutional:       Appearance: Normal  appearance.   HENT:      Head: Normocephalic and atraumatic.        Comments: Bruising and ecchymosis over her right eye from trauma  Eyes:      Extraocular Movements: Extraocular movements intact.      Conjunctiva/sclera: Conjunctivae normal.      Pupils: Pupils are equal, round, and reactive to light.   Cardiovascular:      Rate and Rhythm: Normal rate and regular rhythm.      Pulses: Normal pulses.   Pulmonary:      Breath sounds: Normal breath sounds.   Abdominal:      Palpations: Abdomen is soft.   Musculoskeletal:         General: Normal range of motion.      Cervical back: Normal range of motion and neck supple.   Skin:     General: Skin is warm and dry.   Neurological:      Mental Status: She is alert and oriented to person, place, and time.      Cranial Nerves: Cranial nerves 2-12 are intact.      Motor: Motor function is intact. No weakness or atrophy.      Coordination: Coordination is intact. Romberg sign negative. Romberg Test normal.      Gait: Gait is intact. Gait normal.      Deep Tendon Reflexes: Reflexes are normal and symmetric.      Reflex Scores:       Tricep reflexes are 2+ on the right side and 2+ on the left side.       Bicep reflexes are 2+ on the right side and 2+ on the left side.       Brachioradialis reflexes are 2+ on the right side and 2+ on the left side.       Patellar reflexes are 2+ on the right side and 2+ on the left side.       Achilles reflexes are 2+ on the right side and 2+ on the left side.  Psychiatric:         Speech: Speech normal.         Neurologic Exam     Mental Status   Oriented to person, place, and time.   Attention: normal. Concentration: normal.   Speech: speech is normal   Level of consciousness: alert    Cranial Nerves   Cranial nerves II through XII intact.     CN III, IV, VI   Pupils are equal, round, and reactive to light.    Motor Exam   Muscle bulk: normal  Overall muscle tone: normal    Strength   Strength 5/5 except as noted.     Sensory Exam   Light  touch normal.     Gait, Coordination, and Reflexes     Gait  Gait: normal    Coordination   Romberg: negative    Reflexes   Reflexes 2+ except as noted.   Right brachioradialis: 2+  Left brachioradialis: 2+  Right biceps: 2+  Left biceps: 2+  Right triceps: 2+  Left triceps: 2+  Right patellar: 2+  Left patellar: 2+  Right achilles: 2+  Left achilles: 2+      Assessment & Plan   Independent Review of Radiographic Studies:      I personally reviewed the images from the following studies.    MR: MRI of the cervical spine wo contrast was reviewed and shows degenerative disc disease of the cervical spine at C4-5 and C5-6 eccentric to the right side    Assessment/Plan: Has had minimal improvement of her arm pain with epidural steroid injections.  I suspect that she has a primary shoulder issue and I think she will benefit from a consultation with Dr. Batsheva Staton.  Will obtain an x-ray of her right shoulder today.  As far as her cervicalgia, I think that pain management could try a facet block and RFA for her    Medical Decision Making:      X-ray right shoulder  Referral to orthopedic surgery, Dr. Batsheva Staton         Diagnoses and all orders for this visit:    1. Chronic right shoulder pain (Primary)  -     XR shoulder 2+ vw right; Future  -     Ambulatory Referral to Orthopedic Surgery    2. Degenerative disc disease, cervical    3. Cervical radiculopathy             Patient Instructions/Recommendations:    Follow-up as needed      Sal Amaya MD  05/30/24  12:11 EDT

## 2024-05-30 ENCOUNTER — HOSPITAL ENCOUNTER (OUTPATIENT)
Dept: GENERAL RADIOLOGY | Facility: HOSPITAL | Age: 69
Discharge: HOME | End: 2024-05-30
Admitting: NEUROLOGICAL SURGERY
Payer: MEDICARE

## 2024-05-30 ENCOUNTER — OFFICE VISIT (OUTPATIENT)
Dept: NEUROSURGERY | Facility: CLINIC | Age: 69
End: 2024-05-30
Payer: MEDICARE

## 2024-05-30 VITALS
OXYGEN SATURATION: 98 % | RESPIRATION RATE: 16 BRPM | HEART RATE: 74 BPM | SYSTOLIC BLOOD PRESSURE: 142 MMHG | TEMPERATURE: 97 F | WEIGHT: 104.8 LBS | DIASTOLIC BLOOD PRESSURE: 82 MMHG | HEIGHT: 58 IN | BODY MASS INDEX: 22 KG/M2

## 2024-05-30 DIAGNOSIS — G89.29 CHRONIC RIGHT SHOULDER PAIN: Primary | ICD-10-CM

## 2024-05-30 DIAGNOSIS — M81.8 OTHER OSTEOPOROSIS WITHOUT CURRENT PATHOLOGICAL FRACTURE: ICD-10-CM

## 2024-05-30 DIAGNOSIS — M25.511 CHRONIC RIGHT SHOULDER PAIN: ICD-10-CM

## 2024-05-30 DIAGNOSIS — G89.29 CHRONIC RIGHT SHOULDER PAIN: ICD-10-CM

## 2024-05-30 DIAGNOSIS — M50.30 DEGENERATIVE DISC DISEASE, CERVICAL: ICD-10-CM

## 2024-05-30 DIAGNOSIS — M25.511 CHRONIC RIGHT SHOULDER PAIN: Primary | ICD-10-CM

## 2024-05-30 DIAGNOSIS — M54.12 CERVICAL RADICULOPATHY: ICD-10-CM

## 2024-05-30 PROCEDURE — 73030 X-RAY EXAM OF SHOULDER: CPT

## 2024-05-31 NOTE — PROGRESS NOTES
New Shoulder      Patient: Nanci Umanzor        YOB: 1955    Medical Record Number: [id number]        Chief Complaints: Right shoulder pain      History of Present Illness: This is a    69-year-old female who is right-hand-dominant fell on her right shoulder injuring the shoulder on Wednesday 529 she injured her neck as well and saw Dr. Vicente x-rays were done of her shoulder which indicated a questionable chronic rotator cuff tear and she was sent here.  Her past medical history as listed below and reviewed by me    Allergies:   Allergies   Allergen Reactions    Clarithromycin Shortness Of Breath and Palpitations    Fluarix [Influenza Virus Vaccine] Myalgia    Pneumococcal Vaccines Swelling       Medications:   Home Medications:  Current Outpatient Medications on File Prior to Visit   Medication Sig    albuterol (PROVENTIL) (2.5 MG/3ML) 0.083% nebulizer solution Take 2.5 mg by nebulization Every 4 (Four) Hours As Needed for Wheezing.    albuterol sulfate  (90 Base) MCG/ACT inhaler Inhale 2 puffs Every 4 (Four) Hours As Needed for Wheezing.    amLODIPine (NORVASC) 5 MG tablet Take 1 tablet by mouth Daily.    atorvastatin (LIPITOR) 40 MG tablet Take 1 tablet by mouth Daily.    benazepril (LOTENSIN) 10 MG tablet Take 1 tablet by mouth Daily.    BIOTIN PO Take 1 capsule by mouth Daily.    calcium citrate-vitamin d (CITRACAL) 200-250 MG-UNIT tablet tablet Take 1 tablet by mouth Daily.    Cholecalciferol (Vitamin D) 50 MCG (2000 UT) tablet Take 1 tablet by mouth Daily.    fluticasone (FLONASE) 50 MCG/ACT nasal spray 2 sprays into the nostril(s) as directed by provider Daily for 14 days.    Fluticasone-Salmeterol (ADVAIR/WIXELA) 250-50 MCG/ACT DISKUS Inhale 1 puff 2 (Two) Times a Day.    gabapentin (NEURONTIN) 300 MG capsule Take 1 capsule by mouth at night x 7 days. If tolerating well, may increase to 1 capsule twice a day with goal of taking medication three times per day as tolerated.     Ibuprofen 3 %, Gabapentin 10 %, Baclofen 2 %, lidocaine 4 %, Ketamine HCl 4 % Apply 1-2 g topically to the appropriate area as directed 3 (Three) to 4 (Four) times daily.    Mucus Relief 600 MG 12 hr tablet TAKE TWO TABLETS BY MOUTH TWICE A DAY FOR 7 DAYS    Multiple Vitamins-Minerals (CENTRUM SILVER ADULT 50+ PO) Take 1 tablet by mouth Daily.    Multiple Vitamins-Minerals (ZINC PO) Take  by mouth.    Omega-3 Fatty Acids (FISH OIL PO) Take 1 capsule by mouth Daily.    pantoprazole (PROTONIX) 40 MG EC tablet Take 1 tablet by mouth Daily.    Prolia 60 MG/ML solution prefilled syringe syringe INJECT 1 ML UNDER THE SKIN INTO THE APPROPRIATE AREA EVERY 6 MONTHS ONE TIME FOR 1 DOSE AS DIRECTED.    tretinoin (RETIN-A) 0.05 % cream Apply  topically Every Night. (Patient taking differently: Apply 1 Application topically to the appropriate area as directed Every Other Day.)    Turmeric (QC TUMERIC COMPLEX PO) Take  by mouth.    vitamin C (ASCORBIC ACID) 500 MG tablet Take 2 tablets by mouth Daily.    vitamin E 400 UNIT capsule Take 1 capsule by mouth Daily.    Zinc 50 MG capsule      No current facility-administered medications on file prior to visit.     Current Medications:  Scheduled Meds:  Continuous Infusions:No current facility-administered medications for this visit.    PRN Meds:.    Past Medical History:   Diagnosis Date    Allergic 1990's    Ankle fracture     Rt 2002  Lt 2004    Ankylosing spondylitis of site in spine     Anxiety     Arthritis     Asthma     Cancer 1992    Cervical cone biopsy for Stage II abnormality    Cataract 2017    Cervical disc disorder     Cervical radiculopathy 10/03/2023    Chronic pain disorder     Clavicle fracture     10/21/1970    Colon polyp     COPD (chronic obstructive pulmonary disease) 2006    Coronary artery disease 03/2023    Leaky tricuspid valve    Depression     Diverticulitis of colon 2019    Diverticulosis     Fibula fracture     left 2002    GERD (gastroesophageal reflux  disease) 2016    Glaucoma 2006    Heart murmur     Hyperlipidemia     Hypertension     Joint pain     Low back pain 1980s    Lumbosacral disc disease     Neck pain     Osteoarthritis     Osteopenia 2016    Pelvis fracture     10/21/1970    Pneumonia     Reflex sympathetic dystrophy     Spinal stenosis     Ulcerative colitis 23    Visual impairment Since 1yrs of age    Wrist fracture     2007        Past Surgical History:   Procedure Laterality Date    CERVICAL CONIZATION      CERVICAL EPIDURAL N/A 2023    Procedure: CERVICAL EPIDURAL STEROID INJECTION CPT: 16120;  Surgeon: Chidi Portillo MD;  Location: [facility];  Service: Pain Management;  Laterality: N/A;     SECTION      ,,    COLONOSCOPY N/A 2017    Procedure: COLONOSCOPY TO CECUM with cold polypectomy;  Surgeon: Alo Staton Jr., MD;  Location:  [facility];  Service:     COLONOSCOPY N/A 2022    Procedure: COLONOSCOPY TO CECUM WITH COLD BIOPSY POLYPECTOMY;  Surgeon: Chuck Servin MD;  Location:  [facility];  Service: Gastroenterology;  Laterality: N/A;  PRE: HX COLON POLYPS  POST: DIVERTICULOSIS, POLYP    ENDOMETRIAL ABLATION      EPIDURAL BLOCK      EYE SURGERY  2006    FINGER SURGERY      FRACTURE SURGERY  2002    HEMORRHOIDECTOMY  2015    NERVE BLOCK Right 2023    Procedure: RIGHT SUPRASCAPULAR NERVE BLOCK CPT: 27510;  Surgeon: Chidi Portillo MD;  Location: [facility];  Service: Pain Management;  Laterality: Right;    TRIGGER POINT INJECTION      TUBAL ABDOMINAL LIGATION          Social History     Occupational History    Occupation: ADMINISTRATION     Comment: Disabled   Tobacco Use    Smoking status: Former     Current packs/day: 0.00     Average packs/day: 1 pack/day for 17.5 years (17.5 ttl pk-yrs)     Types: Cigarettes     Start date: 1995     Quit date: 2013     Years since quittin.1     Passive exposure: Past    Smokeless tobacco: Never    Tobacco  comments:     Started smoking after separation from my .   Vaping Use    Vaping status: Never Used   Substance and Sexual Activity    Alcohol use: Yes     Alcohol/week: 2.0 standard drinks of alcohol     Types: 2 Glasses of wine per week     Comment: Red wine    Drug use: Never    Sexual activity: Not Currently     Partners: Male     Birth control/protection: Post-menopausal      Social History     Social History Narrative    Not on file        Family History   Problem Relation Age of Onset    Heart disease Mother         Mechanical aortic valve, several heart attacks    Hypertension Mother     Hyperlipidemia Mother     Rheum arthritis Mother     Arthritis Mother     Colon polyps Mother     Stroke Mother     Osteoporosis Mother     Seizures Mother     GI problems Mother     Diabetes Father         Type I    Hyperlipidemia Father     Hypertension Father     Heart disease Father         Afib    Thyroid disease Father     Hearing loss Father         Hearing aid    COPD Father     Cancer Sister         Invasive Breast Cancer    Depression Sister     Glaucoma Sister     Miscarriages / Stillbirths Sister     Vision loss Sister     Osteoporosis Sister     Crohn's disease Sister     GI problems Sister     Depression Sister     Hypertension Brother     Lung disease Brother     Asthma Brother     Hypertension Brother     Asthma Brother     Hyperlipidemia Brother     Hypertension Brother     Hyperlipidemia Brother     Lung disease Daughter     Asthma Daughter     Hyperlipidemia Daughter     Heart disease Maternal Aunt         Afib    Rheum arthritis Maternal Aunt     Arthritis Maternal Aunt     Stroke Maternal Aunt     Hyperlipidemia Maternal Uncle     Heart disease Maternal Uncle     Heart disease Paternal Aunt     Heart disease Maternal Grandmother     Hyperlipidemia Maternal Grandmother     Rheum arthritis Maternal Grandmother     Stroke Maternal Grandmother     Arthritis Maternal Grandmother     Hypertension  Maternal Grandmother     Osteoporosis Maternal Grandmother     Heart disease Maternal Grandfather     Cancer Maternal Grandfather         Pancreatic    Stomach cancer Maternal Grandfather     Heart disease Paternal Grandmother     Hyperlipidemia Paternal Grandmother     Stroke Paternal Grandmother     Hypertension Paternal Grandmother     Heart disease Paternal Grandfather     Cancer Paternal Grandfather         Lung    Malig Hyperthermia Neg Hx              Review of Systems:     Review of Systems      Physical Exam: 69 y.o. female  General Appearance:    Alert, cooperative, in no acute distress                 There were no vitals filed for this visit.   Patient is alert and read ×3 no acute distress appears her above-listed at height weight and age.  Affect is normal respiratory rate is normal unlabored. Heart rate regular rate rhythm, sclera, dentition and hearing are normal for the purpose of this exam.    Ortho Exam  Exam of the right shoulder she can only active flex to about 150 and she has pain with that passively and get her to about 175 external rotation to 50 rotator cuff strength 4/5 with symptoms  Procedures          Radiology:   AP, Scapular Y and Axillary Lateral of the right shoulder were/reviewed to evauate shoulder I have reviewed she does have some narrowing of subacromial space no obvious acute pathology pain.  Imaging Results (Most Recent)       None          Assessment/Plan: Right shoulder pain following a fall I think she probably does have some longstanding rotator cuff pathology attempted to injected however in my differential is also an occult fracture would not like to inject occult fracture therefore I would like to get an MRI to better assess and rule out an occult fracture as well as get a better feel for the rotator cuff.  If it is just cuff and no fracture I will get her back in here for an injection

## 2024-06-03 ENCOUNTER — OFFICE VISIT (OUTPATIENT)
Dept: ORTHOPEDIC SURGERY | Facility: CLINIC | Age: 69
End: 2024-06-03
Payer: MEDICARE

## 2024-06-03 VITALS — WEIGHT: 104 LBS | HEIGHT: 58 IN | TEMPERATURE: 98 F | BODY MASS INDEX: 21.83 KG/M2

## 2024-06-03 DIAGNOSIS — S42.294A OTHER CLOSED NONDISPLACED FRACTURE OF PROXIMAL END OF RIGHT HUMERUS, INITIAL ENCOUNTER: ICD-10-CM

## 2024-06-03 DIAGNOSIS — M75.101 TEAR OF RIGHT ROTATOR CUFF, UNSPECIFIED TEAR EXTENT, UNSPECIFIED WHETHER TRAUMATIC: Primary | ICD-10-CM

## 2024-06-03 PROCEDURE — 1160F RVW MEDS BY RX/DR IN RCRD: CPT | Performed by: ORTHOPAEDIC SURGERY

## 2024-06-03 PROCEDURE — 99213 OFFICE O/P EST LOW 20 MIN: CPT | Performed by: ORTHOPAEDIC SURGERY

## 2024-06-03 PROCEDURE — 1159F MED LIST DOCD IN RCRD: CPT | Performed by: ORTHOPAEDIC SURGERY

## 2024-06-03 PROCEDURE — 73030 X-RAY EXAM OF SHOULDER: CPT | Performed by: ORTHOPAEDIC SURGERY

## 2024-06-03 RX ORDER — ACETAMINOPHEN 325 MG/1
650 TABLET ORAL
COMMUNITY
Start: 2024-05-29

## 2024-06-05 ENCOUNTER — TELEPHONE (OUTPATIENT)
Dept: FAMILY MEDICINE CLINIC | Facility: CLINIC | Age: 69
End: 2024-06-05
Payer: MEDICARE

## 2024-06-06 RX ORDER — PANTOPRAZOLE SODIUM 40 MG/1
40 TABLET, DELAYED RELEASE ORAL DAILY
Qty: 90 TABLET | Refills: 0 | Status: SHIPPED | OUTPATIENT
Start: 2024-06-06

## 2024-06-07 ENCOUNTER — TELEPHONE (OUTPATIENT)
Dept: ORTHOPEDIC SURGERY | Facility: CLINIC | Age: 69
End: 2024-06-07
Payer: MEDICARE

## 2024-06-07 DIAGNOSIS — M25.511 ACUTE PAIN OF RIGHT SHOULDER: Primary | ICD-10-CM

## 2024-06-07 NOTE — TELEPHONE ENCOUNTER
Patient seen in clinic 6/3/24 and discussed MRI with Dr Staton, no order placed, please advise if you would like patient to get this.

## 2024-06-07 NOTE — TELEPHONE ENCOUNTER
No MRI order is placed in referral tab, however do see from last office note from FRANC that MRI was recommended.     If patient still needs MRI, please place order and can try to obtain auth and schedule at [facility] for quicker availability.

## 2024-06-07 NOTE — TELEPHONE ENCOUNTER
PATIENT CALLED TO CHECK ON STATUS OF ORDER FOR MRI THAT SHE DISCUSSED WITH DR CHAPARRO AT LAST VISIT.  PATIENT STATES SHE NEEDS TO GET IN FOR THIS MRI ASAP.   PATIENT DOES NOT HAVE A PREFERENCE FOR WHAT FACILITY SHE GOES TO FOR MRI, JUST WANTS TO GET IN DONE ASAP.

## 2024-06-13 LAB
25(OH)D3+25(OH)D2 SERPL-MCNC: 60.5 NG/ML (ref 30–100)
ALBUMIN SERPL-MCNC: 4.7 G/DL (ref 3.8–4.8)
ALBUMIN/GLOB SERPL: 2 {RATIO} (ref 1.2–2.2)
ALP SERPL-CCNC: 68 IU/L (ref 44–121)
ALT SERPL-CCNC: 21 IU/L (ref 0–32)
AST SERPL-CCNC: 22 IU/L (ref 0–40)
BILIRUB SERPL-MCNC: 0.3 MG/DL (ref 0–1.2)
BUN SERPL-MCNC: 16 MG/DL (ref 8–27)
BUN/CREAT SERPL: 26 (ref 12–28)
CALCIUM SERPL-MCNC: 10 MG/DL (ref 8.7–10.3)
CHLORIDE SERPL-SCNC: 100 MMOL/L (ref 96–106)
CHOLEST SERPL-MCNC: 211 MG/DL (ref 100–199)
CO2 SERPL-SCNC: 22 MMOL/L (ref 20–29)
CREAT SERPL-MCNC: 0.62 MG/DL (ref 0.57–1)
EGFRCR SERPLBLD CKD-EPI 2021: 95 ML/MIN/1.73
GLOBULIN SER CALC-MCNC: 2.3 G/DL (ref 1.5–4.5)
GLUCOSE SERPL-MCNC: 80 MG/DL (ref 70–99)
HDLC SERPL-MCNC: 88 MG/DL
LDLC SERPL CALC-MCNC: 106 MG/DL (ref 0–99)
POTASSIUM SERPL-SCNC: 5.2 MMOL/L (ref 3.5–5.2)
PROT SERPL-MCNC: 7 G/DL (ref 6–8.5)
PTH-INTACT SERPL-MCNC: 26 PG/ML (ref 15–65)
SODIUM SERPL-SCNC: 137 MMOL/L (ref 134–144)
SPECIMEN STATUS: NORMAL
TRIGL SERPL-MCNC: 96 MG/DL (ref 0–149)
TSH SERPL DL<=0.005 MIU/L-ACNC: 3.83 UIU/ML (ref 0.45–4.5)
VLDLC SERPL CALC-MCNC: 17 MG/DL (ref 5–40)

## 2024-06-18 ENCOUNTER — OFFICE VISIT (OUTPATIENT)
Dept: FAMILY MEDICINE CLINIC | Facility: CLINIC | Age: 69
End: 2024-06-18
Payer: MEDICARE

## 2024-06-18 ENCOUNTER — HOSPITAL ENCOUNTER (OUTPATIENT)
Dept: MRI IMAGING | Facility: HOSPITAL | Age: 69
Discharge: HOME | End: 2024-06-18
Admitting: ORTHOPAEDIC SURGERY
Payer: MEDICARE

## 2024-06-18 VITALS
HEART RATE: 82 BPM | WEIGHT: 106.4 LBS | DIASTOLIC BLOOD PRESSURE: 62 MMHG | HEIGHT: 58 IN | OXYGEN SATURATION: 96 % | SYSTOLIC BLOOD PRESSURE: 104 MMHG | BODY MASS INDEX: 22.33 KG/M2

## 2024-06-18 DIAGNOSIS — Z00.00 MEDICARE ANNUAL WELLNESS VISIT, SUBSEQUENT: Primary | ICD-10-CM

## 2024-06-18 DIAGNOSIS — E78.5 HYPERLIPIDEMIA, UNSPECIFIED HYPERLIPIDEMIA TYPE: ICD-10-CM

## 2024-06-18 DIAGNOSIS — S01.81XA FACIAL LACERATION, INITIAL ENCOUNTER: ICD-10-CM

## 2024-06-18 DIAGNOSIS — M25.511 ACUTE PAIN OF RIGHT SHOULDER: ICD-10-CM

## 2024-06-18 DIAGNOSIS — I10 ESSENTIAL HYPERTENSION: ICD-10-CM

## 2024-06-18 PROCEDURE — 90471 IMMUNIZATION ADMIN: CPT | Performed by: NURSE PRACTITIONER

## 2024-06-18 PROCEDURE — 73221 MRI JOINT UPR EXTREM W/O DYE: CPT

## 2024-06-18 PROCEDURE — 3078F DIAST BP <80 MM HG: CPT | Performed by: NURSE PRACTITIONER

## 2024-06-18 PROCEDURE — 3074F SYST BP LT 130 MM HG: CPT | Performed by: NURSE PRACTITIONER

## 2024-06-18 PROCEDURE — G0439 PPPS, SUBSEQ VISIT: HCPCS | Performed by: NURSE PRACTITIONER

## 2024-06-18 PROCEDURE — 1125F AMNT PAIN NOTED PAIN PRSNT: CPT | Performed by: NURSE PRACTITIONER

## 2024-06-18 PROCEDURE — 90714 TD VACC NO PRESV 7 YRS+ IM: CPT | Performed by: NURSE PRACTITIONER

## 2024-06-18 RX ORDER — TRETINOIN 0.5 MG/G
CREAM TOPICAL NIGHTLY
Qty: 20 G | Refills: 5 | Status: SHIPPED | OUTPATIENT
Start: 2024-06-18
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
## 2024-06-19 LAB
BASOPHILS # BLD AUTO: 0.06 10*3/MM3 (ref 0–0.2)
BASOPHILS NFR BLD AUTO: 0.8 % (ref 0–1.5)
EOSINOPHIL # BLD AUTO: 0.24 10*3/MM3 (ref 0–0.4)
EOSINOPHIL NFR BLD AUTO: 3.2 % (ref 0.3–6.2)
ERYTHROCYTE [DISTWIDTH] IN BLOOD BY AUTOMATED COUNT: 11.9 % (ref 12.3–15.4)
HCT VFR BLD AUTO: 37.8 % (ref 34–46.6)
HGB BLD-MCNC: 12.5 G/DL (ref 12–15.9)
IMM GRANULOCYTES # BLD AUTO: 0.01 10*3/MM3 (ref 0–0.05)
IMM GRANULOCYTES NFR BLD AUTO: 0.1 % (ref 0–0.5)
LYMPHOCYTES # BLD AUTO: 1.67 10*3/MM3 (ref 0.7–3.1)
LYMPHOCYTES NFR BLD AUTO: 22 % (ref 19.6–45.3)
MCH RBC QN AUTO: 31.5 PG (ref 26.6–33)
MCHC RBC AUTO-ENTMCNC: 33.1 G/DL (ref 31.5–35.7)
MCV RBC AUTO: 95.2 FL (ref 79–97)
MONOCYTES # BLD AUTO: 0.61 10*3/MM3 (ref 0.1–0.9)
MONOCYTES NFR BLD AUTO: 8 % (ref 5–12)
NEUTROPHILS # BLD AUTO: 4.99 10*3/MM3 (ref 1.7–7)
NEUTROPHILS NFR BLD AUTO: 65.9 % (ref 42.7–76)
NRBC BLD AUTO-RTO: 0 /100 WBC (ref 0–0.2)
PLATELET # BLD AUTO: 340 10*3/MM3 (ref 140–450)
RBC # BLD AUTO: 3.97 10*6/MM3 (ref 3.77–5.28)
WBC # BLD AUTO: 7.58 10*3/MM3 (ref 3.4–10.8)

## 2024-06-20 ENCOUNTER — TELEPHONE (OUTPATIENT)
Dept: ORTHOPEDIC SURGERY | Facility: CLINIC | Age: 69
End: 2024-06-20
Payer: MEDICARE

## 2024-06-20 NOTE — TELEPHONE ENCOUNTER
----- Message from Batsheva Staton sent at 6/18/2024  5:12 PM EDT -----  Can you please let her know that she has a large rotator cuff tear the tendon that remains is quite thin I am concerned this might not be a repairable tear I would like her to see Dr. Brown as he would have other options if it is not repairable such as a special kind of shoulder replacement

## 2024-06-26 ENCOUNTER — OFFICE VISIT (OUTPATIENT)
Dept: FAMILY MEDICINE CLINIC | Facility: CLINIC | Age: 69
End: 2024-06-26
Payer: COMMERCIAL

## 2024-06-26 VITALS
HEIGHT: 58 IN | SYSTOLIC BLOOD PRESSURE: 116 MMHG | WEIGHT: 105.9 LBS | DIASTOLIC BLOOD PRESSURE: 72 MMHG | HEART RATE: 76 BPM | OXYGEN SATURATION: 100 % | BODY MASS INDEX: 22.23 KG/M2

## 2024-06-26 DIAGNOSIS — M54.12 CERVICAL RADICULOPATHY: ICD-10-CM

## 2024-06-26 DIAGNOSIS — R41.89 BRAIN FOG: ICD-10-CM
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
## 2024-06-26 DIAGNOSIS — R91.1 LUNG NODULE: ICD-10-CM

## 2024-06-26 DIAGNOSIS — M54.2 NECK PAIN: Primary | ICD-10-CM

## 2024-06-26 DIAGNOSIS — S13.4XXA WHIPLASH INJURY TO NECK, INITIAL ENCOUNTER: ICD-10-CM

## 2024-06-26 DIAGNOSIS — M48.02 CERVICAL STENOSIS OF SPINAL CANAL: ICD-10-CM

## 2024-06-26 PROCEDURE — 99215 OFFICE O/P EST HI 40 MIN: CPT | Performed by: INTERNAL MEDICINE

## 2024-06-26 RX ORDER — GABAPENTIN 300 MG/1
CAPSULE ORAL
Qty: 30 CAPSULE | Refills: 0 | Status: SHIPPED | OUTPATIENT
Start: 2024-06-26
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
## 2024-06-27 ENCOUNTER — HOSPITAL ENCOUNTER (OUTPATIENT)
Dept: MRI IMAGING | Facility: HOSPITAL | Age: 69
Discharge: HOME | End: 2024-06-27
Payer: COMMERCIAL

## 2024-06-27 ENCOUNTER — HOSPITAL ENCOUNTER (OUTPATIENT)
Dept: CT IMAGING | Facility: HOSPITAL | Age: 69
Discharge: HOME | End: 2024-06-27
Payer: COMMERCIAL

## 2024-06-27 PROCEDURE — 70450 CT HEAD/BRAIN W/O DYE: CPT

## 2024-06-27 PROCEDURE — 72141 MRI NECK SPINE W/O DYE: CPT

## 2024-06-28 ENCOUNTER — TELEPHONE (OUTPATIENT)
Dept: NEUROSURGERY | Facility: CLINIC | Age: 69
End: 2024-06-28
Payer: COMMERCIAL

## 2024-06-28 DIAGNOSIS — M54.2 NECK PAIN: ICD-10-CM
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
## 2024-06-28 NOTE — TELEPHONE ENCOUNTER
Called patient and left VM. Informed patient that per  if she could call us once she gets her CT scheduled so that we can schedule her f/u appt.

## 2024-06-28 NOTE — TELEPHONE ENCOUNTER
----- Message from Sal Amaya sent at 6/28/2024  7:28 AM EDT -----  Please schedule this patient for a follow-up after her CT scan gets completed.  She just had an MRI recently and her PCP ordered a CT scan.

## 2024-07-12 DIAGNOSIS — J44.89 COPD (CHRONIC OBSTRUCTIVE PULMONARY DISEASE) WITH CHRONIC BRONCHITIS: ICD-10-CM

## 2024-07-12 RX ORDER — FLUTICASONE PROPIONATE AND SALMETEROL 250; 50 UG/1; UG/1
1 POWDER RESPIRATORY (INHALATION) 2 TIMES DAILY
Qty: 60 EACH | Refills: 11 | Status: SHIPPED | OUTPATIENT
Start: 2024-07-12

## 2024-07-15 ENCOUNTER — TELEPHONE (OUTPATIENT)
Dept: FAMILY MEDICINE CLINIC | Facility: CLINIC | Age: 69
End: 2024-07-15
Payer: COMMERCIAL

## 2024-07-15 ENCOUNTER — OFFICE VISIT (OUTPATIENT)
Dept: ORTHOPEDIC SURGERY | Facility: CLINIC | Age: 69
End: 2024-07-15
Payer: COMMERCIAL

## 2024-07-15 VITALS — BODY MASS INDEX: 21.09 KG/M2 | WEIGHT: 104.6 LBS | TEMPERATURE: 98.7 F | HEIGHT: 59 IN

## 2024-07-15 DIAGNOSIS — M75.101 TEAR OF RIGHT ROTATOR CUFF, UNSPECIFIED TEAR EXTENT, UNSPECIFIED WHETHER TRAUMATIC: Primary | ICD-10-CM

## 2024-07-15 PROCEDURE — 99214 OFFICE O/P EST MOD 30 MIN: CPT | Performed by: ORTHOPAEDIC SURGERY

## 2024-07-15 PROCEDURE — 20610 DRAIN/INJ JOINT/BURSA W/O US: CPT | Performed by: ORTHOPAEDIC SURGERY

## 2024-07-15 RX ORDER — METHYLPREDNISOLONE ACETATE 80 MG/ML
80 INJECTION, SUSPENSION INTRA-ARTICULAR; INTRALESIONAL; INTRAMUSCULAR; SOFT TISSUE
Status: COMPLETED | OUTPATIENT
Start: 2024-07-15 | End: 2024-07-15

## 2024-07-15 RX ORDER — LIDOCAINE HYDROCHLORIDE 10 MG/ML
2 INJECTION, SOLUTION EPIDURAL; INFILTRATION; INTRACAUDAL; PERINEURAL
Status: COMPLETED | OUTPATIENT
Start: 2024-07-15 | End: 2024-07-15

## 2024-07-15 RX ADMIN — LIDOCAINE HYDROCHLORIDE 2 ML: 10 INJECTION, SOLUTION EPIDURAL; INFILTRATION; INTRACAUDAL; PERINEURAL at 11:49

## 2024-07-15 RX ADMIN — METHYLPREDNISOLONE ACETATE 80 MG: 80 INJECTION, SUSPENSION INTRA-ARTICULAR; INTRALESIONAL; INTRAMUSCULAR; SOFT TISSUE at 11:49

## 2024-07-15 NOTE — TELEPHONE ENCOUNTER
Caller: Nanci Umanzor    Relationship: Self    Best call back number: [phone]     What was the call regarding: PATIENT STATES THAT HER RECENT TETANUS SHOT WAS DENIED BY INSURANCE BECAUSE IT WAS CODED INCORRECTLY. THEY TOLD HER IT WAS BILLED AS MEDICAL INSTEAD OF UNDER DRUG SIDE OF INSURANCE. REQUESTS TO HAVE THIS CORRECTED. PLEASE CALL TO CONFIRM OR MESSAGE ON [employer]
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
## 2024-07-16 ENCOUNTER — HOSPITAL ENCOUNTER (OUTPATIENT)
Dept: CT IMAGING | Facility: HOSPITAL | Age: 69
Discharge: HOME | End: 2024-07-16
Admitting: INTERNAL MEDICINE
Payer: COMMERCIAL

## 2024-07-16 ENCOUNTER — OFFICE VISIT (OUTPATIENT)
Dept: GASTROENTEROLOGY | Facility: CLINIC | Age: 69
End: 2024-07-16
Payer: MEDICARE

## 2024-07-16 VITALS
BODY MASS INDEX: 21.96 KG/M2 | DIASTOLIC BLOOD PRESSURE: 72 MMHG | HEIGHT: 58 IN | WEIGHT: 104.6 LBS | SYSTOLIC BLOOD PRESSURE: 102 MMHG | TEMPERATURE: 96.2 F | HEART RATE: 85 BPM
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
## 2024-07-16 DIAGNOSIS — M54.2 NECK PAIN: ICD-10-CM

## 2024-07-16 DIAGNOSIS — K21.9 GASTROESOPHAGEAL REFLUX DISEASE WITHOUT ESOPHAGITIS: Primary | ICD-10-CM

## 2024-07-16 PROCEDURE — 72128 CT CHEST SPINE W/O DYE: CPT

## 2024-07-16 PROCEDURE — 3074F SYST BP LT 130 MM HG: CPT | Performed by: INTERNAL MEDICINE

## 2024-07-16 PROCEDURE — 1159F MED LIST DOCD IN RCRD: CPT | Performed by: INTERNAL MEDICINE

## 2024-07-16 PROCEDURE — 1160F RVW MEDS BY RX/DR IN RCRD: CPT | Performed by: INTERNAL MEDICINE

## 2024-07-16 PROCEDURE — 3078F DIAST BP <80 MM HG: CPT | Performed by: INTERNAL MEDICINE

## 2024-07-16 PROCEDURE — 99212 OFFICE O/P EST SF 10 MIN: CPT | Performed by: INTERNAL MEDICINE

## 2024-07-16 RX ORDER — PANTOPRAZOLE SODIUM 40 MG/1
40 TABLET, DELAYED RELEASE ORAL DAILY
Qty: 90 TABLET | Refills: 3 | Status: SHIPPED | OUTPATIENT
Start: 2024-07-16
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
## 2024-08-06 ENCOUNTER — TREATMENT (OUTPATIENT)
Dept: PHYSICAL THERAPY | Facility: CLINIC | Age: 69
End: 2024-08-06
Payer: COMMERCIAL

## 2024-08-06 DIAGNOSIS — G89.29 CHRONIC RIGHT SHOULDER PAIN: ICD-10-CM

## 2024-08-06 DIAGNOSIS — M25.611 DECREASED RANGE OF MOTION OF RIGHT SHOULDER: ICD-10-CM

## 2024-08-06 DIAGNOSIS — M25.511 CHRONIC RIGHT SHOULDER PAIN: ICD-10-CM

## 2024-08-06 DIAGNOSIS — M75.121 COMPLETE TEAR OF RIGHT ROTATOR CUFF, UNSPECIFIED WHETHER TRAUMATIC: Primary | ICD-10-CM

## 2024-08-06 NOTE — PATIENT INSTRUCTIONS
Access Code: [email or web address]  URL: [email or web address]  Date: 08/06/2024  Prepared by: Teresa Cantu    Exercises  - Shoulder Flexion Wall Slide with Towel  - 1-3 x daily - 7 x weekly - 1 sets - 10 reps - 10 hold  - Shoulder Scaption Wall Slide with Towel  - 1-3 x daily - 7 x weekly - 1 sets - 10 reps - 10 hold  - Standing Shoulder Abduction Slides at Wall  - 1-3 x daily - 7 x weekly - 1 sets - 10 reps - 10 hold  - Neck Extension Stretch  - 1 x daily - 7 x weekly - 1 sets - 10 reps - 10 hold  - Hooklying Cervical Rotations on Foam Roll  - 1 x daily - 7 x weekly - 1 sets - 10 reps - 10 hold  - Neck Rotation Stretch  - 1 x daily - 7 x weekly - 1 sets - 10 reps - 10 hold  - Neck Sidebend Stretch  - 1 x daily - 7 x weekly - 1 sets - 10 reps - 10 hold  Pt was educated on findings of evaluation, purpose of treatment and goals for therapy. Treatment options discussed and questions answered. Pt was educated on exercises, self treatment and pain relief techniques. Pt educated on anatomy of affected structures.
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
## 2024-08-08 ENCOUNTER — OFFICE VISIT (OUTPATIENT)
Dept: NEUROSURGERY | Facility: CLINIC | Age: 69
End: 2024-08-08
Payer: COMMERCIAL

## 2024-08-08 VITALS
DIASTOLIC BLOOD PRESSURE: 52 MMHG | SYSTOLIC BLOOD PRESSURE: 108 MMHG | WEIGHT: 105.7 LBS | OXYGEN SATURATION: 95 % | HEIGHT: 58 IN | BODY MASS INDEX: 22.19 KG/M2 | TEMPERATURE: 97 F | HEART RATE: 70 BPM | RESPIRATION RATE: 16 BRPM

## 2024-08-08 DIAGNOSIS — S13.4XXA WHIPLASH INJURY TO NECK, INITIAL ENCOUNTER: Primary | ICD-10-CM

## 2024-08-14 ENCOUNTER — TREATMENT (OUTPATIENT)
Dept: PHYSICAL THERAPY | Facility: CLINIC | Age: 69
End: 2024-08-14
Payer: COMMERCIAL

## 2024-08-14 DIAGNOSIS — M75.121 COMPLETE TEAR OF RIGHT ROTATOR CUFF, UNSPECIFIED WHETHER TRAUMATIC: Primary | ICD-10-CM

## 2024-08-14 DIAGNOSIS — S13.4XXA WHIPLASH INJURY TO NECK, INITIAL ENCOUNTER: ICD-10-CM

## 2024-08-14 DIAGNOSIS — M25.611 DECREASED RANGE OF MOTION OF RIGHT SHOULDER: ICD-10-CM

## 2024-08-14 DIAGNOSIS — M25.511 CHRONIC RIGHT SHOULDER PAIN: ICD-10-CM

## 2024-08-14 DIAGNOSIS — G89.29 CHRONIC RIGHT SHOULDER PAIN: ICD-10-CM

## 2024-08-14 NOTE — PROGRESS NOTES
Re-Assessment / Progress Note  [facility] [place]   [address], [address]  [place] [address]  P: [phone]  F: [phone]  Patient: Nanci Umanzor   : 1955  Diagnosis/ICD-10 Code:  Complete tear of right rotator cuff, unspecified whether traumatic [M75.121]  Referring practitioner: Bear Brown MD  Date of Initial Visit: Episode Type: THERAPY  Noted: 2024    Today's Date: 2024  Patient seen for 2 sessions.      Subjective:   Nanci Umanzor reports: has new order for PT and dry needling for whiplash of neck     Subjective Questionnaire: QuickDASH:   Clinical Progress: unchanged  Home Program Compliance: Yes  Treatment has included: therapeutic exercise, neuromuscular re-education, manual therapy, therapeutic activity, and moist heat    Subjective   Neck pain: 7   Objective   AROM cervical:  L Rotation: 25 with pain  R rotation: 30 with pain  Flexion: 20 with pain  Ext: 10 with pain   TTP and trigger point noted B UT, L >R, B levator scapula, suboccipital   Left Shoulder   Normal active range of motion     Right Shoulder   Flexion: 80 degrees with pain  Abduction: 65 degrees with pain  External rotation 90°: 60 degreeswith pain  External rotation BTH: C2 with pain  Internal rotation 90°: 50 degrees with pain  Internal rotation BTB: L2 with pain     Passive Range of Motion      Right Shoulder   Flexion: 150 degrees with pain  Abduction: 140 degrees with pain  Assessment/Plan  Progress toward previous goals: Not Met    Goals     Plan Goals: Short Term Goals: 2-4 weeks. Patient will:  1. Be independent with initial HEP  2. Be instructed in posture and body mechanics.  3. Demonstrate full GH PROM to allow for progressing of therapy exercises.     Long Term Goals: 4-8 weeks. Pt will:  1. Exhibit (R) shoulder  and cervical AROM to WFL to allow for reaching overhead and out (ABD) without pain limiting function.  2. Demonstrate improved Right UE MMT of >/= 4+/5 to  allow for performance of ADL's/household management/recreational activities.  3. Pt able to reach overhead and lift 10# to allow for return to doing home/yard/recreational activities with min to no pain.  4. Report perceived disability </=25% based on QuickDASH         Recommendations: Continue as planned  Timeframe: 1 month  Prognosis to achieve goals: good    PT Signature: Teresa Cantu, PT  [place] Lic. # [id number]      Based upon review of the patient's progress and continued therapy plan, it is my medical opinion that Nanci Umanzor should continue physical therapy treatment at [facility]  [address] [place] [address] [phone] ; Fax Number [phone]    Signature: __________________________________  Bear Brown MD    Manual Therapy:  15        mins  70404;  Therapeutic Exercise:      8    mins  56761;     Neuromuscular Amanda:          mins  15461;    Therapeutic Activity:    15       mins  44162;     Gait Training:            mins  15771;     Ultrasound:           mins  72713;    Electrical Stimulation:          mins  82590 ( );  Dry Needling           mins self-pay  Traction           mins 28136  Canalith Repositioning         mins 71357      Timed Treatment:    38  mins   Total Treatment:     48   mins

## 2024-08-20 ENCOUNTER — TREATMENT (OUTPATIENT)
Dept: PHYSICAL THERAPY | Facility: CLINIC | Age: 69
End: 2024-08-20
Payer: COMMERCIAL

## 2024-08-20 DIAGNOSIS — M75.121 COMPLETE TEAR OF RIGHT ROTATOR CUFF, UNSPECIFIED WHETHER TRAUMATIC: Primary | ICD-10-CM

## 2024-08-20 DIAGNOSIS — M25.611 DECREASED RANGE OF MOTION OF RIGHT SHOULDER: ICD-10-CM

## 2024-08-20 DIAGNOSIS — M25.511 CHRONIC RIGHT SHOULDER PAIN: ICD-10-CM

## 2024-08-20 DIAGNOSIS — G89.29 CHRONIC RIGHT SHOULDER PAIN: ICD-10-CM

## 2024-08-20 DIAGNOSIS — S13.4XXA WHIPLASH INJURY TO NECK, INITIAL ENCOUNTER: ICD-10-CM
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
## 2024-08-27 ENCOUNTER — TELEPHONE (OUTPATIENT)
Dept: PHYSICAL THERAPY | Facility: OTHER | Age: 69
End: 2024-08-27
Payer: COMMERCIAL

## 2024-08-27 NOTE — TELEPHONE ENCOUNTER
Caller: Nanci Umanzor    Relationship: Self    What was the call regarding: PATIENT CANCELLED APPT TODAY BECAUSE THEY HAVE TO WORK

## 2024-09-03 ENCOUNTER — TREATMENT (OUTPATIENT)
Dept: PHYSICAL THERAPY | Facility: CLINIC | Age: 69
End: 2024-09-03
Payer: MEDICARE

## 2024-09-03 DIAGNOSIS — S13.4XXA WHIPLASH INJURY TO NECK, INITIAL ENCOUNTER: ICD-10-CM

## 2024-09-03 DIAGNOSIS — M25.511 CHRONIC RIGHT SHOULDER PAIN: ICD-10-CM

## 2024-09-03 DIAGNOSIS — M25.611 DECREASED RANGE OF MOTION OF RIGHT SHOULDER: ICD-10-CM

## 2024-09-03 DIAGNOSIS — G89.29 CHRONIC RIGHT SHOULDER PAIN: ICD-10-CM

## 2024-09-03 DIAGNOSIS — M75.121 COMPLETE TEAR OF RIGHT ROTATOR CUFF, UNSPECIFIED WHETHER TRAUMATIC: Primary | ICD-10-CM
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
## 2024-09-05 DIAGNOSIS — E78.01 FAMILIAL HYPERCHOLESTEROLEMIA: ICD-10-CM

## 2024-09-05 RX ORDER — PANTOPRAZOLE SODIUM 40 MG/1
40 TABLET, DELAYED RELEASE ORAL DAILY
Qty: 90 TABLET | Refills: 3 | Status: SHIPPED | OUTPATIENT
Start: 2024-09-05

## 2024-09-05 NOTE — TELEPHONE ENCOUNTER
Rx Refill Note  Requested Prescriptions     Pending Prescriptions Disp Refills    atorvastatin (LIPITOR) 40 MG tablet [Pharmacy Med Name: ATORVASTATIN 40MG TABLETS] 90 tablet 1     Sig: TAKE 1 TABLET BY MOUTH DAILY      Last office visit with prescribing clinician: 6/26/2024   Last telemedicine visit with prescribing clinician: Visit date not found   Next office visit with prescribing clinician: Visit date not found                         Would you like a call back once the refill request has been completed: [] Yes [] No    If the office needs to give you a call back, can they leave a voicemail: [] Yes [] No    Kalia Olsen MA  09/05/24, 14:46 EDT

## 2024-09-06 RX ORDER — ATORVASTATIN CALCIUM 40 MG/1
40 TABLET, FILM COATED ORAL DAILY
Qty: 90 TABLET | Refills: 1 | Status: SHIPPED | OUTPATIENT
Start: 2024-09-06

## 2024-09-10 ENCOUNTER — TREATMENT (OUTPATIENT)
Dept: PHYSICAL THERAPY | Facility: CLINIC | Age: 69
End: 2024-09-10
Payer: MEDICARE

## 2024-09-10 DIAGNOSIS — M25.511 CHRONIC RIGHT SHOULDER PAIN: ICD-10-CM

## 2024-09-10 DIAGNOSIS — S13.4XXA WHIPLASH INJURY TO NECK, INITIAL ENCOUNTER: ICD-10-CM

## 2024-09-10 DIAGNOSIS — M25.611 DECREASED RANGE OF MOTION OF RIGHT SHOULDER: ICD-10-CM

## 2024-09-10 DIAGNOSIS — G89.29 CHRONIC RIGHT SHOULDER PAIN: ICD-10-CM

## 2024-09-10 DIAGNOSIS — M75.121 COMPLETE TEAR OF RIGHT ROTATOR CUFF, UNSPECIFIED WHETHER TRAUMATIC: Primary | ICD-10-CM

## 2024-09-10 PROCEDURE — 97110 THERAPEUTIC EXERCISES: CPT | Performed by: PHYSICAL THERAPIST

## 2024-09-10 PROCEDURE — 97140 MANUAL THERAPY 1/> REGIONS: CPT | Performed by: PHYSICAL THERAPIST

## 2024-09-10 PROCEDURE — 20560 NDL INSJ W/O NJX 1 OR 2 MUSC: CPT | Performed by: PHYSICAL THERAPIST
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
## 2024-09-12 ENCOUNTER — TRANSCRIBE ORDERS (OUTPATIENT)
Dept: ADMINISTRATIVE | Facility: HOSPITAL | Age: 69
End: 2024-09-12
Payer: COMMERCIAL

## 2024-09-12 DIAGNOSIS — Z12.31 ENCOUNTER FOR SCREENING MAMMOGRAM FOR MALIGNANT NEOPLASM OF BREAST: Primary | ICD-10-CM

## 2024-09-16 ENCOUNTER — TRANSCRIBE ORDERS (OUTPATIENT)
Dept: ADMINISTRATIVE | Facility: HOSPITAL | Age: 69
End: 2024-09-16
Payer: MEDICARE

## 2024-09-16 DIAGNOSIS — Z87.891 PERSONAL HISTORY OF TOBACCO USE: Primary | ICD-10-CM

## 2024-09-18 ENCOUNTER — TREATMENT (OUTPATIENT)
Dept: PHYSICAL THERAPY | Facility: CLINIC | Age: 69
End: 2024-09-18
Payer: MEDICARE

## 2024-09-18 DIAGNOSIS — M25.611 DECREASED RANGE OF MOTION OF RIGHT SHOULDER: ICD-10-CM

## 2024-09-18 DIAGNOSIS — G89.29 CHRONIC RIGHT SHOULDER PAIN: ICD-10-CM

## 2024-09-18 DIAGNOSIS — S13.4XXA WHIPLASH INJURY TO NECK, INITIAL ENCOUNTER: ICD-10-CM

## 2024-09-18 DIAGNOSIS — M75.121 COMPLETE TEAR OF RIGHT ROTATOR CUFF, UNSPECIFIED WHETHER TRAUMATIC: Primary | ICD-10-CM

## 2024-09-18 DIAGNOSIS — M25.511 CHRONIC RIGHT SHOULDER PAIN: ICD-10-CM

## 2024-09-25 ENCOUNTER — TREATMENT (OUTPATIENT)
Dept: PHYSICAL THERAPY | Facility: CLINIC | Age: 69
End: 2024-09-25
Payer: MEDICARE

## 2024-09-25 DIAGNOSIS — M25.511 CHRONIC RIGHT SHOULDER PAIN: ICD-10-CM

## 2024-09-25 DIAGNOSIS — M75.121 COMPLETE TEAR OF RIGHT ROTATOR CUFF, UNSPECIFIED WHETHER TRAUMATIC: Primary | ICD-10-CM

## 2024-09-25 DIAGNOSIS — S13.4XXA WHIPLASH INJURY TO NECK, INITIAL ENCOUNTER: ICD-10-CM

## 2024-09-25 DIAGNOSIS — M25.611 DECREASED RANGE OF MOTION OF RIGHT SHOULDER: ICD-10-CM

## 2024-09-25 DIAGNOSIS — G89.29 CHRONIC RIGHT SHOULDER PAIN: ICD-10-CM

## 2024-10-01 ENCOUNTER — TREATMENT (OUTPATIENT)
Dept: PHYSICAL THERAPY | Facility: CLINIC | Age: 69
End: 2024-10-01
Payer: MEDICARE

## 2024-10-01 DIAGNOSIS — M75.121 COMPLETE TEAR OF RIGHT ROTATOR CUFF, UNSPECIFIED WHETHER TRAUMATIC: Primary | ICD-10-CM

## 2024-10-01 DIAGNOSIS — M25.611 DECREASED RANGE OF MOTION OF RIGHT SHOULDER: ICD-10-CM

## 2024-10-01 DIAGNOSIS — M25.511 CHRONIC RIGHT SHOULDER PAIN: ICD-10-CM

## 2024-10-01 DIAGNOSIS — G89.29 CHRONIC RIGHT SHOULDER PAIN: ICD-10-CM

## 2024-10-01 DIAGNOSIS — S13.4XXA WHIPLASH INJURY TO NECK, INITIAL ENCOUNTER: ICD-10-CM

## 2024-10-01 PROCEDURE — 97140 MANUAL THERAPY 1/> REGIONS: CPT | Performed by: PHYSICAL THERAPIST

## 2024-10-01 PROCEDURE — 20560 NDL INSJ W/O NJX 1 OR 2 MUSC: CPT | Performed by: PHYSICAL THERAPIST
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
## 2024-10-02 ENCOUNTER — TELEPHONE (OUTPATIENT)
Dept: FAMILY MEDICINE CLINIC | Facility: CLINIC | Age: 69
End: 2024-10-02

## 2024-10-02 NOTE — TELEPHONE ENCOUNTER
"Subjective:      Shayd Prakash Jr. is a 70 y.o. male who returns today regarding his metastatic prostate cancer.      He is here today due to new left-sided hydronephrosis.  He is currently receiving chemotherapy for pancreatic cancer and the hydronephrosis was noted on a surveillance CT scan earlier this week.  He has had a steady slow rise in creatinine over the past month.  He denies any left-sided flank pain.  He denies any urinary problems.    Previously last seen in May, at which time he received his 6 month Eligard injection.    Prostate Cancer History  He is s/p TRUS/bx on 8/7/15 w/ high volume marta 9 (5+4), PSA 25.  He had negative metastatic workup.  At time of planned RALP on 9/10/15 he was noted to have BN invasion on cysto and therefore only PLND was done, which confirmed metastatic disease.  He was started on ADT 9/17/15 w/ firmagon followed by Eligard on 10/15/15.  Received last Eligard 10/17/17. Casodex added for rising PSA, which persisted. Completed pelvic radiation November 2017. Completed taxotere in 2018.     The following portions of the patient's history were reviewed and updated as appropriate: allergies, current medications, past family history, past medical history, past social history, past surgical history and problem list.    Review of Systems  A comprehensive multipoint review of systems was negative except as otherwise stated in the HPI.     Objective:   Vitals:   /75 (BP Location: Left arm, Patient Position: Sitting, BP Method: Medium (Automatic))   Pulse 101   Ht 5' 8" (1.727 m)   Wt 66.2 kg (146 lb)   BMI 22.20 kg/m²     Physical Exam   General: alert and oriented, no acute distress  Respiratory: Symmetric expansion, non-labored breathing  Neuro: no gross deficits  Psych: normal judgment and insight, normal mood/affect and non-anxious    Lab Review   Urinalysis demonstrates + for RBCs    Component PSA DIAGNOSTIC   Latest Ref Rng & Units 0.00 - 4.00 ng/mL   4/23/2019 1.4 " Caller: Nanci Umanzor    Relationship: Self    Best call back number: [phone]     What is the medical concern/diagnosis: BACK PAIN/SCIATICA    What specialty or service is being requested: NEUROSURGERY    What is the provider, practice or medical service name: DR SYLVESTER    What is the office location: [facility] NEUR[facility]    What is the office phone number: [phone]    Any additional details: PATIENT NEEDS ADDITIONAL REFERRAL FOR BACK PAIN/SCIATICA FLARE UP     11/27/2018 2.0   8/24/2018 6.7   6/8/2018 7.3   3/6/2018 2.7   1/8/2018 0.92   12/5/2017 1.3   9/27/2017 13.7 (H)   9/1/2017 9.4 (H)   7/3/2017 5.0 (H)   10/7/2016 0.89   4/8/2016 0.73   1/4/2016 1.5   10/9/2015 6.6 (H)   7/7/2015 25.2 (H)     Imaging (images personally reviewed)  CT Non-contrast: Moderate left hydronephrosis and proximal hydroureter, no stones    Assessment and Plan:   Left Hydronephrosis  Acute Kidney Injury  -- Recommend cysto and left ureteral stent placement - he wishes to proceed  -- Will schedule next Tuesday at [facility]    Prostate cancer 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 Melendez as scheduled   -- FU PSA per Dr. Melendez    Anterior urethral stricture, unspecified stricture type  -- S/p cysto dilation in OR at time of PLND in September 2015  -- Voiding well now - will monitor

## 2024-10-03 DIAGNOSIS — G89.29 CHRONIC LOW BACK PAIN WITH SCIATICA, SCIATICA LATERALITY UNSPECIFIED, UNSPECIFIED BACK PAIN LATERALITY: Primary | ICD-10-CM

## 2024-10-03 DIAGNOSIS — M54.40 CHRONIC LOW BACK PAIN WITH SCIATICA, SCIATICA LATERALITY UNSPECIFIED, UNSPECIFIED BACK PAIN LATERALITY: Primary | ICD-10-CM

## 2024-10-08 NOTE — TELEPHONE ENCOUNTER
Caller: Nanci Umanzor    Relationship: Self    Best call back number: [phone]     What orders are you requesting (i.e. lab or imaging): MRI OF LOWER BACK     In what timeframe would the patient need to come in: ASAP     Additional notes: PATIENT IS REQUESTING AN MRI OF HER LOWER BACK. PATIENT STATED THAT SHE COULD NOT GET IN UNTIL 11/26/24 FOR HER REFERRAL. THEY ADVISED THAT IF SHE GOT AN MRI SHE WOULD BE ABLE TO GET IN SOONER. PLEASE ADVISE.

## 2024-10-15 ENCOUNTER — TREATMENT (OUTPATIENT)
Dept: PHYSICAL THERAPY | Facility: CLINIC | Age: 69
End: 2024-10-15
Payer: MEDICARE

## 2024-10-15 ENCOUNTER — HOSPITAL ENCOUNTER (OUTPATIENT)
Dept: CT IMAGING | Facility: HOSPITAL | Age: 69
Discharge: HOME | End: 2024-10-15
Admitting: INTERNAL MEDICINE
Payer: MEDICARE

## 2024-10-15 DIAGNOSIS — G89.29 CHRONIC RIGHT SHOULDER PAIN: ICD-10-CM

## 2024-10-15 DIAGNOSIS — M75.121 COMPLETE TEAR OF RIGHT ROTATOR CUFF, UNSPECIFIED WHETHER TRAUMATIC: Primary | ICD-10-CM

## 2024-10-15 DIAGNOSIS — S13.4XXA WHIPLASH INJURY TO NECK, INITIAL ENCOUNTER: ICD-10-CM

## 2024-10-15 DIAGNOSIS — Z87.891 PERSONAL HISTORY OF TOBACCO USE: ICD-10-CM

## 2024-10-15 DIAGNOSIS — M25.511 CHRONIC RIGHT SHOULDER PAIN: ICD-10-CM

## 2024-10-15 DIAGNOSIS — M25.611 DECREASED RANGE OF MOTION OF RIGHT SHOULDER: ICD-10-CM

## 2024-10-15 PROCEDURE — 97530 THERAPEUTIC ACTIVITIES: CPT | Performed by: PHYSICAL THERAPIST

## 2024-10-15 PROCEDURE — 20560 NDL INSJ W/O NJX 1 OR 2 MUSC: CPT | Performed by: PHYSICAL THERAPIST

## 2024-10-15 PROCEDURE — 71271 CT THORAX LUNG CANCER SCR C-: CPT

## 2024-10-15 NOTE — PROGRESS NOTES
Re-Assessment / Progress Note  [facility] [place]   [address], [address]  [place] [address]  P: [phone]  F: [phone]  Patient: Nanci Umanzor   : 1955  Diagnosis/ICD-10 Code:  Complete tear of right rotator cuff, unspecified whether traumatic [M75.121]  Referring practitioner: Rachell Borden*  Date of Initial Visit: Episode Type: THERAPY  Noted: 2024    Today's Date: 10/15/2024  Patient seen for 9 sessions.      Subjective:   Nanci Umanzor reports: continues to have L side neck pain limited ROM but feels like dry needling is beneficial and does feel improvement after PT sessions.  R shoulder motion in is improved but feels R arm is still weak and has fluctuating pain in R shoulder. Follows up with Dr williamson     Subjective Questionnaire: QuickDASH: 11.36  NDI: 8  Clinical Progress: improved  Home Program Compliance: Yes  Treatment has included: therapeutic exercise, neuromuscular re-education, manual therapy, therapeutic activity, dry needling, and moist heat    Subjective   Objective   AROM cervical:  L Rotation: 45 with pain  R rotation: 50 with pain  Flexion: 30 with pain  Ext: 30with pain   TTP and trigger point noted B UT, L >R, B levator scapula, suboccipital   Left Shoulder   Normal active range of motion     Right Shoulder   Flexion: WNL  degrees with occasional pain    Abduction: WNLdegrees with occasional pain   External rotation 90°: 70  External rotation BTH: C5  Internal rotation 90°: 50 degrees   Internal rotation BTB: L1   Assessment/Plan  Progress toward previous goals: Partially Met  Pt continues to need skilled therapy to improve ROM,  strength pain and function.  Goals   Goals: Short Term Goals: 2-4 weeks. Patient will:  1. Be independent with initial HEP met  2. Be instructed in posture and body mechanics.met  3. Demonstrate full GH PROM to allow for progressing of therapy exercises. met     Long Term Goals: 4-8 weeks. Pt  will:  1. Exhibit (R) shoulder  and cervical AROM to WFL to allow for reaching overhead and out (ABD) without pain limiting function.  2. Demonstrate improved Right UE MMT of >/= 4+/5 to allow for performance of ADL's/household management/recreational activities.  3. Pt able to reach overhead and lift 10# to allow for return to doing home/yard/recreational activities with min to no pain.  4. Report perceived disability </=25% based on QuickDASH met     Recommendations: Continue as planned  Timeframe: 1 month  Prognosis to achieve goals: good    PT Signature: Teresa Cantu, PT  [place] Lic. # [id number]      Based upon review of the patient's progress and continued therapy plan, it is my medical opinion that Nanci Umanzor should continue physical therapy treatment at [facility]  [address] [place] [address] [phone] ; Fax Number [phone]    Signature: __________________________________  Rachell Borden APRN    Manual Therapy:          mins  47749;  Therapeutic Exercise:          mins  54045;     Neuromuscular Amanda:          mins  87341;    Therapeutic Activity:     10      mins  68258;     Gait Training:            mins  30130;     Ultrasound:           mins  79788;    Electrical Stimulation:          mins  40118 ( );  Dry Needling           mins self-pay  Traction           mins 44212  Canalith Repositioning         mins 74044  Dry Needling                              15 mins    Timed Treatment:   10   mins   Total Treatment:      25   mins

## 2024-10-16 ENCOUNTER — OFFICE VISIT (OUTPATIENT)
Dept: ORTHOPEDIC SURGERY | Facility: CLINIC | Age: 69
End: 2024-10-16
Payer: MEDICARE

## 2024-10-16 VITALS — WEIGHT: 106.4 LBS | HEIGHT: 58 IN | TEMPERATURE: 98.2 F | BODY MASS INDEX: 22.33 KG/M2

## 2024-10-16 DIAGNOSIS — M25.511 CHRONIC RIGHT SHOULDER PAIN: ICD-10-CM

## 2024-10-16 DIAGNOSIS — G89.29 CHRONIC RIGHT SHOULDER PAIN: ICD-10-CM

## 2024-10-16 DIAGNOSIS — M75.121 COMPLETE TEAR OF RIGHT ROTATOR CUFF, UNSPECIFIED WHETHER TRAUMATIC: Primary | ICD-10-CM

## 2024-10-16 RX ORDER — METHYLPREDNISOLONE ACETATE 80 MG/ML
80 INJECTION, SUSPENSION INTRA-ARTICULAR; INTRALESIONAL; INTRAMUSCULAR; SOFT TISSUE
Status: COMPLETED | OUTPATIENT
Start: 2024-10-16 | End: 2024-10-16

## 2024-10-16 RX ORDER — LIDOCAINE HYDROCHLORIDE 10 MG/ML
2 INJECTION, SOLUTION EPIDURAL; INFILTRATION; INTRACAUDAL; PERINEURAL
Status: COMPLETED | OUTPATIENT
Start: 2024-10-16 | End: 2024-10-16

## 2024-10-16 RX ADMIN — METHYLPREDNISOLONE ACETATE 80 MG: 80 INJECTION, SUSPENSION INTRA-ARTICULAR; INTRALESIONAL; INTRAMUSCULAR; SOFT TISSUE at 13:55

## 2024-10-16 RX ADMIN — LIDOCAINE HYDROCHLORIDE 2 ML: 10 INJECTION, SOLUTION EPIDURAL; INFILTRATION; INTRACAUDAL; PERINEURAL at 13:55

## 2024-10-16 NOTE — PROGRESS NOTES
Ms. Umanzor comes in today for follow-up.  Injections have worked well in the past.  The patient would like to get a repeat injection today.  The risks, benefits and alternatives were discussed and the patient consented.  Going forward, the patient will follow-up as needed.    CARMELA Sue    10/16/2024      Large Joint Arthrocentesis: R subacromial bursa  Date/Time: 10/16/2024 1:55 PM  Consent given by: patient  Site marked: site marked  Timeout: Immediately prior to procedure a time out was called to verify the correct patient, procedure, equipment, support staff and site/side marked as required   Supporting Documentation  Indications: pain   Procedure Details  Location: shoulder - R subacromial bursa  Preparation: Patient was prepped and draped in the usual sterile fashion  Needle gauge: 21G.  Approach: posterior  Medications administered: 80 mg methylPREDNISolone acetate 80 MG/ML; 2 mL lidocaine PF 1% 1 %  Patient tolerance: patient tolerated the procedure well with no immediate complications        (2) difficulty swallowing liquids/foods

## 2024-10-17 ENCOUNTER — OFFICE VISIT (OUTPATIENT)
Dept: FAMILY MEDICINE CLINIC | Facility: CLINIC | Age: 69
End: 2024-10-17
Payer: MEDICARE

## 2024-10-17 VITALS
DIASTOLIC BLOOD PRESSURE: 68 MMHG | BODY MASS INDEX: 22.37 KG/M2 | SYSTOLIC BLOOD PRESSURE: 110 MMHG | HEIGHT: 58 IN | TEMPERATURE: 97.7 F | OXYGEN SATURATION: 97 % | WEIGHT: 106.6 LBS | RESPIRATION RATE: 16 BRPM

## 2024-10-17 DIAGNOSIS — E78.01 FAMILIAL HYPERCHOLESTEROLEMIA: ICD-10-CM

## 2024-10-17 DIAGNOSIS — Z00.00 ROUTINE HEALTH MAINTENANCE: ICD-10-CM

## 2024-10-17 DIAGNOSIS — K21.9 GASTROESOPHAGEAL REFLUX DISEASE WITHOUT ESOPHAGITIS: ICD-10-CM

## 2024-10-17 DIAGNOSIS — J44.89 COPD (CHRONIC OBSTRUCTIVE PULMONARY DISEASE) WITH CHRONIC BRONCHITIS: ICD-10-CM

## 2024-10-17 DIAGNOSIS — M54.31 RIGHT SCIATIC NERVE PAIN: ICD-10-CM

## 2024-10-17 DIAGNOSIS — M81.8 OTHER OSTEOPOROSIS WITHOUT CURRENT PATHOLOGICAL FRACTURE: ICD-10-CM

## 2024-10-17 DIAGNOSIS — I10 ESSENTIAL HYPERTENSION: Primary | ICD-10-CM

## 2024-10-17 DIAGNOSIS — J06.9 ACUTE URI: ICD-10-CM

## 2024-10-17 PROCEDURE — 99215 OFFICE O/P EST HI 40 MIN: CPT | Performed by: STUDENT IN AN ORGANIZED HEALTH CARE EDUCATION/TRAINING PROGRAM

## 2024-10-17 PROCEDURE — 3078F DIAST BP <80 MM HG: CPT | Performed by: STUDENT IN AN ORGANIZED HEALTH CARE EDUCATION/TRAINING PROGRAM

## 2024-10-17 PROCEDURE — 1125F AMNT PAIN NOTED PAIN PRSNT: CPT | Performed by: STUDENT IN AN ORGANIZED HEALTH CARE EDUCATION/TRAINING PROGRAM

## 2024-10-17 PROCEDURE — 3074F SYST BP LT 130 MM HG: CPT | Performed by: STUDENT IN AN ORGANIZED HEALTH CARE EDUCATION/TRAINING PROGRAM

## 2024-10-17 PROCEDURE — G2211 COMPLEX E/M VISIT ADD ON: HCPCS | Performed by: STUDENT IN AN ORGANIZED HEALTH CARE EDUCATION/TRAINING PROGRAM

## 2024-10-17 RX ORDER — GUAIFENESIN 600 MG/1
600 TABLET, EXTENDED RELEASE ORAL 2 TIMES DAILY
Qty: 28 TABLET | Refills: 0 | Status: SHIPPED | OUTPATIENT
Start: 2024-10-17

## 2024-10-17 RX ORDER — ATORVASTATIN CALCIUM 40 MG/1
40 TABLET, FILM COATED ORAL DAILY
Qty: 90 TABLET | Refills: 1 | Status: SHIPPED | OUTPATIENT
Start: 2024-10-17

## 2024-10-17 RX ORDER — GUAIFENESIN 600 MG/1
TABLET, EXTENDED RELEASE ORAL
Qty: 28 TABLET | Refills: 0 | Status: CANCELLED | OUTPATIENT
Start: 2024-10-17

## 2024-10-17 NOTE — PROGRESS NOTES
New Patient Office Visit      Patient Name: Nanci Umanzor  : 1955   MRN: [id number]   Care Team: Patient Care Team:  Cristino Mcknight MD as PCP - General (Internal Medicine)    Chief Complaint:    Chief Complaint   Patient presents with    Establish Care       History of Present Illness: Nanci Umanzor is a 69 y.o. female     History of Present Illness  The patient is a 69-year-old female with a history of essential hypertension, COPD, and former tobacco use, presenting today for an annual physical exam.    She has a 17 pack-year smoking history and quit smoking in . Her most recent yearly CT low-dose cancer screening imaging was performed on 10/15/2024 and is awaiting a formal read by a radiologist. She is also scheduled for her annual mammogram on 10/25/2024.    She has a history of COPD and started smoking at the age of 38, quitting in . She has had lung issues since childhood and tends to avoid seeing doctors. She has been using Advair Diskus twice a day and albuterol as needed for her COPD. She sees a pulmonologist, Dr. Felder, for a small nodule in her lungs, believed to be scar tissue from previous pneumonia. She has been using albuterol more frequently in the summer and when the weather gets cold.    She is currently on Prolia for osteoporosis, a condition that runs in her family. She is due for her next Prolia shot in 2024 and needs blood work done prior to this. She experienced a slipped disc, which caused severe pain for a month before it fused. She also has a complete tear in her rotator cuff and another partial tear, for which she is seeing Dr. Brown. She recently received a cortisone shot for this issue. She has been experiencing sciatica for a month, which causes a burning sensation and numbness.    She takes pantoprazole for GERD, which helps her symptoms. She has a history of diverticulitis and sees Dr. Servin for this issue. She also has a leaky tricuspid valve  but was told by Dr. Malik that she does not need treatment for it.      FAMILY HISTORY  Her mother and grandmother both had osteoporosis. Cancer runs in her family. Her sister has had a double mastectomy. Her grandfather  from pancreatic cancer. Her cousin was just diagnosed with primary CNS lymphoma.        Subjective      Past Medical History:   Past Medical History:   Diagnosis Date    Allergic     Ankle fracture     Rt   Lt     Ankylosing spondylitis of site in spine     Anxiety     Arthritis     Arthritis of neck     Asthma     Cancer 1992    Cervical cone biopsy for Stage II abnormality    Cataract 2017    Cervical disc disorder     Cervical radiculopathy 10/03/2023    Chronic pain disorder     Clavicle fracture     10/21/1970    Colon polyp     COPD (chronic obstructive pulmonary disease) 2006    Coronary artery disease 2023    Leaky tricuspid valve    Depression     Diverticulitis of colon 2019    Diverticulosis     Fibula fracture     left 2002    Fracture of hip Hairline pelvic fracture    Fracture of wrist     Fracture, finger     Fracture, tibia and fibula     GERD (gastroesophageal reflux disease) 2016    Glaucoma 2006    Heart murmur     Hip arthrosis     Hyperlipidemia     Hypertension     Joint pain     Low back pain 1980s    Lumbosacral disc disease     Neck pain     Neck strain     Osteoarthritis     Osteopenia 2016    Pelvis fracture     10/21/1970    Pneumonia     Reflex sympathetic dystrophy     Rotator cuff syndrome     Spinal stenosis     Thoracic disc disorder     Ulcerative colitis 21    Visual impairment Since 1yrs of age    Wrist fracture     2007       Past Surgical History:   Past Surgical History:   Procedure Laterality Date    CERVICAL CONIZATION      CERVICAL EPIDURAL N/A 2023    Procedure: CERVICAL EPIDURAL STEROID INJECTION CPT: 06872;  Surgeon: Chidi Portillo MD;  Location: [facility] MAIN OR;  Service: Pain Management;  Laterality: N/A;      SECTION      ,,    COLONOSCOPY N/A 2017    Procedure: COLONOSCOPY TO CECUM with cold polypectomy;  Surgeon: Alo Staton Jr., MD;  Location:  [facility];  Service:     COLONOSCOPY N/A 2022    Procedure: COLONOSCOPY TO CECUM WITH COLD BIOPSY POLYPECTOMY;  Surgeon: Chuck Servin MD;  Location:  [facility];  Service: Gastroenterology;  Laterality: N/A;  PRE: HX COLON POLYPS  POST: DIVERTICULOSIS, POLYP    ENDOMETRIAL ABLATION      EPIDURAL BLOCK      EYE SURGERY      FINGER SURGERY      FRACTURE SURGERY      HAND SURGERY      HEMORRHOIDECTOMY  2015    NERVE BLOCK Right 2023    Procedure: RIGHT SUPRASCAPULAR NERVE BLOCK CPT: 16130;  Surgeon: Chidi Portillo MD;  Location: [facility] [place] [facility] OR;  Service: Pain Management;  Laterality: Right;    TRIGGER POINT INJECTION      TUBAL ABDOMINAL LIGATION         Family History:   Family History   Problem Relation Age of Onset    Heart disease Mother         Mechanical aortic valve, several heart attacks    Hypertension Mother     Hyperlipidemia Mother     Rheum arthritis Mother     Arthritis Mother     Colon polyps Mother     Stroke Mother     Osteoporosis Mother     Seizures Mother     GI problems Mother     Broken bones Mother     Diabetes Father         Type I    Hyperlipidemia Father     Hypertension Father     Heart disease Father         Afib    Thyroid disease Father     Hearing loss Father         Hearing aid    COPD Father     Cancer Sister         Invasive Breast Cancer    Depression Sister         This needs to be removed    Glaucoma Sister     Miscarriages / Stillbirths Sister     Vision loss Sister     Osteoporosis Sister     Crohn's disease Sister     GI problems Sister     Depression Sister     Hypertension Brother     Lung disease Brother     Asthma Brother     Hypertension Brother     Asthma Brother     Hyperlipidemia Brother     Hypertension Brother     Hyperlipidemia Brother     Lung disease  Daughter     Asthma Daughter     Hyperlipidemia Daughter     Heart disease Maternal Aunt         Afib    Rheum arthritis Maternal Aunt     Arthritis Maternal Aunt     Stroke Maternal Aunt     Hyperlipidemia Maternal Uncle     Heart disease Maternal Uncle     Heart disease Paternal Aunt     Heart disease Maternal Grandmother     Hyperlipidemia Maternal Grandmother     Rheum arthritis Maternal Grandmother     Stroke Maternal Grandmother     Arthritis Maternal Grandmother     Hypertension Maternal Grandmother     Osteoporosis Maternal Grandmother     Heart disease Maternal Grandfather     Cancer Maternal Grandfather         Pancreatic    Stomach cancer Maternal Grandfather     Heart disease Paternal Grandmother     Hyperlipidemia Paternal Grandmother     Stroke Paternal Grandmother     Hypertension Paternal Grandmother     Heart disease Paternal Grandfather     Cancer Paternal Grandfather         Lung    Asthma Son         Diagnosed 3/24    Malig Hyperthermia Neg Hx        Social History:   Social History     Socioeconomic History    Marital status:     Number of children: 3    Years of education: AS   Tobacco Use    Smoking status: Former     Current packs/day: 0.00     Average packs/day: 1 pack/day for 17.5 years (17.5 ttl pk-yrs)     Types: Cigarettes     Start date: 1995     Quit date: 2013     Years since quittin.5     Passive exposure: Past    Smokeless tobacco: Never    Tobacco comments:     Started smoking after separation from my .   Vaping Use    Vaping status: Never Used   Substance and Sexual Activity    Alcohol use: Yes     Alcohol/week: 1.0 standard drink of alcohol     Types: 1 Glasses of wine per week     Comment: Red wine    Drug use: Never    Sexual activity: Not Currently     Partners: Male     Birth control/protection: Post-menopausal       Tobacco History:   Social History     Tobacco Use   Smoking Status Former    Current packs/day: 0.00    Average packs/day: 1  pack/day for 17.5 years (17.5 ttl pk-yrs)    Types: Cigarettes    Start date: 1995    Quit date: 2013    Years since quittin.5    Passive exposure: Past   Smokeless Tobacco Never   Tobacco Comments    Started smoking after separation from my .       Medications:     Current Outpatient Medications:     acetaminophen (TYLENOL) 325 MG tablet, Take 2 tablets by mouth., Disp: , Rfl:     albuterol (PROVENTIL) (2.5 MG/3ML) 0.083% nebulizer solution, Take 2.5 mg by nebulization Every 4 (Four) Hours As Needed for Wheezing., Disp: 3 mL, Rfl: 2    albuterol sulfate  (90 Base) MCG/ACT inhaler, Inhale 2 puffs Every 4 (Four) Hours As Needed for Wheezing., Disp: 8.5 g, Rfl: 11    amLODIPine (NORVASC) 5 MG tablet, Take 1 tablet by mouth Daily., Disp: 90 tablet, Rfl: 1    atorvastatin (LIPITOR) 40 MG tablet, Take 1 tablet by mouth Daily., Disp: 90 tablet, Rfl: 1    benazepril (LOTENSIN) 10 MG tablet, Take 1 tablet by mouth Daily., Disp: 90 tablet, Rfl: 1    BIOTIN PO, Take 1 capsule by mouth Daily., Disp: , Rfl:     calcium citrate-vitamin d (CITRACAL) 200-250 MG-UNIT tablet tablet, Take 1 tablet by mouth Daily., Disp: , Rfl:     Cholecalciferol (Vitamin D) 50 MCG (2000 UT) tablet, Take 1 tablet by mouth Daily., Disp: , Rfl:     Fluticasone-Salmeterol (ADVAIR/WIXELA) 250-50 MCG/ACT DISKUS, Inhale 1 puff 2 (Two) Times a Day., Disp: 60 each, Rfl: 11    guaiFENesin (Mucus Relief) 600 MG 12 hr tablet, Take 1 tablet by mouth 2 (Two) Times a Day., Disp: 28 tablet, Rfl: 0    Ibuprofen 3 %, Gabapentin 10 %, Baclofen 2 %, lidocaine 4 %, Ketamine HCl 4 %, Apply 1-2 g topically to the appropriate area as directed 3 (Three) to 4 (Four) times daily., Disp: 90 g, Rfl: 0    Multiple Vitamins-Minerals (CENTRUM SILVER ADULT 50+ PO), Take 1 tablet by mouth Daily., Disp: , Rfl:     Multiple Vitamins-Minerals (ZINC PO), Take  by mouth., Disp: , Rfl:     Omega-3 Fatty Acids (FISH OIL PO), Take 1 capsule by mouth Daily., Disp:  ", Rfl:     pantoprazole (PROTONIX) 40 MG EC tablet, TAKE 1 TABLET BY MOUTH DAILY, Disp: 90 tablet, Rfl: 3    Prolia 60 MG/ML solution prefilled syringe syringe, INJECT 1 ML UNDER THE SKIN INTO THE APPROPRIATE AREA EVERY 6 MONTHS ONE TIME FOR 1 DOSE AS DIRECTED., Disp: 1 mL, Rfl: 2    tretinoin (RETIN-A) 0.05 % cream, Apply  topically to the appropriate area as directed Every Night., Disp: 20 g, Rfl: 5    Turmeric (QC TUMERIC COMPLEX PO), Take  by mouth., Disp: , Rfl:     vitamin C (ASCORBIC ACID) 500 MG tablet, Take 2 tablets by mouth Daily., Disp: , Rfl:     vitamin E 400 UNIT capsule, Take 1 capsule by mouth Daily., Disp: , Rfl:     Zinc 50 MG capsule, , Disp: , Rfl:     fluticasone (FLONASE) 50 MCG/ACT nasal spray, 2 sprays into the nostril(s) as directed by provider Daily for 14 days., Disp: 16 g, Rfl: 11    Allergies:   Allergies   Allergen Reactions    Clarithromycin Shortness Of Breath and Palpitations    Fluarix [Influenza Virus Vaccine] Myalgia    Pneumococcal Vaccines Swelling       Objective     Physical Exam:  Vital Signs:   Vitals:    10/17/24 1431   BP: 110/68   BP Location: Left arm   Patient Position: Sitting   Cuff Size: Adult   Resp: 16   Temp: 97.7 °F (36.5 °C)   TempSrc: Oral   SpO2: 97%   Weight: 48.4 kg (106 lb 9.6 oz)   Height: 147.3 cm (57.99\")   PF: 63 L/min   PainSc:   6   PainLoc: Back     Body mass index is 22.29 kg/m².     Physical Exam  Constitutional:       General: She is not in acute distress.  HENT:      Head: Normocephalic and atraumatic.   Cardiovascular:      Rate and Rhythm: Normal rate and regular rhythm.   Pulmonary:      Effort: Pulmonary effort is normal. No respiratory distress.   Musculoskeletal:         General: Tenderness (right lower paraspinal tenderness to palpation) present.      Right lower leg: No edema.      Left lower leg: No edema.   Skin:     Findings: No rash.   Neurological:      General: No focal deficit present.      Mental Status: She is alert and oriented " to person, place, and time.   Psychiatric:         Mood and Affect: Mood normal.         Behavior: Behavior normal.         Thought Content: Thought content normal.         Judgment: Judgment normal.         Assessment / Plan      Assessment/Plan:   Problems Addressed This Visit    Assessment & Plan  1. Essential Hypertension.  Her blood pressure is well controlled at 110/68. She will continue her current medications, Amlodipine 5 mg and Benazepril 10 mg.    2. Chronic Obstructive Pulmonary Disease (COPD).  She is currently using Advair Diskus, 1 puff twice a day, and has a rescue inhaler, Albuterol, which she uses more frequently during hot and cold weather. She will discuss the possibility of switching to triple therapy with her pulmonologist at her next appointment on October 25, 2024, to better manage her symptoms.    3. Osteoporosis.  She is on Prolia and is due for her next injection around November 6, 2024. A renal function panel, vitamin D, and calcium tests will be ordered in preparation for her Prolia injection.    4. Rotator Cuff Tear.  She received a cortisone shot recently and is trying to delay surgery until January. She will follow up with her orthopedic specialist in December.    5. Sciatica.  A referral for physical therapy will be made to address her sciatica. If the pain persists, duloxetine may be considered to manage neuropathic pain.    6. Gastroesophageal Reflux Disease (GERD).  She is currently taking Pantoprazole and reports good symptom control. She will continue with her current medication.    7. Hyperlipidemia.  She is running low on Lipitor and a refill will be sent to her pharmacy.    8. Health Maintenance.  She had her yearly CT low dose cancer screening on October 15, 2024, and is awaiting the formal read by the radiologist. She is scheduled for her annual mammogram on October 25, 2024. Her last DEXA scan was conducted late last year, with a follow-up recommended in two years.             Plan of care reviewed with patient at the conclusion of today's visit. Education was provided regarding diagnosis and management.  Patient verbalizes understanding of and agreement with management plan.      Follow Up:   Return in about 8 months (around 7/1/2025) for Medicare Wellness.    I spent 57 minutes caring for Nanci on this date of service. This time includes time spent by me in the following activities:preparing for the visit, reviewing tests, obtaining and/or reviewing a separately obtained history, performing a medically appropriate examination and/or evaluation , counseling and educating the patient/family/caregiver, ordering medications, tests, or procedures, referring and communicating with other health care professionals , documenting information in the medical record, independently interpreting results and communicating that information with the patient/family/caregiver, and care coordination        Cristino Mcknight MD  [facility]  [address] [place] [phone]  Fax [phone]  Phone [phone]    Patient or patient representative verbalized consent for the use of Ambient Listening during the visit with  Cristino Mcknight MD for chart documentation. 10/17/2024  15:18 EDT

## 2024-10-21 ENCOUNTER — PATIENT MESSAGE (OUTPATIENT)
Dept: FAMILY MEDICINE CLINIC | Facility: CLINIC | Age: 69
End: 2024-10-21
Payer: MEDICARE

## 2024-10-22 ENCOUNTER — TREATMENT (OUTPATIENT)
Dept: PHYSICAL THERAPY | Facility: CLINIC | Age: 69
End: 2024-10-22
Payer: MEDICARE

## 2024-10-22 DIAGNOSIS — M54.41 CHRONIC RIGHT-SIDED LOW BACK PAIN WITH RIGHT-SIDED SCIATICA: Primary | ICD-10-CM

## 2024-10-22 DIAGNOSIS — Z74.09 IMPAIRED FUNCTIONAL MOBILITY AND ACTIVITY TOLERANCE: ICD-10-CM

## 2024-10-22 DIAGNOSIS — G89.29 CHRONIC RIGHT-SIDED LOW BACK PAIN WITH RIGHT-SIDED SCIATICA: Primary | ICD-10-CM

## 2024-10-24 ENCOUNTER — TELEPHONE (OUTPATIENT)
Dept: FAMILY MEDICINE CLINIC | Facility: CLINIC | Age: 69
End: 2024-10-24

## 2024-10-24 NOTE — TELEPHONE ENCOUNTER
Caller: Noé Nanci Ghanshyam    Relationship: Self    Best call back number: [phone]     Caller requesting test results:      What test was performed:      When was the test performed:      Where was the test performed:      Additional notes: PATIENT CALLED STATED SHE HAD LUNG SCAN DONE AT [facility] THAT WAS ORDERED BY HER PULMONOLOGIST.  SHE RECEIVED HER TEST RESULTS ON MY CHART AND WANTS DR ROLDAN TO REVIEW THEM.  SHE HAS APPT WITH PULMONOLOGIST TOMORROW 10/25/24.  SHE WANTS TO KNOW IF SHE NEEDS TO BE REFERRED TO A SPECIALIST AND PLEASE LET HER KNOW.  THE RESULTS SHOWED AORTIC ANEURYSM.

## 2024-10-25 ENCOUNTER — HOSPITAL ENCOUNTER (OUTPATIENT)
Dept: MAMMOGRAPHY | Facility: HOSPITAL | Age: 69
Discharge: HOME | End: 2024-10-25
Admitting: NURSE PRACTITIONER
Payer: MEDICARE

## 2024-10-25 DIAGNOSIS — Z12.31 ENCOUNTER FOR SCREENING MAMMOGRAM FOR MALIGNANT NEOPLASM OF BREAST: ICD-10-CM

## 2024-10-25 PROCEDURE — 77067 SCR MAMMO BI INCL CAD: CPT

## 2024-10-25 PROCEDURE — 77063 BREAST TOMOSYNTHESIS BI: CPT

## 2024-10-28 ENCOUNTER — TELEPHONE (OUTPATIENT)
Dept: FAMILY MEDICINE CLINIC | Facility: CLINIC | Age: 69
End: 2024-10-28
Payer: MEDICARE

## 2024-10-28 DIAGNOSIS — I71.20 THORACIC AORTIC ANEURYSM WITHOUT RUPTURE, UNSPECIFIED PART: Primary | ICD-10-CM

## 2024-10-28 NOTE — TELEPHONE ENCOUNTER
Hub staff attempted to follow warm transfer process and was unsuccessful     Caller: Nanci Umanzor    Relationship to patient: Self    Best call back number: [phone]     Patient is: RETURNING CALLBACK    PATIENT STATES SHE IS AT WORK UNTIL 4:30 AND CANNOT ANSWER THE PHONE     PATIENT STATES SHE GETS A BREAK AROUND 2:30    PATIENT STATES IT WOULD BE BETTER FOR A MESSAGE TO BE LEFT THROUGH [facility]

## 2024-10-28 NOTE — TELEPHONE ENCOUNTER
Attempted to call patient to discuss 3.8 cm TAA noted on recent annual LDCT for lung cancer screening. Will reattempt calling later to discuss preventative measures to prevent further enlargement of this aneurysm including continuing to keep SBP less than 130, statin therapy (which patient is already on), among others.  Will inform patient that I have placed an order for a referral to vascular surgery to discuss whether the patient warrants screening for other associated aneurysmal disease versus continued annual screening of TAA only.

## 2024-10-29 ENCOUNTER — TELEPHONE (OUTPATIENT)
Dept: FAMILY MEDICINE CLINIC | Facility: CLINIC | Age: 69
End: 2024-10-29

## 2024-10-29 ENCOUNTER — TREATMENT (OUTPATIENT)
Dept: PHYSICAL THERAPY | Facility: CLINIC | Age: 69
End: 2024-10-29
Payer: MEDICARE

## 2024-10-29 DIAGNOSIS — G89.29 CHRONIC RIGHT-SIDED LOW BACK PAIN WITH RIGHT-SIDED SCIATICA: Primary | ICD-10-CM

## 2024-10-29 DIAGNOSIS — M54.41 CHRONIC RIGHT-SIDED LOW BACK PAIN WITH RIGHT-SIDED SCIATICA: Primary | ICD-10-CM

## 2024-10-29 DIAGNOSIS — Z74.09 IMPAIRED FUNCTIONAL MOBILITY AND ACTIVITY TOLERANCE: ICD-10-CM

## 2024-10-29 DIAGNOSIS — I71.20 THORACIC AORTIC ANEURYSM WITHOUT RUPTURE, UNSPECIFIED PART: Primary | ICD-10-CM

## 2024-10-29 PROCEDURE — 97140 MANUAL THERAPY 1/> REGIONS: CPT | Performed by: PHYSICAL THERAPIST

## 2024-10-29 PROCEDURE — 97530 THERAPEUTIC ACTIVITIES: CPT | Performed by: PHYSICAL THERAPIST

## 2024-10-29 PROCEDURE — 97110 THERAPEUTIC EXERCISES: CPT | Performed by: PHYSICAL THERAPIST

## 2024-10-29 NOTE — TELEPHONE ENCOUNTER
UNABLE TO WARM TRANSFER  Caller: Nanci Umanzor    Relationship: Self    Best call back number: [phone]     What is the best time to reach you: ANY    Who are you requesting to speak with (clinical staff, provider,  specific staff member): PCP        What was the call regarding: PATIENT HAD MISSED A CALL FROM OFFICE ABOUT HER Thoracic aortic aneurysm. SHE HAS SEVERAL QUESTIONS    Is it okay if the provider responds through [employer]: PLEASE COMMUNICATE THROUGH [employer]

## 2024-10-29 NOTE — PROGRESS NOTES
Physical Therapy Daily Treatment Note  [facility] [place]   [address]  [place] [address]  P: [phone]  F: [phone]    Patient: Nanci Umanzor   : 1955  Referring practitioner: Cristino Mcknight MD  Date of Initial Visit: Type: THERAPY  Noted: 10/22/2024  Today's Date: 10/29/2024  Patient seen for 2 sessions       Visit Diagnoses:    ICD-10-CM ICD-9-CM   1. Chronic right-sided low back pain with right-sided sciatica  M54.41 724.2    G89.29 724.3     338.29   2. Impaired functional mobility and activity tolerance  Z74.09 V49.89         Nanci Umanzor reports: Low back pain and sciatica fluctuates, aggravated with standing on hard floor at work     Subjective     Objective   See Exercise, Manual, and Modality Logs for complete treatment.       Assessment/Plan Tolerated treatment session well. Compliant/cooperative with current rehab efforts.  Benefits from verbal/tactile cues to ensure correct exercise performance/technique, hold time and position. Plan details: Progress ROM / strengthening / stabilization / functional activity as tolerated       Timed:         Manual Therapy:   10      mins  32637;     Therapeutic Exercise:   20      mins  20849;     Neuromuscular Amanda:        mins  38173;    Therapeutic Activity:        8  mins  78990;     Gait Training:           mins  77460;     Ultrasound:          mins  57822;    Ionto                                   mins  79368  Self Care                            mins  43341  Traction          mins 39348      Un-Timed:  Canalith Repos         mins 52338  Electrical Stimulation:        mins  02706 ( );  Dry Needling          mins self-pay  Traction          mins 36528        Timed Treatment:  38    mins   Total Treatment:    48    mins    Teresa Cantu PT  [place] License #: [id number]    Physical Therapist

## 2024-10-31 ENCOUNTER — TELEPHONE (OUTPATIENT)
Dept: FAMILY MEDICINE CLINIC | Facility: CLINIC | Age: 69
End: 2024-10-31
Payer: MEDICARE

## 2024-10-31 NOTE — TELEPHONE ENCOUNTER
Spoke with patient via telephone this morning and answered questions she had regarding her thoracic aortic aneurysm.  The patient stated that she will call the thoracic surgeons office to schedule an appointment with them.

## 2024-11-07 ENCOUNTER — LAB (OUTPATIENT)
Dept: OTHER | Facility: HOSPITAL | Age: 69
End: 2024-11-07
Payer: MEDICARE

## 2024-11-07 ENCOUNTER — INFUSION (OUTPATIENT)
Dept: ONCOLOGY | Facility: HOSPITAL | Age: 69
End: 2024-11-07
Payer: MEDICARE

## 2024-11-07 DIAGNOSIS — E55.9 HYPOVITAMINOSIS D: ICD-10-CM

## 2024-11-07 DIAGNOSIS — M81.8 OTHER OSTEOPOROSIS WITHOUT CURRENT PATHOLOGICAL FRACTURE: Primary | ICD-10-CM

## 2024-11-07 PROCEDURE — 96372 THER/PROPH/DIAG INJ SC/IM: CPT

## 2024-11-07 PROCEDURE — 25010000002 DENOSUMAB 60 MG/ML SOLUTION PREFILLED SYRINGE: Performed by: STUDENT IN AN ORGANIZED HEALTH CARE EDUCATION/TRAINING PROGRAM

## 2024-11-07 RX ADMIN — DENOSUMAB 60 MG: 60 INJECTION SUBCUTANEOUS at 14:09

## 2024-11-07 NOTE — NURSING NOTE
USED CA 10.1 DATED 11/5/24.     Arrived  for prolia injection. Indication and side effects reviewed. Denies recent dental work. Labs and medications verified. Prolia administered in L arm without incidence. Instructed to call prescribing MD for any concerns or questions and instructed on how to schedule future appts.  Pt vu and discharged in stable condition.

## 2024-11-11 DIAGNOSIS — Z00.00 HEALTHCARE MAINTENANCE: Primary | ICD-10-CM

## 2024-11-11 DIAGNOSIS — E55.9 HYPOVITAMINOSIS D: ICD-10-CM

## 2024-11-15 ENCOUNTER — TELEPHONE (OUTPATIENT)
Dept: ORTHOPEDIC SURGERY | Facility: CLINIC | Age: 69
End: 2024-11-15

## 2024-11-15 NOTE — TELEPHONE ENCOUNTER
Caller: Nanci Umanzor    Relationship: Self    Best call back number: [phone]    What form or medical record are you requesting: OFFICE NOTES AND COPY OF IMAGING ON DISC FOR RIGHT SHOULDER    Who is requesting this form or medical record from you: PATIENT    How would you like to receive the form or medical records (pick-up, mail, fax): MAIL     If mail, what is the address[place] [address] [address]     Timeframe paperwork needed: ASAP

## 2024-11-18 DIAGNOSIS — I10 ESSENTIAL HYPERTENSION: ICD-10-CM

## 2024-11-18 RX ORDER — AMLODIPINE BESYLATE 5 MG/1
5 TABLET ORAL DAILY
Qty: 90 TABLET | Refills: 1 | Status: SHIPPED | OUTPATIENT
Start: 2024-11-18

## 2024-11-18 RX ORDER — BENAZEPRIL HYDROCHLORIDE 10 MG/1
10 TABLET ORAL DAILY
Qty: 90 TABLET | Refills: 1 | OUTPATIENT
Start: 2024-11-18

## 2024-11-18 RX ORDER — BENAZEPRIL HYDROCHLORIDE 10 MG/1
10 TABLET ORAL DAILY
Qty: 90 TABLET | Refills: 1 | Status: SHIPPED | OUTPATIENT
Start: 2024-11-18

## 2024-11-18 RX ORDER — AMLODIPINE BESYLATE 5 MG/1
5 TABLET ORAL DAILY
Qty: 90 TABLET | Refills: 1 | OUTPATIENT
Start: 2024-11-18

## 2024-11-18 NOTE — TELEPHONE ENCOUNTER
DELETE AFTER REVIEWING: Send the encounter HIGH priority, If patient has less than a 3 day supply. If the patient will run out of medication over the weekend add that information to the additional details line. Send to the appropriate pool. Check your call action grid or workflows.    Caller: Nanci Umanzor    Relationship: Self    Best call back number: [phone]     Requested Prescriptions:   Requested Prescriptions     Pending Prescriptions Disp Refills    benazepril (LOTENSIN) 10 MG tablet 90 tablet 1     Sig: Take 1 tablet by mouth Daily.    amLODIPine (NORVASC) 5 MG tablet 90 tablet 1     Sig: Take 1 tablet by mouth Daily.        Pharmacy where request should be sent: [facility][id number] [address] AT [place] [phone] [place] [phone] FX     Last office visit with prescribing clinician: 10/17/2024   Last telemedicine visit with prescribing clinician: Visit date not found   Next office visit with prescribing clinician: Visit date not found       Colin Minaya Rep   11/18/24 09:08 EST
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
## 2024-11-21 ENCOUNTER — TREATMENT (OUTPATIENT)
Dept: PHYSICAL THERAPY | Facility: CLINIC | Age: 69
End: 2024-11-21
Payer: MEDICARE

## 2024-11-21 DIAGNOSIS — M54.41 CHRONIC RIGHT-SIDED LOW BACK PAIN WITH RIGHT-SIDED SCIATICA: Primary | ICD-10-CM

## 2024-11-21 DIAGNOSIS — Z74.09 IMPAIRED FUNCTIONAL MOBILITY AND ACTIVITY TOLERANCE: ICD-10-CM

## 2024-11-21 DIAGNOSIS — G89.29 CHRONIC RIGHT-SIDED LOW BACK PAIN WITH RIGHT-SIDED SCIATICA: Primary | ICD-10-CM

## 2024-11-21 NOTE — PROGRESS NOTES
Physical Therapy Daily Treatment Note  [facility] [place]   [address]  [place] [address]  P: [phone]  F: [phone]    Patient: Nanci Umanzor   : 1955  Referring practitioner: Cristino Mcknight MD  Date of Initial Visit: Type: THERAPY  Noted: 10/22/2024  Today's Date: 2024  Patient seen for 3 sessions       Visit Diagnoses:    ICD-10-CM ICD-9-CM   1. Chronic right-sided low back pain with right-sided sciatica  M54.41 724.2    G89.29 724.3     338.29   2. Impaired functional mobility and activity tolerance  Z74.09 V49.89         Nanci Umanzor reports: low back and R sciatica pain overall improved, worsens when standing at work, performs stretches while on her break and are beneficial to relieve pain    Subjective     Objective   See Exercise, Manual, and Modality Logs for complete treatment. Issued updated HEP  Access Code: [email or web address]  URL: [email or web address]  Date: 2024  Prepared by: Teresa Cantu    Exercises  - Supine Figure 4 Piriformis Stretch  - 1 x daily - 7 x weekly - 1 sets - 5 reps - 20 hold  - Seated Piriformis Stretch  - 1 x daily - 7 x weekly - 1 sets - 5 reps - 20 hold  - Seated Piriformis Stretch  - 1 x daily - 7 x weekly - 1 sets - 5 reps - 20 hold    Assessment/Plan pt responds well to piriformis stretches. Plan details: Progress ROM / strengthening / stabilization / functional activity as tolerated       Timed:         Manual Therapy:   8      mins  67356;     Therapeutic Exercise:  20       mins  56203;     Neuromuscular Amanda:        mins  34067;    Therapeutic Activity:          mins  55805;     Gait Training:           mins  34827;     Ultrasound:          mins  98388;    Ionto                                   mins  98413  Self Care                            mins  74298  Traction          mins 15014      Un-Timed:  Canalith Repos         mins 95616  Electrical Stimulation:         mins  06592 (  );  Dry Needling          mins self-pay  Traction          mins 80346        Timed Treatment: 28     mins   Total Treatment:   40      mins    Teresa Cantu PT  [place] License #: [id number]    Physical Therapist

## 2024-11-26 ENCOUNTER — LAB (OUTPATIENT)
Dept: LAB | Facility: HOSPITAL | Age: 69
End: 2024-11-26
Payer: MEDICARE

## 2024-11-26 ENCOUNTER — OFFICE VISIT (OUTPATIENT)
Dept: NEUROSURGERY | Facility: CLINIC | Age: 69
End: 2024-11-26
Payer: MEDICARE

## 2024-11-26 VITALS
TEMPERATURE: 97.1 F | RESPIRATION RATE: 16 BRPM | OXYGEN SATURATION: 95 % | WEIGHT: 106 LBS | SYSTOLIC BLOOD PRESSURE: 130 MMHG | DIASTOLIC BLOOD PRESSURE: 70 MMHG | BODY MASS INDEX: 22.25 KG/M2 | HEART RATE: 51 BPM | HEIGHT: 58 IN

## 2024-11-26 DIAGNOSIS — M54.41 ACUTE RIGHT-SIDED LOW BACK PAIN WITH RIGHT-SIDED SCIATICA: Primary | ICD-10-CM

## 2024-11-26 LAB
MAGNESIUM SERPL-MCNC: 1.9 MG/DL (ref 1.6–2.4)
PHOSPHATE SERPL-MCNC: 4 MG/DL (ref 2.5–4.5)

## 2024-11-26 PROCEDURE — 84100 ASSAY OF PHOSPHORUS: CPT | Performed by: STUDENT IN AN ORGANIZED HEALTH CARE EDUCATION/TRAINING PROGRAM

## 2024-11-26 PROCEDURE — 84443 ASSAY THYROID STIM HORMONE: CPT | Performed by: STUDENT IN AN ORGANIZED HEALTH CARE EDUCATION/TRAINING PROGRAM

## 2024-11-26 PROCEDURE — 83735 ASSAY OF MAGNESIUM: CPT | Performed by: STUDENT IN AN ORGANIZED HEALTH CARE EDUCATION/TRAINING PROGRAM

## 2024-11-26 PROCEDURE — 80053 COMPREHEN METABOLIC PANEL: CPT | Performed by: STUDENT IN AN ORGANIZED HEALTH CARE EDUCATION/TRAINING PROGRAM

## 2024-11-26 PROCEDURE — 80061 LIPID PANEL: CPT | Performed by: STUDENT IN AN ORGANIZED HEALTH CARE EDUCATION/TRAINING PROGRAM

## 2024-11-26 RX ORDER — MELOXICAM 15 MG/1
15 TABLET ORAL DAILY
Qty: 30 TABLET | Refills: 3 | Status: SHIPPED | OUTPATIENT
Start: 2024-11-26

## 2024-12-12 ENCOUNTER — TELEPHONE (OUTPATIENT)
Dept: PHYSICAL THERAPY | Facility: CLINIC | Age: 69
End: 2024-12-12

## 2024-12-12 NOTE — TELEPHONE ENCOUNTER
Caller: Nanci Umanzor    Relationship: Self         What was the call regarding: LOCKED OUT OF HOUSE WITH OOUT HER KEYS,

## 2024-12-19 ENCOUNTER — TREATMENT (OUTPATIENT)
Dept: PHYSICAL THERAPY | Facility: CLINIC | Age: 69
End: 2024-12-19
Payer: MEDICARE

## 2024-12-19 DIAGNOSIS — G89.29 CHRONIC RIGHT-SIDED LOW BACK PAIN WITH RIGHT-SIDED SCIATICA: Primary | ICD-10-CM

## 2024-12-19 DIAGNOSIS — M54.41 CHRONIC RIGHT-SIDED LOW BACK PAIN WITH RIGHT-SIDED SCIATICA: Primary | ICD-10-CM

## 2024-12-19 DIAGNOSIS — Z74.09 IMPAIRED FUNCTIONAL MOBILITY AND ACTIVITY TOLERANCE: ICD-10-CM

## 2024-12-19 NOTE — PROGRESS NOTES
Re-Assessment / Progress Note  [facility] [place]   [address], [address]  [place] [address]  P: [phone]  F: [phone]  Patient: Nanci Umanzor   : 1955  Diagnosis/ICD-10 Code:  Chronic right-sided low back pain with right-sided sciatica [M54.41, G89.29]  Referring practitioner: Cristino Mcknight MD  Date of Initial Visit: Episode Type: THERAPY  Noted: 10/22/2024    Today's Date: 2024  Patient seen for 4 sessions.      Subjective:   Nanci Umanzor reports: continued R low back pain with radicular symptoms to R hip, buttock, R leg. Pain increased with prolonged standing and walking, affecting her work and daily activities.  Rates pain at 5/10.     Subjective Questionnaire: Oswestry: 24  (48%)  (initial evaluation: 64%)  Clinical Progress: improved  Home Program Compliance: Yes  Treatment has included: therapeutic exercise, neuromuscular re-education, manual therapy, therapeutic activity, and moist heat    Subjective   Objective   Palpation      Right Tenderness of the erector spinae, lumbar paraspinals and quadratus lumborum.      Tenderness      Right Hip   Tenderness in the PSIS and sacroiliac joint.      Active Range of Motion      Lumbar   Flexion: with pain  Extension: with pain  Left lateral flexion: with pain  Right lateral flexion: with pain  Left rotation: with pain  Right rotation: with pain     Strength/Myotome Testing      Lumbar   Left   Normal strength     Right Hip   Planes of Motion   Flexion: 4  Abduction: 3+  Adduction: 4  External rotation: 4  Internal rotation: 4     Right Knee   Flexion: 4+  Extension: 4+     Right Ankle/Foot   Dorsiflexion: 4        Lumbar Flexibility Comments:   Decreased B hip ER flexibility      Assessment/Plan  Progress toward previous goals: Partially Met  Pt continues to need skilled therapy to improve ROM, pain, strength and function.  Goals  Plan Goals: Short Term Goals: 4 weeks. Patient will:  1. Be  independent with initial HEP met  2. Be instructed in posture and body mechanics met  3. Demonstrate TrA contraction during exercises in clinic without need for cueing. met     Long Term Goals: 8 weeks. Patient will:  1. Demonstrate improved Right lower extremity/lower abdominal MMT of >/= 4+/5 to allow for performance of daily activities without pain.  2. Demonstrate lower extremity flexibility and lumbar ROM WFL to allow for return to household & recreational activities w/o increased symptoms  3. Return to work, ADLs without limitation from pain.   4. Perceived disability </= 25% as measured by Oswestry Questionnaire.       Recommendations: Continue as planned  Timeframe: 1 month  Prognosis to achieve goals: good    PT Signature: Teresa Cantu, PT  [place] Lic. # [id number]      Based upon review of the patient's progress and continued therapy plan, it is my medical opinion that Nanci Umanzor should continue physical therapy treatment at [facility]  [address] [place] [id number] [phone] ; Fax Number [phone]    Signature: __________________________________  Cristino Mcknight MD    Manual Therapy:     20     mins  56991;  Therapeutic Exercise:          mins  57578;     Neuromuscular Amanda:          mins  73480;    Therapeutic Activity:    10       mins  96842;     Gait Training:            mins  34037;     Ultrasound:           mins  03289;    Electrical Stimulation:          mins  41891 ( );  Dry Needling           mins self-pay  Traction           mins 08428  Canalith Repositioning         mins 70463      Timed Treatment: 30     mins   Total Treatment:      40  mins

## 2025-01-04 ENCOUNTER — HOSPITAL ENCOUNTER (OUTPATIENT)
Dept: MRI IMAGING | Facility: HOSPITAL | Age: 70
Discharge: HOME | End: 2025-01-04
Payer: MEDICARE

## 2025-01-04 DIAGNOSIS — M54.41 ACUTE RIGHT-SIDED LOW BACK PAIN WITH RIGHT-SIDED SCIATICA: ICD-10-CM

## 2025-01-04 PROCEDURE — 72148 MRI LUMBAR SPINE W/O DYE: CPT

## 2025-01-09 ENCOUNTER — TREATMENT (OUTPATIENT)
Dept: PHYSICAL THERAPY | Facility: CLINIC | Age: 70
End: 2025-01-09
Payer: MEDICARE

## 2025-01-09 DIAGNOSIS — G89.29 CHRONIC RIGHT-SIDED LOW BACK PAIN WITH RIGHT-SIDED SCIATICA: Primary | ICD-10-CM

## 2025-01-09 DIAGNOSIS — Z74.09 IMPAIRED FUNCTIONAL MOBILITY AND ACTIVITY TOLERANCE: ICD-10-CM

## 2025-01-09 DIAGNOSIS — M54.41 CHRONIC RIGHT-SIDED LOW BACK PAIN WITH RIGHT-SIDED SCIATICA: Primary | ICD-10-CM

## 2025-01-09 PROCEDURE — 97110 THERAPEUTIC EXERCISES: CPT | Performed by: PHYSICAL THERAPIST

## 2025-01-09 PROCEDURE — 97140 MANUAL THERAPY 1/> REGIONS: CPT | Performed by: PHYSICAL THERAPIST

## 2025-01-09 PROCEDURE — 97530 THERAPEUTIC ACTIVITIES: CPT | Performed by: PHYSICAL THERAPIST

## 2025-01-09 NOTE — PROGRESS NOTES
Physical Therapy Daily Treatment Note  [facility] [place]   [address], [address]  [place] [address]  P: [phone]  F: [phone]    Patient: Nanci Umanzor   : 1955  Referring practitioner: Cristino Mcknight MD  Date of Initial Visit: Type: THERAPY  Noted: 10/22/2024  Today's Date: 2025  Patient seen for 5 sessions       Visit Diagnoses:    ICD-10-CM ICD-9-CM   1. Chronic right-sided low back pain with right-sided sciatica  M54.41 724.2    G89.29 724.3     338.29   2. Impaired functional mobility and activity tolerance  Z74.09 V49.89         Nanci Umanzor reports: low back pain and R side sciatica is a little better today, has been off work for a few days due to weather. Had MRI and has follow up with  2/10/25.     Subjective     Objective   See Exercise, Manual, and Modality Logs for complete treatment.   Discussed MRI results with pt.     Assessment/Plan pt responds well to exercises and manual interventions. Also, has R TSA scheduled for 25. Plan details: Progress ROM / strengthening / stabilization / functional activity as tolerated       Timed:         Manual Therapy:  15       mins  30182;     Therapeutic Exercise:    10     mins  62992;     Neuromuscular Amanda:        mins  36289;    Therapeutic Activity:     15     mins  27542;     Gait Training:           mins  95368;     Ultrasound:          mins  31761;    Ionto                                   mins  95840  Self Care                            mins  35960  Traction          mins 12184      Un-Timed:  Canalith Repos         mins 32977  Electrical Stimulation:         mins  98877 ( );  Dry Needling          mins self-pay  Traction          mins 18403        Timed Treatment:   40   mins   Total Treatment:     50   mins    Teresa Cantu, PT  [place] License #: [id number]    Physical Therapist

## 2025-01-13 ENCOUNTER — TREATMENT (OUTPATIENT)
Dept: PHYSICAL THERAPY | Facility: CLINIC | Age: 70
End: 2025-01-13
Payer: MEDICARE

## 2025-01-13 DIAGNOSIS — M54.41 CHRONIC RIGHT-SIDED LOW BACK PAIN WITH RIGHT-SIDED SCIATICA: Primary | ICD-10-CM

## 2025-01-13 DIAGNOSIS — Z74.09 IMPAIRED FUNCTIONAL MOBILITY AND ACTIVITY TOLERANCE: ICD-10-CM

## 2025-01-13 DIAGNOSIS — G89.29 CHRONIC RIGHT-SIDED LOW BACK PAIN WITH RIGHT-SIDED SCIATICA: Primary | ICD-10-CM

## 2025-01-13 PROCEDURE — 97140 MANUAL THERAPY 1/> REGIONS: CPT | Performed by: PHYSICAL THERAPIST

## 2025-01-13 PROCEDURE — 97110 THERAPEUTIC EXERCISES: CPT | Performed by: PHYSICAL THERAPIST

## 2025-01-13 NOTE — PROGRESS NOTES
Physical Therapy Daily Treatment Note  [facility] [place]   [address], [address]  [place] [address]  P: [phone]  F: [phone]    Patient: Nanci Umanzor   : 1955  Referring practitioner: Cristino Mcknight MD  Date of Initial Visit: Type: THERAPY  Noted: 10/22/2024  Today's Date: 2025  Patient seen for 6 sessions       Visit Diagnoses:    ICD-10-CM ICD-9-CM   1. Chronic right-sided low back pain with right-sided sciatica  M54.41 724.2    G89.29 724.3     338.29   2. Impaired functional mobility and activity tolerance  Z74.09 V49.89         Nanci Umanzor reports: nerve pain  in leg is feeling better, feels better after doing stretches on HEP.     Subjective     Objective   See Exercise, Manual, and Modality Logs for complete treatment. Updated HEP   Access Code: [email or web address]  URL: [email or web address]  Date: 2025  Prepared by: Teresa Cantu    Exercises  - Supine Figure 4 Piriformis Stretch  - 1 x daily - 7 x weekly - 1 sets - 5 reps - 20 hold  - Seated Piriformis Stretch  - 1 x daily - 7 x weekly - 1 sets - 5 reps - 20 hold  - Seated Piriformis Stretch  - 1 x daily - 7 x weekly - 1 sets - 5 reps - 20 hold  - Bridge  - 1 x daily - 7 x weekly - 3 sets - 10 reps - 5 hold    Assessment/Plan responds well to manual interventions and HEP. Added core strengthening exercise and issued updated HEP. Plan details: Progress ROM / strengthening / stabilization / functional activity as tolerated       Timed:         Manual Therapy:   10     mins  85146;     Therapeutic Exercise:  20       mins  86184;     Neuromuscular Amanda:        mins  14999;    Therapeutic Activity:          mins  32018;     Gait Training:           mins  85857;     Ultrasound:          mins  80286;    Ionto                                   mins  83712  Self Care                            mins  00731  Traction          mins 45623      Un-Timed:  Canalith Repos         mins  47298  Electrical Stimulation:         mins  88970 ( );  Dry Needling          mins self-pay  Traction          mins 18020        Timed Treatment:   30   mins   Total Treatment:    40    mins    GLORIA Holley License #: [id number]    Physical Therapist

## 2025-01-16 ENCOUNTER — TREATMENT (OUTPATIENT)
Dept: PHYSICAL THERAPY | Facility: CLINIC | Age: 70
End: 2025-01-16
Payer: MEDICARE

## 2025-01-16 DIAGNOSIS — M54.41 CHRONIC RIGHT-SIDED LOW BACK PAIN WITH RIGHT-SIDED SCIATICA: Primary | ICD-10-CM

## 2025-01-16 DIAGNOSIS — G89.29 CHRONIC RIGHT-SIDED LOW BACK PAIN WITH RIGHT-SIDED SCIATICA: Primary | ICD-10-CM

## 2025-01-16 DIAGNOSIS — Z74.09 IMPAIRED FUNCTIONAL MOBILITY AND ACTIVITY TOLERANCE: ICD-10-CM

## 2025-01-16 PROCEDURE — 97140 MANUAL THERAPY 1/> REGIONS: CPT | Performed by: PHYSICAL THERAPIST

## 2025-01-16 PROCEDURE — 97110 THERAPEUTIC EXERCISES: CPT | Performed by: PHYSICAL THERAPIST

## 2025-01-16 NOTE — PROGRESS NOTES
Physical Therapy Daily Treatment Note  [facility] [place]   [address]  [place] [address]  P: [phone]  F: [phone]    Patient: Nanci Umanzor   : 1955  Referring practitioner: Cristino Mcknight MD  Date of Initial Visit: Type: THERAPY  Noted: 10/22/2024  Today's Date: 2025  Patient seen for 7 sessions       Visit Diagnoses:    ICD-10-CM ICD-9-CM   1. Chronic right-sided low back pain with right-sided sciatica  M54.41 724.2    G89.29 724.3     338.29   2. Impaired functional mobility and activity tolerance  Z74.09 V49.89         Nanci Umanzor reports: low back pain is improving, still aggravated when working.     Subjective     Objective   See Exercise, Manual, and Modality Logs for complete treatment.       Assessment/Plan responds well to manual interventions. Plan details: Progress ROM / strengthening / stabilization / functional activity as tolerated       Timed:         Manual Therapy:   15      mins  72102;     Therapeutic Exercise:   15      mins  32754;     Neuromuscular Amanda:        mins  27023;    Therapeutic Activity:          mins  93649;     Gait Training:           mins  72843;     Ultrasound:          mins  29018;    Ionto                                   mins  36285  Self Care                            mins  94253  Traction          mins 78414      Un-Timed:  Canalith Repos         mins 34837  Electrical Stimulation:         mins  09218 ( );  Dry Needling          mins self-pay  Traction          mins 05825        Timed Treatment:  30    mins   Total Treatment:     40   mins    Teresa Cantu, PT  [place] License #: [id number]    Physical Therapist

## 2025-01-20 PROBLEM — I77.810 ASCENDING AORTA DILATATION: Status: ACTIVE | Noted: 2025-01-20

## 2025-01-20 NOTE — PROGRESS NOTES
1/21/2025      Subjective:      Cristino Mcknight MD    Chief Complaint: evaluation of ascending aortic dilatation    History of Present Illness:       Dear Cristino Brooks MD and Colleagues,    It was nice to see Nanci Umanzor in [facility] today. She is a 69 y.o. female with newly diagnosed ascending aortic dilatation, HTN, HLD, cervical degenerative disc disease, osteoporosis, former tobacco abuse, pulmonary nodules, and asthmatic bronchitis.  She established care with DR. Cristino Mcknight in October 2024.  Her ascending aortic dilatation was originally discovered on lung cancer screening scan.  She was referred to [facility] for pulmonary nodules and cardiac surgery to evaluate her ascending aortic dilatation.  She reports an upcoming right shoulder replacement with Dr. Vaca.  She comes in today for evaluation and management of aortic dilatation.  The CT of chest without contrast on 10/15/2024 was reviewed.  The aortic root measures 2.8 cm, ascending aorta measures 3.8 cm, and DTA measures 2.3-2.4 cm.  These measurements are stable when compared to Nov 2023 study (2.8 cm/ 3.7-3.8 cm/ 2.1 cm).  CT chest in 2021 also showed ascending aorta measuring 3.8 cm.  Last echo was in Jan 2023 showing EF 69%, grade 1 diastolic dysfunction, aortic valve noted to have sclerosis without AI/AS, and mild TR.  She denies shortness of breath, chest/back pain, numbness/tingling/pain of extremities.  No vascular deficiencies or hyperextensible or hypermobile joints are noted on exam.  The patient's family history is negative for aneurysms or dissections, negative for connective tissue disease, and negative for coronary disease in first degree family members.  Her mother had rheumatic heart disease and required an AVR in her 60s.  She remains active.  She works part-time at [employer] in [place].  She is a former smoker.  Her BP today is 120/88. She is alone for the appt today.        Patient  Active Problem List   Diagnosis    Cervical nerve root disorder    Essential hypertension    Hyperlipidemia    Colon polyps    Family history of breast cancer    Hypovitaminosis D    Closed compression fracture of fifth lumbar vertebra    Bunion    History of compression fracture of spine    Diverticulitis    COPD (chronic obstructive pulmonary disease) with chronic bronchitis    Low back pain    Neck pain    History of adenomatous polyp of colon    Personal history of tobacco use, presenting hazards to health    Colitis with rectal bleeding    Heart palpitations    Post-menopausal    Cervical stenosis of spinal canal    Cervical radiculopathy    Chronic scapular pain    Other osteoporosis without current pathological fracture    Gastroesophageal reflux disease without esophagitis    Routine health maintenance    Lung nodule    Degenerative disc disease, cervical    History of cervical dysplasia    Persistent asthma with acute exacerbation    History of colitis    Ascending aorta dilatation       Past Medical History:   Diagnosis Date    Ankle fracture     Rt 2002  Lt 2004    Arthritis of neck     Asthma     Cancer 1992    Cervical cone biopsy for Stage II abnormality    Cataract 2017    Cervical radiculopathy 10/03/2023    Cervical spondylosis     Chronic pain disorder     Clavicle fracture     10/21/1970    COPD (chronic obstructive pulmonary disease) 2006    Degenerative joint disease of shoulder, right     Depression with anxiety     Diverticulosis     Fracture of hip Hairline pelvic fracture    Fracture of wrist     Fracture, finger     Fracture, tibia and fibula     GERD (gastroesophageal reflux disease) 2016    Glaucoma 2006    Heart murmur     Hip arthrosis     Hyperlipidemia     Hypertension     Lumbosacral disc disease     Osteopenia 2016    Osteoporosis     Pelvis fracture     10/21/1970    Reflex sympathetic dystrophy     Rotator cuff syndrome     Spinal stenosis     Thoracic disc disorder 2021     Ulcerative colitis 21    Visual impairment Since 1yrs of age    Wrist fracture            Past Surgical History:   Procedure Laterality Date    CERVICAL CONIZATION      CERVICAL EPIDURAL N/A 2023    Procedure: CERVICAL EPIDURAL STEROID INJECTION CPT: 83702;  Surgeon: Chidi Poritllo MD;  Location: [facility] MAIN OR;  Service: Pain Management;  Laterality: N/A;     SECTION      ,,    COLONOSCOPY N/A 2017    Procedure: COLONOSCOPY TO CECUM with cold polypectomy;  Surgeon: Alo Staton Jr., MD;  Location:  [facility];  Service:     COLONOSCOPY N/A 2022    Procedure: COLONOSCOPY TO CECUM WITH COLD BIOPSY POLYPECTOMY;  Surgeon: Chuck Servin MD;  Location:  [facility];  Service: Gastroenterology;  Laterality: N/A;  PRE: HX COLON POLYPS  POST: DIVERTICULOSIS, POLYP    ENDOMETRIAL ABLATION      EPIDURAL BLOCK      EYE SURGERY      FINGER SURGERY      FRACTURE SURGERY      HAND SURGERY      HEMORRHOIDECTOMY  2015    NERVE BLOCK Right 2023    Procedure: RIGHT SUPRASCAPULAR NERVE BLOCK CPT: 76222;  Surgeon: Chidi Portillo MD;  Location: [facility] MA[facility] OR;  Service: Pain Management;  Laterality: Right;    TRIGGER POINT INJECTION      TUBAL ABDOMINAL LIGATION         Allergies   Allergen Reactions    Clarithromycin Shortness Of Breath and Palpitations    Fluarix [Influenza Virus Vaccine] Myalgia    Pneumococcal Vaccines Swelling         Current Outpatient Medications:     acetaminophen (TYLENOL) 325 MG tablet, Take 2 tablets by mouth Every 6 (Six) Hours As Needed., Disp: , Rfl:     albuterol (PROVENTIL) (2.5 MG/3ML) 0.083% nebulizer solution, Take 2.5 mg by nebulization Every 4 (Four) Hours As Needed for Wheezing., Disp: 3 mL, Rfl: 2    albuterol sulfate  (90 Base) MCG/ACT inhaler, Inhale 2 puffs Every 4 (Four) Hours As Needed for Wheezing., Disp: 8.5 g, Rfl: 11    amLODIPine (NORVASC) 5 MG tablet, Take 1 tablet by mouth Daily., Disp:  90 tablet, Rfl: 1    atorvastatin (LIPITOR) 40 MG tablet, Take 1 tablet by mouth Daily., Disp: 90 tablet, Rfl: 1    benazepril (LOTENSIN) 10 MG tablet, Take 1 tablet by mouth Daily., Disp: 90 tablet, Rfl: 1    BIOTIN PO, Take 1 capsule by mouth Daily., Disp: , Rfl:     calcium citrate-vitamin d (CITRACAL) 200-250 MG-UNIT tablet tablet, Take 1 tablet by mouth Daily., Disp: , Rfl:     Cholecalciferol (Vitamin D) 50 MCG (2000 UT) tablet, Take 1 tablet by mouth Daily., Disp: , Rfl:     fluticasone (FLONASE) 50 MCG/ACT nasal spray, 2 sprays into the nostril(s) as directed by provider Daily for 14 days., Disp: 16 g, Rfl: 11    Fluticasone-Salmeterol (ADVAIR/WIXELA) 250-50 MCG/ACT DISKUS, Inhale 1 puff 2 (Two) Times a Day., Disp: 60 each, Rfl: 11    guaiFENesin (Mucus Relief) 600 MG 12 hr tablet, Take 1 tablet by mouth 2 (Two) Times a Day., Disp: 28 tablet, Rfl: 0    meloxicam (MOBIC) 15 MG tablet, Take 1 tablet by mouth Daily., Disp: 30 tablet, Rfl: 3    Multiple Vitamins-Minerals (CENTRUM SILVER ADULT 50+ PO), Take 1 tablet by mouth Daily., Disp: , Rfl:     Omega-3 Fatty Acids (FISH OIL PO), Take 1 capsule by mouth Daily., Disp: , Rfl:     pantoprazole (PROTONIX) 40 MG EC tablet, TAKE 1 TABLET BY MOUTH DAILY, Disp: 90 tablet, Rfl: 3    Prolia 60 MG/ML solution prefilled syringe syringe, INJECT 1 ML UNDER THE SKIN INTO THE APPROPRIATE AREA EVERY 6 MONTHS ONE TIME FOR 1 DOSE AS DIRECTED., Disp: 1 mL, Rfl: 2    tretinoin (RETIN-A) 0.05 % cream, Apply  topically to the appropriate area as directed Every Night., Disp: 20 g, Rfl: 5    Turmeric (QC TUMERIC COMPLEX PO), Take  by mouth., Disp: , Rfl:     vitamin C (ASCORBIC ACID) 500 MG tablet, Take 2 tablets by mouth Daily., Disp: , Rfl:     Social History     Socioeconomic History    Marital status:     Number of children: 3    Years of education: AS   Tobacco Use    Smoking status: Former     Current packs/day: 0.00     Average packs/day: 1 pack/day for 17.5 years  (17.5 ttl pk-yrs)     Types: Cigarettes     Start date: 1995     Quit date: 2013     Years since quittin.8     Passive exposure: Past    Smokeless tobacco: Never    Tobacco comments:     Started smoking after separation from my .   Vaping Use    Vaping status: Never Used   Substance and Sexual Activity    Alcohol use: Yes     Alcohol/week: 1.0 standard drink of alcohol     Types: 1 Glasses of wine per week     Comment: Red wine    Drug use: Never    Sexual activity: Not Currently     Partners: Male     Birth control/protection: Post-menopausal       Family History   Problem Relation Age of Onset    Heart disease Mother         Mechanical aortic valve, several heart attacks    Hypertension Mother     Hyperlipidemia Mother     Rheum arthritis Mother     Arthritis Mother     Colon polyps Mother     Stroke Mother     Osteoporosis Mother     Seizures Mother     GI problems Mother     Broken bones Mother     Diabetes Father         Type I    Hyperlipidemia Father     Hypertension Father     Heart disease Father         Afib    Thyroid disease Father     Hearing loss Father         Hearing aid    COPD Father     Cancer Sister         Invasive Breast Cancer    Depression Sister         This needs to be removed    Glaucoma Sister     Miscarriages / Stillbirths Sister     Vision loss Sister     Osteoporosis Sister     Crohn's disease Sister     GI problems Sister     Depression Sister     Hypertension Brother     Lung disease Brother     Asthma Brother     Hypertension Brother     Asthma Brother     Hyperlipidemia Brother     Hypertension Brother     Hyperlipidemia Brother     Lung disease Daughter     Asthma Daughter     Hyperlipidemia Daughter     Heart disease Maternal Aunt         Afib    Rheum arthritis Maternal Aunt     Arthritis Maternal Aunt     Stroke Maternal Aunt     Hyperlipidemia Maternal Uncle     Heart disease Maternal Uncle     Heart disease Paternal Aunt     Heart disease Maternal  Grandmother     Hyperlipidemia Maternal Grandmother     Rheum arthritis Maternal Grandmother     Stroke Maternal Grandmother     Arthritis Maternal Grandmother     Hypertension Maternal Grandmother     Osteoporosis Maternal Grandmother     Heart disease Maternal Grandfather     Cancer Maternal Grandfather         Pancreatic    Stomach cancer Maternal Grandfather     Heart disease Paternal Grandmother     Hyperlipidemia Paternal Grandmother     Stroke Paternal Grandmother     Hypertension Paternal Grandmother     Heart disease Paternal Grandfather     Cancer Paternal Grandfather         Lung    Asthma Son         Diagnosed 3/24    Malig Hyperthermia Neg Hx            Review of Systems:  Review of Systems   Respiratory:  Negative for shortness of breath.    Cardiovascular:  Negative for chest pain, palpitations and leg swelling.   Musculoskeletal:  Positive for arthralgias and neck pain.   Neurological:  Negative for dizziness and light-headedness.       Physical Exam:    Vital Signs:  Weight: 48.5 kg (107 lb)   Body mass index is 22.36 kg/m².  Temp: 97.1 °F (36.2 °C)   Heart Rate: 87   BP: 120/88     Physical Exam  Vitals and nursing note reviewed.   Constitutional:       General: She is awake.      Appearance: Normal appearance. She is well-developed and well-groomed.   HENT:      Head: Normocephalic and atraumatic.      Nose: Nose normal.      Mouth/Throat:      Lips: Pink.      Mouth: Mucous membranes are moist.      Pharynx: Uvula midline.   Eyes:      General: Lids are normal. No scleral icterus.     Extraocular Movements: Extraocular movements intact.      Conjunctiva/sclera: Conjunctivae normal.      Pupils: Pupils are equal, round, and reactive to light.   Neck:      Thyroid: No thyroid mass or thyromegaly.      Vascular: Normal carotid pulses. No carotid bruit, hepatojugular reflux or JVD.      Trachea: Trachea normal.   Cardiovascular:      Rate and Rhythm: Normal rate and regular rhythm.      Pulses:            Carotid pulses are 2+ on the right side and 2+ on the left side.       Radial pulses are 2+ on the right side and 2+ on the left side.        Femoral pulses are 2+ on the right side and 2+ on the left side.       Popliteal pulses are 2+ on the right side and 2+ on the left side.        Dorsalis pedis pulses are 2+ on the right side and 2+ on the left side.        Posterior tibial pulses are 2+ on the right side and 2+ on the left side.      Heart sounds: Normal heart sounds. No murmur heard.  Pulmonary:      Effort: Pulmonary effort is normal.      Breath sounds: Normal breath sounds.   Abdominal:      General: Bowel sounds are normal. There is no distension.      Palpations: Abdomen is soft.      Tenderness: There is no abdominal tenderness.   Musculoskeletal:      Cervical back: Neck supple.      Comments: Gait steady and strong without use of assistive devices   Lymphadenopathy:      Cervical: No cervical adenopathy.      Upper Body:      Right upper body: No supraclavicular adenopathy.      Left upper body: No supraclavicular adenopathy.   Skin:     General: Skin is warm and dry.      Capillary Refill: Capillary refill takes less than 2 seconds.      Findings: No erythema or rash.      Nails: There is no clubbing.   Neurological:      Mental Status: She is alert and oriented to person, place, and time.      GCS: GCS eye subscore is 4. GCS verbal subscore is 5. GCS motor subscore is 6.   Psychiatric:         Attention and Perception: Attention and perception normal.         Mood and Affect: Mood and affect normal.         Speech: Speech normal.         Behavior: Behavior normal. Behavior is cooperative.         Thought Content: Thought content normal.         Cognition and Memory: Cognition and memory normal.         Judgment: Judgment normal.        Assessment:     Ascending aorta dilatation, 3.8 cm--stable since 2021  HTN--stable  HLD--statin  Former tobacco abuse/asthmatic bronchitis--followed by  [facility]  Pulmonary nodules--followed by [facility]  Osteoporosis on Prolia--per PCP      Recommendation/Plan:       Continued risk factor modification of hypertension with a goal blood pressure less than 130/80, hyperlipidemia optimization, and continued avoidance of tobacco/nicotine products.    We discussed the importance of avoidance of over the counter decongestants and stimulants, specifically pseudoephedrine, for hypertension and aneurysm management.    Initiation of low aerobic exercise is indicated to help reduce body habitus, assist with blood pressure and cholesterol control.       Although risk of rupture is low, emergency department presentation is warranted for acute chest, back, or abdominal pain, syncope, or stroke like symptoms.    Follow up in [facility] in 1 year(s) with CT scan of chest without contrast.  Her aortic dilatation has been stable since 2021.  She reports a CT scan in 6 months to evaluate lung nodules.  She will let me know when it is completed and I will measure her aorta again.  We discussed this would not impede her upcoming shoulder surgery either.    I spent approximately 45 minutes total in evaluating the data, examining the patient, discussing the options and counseling.  Independent interpretation of imaging.  Imaging shown to pt.     Thank you for allowing us to participate in her care.    Regards,    Kaitlin Rico, CARMELA      **All problems and history are new to this examiner.  Extensive review of records prior to and during patient visit

## 2025-01-21 ENCOUNTER — HOSPITAL ENCOUNTER (OUTPATIENT)
Dept: GENERAL RADIOLOGY | Facility: HOSPITAL | Age: 70
Discharge: HOME | End: 2025-01-21
Payer: MEDICARE

## 2025-01-21 ENCOUNTER — PATIENT ROUNDING (BHMG ONLY) (OUTPATIENT)
Dept: CARDIAC SURGERY | Facility: CLINIC | Age: 70
End: 2025-01-21

## 2025-01-21 ENCOUNTER — OFFICE VISIT (OUTPATIENT)
Dept: CARDIAC SURGERY | Facility: CLINIC | Age: 70
End: 2025-01-21
Payer: MEDICARE

## 2025-01-21 ENCOUNTER — PRE-ADMISSION TESTING (OUTPATIENT)
Dept: PREADMISSION TESTING | Facility: HOSPITAL | Age: 70
End: 2025-01-21
Payer: MEDICARE

## 2025-01-21 ENCOUNTER — TREATMENT (OUTPATIENT)
Dept: PHYSICAL THERAPY | Facility: CLINIC | Age: 70
End: 2025-01-21
Payer: MEDICARE

## 2025-01-21 VITALS
SYSTOLIC BLOOD PRESSURE: 126 MMHG | RESPIRATION RATE: 20 BRPM | HEART RATE: 86 BPM | BODY MASS INDEX: 22.84 KG/M2 | WEIGHT: 108.8 LBS | DIASTOLIC BLOOD PRESSURE: 80 MMHG | TEMPERATURE: 97.5 F | OXYGEN SATURATION: 98 % | HEIGHT: 58 IN

## 2025-01-21 VITALS
HEART RATE: 87 BPM | BODY MASS INDEX: 22.46 KG/M2 | HEIGHT: 58 IN | DIASTOLIC BLOOD PRESSURE: 88 MMHG | OXYGEN SATURATION: 96 % | TEMPERATURE: 97.1 F | WEIGHT: 107 LBS | SYSTOLIC BLOOD PRESSURE: 120 MMHG | RESPIRATION RATE: 18 BRPM

## 2025-01-21 DIAGNOSIS — M54.41 CHRONIC RIGHT-SIDED LOW BACK PAIN WITH RIGHT-SIDED SCIATICA: Primary | ICD-10-CM

## 2025-01-21 DIAGNOSIS — I77.810 ASCENDING AORTA DILATATION: Primary | ICD-10-CM

## 2025-01-21 DIAGNOSIS — I10 ESSENTIAL HYPERTENSION: ICD-10-CM

## 2025-01-21 DIAGNOSIS — Z74.09 IMPAIRED FUNCTIONAL MOBILITY AND ACTIVITY TOLERANCE: ICD-10-CM

## 2025-01-21 DIAGNOSIS — G89.29 CHRONIC RIGHT-SIDED LOW BACK PAIN WITH RIGHT-SIDED SCIATICA: Primary | ICD-10-CM

## 2025-01-21 DIAGNOSIS — E78.5 HYPERLIPIDEMIA, UNSPECIFIED HYPERLIPIDEMIA TYPE: ICD-10-CM

## 2025-01-21 DIAGNOSIS — Z87.891 PERSONAL HISTORY OF TOBACCO USE, PRESENTING HAZARDS TO HEALTH: ICD-10-CM

## 2025-01-21 LAB
ALBUMIN SERPL-MCNC: 4.4 G/DL (ref 3.5–5.2)
ALBUMIN/GLOB SERPL: 1.7 G/DL
ALP SERPL-CCNC: 54 U/L (ref 39–117)
ALT SERPL W P-5'-P-CCNC: 23 U/L (ref 1–33)
ANION GAP SERPL CALCULATED.3IONS-SCNC: 11.2 MMOL/L (ref 5–15)
AST SERPL-CCNC: 27 U/L (ref 1–32)
BILIRUB SERPL-MCNC: 0.3 MG/DL (ref 0–1.2)
BILIRUB UR QL STRIP: NEGATIVE
BUN SERPL-MCNC: 14 MG/DL (ref 8–23)
BUN/CREAT SERPL: 20.3 (ref 7–25)
CALCIUM SPEC-SCNC: 9.7 MG/DL (ref 8.6–10.5)
CHLORIDE SERPL-SCNC: 99 MMOL/L (ref 98–107)
CLARITY UR: CLEAR
CO2 SERPL-SCNC: 26.8 MMOL/L (ref 22–29)
COLOR UR: YELLOW
CREAT SERPL-MCNC: 0.69 MG/DL (ref 0.57–1)
DEPRECATED RDW RBC AUTO: 40.1 FL (ref 37–54)
EGFRCR SERPLBLD CKD-EPI 2021: 92.9 ML/MIN/1.73
ERYTHROCYTE [DISTWIDTH] IN BLOOD BY AUTOMATED COUNT: 11.6 % (ref 12.3–15.4)
GLOBULIN UR ELPH-MCNC: 2.6 GM/DL
GLUCOSE SERPL-MCNC: 102 MG/DL (ref 65–99)
GLUCOSE UR STRIP-MCNC: NEGATIVE MG/DL
HCT VFR BLD AUTO: 37.7 % (ref 34–46.6)
HGB BLD-MCNC: 12.9 G/DL (ref 12–15.9)
HGB UR QL STRIP.AUTO: NEGATIVE
KETONES UR QL STRIP: NEGATIVE
LEUKOCYTE ESTERASE UR QL STRIP.AUTO: NEGATIVE
MCH RBC QN AUTO: 32.6 PG (ref 26.6–33)
MCHC RBC AUTO-ENTMCNC: 34.2 G/DL (ref 31.5–35.7)
MCV RBC AUTO: 95.2 FL (ref 79–97)
NITRITE UR QL STRIP: NEGATIVE
PH UR STRIP.AUTO: 5.5 [PH] (ref 5–8)
PLATELET # BLD AUTO: 365 10*3/MM3 (ref 140–450)
PMV BLD AUTO: 8.6 FL (ref 6–12)
POTASSIUM SERPL-SCNC: 4 MMOL/L (ref 3.5–5.2)
PROT SERPL-MCNC: 7 G/DL (ref 6–8.5)
PROT UR QL STRIP: NEGATIVE
QT INTERVAL: 392 MS
QTC INTERVAL: 423 MS
RBC # BLD AUTO: 3.96 10*6/MM3 (ref 3.77–5.28)
SODIUM SERPL-SCNC: 137 MMOL/L (ref 136–145)
SP GR UR STRIP: 1.01 (ref 1–1.03)
UROBILINOGEN UR QL STRIP: NORMAL
WBC NRBC COR # BLD AUTO: 8.02 10*3/MM3 (ref 3.4–10.8)

## 2025-01-21 PROCEDURE — 73030 X-RAY EXAM OF SHOULDER: CPT

## 2025-01-21 PROCEDURE — 36415 COLL VENOUS BLD VENIPUNCTURE: CPT

## 2025-01-21 PROCEDURE — 3074F SYST BP LT 130 MM HG: CPT | Performed by: NURSE PRACTITIONER

## 2025-01-21 PROCEDURE — 93005 ELECTROCARDIOGRAM TRACING: CPT

## 2025-01-21 PROCEDURE — 1159F MED LIST DOCD IN RCRD: CPT | Performed by: NURSE PRACTITIONER

## 2025-01-21 PROCEDURE — 97530 THERAPEUTIC ACTIVITIES: CPT | Performed by: PHYSICAL THERAPIST

## 2025-01-21 PROCEDURE — 97110 THERAPEUTIC EXERCISES: CPT | Performed by: PHYSICAL THERAPIST

## 2025-01-21 PROCEDURE — 71046 X-RAY EXAM CHEST 2 VIEWS: CPT

## 2025-01-21 PROCEDURE — 99215 OFFICE O/P EST HI 40 MIN: CPT | Performed by: NURSE PRACTITIONER

## 2025-01-21 PROCEDURE — 85027 COMPLETE CBC AUTOMATED: CPT

## 2025-01-21 PROCEDURE — 1160F RVW MEDS BY RX/DR IN RCRD: CPT | Performed by: NURSE PRACTITIONER

## 2025-01-21 PROCEDURE — 81003 URINALYSIS AUTO W/O SCOPE: CPT

## 2025-01-21 PROCEDURE — 80053 COMPREHEN METABOLIC PANEL: CPT

## 2025-01-21 PROCEDURE — 3079F DIAST BP 80-89 MM HG: CPT | Performed by: NURSE PRACTITIONER

## 2025-01-21 PROCEDURE — 93010 ELECTROCARDIOGRAM REPORT: CPT | Performed by: INTERNAL MEDICINE

## 2025-01-21 NOTE — PROGRESS NOTES
Physical Therapy Daily Treatment Note  [facility] [place]   [address], [address]  [place] [address]  P: [phone]  F: [phone]    Patient: Nanci Umanzor   : 1955  Referring practitioner: Cristino Mcknight MD  Date of Initial Visit: Type: THERAPY  Noted: 10/22/2024  Today's Date: 2025  Patient seen for 8 sessions       Visit Diagnoses:    ICD-10-CM ICD-9-CM   1. Chronic right-sided low back pain with right-sided sciatica  M54.41 724.2    G89.29 724.3     338.29   2. Impaired functional mobility and activity tolerance  Z74.09 V49.89         Nanci Umanzor reports: low back pain is improved, compliance with HEP.    Pt reports R TSA surgery is 25.   Subjective     Objective   See Exercise, Manual, and Modality Logs for complete treatment.       Assessment/Plan Responding well to exercises. Plan details: Progress ROM / strengthening / stabilization / functional activity as tolerated       Timed:         Manual Therapy:         mins  85129;     Therapeutic Exercise:10         mins  52771;     Neuromuscular Amanda:        mins  17621;    Therapeutic Activity:  15        mins  43179;     Gait Training:           mins  68519;     Ultrasound:          mins  00186;    Ionto                                   mins  29697  Self Care                            mins  87597  Traction          mins 81165      Un-Timed:  Canalith Repos         mins 95260  Electrical Stimulation:         mins  89032 ( );  Dry Needling          mins self-pay  Traction          mins 90073        Timed Treatment:  25    mins   Total Treatment:    25   mins    Teresa Cantu PT  [place] License #: [id number]    Physical Therapist

## 2025-01-21 NOTE — DISCHARGE INSTRUCTIONS
Take the following medications the morning of surgery:  INHALERS, AMLODIPINE, AND PANTOPRAZOLE    HOSPITAL TO CALL 1-2 DAYS PRIOR TO SURGERY  WITH ARRIVAL TIME       If you are on prescription narcotic pain medication to control your pain you may also take that medication the morning of surgery.      General Instructions:     Do not eat solid food after midnight the night before surgery.  Clear liquids day of surgery are allowed but must be stopped at least two hours before your hospital arrival time.       Allowed clear liquids      Water, sodas, and tea or coffee with no cream or milk added.       12 to 20 ounces of a clear liquid that contains carbohydrates is recommended.  If non-diabetic, have Gatorade or Powerade.  If diabetic, have G2 or Powerade Zero.     Do not have liquids red in color.  Do not consume chicken, beef, pork or vegetable broth or bouillon cubes of any variety as they are not considered clear liquids and are not allowed.      Infants may have breast milk up to four hours before surgery.  Infants drinking formula may drink formula up to six hours before surgery.   Patients who avoid smoking, chewing tobacco and alcohol for 4 weeks prior to surgery have a reduced risk of post-operative complications.  Quit smoking as many days before surgery as you can.  Do not smoke, use chewing tobacco or drink alcohol the day of surgery.   If applicable bring your C-PAP/ BI-PAP machine in with you to preop day of surgery.  Bring any papers given to you in the doctor’s office.  Wear clean comfortable clothes.  Do not wear contact lenses, false eyelashes or make-up.  Bring a case for your glasses.   Bring crutches or walker if applicable.  Remove all piercings.  Leave jewelry and any other valuables at home.  Hair extensions with metal clips must be removed prior to surgery.  The Pre-Admission Testing nurse will instruct you to bring medications if unable to obtain an accurate list in Pre-Admission Testing.             Day of surgery:  Your arrival time is approximately two hours before your scheduled surgery time.  Upon arrival, a Pre-op nurse and Anesthesiologist will review your health history, obtain vital signs, and answer questions you may have.  The only belongings needed at this time will be a list of your home medications and if applicable your C-PAP/BI-PAP machine.  A Pre-op nurse will start an IV and you may receive medication in preparation for surgery, including something to help you relax.     Please be aware that surgery does come with discomfort.  We want to make every effort to control your discomfort so please discuss any uncontrolled symptoms with your nurse.   Your doctor will most likely have prescribed pain medications.      If you are going home after surgery you will receive individualized written care instructions before being discharged.  A responsible adult must drive you to and from the hospital on the day of your surgery and ideally stay with you through the night.  Discharge prescriptions can be filled by the hospital pharmacy during regular pharmacy hours.  If you are having surgery late in the day/evening your prescription may be e-prescribed to your pharmacy.  Please verify your pharmacy hours or chose a 24 hour pharmacy to avoid not having access to your prescription because your pharmacy has closed for the day.    If you are staying overnight following surgery, you will be transported to your hospital room following the recovery period.  [facility] has all private rooms.    If you have any questions please call Pre-Admission Testing at [phone].  Deductibles and co-payments are collected on the day of service. Please be prepared to pay the required co-pay, deductible or deposit on the day of service as defined by your plan.    Call your surgeon immediately if you experience any of the following symptoms:  Sore Throat  Shortness of Breath or difficulty  breathing  Cough  Chills  Body soreness or muscle pain  Headache  Fever  New loss of taste or smell  Do not arrive for your surgery ill.  Your procedure will need to be rescheduled to another time.  You will need to call your physician before the day of surgery to avoid any unnecessary exposure to hospital staff as well as other patients.        PREVENTING INFECTION IN SHOULDER SURGICAL SITES     C. acnes is a bacteria that lives deep within follicles and pores of the skin. It is found in large numbers on the skin of the face, axilla (armpit), chest and back and is the primary bacteria to cause a surgical site infection after shoulder surgery.      Use of a Benzoyl Peroxide solution prior to shoulder surgery decreases C. acnes and reduces post-op infections.   Your surgeon has ordered 5% Benzoyl Peroxide wash to be used three times prior to your surgery.     Please read the following instructions carefully and bring this form with you the day of surgery.     General bathing instructions starting two days before your surgery:    Shower using a fresh bar of anti-bacterial soap (such as Dial) and clean washcloth.  Pay special attention to the neck, shoulder and armpit area.   Wash your hair as usual with your regular shampoo.   Rinse hair and body thoroughly with warm water (not hot water) to remove shampoo and residue.   Dry with a clean towel.              Sleep in a clean bed with clean clothing.  Do not allow pets to sleep with you.     For 2 days before surgery, avoid shaving with a razor because the razor can irritate skin and make it easier to develop an infection.    Any areas of open skin can increase the risk of a post-operative wound infection by allowing bacteria to enter and travel throughout the body.  Notify your surgeon if you have any skin wounds / rashes even if it is not near the expected surgical site.  The area will need assessed to determine if surgery should be delayed until it is  healed.      First application of 5% Benzoyl Peroxide Wash two nights before surgery:                                                                Wash neck, shoulder (front, back, side) and armpit   with warm water, rinse and dry - see picture.  Gently wash the same areas with the Benzoyl Peroxide   cleanser going away from the neck for 10-20 seconds.   Work into a full lather and leave on the skin for   2 minutes for greatest effect.  Rinse thoroughly with warm water, not hot water.                     Pat dry with a clean towel.                                                            Wash your hands thoroughly.  Do not apply lotion, powder, perfume or deodorant.   Put on clean clothes.    Second application the night before surgery:  Repeat the above steps.        Third application morning of surgery:  Repeat the above steps.    Due to shoulder pain or decreased range of motion of your shoulder and arm, you may need assistance washing under the arm or the back portion of your shoulder.     Avoid further washing the areas of the skin treated with Benzoyl Peroxide for at least 1 hour.    For your convenience, you may purchase Benzoyl Peroxide at [facility] retail pharmacy.     Warning:  Let your physician know if you are allergic to Benzoyl Peroxide or have very sensitive skin and cannot use it.   Stop using and contact your surgeon if you experience any excessive scaling, itching, swelling, skin irritation or other signs of a reaction.  Keep out of eyes, ears, nose and mouth.  Do not apply to sunburned, irritated or broken area on the skin.  Avoid unwanted problems with bleaching effect by following these tips:  Wash hands after each use.  Avoid contact with hair, clothing, furnishings or carpeting.  Wear clean, old t-shirt or clothing to bed.   Use clean, old white pillow cases and sheets to avoid discoloring your bed linens.                                                                                                                                                                                                                                                                                    Please complete the checklist below, bring it with you to the hospital                               the day of surgery and give to the Pre-op Nurse     Preoperative Skin Prep Checklist        Patient Name Label             Enter dates and ?  boxes to indicate completed    Surgery Date: _______________ Regular Shower  Benzoyl Peroxide Wash   First Application:  2 days before_______________  (Stop shaving all body parts)           Morning or Evening                        Evening    Second Application:  1 day  before________________        Morning or Evening                          Evening   Third Application:  Day of Surgery______________                           Morning                   Morning

## 2025-01-21 NOTE — PROGRESS NOTES
A My Chart message has been sent to the patient for PATIENT ROUNDING with [facility]

## 2025-01-23 ENCOUNTER — TREATMENT (OUTPATIENT)
Dept: PHYSICAL THERAPY | Facility: CLINIC | Age: 70
End: 2025-01-23
Payer: MEDICARE

## 2025-01-23 DIAGNOSIS — I77.810 ASCENDING AORTA DILATATION: Primary | ICD-10-CM

## 2025-01-23 DIAGNOSIS — M54.41 CHRONIC RIGHT-SIDED LOW BACK PAIN WITH RIGHT-SIDED SCIATICA: Primary | ICD-10-CM

## 2025-01-23 DIAGNOSIS — Z74.09 IMPAIRED FUNCTIONAL MOBILITY AND ACTIVITY TOLERANCE: ICD-10-CM

## 2025-01-23 DIAGNOSIS — G89.29 CHRONIC RIGHT-SIDED LOW BACK PAIN WITH RIGHT-SIDED SCIATICA: Primary | ICD-10-CM

## 2025-01-23 PROCEDURE — 97530 THERAPEUTIC ACTIVITIES: CPT | Performed by: PHYSICAL THERAPIST

## 2025-01-23 PROCEDURE — 97110 THERAPEUTIC EXERCISES: CPT | Performed by: PHYSICAL THERAPIST

## 2025-01-23 NOTE — PROGRESS NOTES
Physical Therapy Daily Treatment Note  [facility] [place]   [address], [address]  [place] [address]  P: [phone]  F: [phone]    Patient: Nanci Umanzor   : 1955  Referring practitioner: Cristino Mcknight MD  Date of Initial Visit: Type: THERAPY  Noted: 10/22/2024  Today's Date: 2025  Patient seen for 9 sessions       Visit Diagnoses:    ICD-10-CM ICD-9-CM   1. Chronic right-sided low back pain with right-sided sciatica  M54.41 724.2    G89.29 724.3     338.29   2. Impaired functional mobility and activity tolerance  Z74.09 V49.89         Nanci Umanzor reports: low back is feeling better. Scheduled to have R TSA on 25.     Subjective     Objective   See Exercise, Manual, and Modality Logs for complete treatment.       Assessment/Plan Pt has progressed well and ready to DC and continue HEP.       Timed:         Manual Therapy:         mins  59349;     Therapeutic Exercise: 15        mins  44884;     Neuromuscular Amanda:        mins  47869;    Therapeutic Activity:     10     mins  37492;     Gait Training:           mins  32896;     Ultrasound:          mins  35396;    Ionto                                   mins  80763  Self Care                            mins  65110  Traction          mins 62035      Un-Timed:  Canalith Repos         mins 87292  Electrical Stimulation:         mins  71182 ( );  Dry Needling          mins self-pay  Traction          mins 28772        Timed Treatment:  25    mins   Total Treatment:     35   mins    Teresa Cantu PT  [place] License #: [id number]    Physical Therapist

## 2025-01-24 ENCOUNTER — TELEPHONE (OUTPATIENT)
Dept: ORTHOPEDIC SURGERY | Facility: HOSPITAL | Age: 70
End: 2025-01-24
Payer: MEDICARE

## 2025-01-27 NOTE — TELEPHONE ENCOUNTER
Ms. Umanzor called me back at this time. She said she would like to be able to go home, but she doesn't have any one to help her at home day of surgery. She was worried about how she would do with the anaesthesia as it takes her a while to come out of it. Ms. Umanzor is to be an overnight stay at this time.
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
## 2025-01-30 ENCOUNTER — HOSPITAL ENCOUNTER (OUTPATIENT)
Facility: HOSPITAL | Age: 70
Discharge: HOME | End: 2025-01-31
Attending: ORTHOPAEDIC SURGERY | Admitting: ORTHOPAEDIC SURGERY
Payer: MEDICARE

## 2025-01-30 ENCOUNTER — ANESTHESIA EVENT (OUTPATIENT)
Dept: PERIOP | Facility: HOSPITAL | Age: 70
End: 2025-01-30
Payer: MEDICARE

## 2025-01-30 ENCOUNTER — APPOINTMENT (OUTPATIENT)
Dept: GENERAL RADIOLOGY | Facility: HOSPITAL | Age: 70
End: 2025-01-30
Payer: MEDICARE

## 2025-01-30 ENCOUNTER — ANESTHESIA (OUTPATIENT)
Dept: PERIOP | Facility: HOSPITAL | Age: 70
End: 2025-01-30
Payer: MEDICARE

## 2025-01-30 DIAGNOSIS — Z96.611 S/P REVERSE TOTAL SHOULDER ARTHROPLASTY, RIGHT: Primary | ICD-10-CM

## 2025-01-30 DIAGNOSIS — J44.89 COPD (CHRONIC OBSTRUCTIVE PULMONARY DISEASE) WITH CHRONIC BRONCHITIS: ICD-10-CM

## 2025-01-30 PROCEDURE — G0378 HOSPITAL OBSERVATION PER HR: HCPCS

## 2025-01-30 PROCEDURE — C1713 ANCHOR/SCREW BN/BN,TIS/BN: HCPCS | Performed by: ORTHOPAEDIC SURGERY

## 2025-01-30 PROCEDURE — 25010000002 SUGAMMADEX 200 MG/2ML SOLUTION: Performed by: NURSE ANESTHETIST, CERTIFIED REGISTERED

## 2025-01-30 PROCEDURE — 25010000002 BUPIVACAINE (PF) 0.5 % SOLUTION: Performed by: STUDENT IN AN ORGANIZED HEALTH CARE EDUCATION/TRAINING PROGRAM

## 2025-01-30 PROCEDURE — C1776 JOINT DEVICE (IMPLANTABLE): HCPCS | Performed by: ORTHOPAEDIC SURGERY

## 2025-01-30 PROCEDURE — 25010000002 LIDOCAINE PF 2% 2 % SOLUTION: Performed by: NURSE ANESTHETIST, CERTIFIED REGISTERED

## 2025-01-30 PROCEDURE — 25010000002 DEXAMETHASONE SODIUM PHOSPHATE 20 MG/5ML SOLUTION: Performed by: NURSE ANESTHETIST, CERTIFIED REGISTERED

## 2025-01-30 PROCEDURE — 25010000002 MIDAZOLAM PER 1 MG: Performed by: STUDENT IN AN ORGANIZED HEALTH CARE EDUCATION/TRAINING PROGRAM

## 2025-01-30 PROCEDURE — 25810000003 LACTATED RINGERS PER 1000 ML: Performed by: NURSE ANESTHETIST, CERTIFIED REGISTERED

## 2025-01-30 PROCEDURE — 94640 AIRWAY INHALATION TREATMENT: CPT

## 2025-01-30 PROCEDURE — 25810000003 LACTATED RINGERS PER 1000 ML: Performed by: STUDENT IN AN ORGANIZED HEALTH CARE EDUCATION/TRAINING PROGRAM

## 2025-01-30 PROCEDURE — 25010000002 HYDROMORPHONE PER 4 MG: Performed by: NURSE ANESTHETIST, CERTIFIED REGISTERED

## 2025-01-30 PROCEDURE — 73020 X-RAY EXAM OF SHOULDER: CPT

## 2025-01-30 PROCEDURE — 25010000002 CEFAZOLIN PER 500 MG: Performed by: ORTHOPAEDIC SURGERY

## 2025-01-30 PROCEDURE — 94799 UNLISTED PULMONARY SVC/PX: CPT

## 2025-01-30 PROCEDURE — 25010000002 PROPOFOL 200 MG/20ML EMULSION: Performed by: NURSE ANESTHETIST, CERTIFIED REGISTERED

## 2025-01-30 PROCEDURE — 25010000002 MAGNESIUM SULFATE PER 500 MG OF MAGNESIUM: Performed by: NURSE ANESTHETIST, CERTIFIED REGISTERED

## 2025-01-30 PROCEDURE — 25010000002 FENTANYL CITRATE (PF) 50 MCG/ML SOLUTION: Performed by: STUDENT IN AN ORGANIZED HEALTH CARE EDUCATION/TRAINING PROGRAM

## 2025-01-30 PROCEDURE — 25010000002 BUPIVACAINE LIPOSOME 1.3 % SUSPENSION: Performed by: STUDENT IN AN ORGANIZED HEALTH CARE EDUCATION/TRAINING PROGRAM

## 2025-01-30 DEVICE — GLENOID PLATE
Type: IMPLANTABLE DEVICE | Site: SHOULDER | Status: FUNCTIONAL
Brand: EQUINOXE

## 2025-01-30 DEVICE — IMPLANTABLE DEVICE: Type: IMPLANTABLE DEVICE | Status: FUNCTIONAL

## 2025-01-30 DEVICE — IMPLANTABLE DEVICE
Type: IMPLANTABLE DEVICE | Site: SHOULDER | Status: FUNCTIONAL
Brand: EQUINOXE

## 2025-01-30 DEVICE — HUMERAL ADAPTER TRAY
Type: IMPLANTABLE DEVICE | Site: SHOULDER | Status: FUNCTIONAL
Brand: EQUINOXE®

## 2025-01-30 DEVICE — HUMERAL LINER, CONSTRAINED
Type: IMPLANTABLE DEVICE | Site: SHOULDER | Status: FUNCTIONAL
Brand: EQUINOXE®

## 2025-01-30 DEVICE — GLENOSPHERE
Type: IMPLANTABLE DEVICE | Site: SHOULDER | Status: FUNCTIONAL
Brand: EQUINOXE

## 2025-01-30 RX ORDER — PROPOFOL 10 MG/ML
INJECTION, EMULSION INTRAVENOUS AS NEEDED
Status: DISCONTINUED | OUTPATIENT
Start: 2025-01-30 | End: 2025-01-30 | Stop reason: SURG

## 2025-01-30 RX ORDER — ALBUTEROL SULFATE 0.83 MG/ML
2.5 SOLUTION RESPIRATORY (INHALATION) EVERY 4 HOURS PRN
Status: DISCONTINUED | OUTPATIENT
Start: 2025-01-30 | End: 2025-01-30 | Stop reason: SDUPTHER

## 2025-01-30 RX ORDER — SODIUM CHLORIDE 0.9 % (FLUSH) 0.9 %
3 SYRINGE (ML) INJECTION EVERY 12 HOURS SCHEDULED
Status: DISCONTINUED | OUTPATIENT
Start: 2025-01-30 | End: 2025-01-30 | Stop reason: HOSPADM

## 2025-01-30 RX ORDER — FENTANYL CITRATE 50 UG/ML
50 INJECTION, SOLUTION INTRAMUSCULAR; INTRAVENOUS
Status: DISCONTINUED | OUTPATIENT
Start: 2025-01-30 | End: 2025-01-30 | Stop reason: HOSPADM

## 2025-01-30 RX ORDER — FLUMAZENIL 0.1 MG/ML
0.2 INJECTION INTRAVENOUS AS NEEDED
Status: DISCONTINUED | OUTPATIENT
Start: 2025-01-30 | End: 2025-01-30 | Stop reason: HOSPADM

## 2025-01-30 RX ORDER — MORPHINE SULFATE 2 MG/ML
6 INJECTION, SOLUTION INTRAMUSCULAR; INTRAVENOUS
Status: DISCONTINUED | OUTPATIENT
Start: 2025-01-30 | End: 2025-01-31 | Stop reason: HOSPADM

## 2025-01-30 RX ORDER — ROCURONIUM BROMIDE 10 MG/ML
INJECTION, SOLUTION INTRAVENOUS AS NEEDED
Status: DISCONTINUED | OUTPATIENT
Start: 2025-01-30 | End: 2025-01-30 | Stop reason: SURG

## 2025-01-30 RX ORDER — AMLODIPINE BESYLATE 5 MG/1
5 TABLET ORAL DAILY
Status: DISCONTINUED | OUTPATIENT
Start: 2025-01-30 | End: 2025-01-31 | Stop reason: HOSPADM

## 2025-01-30 RX ORDER — ONDANSETRON 2 MG/ML
4 INJECTION INTRAMUSCULAR; INTRAVENOUS EVERY 6 HOURS PRN
Status: DISCONTINUED | OUTPATIENT
Start: 2025-01-30 | End: 2025-01-31 | Stop reason: HOSPADM

## 2025-01-30 RX ORDER — BUPIVACAINE HYDROCHLORIDE 5 MG/ML
INJECTION, SOLUTION EPIDURAL; INTRACAUDAL
Status: DISCONTINUED | OUTPATIENT
Start: 2025-01-30 | End: 2025-01-30 | Stop reason: SURG

## 2025-01-30 RX ORDER — ALBUTEROL SULFATE 90 UG/1
2 INHALANT RESPIRATORY (INHALATION) EVERY 4 HOURS PRN
Status: DISCONTINUED | OUTPATIENT
Start: 2025-01-30 | End: 2025-01-30 | Stop reason: CLARIF

## 2025-01-30 RX ORDER — LABETALOL HYDROCHLORIDE 5 MG/ML
5 INJECTION, SOLUTION INTRAVENOUS
Status: DISCONTINUED | OUTPATIENT
Start: 2025-01-30 | End: 2025-01-30 | Stop reason: HOSPADM

## 2025-01-30 RX ORDER — DEXAMETHASONE SODIUM PHOSPHATE 4 MG/ML
INJECTION, SOLUTION INTRA-ARTICULAR; INTRALESIONAL; INTRAMUSCULAR; INTRAVENOUS; SOFT TISSUE AS NEEDED
Status: DISCONTINUED | OUTPATIENT
Start: 2025-01-30 | End: 2025-01-30 | Stop reason: SURG

## 2025-01-30 RX ORDER — ATROPINE SULFATE 0.4 MG/ML
0.4 INJECTION, SOLUTION INTRAMUSCULAR; INTRAVENOUS; SUBCUTANEOUS ONCE AS NEEDED
Status: DISCONTINUED | OUTPATIENT
Start: 2025-01-30 | End: 2025-01-30 | Stop reason: HOSPADM

## 2025-01-30 RX ORDER — EPHEDRINE SULFATE 50 MG/ML
5 INJECTION, SOLUTION INTRAVENOUS ONCE AS NEEDED
Status: DISCONTINUED | OUTPATIENT
Start: 2025-01-30 | End: 2025-01-30 | Stop reason: HOSPADM

## 2025-01-30 RX ORDER — LISINOPRIL 10 MG/1
10 TABLET ORAL
Status: DISCONTINUED | OUTPATIENT
Start: 2025-01-30 | End: 2025-01-31 | Stop reason: HOSPADM

## 2025-01-30 RX ORDER — ONDANSETRON 4 MG/1
4 TABLET, ORALLY DISINTEGRATING ORAL EVERY 6 HOURS PRN
Status: DISCONTINUED | OUTPATIENT
Start: 2025-01-30 | End: 2025-01-31 | Stop reason: HOSPADM

## 2025-01-30 RX ORDER — HYDROMORPHONE HYDROCHLORIDE 1 MG/ML
0.5 INJECTION, SOLUTION INTRAMUSCULAR; INTRAVENOUS; SUBCUTANEOUS
Status: DISCONTINUED | OUTPATIENT
Start: 2025-01-30 | End: 2025-01-30 | Stop reason: HOSPADM

## 2025-01-30 RX ORDER — LIDOCAINE HYDROCHLORIDE 20 MG/ML
INJECTION, SOLUTION EPIDURAL; INFILTRATION; INTRACAUDAL; PERINEURAL AS NEEDED
Status: DISCONTINUED | OUTPATIENT
Start: 2025-01-30 | End: 2025-01-30 | Stop reason: SURG

## 2025-01-30 RX ORDER — OXYCODONE AND ACETAMINOPHEN 5; 325 MG/1; MG/1
1 TABLET ORAL EVERY 4 HOURS PRN
Status: DISCONTINUED | OUTPATIENT
Start: 2025-01-30 | End: 2025-01-31 | Stop reason: HOSPADM

## 2025-01-30 RX ORDER — SODIUM CHLORIDE 450 MG/100ML
75 INJECTION, SOLUTION INTRAVENOUS CONTINUOUS
Status: ACTIVE | OUTPATIENT
Start: 2025-01-30 | End: 2025-01-31

## 2025-01-30 RX ORDER — SODIUM CHLORIDE, SODIUM LACTATE, POTASSIUM CHLORIDE, CALCIUM CHLORIDE 600; 310; 30; 20 MG/100ML; MG/100ML; MG/100ML; MG/100ML
9 INJECTION, SOLUTION INTRAVENOUS CONTINUOUS
Status: ACTIVE | OUTPATIENT
Start: 2025-01-30 | End: 2025-01-31

## 2025-01-30 RX ORDER — ALBUTEROL SULFATE 0.83 MG/ML
2.5 SOLUTION RESPIRATORY (INHALATION) EVERY 4 HOURS PRN
Status: DISCONTINUED | OUTPATIENT
Start: 2025-01-30 | End: 2025-01-31 | Stop reason: HOSPADM

## 2025-01-30 RX ORDER — SODIUM CHLORIDE, SODIUM LACTATE, POTASSIUM CHLORIDE, CALCIUM CHLORIDE 600; 310; 30; 20 MG/100ML; MG/100ML; MG/100ML; MG/100ML
INJECTION, SOLUTION INTRAVENOUS CONTINUOUS PRN
Status: DISCONTINUED | OUTPATIENT
Start: 2025-01-30 | End: 2025-01-30 | Stop reason: SURG

## 2025-01-30 RX ORDER — PROMETHAZINE HYDROCHLORIDE 25 MG/1
25 TABLET ORAL ONCE AS NEEDED
Status: DISCONTINUED | OUTPATIENT
Start: 2025-01-30 | End: 2025-01-30 | Stop reason: HOSPADM

## 2025-01-30 RX ORDER — NALOXONE HCL 0.4 MG/ML
0.4 VIAL (ML) INJECTION
Status: DISCONTINUED | OUTPATIENT
Start: 2025-01-30 | End: 2025-01-31 | Stop reason: HOSPADM

## 2025-01-30 RX ORDER — TRANEXAMIC ACID 100 MG/ML
INJECTION, SOLUTION INTRAVENOUS AS NEEDED
Status: DISCONTINUED | OUTPATIENT
Start: 2025-01-30 | End: 2025-01-30 | Stop reason: SURG

## 2025-01-30 RX ORDER — HYDROCODONE BITARTRATE AND ACETAMINOPHEN 5; 325 MG/1; MG/1
1 TABLET ORAL ONCE AS NEEDED
Status: COMPLETED | OUTPATIENT
Start: 2025-01-30 | End: 2025-01-30

## 2025-01-30 RX ORDER — MAGNESIUM SULFATE HEPTAHYDRATE 500 MG/ML
INJECTION, SOLUTION INTRAMUSCULAR; INTRAVENOUS AS NEEDED
Status: DISCONTINUED | OUTPATIENT
Start: 2025-01-30 | End: 2025-01-30 | Stop reason: SURG

## 2025-01-30 RX ORDER — ATORVASTATIN CALCIUM 20 MG/1
40 TABLET, FILM COATED ORAL DAILY
Status: DISCONTINUED | OUTPATIENT
Start: 2025-01-30 | End: 2025-01-31 | Stop reason: HOSPADM

## 2025-01-30 RX ORDER — DIAZEPAM 5 MG/1
5 TABLET ORAL EVERY 6 HOURS PRN
Status: DISCONTINUED | OUTPATIENT
Start: 2025-01-30 | End: 2025-01-31 | Stop reason: HOSPADM

## 2025-01-30 RX ORDER — PANTOPRAZOLE SODIUM 40 MG/1
40 TABLET, DELAYED RELEASE ORAL DAILY
Status: DISCONTINUED | OUTPATIENT
Start: 2025-01-30 | End: 2025-01-31 | Stop reason: HOSPADM

## 2025-01-30 RX ORDER — NALOXONE HCL 0.4 MG/ML
0.2 VIAL (ML) INJECTION AS NEEDED
Status: DISCONTINUED | OUTPATIENT
Start: 2025-01-30 | End: 2025-01-30 | Stop reason: HOSPADM

## 2025-01-30 RX ORDER — MIDAZOLAM HYDROCHLORIDE 1 MG/ML
1 INJECTION, SOLUTION INTRAMUSCULAR; INTRAVENOUS
Status: DISCONTINUED | OUTPATIENT
Start: 2025-01-30 | End: 2025-01-30 | Stop reason: HOSPADM

## 2025-01-30 RX ORDER — FENTANYL CITRATE 50 UG/ML
50 INJECTION, SOLUTION INTRAMUSCULAR; INTRAVENOUS ONCE AS NEEDED
Status: COMPLETED | OUTPATIENT
Start: 2025-01-30 | End: 2025-01-30

## 2025-01-30 RX ORDER — ASPIRIN 81 MG/1
81 TABLET ORAL DAILY
Status: DISCONTINUED | OUTPATIENT
Start: 2025-01-31 | End: 2025-01-31 | Stop reason: HOSPADM

## 2025-01-30 RX ORDER — FAMOTIDINE 10 MG/ML
20 INJECTION, SOLUTION INTRAVENOUS ONCE
Status: COMPLETED | OUTPATIENT
Start: 2025-01-30 | End: 2025-01-30

## 2025-01-30 RX ORDER — LIDOCAINE HYDROCHLORIDE 10 MG/ML
0.5 INJECTION, SOLUTION INFILTRATION; PERINEURAL ONCE AS NEEDED
Status: DISCONTINUED | OUTPATIENT
Start: 2025-01-30 | End: 2025-01-30 | Stop reason: HOSPADM

## 2025-01-30 RX ORDER — MORPHINE SULFATE 2 MG/ML
4 INJECTION, SOLUTION INTRAMUSCULAR; INTRAVENOUS
Status: DISCONTINUED | OUTPATIENT
Start: 2025-01-30 | End: 2025-01-31 | Stop reason: HOSPADM

## 2025-01-30 RX ORDER — ONDANSETRON 2 MG/ML
4 INJECTION INTRAMUSCULAR; INTRAVENOUS ONCE AS NEEDED
Status: DISCONTINUED | OUTPATIENT
Start: 2025-01-30 | End: 2025-01-30 | Stop reason: HOSPADM

## 2025-01-30 RX ORDER — OXYCODONE AND ACETAMINOPHEN 5; 325 MG/1; MG/1
2 TABLET ORAL EVERY 4 HOURS PRN
Status: DISCONTINUED | OUTPATIENT
Start: 2025-01-30 | End: 2025-01-31 | Stop reason: HOSPADM

## 2025-01-30 RX ORDER — IPRATROPIUM BROMIDE AND ALBUTEROL SULFATE 2.5; .5 MG/3ML; MG/3ML
3 SOLUTION RESPIRATORY (INHALATION) ONCE AS NEEDED
Status: DISCONTINUED | OUTPATIENT
Start: 2025-01-30 | End: 2025-01-30 | Stop reason: HOSPADM

## 2025-01-30 RX ORDER — SODIUM CHLORIDE 0.9 % (FLUSH) 0.9 %
3-10 SYRINGE (ML) INJECTION AS NEEDED
Status: DISCONTINUED | OUTPATIENT
Start: 2025-01-30 | End: 2025-01-30 | Stop reason: HOSPADM

## 2025-01-30 RX ORDER — PROMETHAZINE HYDROCHLORIDE 25 MG/1
25 SUPPOSITORY RECTAL ONCE AS NEEDED
Status: DISCONTINUED | OUTPATIENT
Start: 2025-01-30 | End: 2025-01-30 | Stop reason: HOSPADM

## 2025-01-30 RX ORDER — BUDESONIDE AND FORMOTEROL FUMARATE DIHYDRATE 160; 4.5 UG/1; UG/1
2 AEROSOL RESPIRATORY (INHALATION)
Status: DISCONTINUED | OUTPATIENT
Start: 2025-01-30 | End: 2025-01-31 | Stop reason: HOSPADM

## 2025-01-30 RX ORDER — DIPHENHYDRAMINE HYDROCHLORIDE 50 MG/ML
12.5 INJECTION INTRAMUSCULAR; INTRAVENOUS
Status: DISCONTINUED | OUTPATIENT
Start: 2025-01-30 | End: 2025-01-30 | Stop reason: HOSPADM

## 2025-01-30 RX ORDER — OXYCODONE AND ACETAMINOPHEN 7.5; 325 MG/1; MG/1
1 TABLET ORAL EVERY 4 HOURS PRN
Status: DISCONTINUED | OUTPATIENT
Start: 2025-01-30 | End: 2025-01-30 | Stop reason: HOSPADM

## 2025-01-30 RX ORDER — PHENYLEPHRINE HCL IN 0.9% NACL 1 MG/10 ML
SYRINGE (ML) INTRAVENOUS AS NEEDED
Status: DISCONTINUED | OUTPATIENT
Start: 2025-01-30 | End: 2025-01-30 | Stop reason: SURG

## 2025-01-30 RX ORDER — HYDRALAZINE HYDROCHLORIDE 20 MG/ML
5 INJECTION INTRAMUSCULAR; INTRAVENOUS
Status: DISCONTINUED | OUTPATIENT
Start: 2025-01-30 | End: 2025-01-30 | Stop reason: HOSPADM

## 2025-01-30 RX ADMIN — Medication 200 MCG: at 08:00

## 2025-01-30 RX ADMIN — HYDROMORPHONE HYDROCHLORIDE 0.5 MG: 1 INJECTION, SOLUTION INTRAMUSCULAR; INTRAVENOUS; SUBCUTANEOUS at 08:45

## 2025-01-30 RX ADMIN — BUDESONIDE AND FORMOTEROL FUMARATE DIHYDRATE 2 PUFF: 160; 4.5 AEROSOL RESPIRATORY (INHALATION) at 20:17

## 2025-01-30 RX ADMIN — PANTOPRAZOLE SODIUM 40 MG: 40 TABLET, DELAYED RELEASE ORAL at 10:46

## 2025-01-30 RX ADMIN — SUGAMMADEX 200 MG: 100 INJECTION, SOLUTION INTRAVENOUS at 08:13

## 2025-01-30 RX ADMIN — Medication 200 MCG: at 07:48

## 2025-01-30 RX ADMIN — CEFAZOLIN 2000 MG: 2 INJECTION, POWDER, FOR SOLUTION INTRAMUSCULAR; INTRAVENOUS at 06:54

## 2025-01-30 RX ADMIN — CEFAZOLIN 2000 MG: 2 INJECTION, POWDER, FOR SOLUTION INTRAMUSCULAR; INTRAVENOUS at 22:54

## 2025-01-30 RX ADMIN — CEFAZOLIN 2000 MG: 2 INJECTION, POWDER, FOR SOLUTION INTRAMUSCULAR; INTRAVENOUS at 15:14

## 2025-01-30 RX ADMIN — HYDROCODONE BITARTRATE AND ACETAMINOPHEN 1 TABLET: 5; 325 TABLET ORAL at 08:45

## 2025-01-30 RX ADMIN — AMLODIPINE BESYLATE 5 MG: 5 TABLET ORAL at 10:46

## 2025-01-30 RX ADMIN — FAMOTIDINE 20 MG: 10 INJECTION INTRAVENOUS at 06:23

## 2025-01-30 RX ADMIN — SODIUM CHLORIDE 75 ML/HR: 450 INJECTION, SOLUTION INTRAVENOUS at 10:46

## 2025-01-30 RX ADMIN — MAGNESIUM SULFATE HEPTAHYDRATE 2 G: 500 INJECTION, SOLUTION INTRAMUSCULAR; INTRAVENOUS at 07:06

## 2025-01-30 RX ADMIN — LIDOCAINE HYDROCHLORIDE 100 MG: 20 INJECTION, SOLUTION EPIDURAL; INFILTRATION; INTRACAUDAL; PERINEURAL at 07:02

## 2025-01-30 RX ADMIN — ATORVASTATIN CALCIUM 40 MG: 20 TABLET, FILM COATED ORAL at 10:46

## 2025-01-30 RX ADMIN — Medication 200 MCG: at 07:33

## 2025-01-30 RX ADMIN — FENTANYL CITRATE 50 MCG: 50 INJECTION, SOLUTION INTRAMUSCULAR; INTRAVENOUS at 06:31

## 2025-01-30 RX ADMIN — MIDAZOLAM 1 MG: 1 INJECTION INTRAMUSCULAR; INTRAVENOUS at 06:31

## 2025-01-30 RX ADMIN — BUPIVACAINE 10 ML: 13.3 INJECTION, SUSPENSION, LIPOSOMAL INFILTRATION at 06:38

## 2025-01-30 RX ADMIN — BUPIVACAINE HYDROCHLORIDE 10 ML: 5 INJECTION, SOLUTION EPIDURAL; INTRACAUDAL; PERINEURAL at 06:38

## 2025-01-30 RX ADMIN — LISINOPRIL 10 MG: 10 TABLET ORAL at 10:46

## 2025-01-30 RX ADMIN — BUPIVACAINE HYDROCHLORIDE 10 ML: 5 INJECTION, SOLUTION EPIDURAL; INTRACAUDAL at 06:41

## 2025-01-30 RX ADMIN — DEXAMETHASONE SODIUM PHOSPHATE 8 MG: 4 INJECTION, SOLUTION INTRAMUSCULAR; INTRAVENOUS at 07:06

## 2025-01-30 RX ADMIN — TRANEXAMIC ACID 1000 MG: 100 INJECTION, SOLUTION INTRAVENOUS at 07:06

## 2025-01-30 RX ADMIN — Medication 100 MCG: at 07:17

## 2025-01-30 RX ADMIN — SODIUM CHLORIDE, POTASSIUM CHLORIDE, SODIUM LACTATE AND CALCIUM CHLORIDE: 600; 310; 30; 20 INJECTION, SOLUTION INTRAVENOUS at 06:57

## 2025-01-30 RX ADMIN — PROPOFOL 150 MG: 10 INJECTION, EMULSION INTRAVENOUS at 07:02

## 2025-01-30 RX ADMIN — ROCURONIUM BROMIDE 50 MG: 10 INJECTION, SOLUTION INTRAVENOUS at 07:02

## 2025-01-30 NOTE — OP NOTE
Date: 1/30/2025  Time: 08:18 EST TOTAL SHOULDER REVERSE ARTHROPLASTY  Procedure Note    Nanci Umanzor  1/30/2025    Pre-op Diagnosis:    Rotator Cuff Arthropathy Right Shoulder    Post-op Diagnosis   Rotator Cuff Arthropathy Right Shoulder    Procedure:  Right  Reverse total shoulder arthroplasty     Surgeon: Bear Vaca M.D.    Surgical assistant: CELESTE AVILA      Anesthesia: General with Block  Anesthesiologist: Jacklyn Del Rosario MD  CRNA: La Rudd CRNA    Staff:   Circulator: Robyn Shah RN  Scrub Person: Irma Menendez  Vendor Representative: Celio Stephenson ([facility])  Assistant: Celeste Avila, CARMELA    Indication for Asst.  A surgical assistant, CELESTE AVILA, was utilized as a medical necessity and was present through the entire procedure allowing safe completion of the procedure by helping with exposure and placement of implants as well as reducing overall operative time and morbidity for the patient.    Estimated Blood Loss: 200 ml    Specimens: * No orders in the log *    Complications: None     Implant specifics:  Implant Name Type Inv. Item Serial No.  Lot No. LRB No. Used Action   SCRW LK EQUINOXE GLENOSPHERE REV/SHLDR - [id number] - [id number] Implant SCRW LK EQUINOXE GLENOSPHERE REV/SHLDR [id number] EXACTECH  Right 1 Implanted   SCRW EQUINOXE TORQ DEFINE REV SHLDR KT - [id number] - [id number] Implant SCRW EQUINOXE TORQ DEFINE REV SHLDR KT [id number] EXACTECH  Right 1 Implanted   BASEPLT REV SHLDR EQUINOXE SM - [id number] - [id number] Implant BASEPLT REV SHLDR EQUINOXE SM [id number] EXACTECH  Right 1 Implanted   SCRW COMPR EQUINOXE LK 4.5X18MM - [id number] - [id number] Implant SCRW COMPR EQUINOXE LK 4.5X18MM [id number] EXACTECH  Right 1 Implanted   SCRW COMPR EQUINOXE LK 4.5X18MM - [id number] - [id number] Implant SCRW COMPR EQUINOXE LK 4.5X18MM  [id number] EXACTECH  Right 1 Implanted   SCRW COMPR EQUINOXE LK 4.5X22MM - [id number] - [id number] Implant SCRW COMPR EQUINOXE LK 4.5X22MM [id number] EXACTECH  Right 1 Implanted   SCRW COMPR EQUINOXE LK 4.5X22MM - [id number] - [id number] Implant SCRW COMPR EQUINOXE LK 4.5X22MM [id number] EXACTECH  Right 1 Implanted   GLENOSPHERE REV SHLDR EQUINOXE 36MM SM - [id number] - [id number] Implant GLENOSPHERE REV SHLDR EQUINOXE 36MM SM [id number] EXACTECH  Right 1 Implanted   STEM HUM ANTON EQUINOXE PRESSFIT 8MM SHT - [id number] - [id number] Implant STEM HUM ANTON EQUINOXE PRESSFIT 8MM SHT [id number] EXACTECH  Right 1 Implanted   TRY ADAPT HUM/SHLDR EQUINOXE FOR/REV/TOTL PLS0 - [id number] - [id number] Implant TRY ADAPT HUM/SHLDR EQUINOXE FOR/REV/TOTL PLS0 [id number] EXACTECH  Right 1 Implanted   LINER HUM/SHLDR EQUINOXE/REV PE 145DEG 36MM PLS0 - [id number] - [id number] Implant LINER HUM/SHLDR EQUINOXE/REV PE 145DEG 36MM PLS0 [id number] EXACTECH  Right 1 Implanted       SURGICAL APPROACH: Deltopectoral      SURGICAL TECHNIQUE: Peel off         Procedure: The patient was taken to the operative suite.  After adequate anesthesia was established the upper extremity was prepped and draped in the usual fashion.  The head was placed in a neutral position and bony prominences were padded.  Ioban was utilized covering the skin over the shoulder and axilla.  Preoperative antibiotics and transexamic acid were confirmed.  An anterior incision was made starting lateral to the coracoid towards the axillary crease through skin and subcutaneous tissues.  The deltopectoral interval was entered.  The cephalic vein was identified, preserved, and retracted laterally.  The conjoined tendon was reflected medially.  The biceps tendon was identified near the pectoralis insertion site.  A small portion of the pectoralis tendon was released and  then the biceps tendon was tenodesed to the pectoralis tendon insertion on the humeral.  The biceps tendon was then tenotomized more proximally.  The subscapularis was peeled off the lesser tuberosity and tagged.  Arthropathic changes were noted in the glenohumeral joint.  The rotator cuff was was in poor condition with tearing and retraction. The capsule was released off the humeral neck and the posterior soft tissue attachments were protected. An external cutting guide was utilized and the head was cut at a 20° retroverted angle.  Excess osteophytes were excised with a rongeur.  Sequential impaction broaching was carried out using the Preserve system broaches.  The final broach was left in place for later trialing.   I then visualized the glenoid and retractors were placed starting posteriorly and superiorly.  The capsule was reflected off the deep surface of the subscapularis.  An anterior retractor was placed.  The labrum was then circumferentially excised off of the glenoid.  The biceps stump was released and removed as well.  A careful capsular release off the inferior glenoid was performed protecting the axillary nerve and vasculature.  This exposed the scapular pillar.    This allowed for visualization of the glenoid.  The preoperative plan demonstrated superior inclination and retroversion that corrected well with a slight bit of reaming to correct for the retroversion and superior inclination changes using a standard baseplate.  A guide was used to drill a K wire through the central aspect of the glenoid.   Sequential reaming was carried out at the prescribed angle and to the prescribed depth based on the preoperative plan.   A central hole was then drilled for the cage using a guide.  This assured that we were within the vault of the glenoid and preserved as much bone as possible during reaming.    Autogenous bone graft was harvested from the humeral head and packed into the cage of the baseplate.  The  baseplate was then compressed onto the glenoid.  Rotation was checked.  Drill holes were then placed for compression screws through the baseplate.  These were then placed to the appropriate depth with good compression and then locking caps were placed over the screw heads.  Good baseplate placement and stable fixation were noted.  An appropriate size glenosphere based on the preoperative plan and intraoperative visualization of the anatomy was chosen and fixed to the baseplate with a central compression screw.  I then trialed the humeral tray and polyethylene.  I was looking for smooth nonimpinged range of motion.  I also wanted to assure good stability by placing traction on the arm and also checking for lateral stability.  Once satisfied with range of motion and stability I removed the humeral components.  I pulse lavaged the proximal humerus and press-fit the appropriate size stem with good purchase and fixation.  Next I re-trialed the humeral tray and humeral liner.  When I was satisfied with range of motion and stability I removed the trial liner and tray.  The appropriate humeral tray was then placed and held with a torque limiting screw.  The chosen polyethylene was then inserted and compressed into place and the shoulder was reduced.  Subdeltoid scar tissue was excised.  Good range of motion was noted.  Good stability was noted.  Vigorous irrigation and suctioning was performed.  The subscapularis was was not repaired.  The deltopectoral interval was closed with 0 Vicryl.  The subcutaneous tissues were closed with 2-0 Vicryl.  The skin was closed with a zip line device.  The patient had a sterile compression dressing applied and was awoken and taken to the postanesthetic recovery unit in good condition.    Bear Vaca MD     Date: 1/30/2025  Time: 08:18 EST

## 2025-01-30 NOTE — ANESTHESIA PROCEDURE NOTES
Peripheral Block      Patient reassessed immediately prior to procedure    Patient location during procedure: pre-op  Start time: 1/30/2025 6:38 AM  Stop time: 1/30/2025 6:41 AM  Reason for block: at surgeon's request and post-op pain management  Performed by  Anesthesiologist: Jacklyn Del Rosario MD  Preanesthetic Checklist  Completed: patient identified, IV checked, site marked, risks and benefits discussed, surgical consent, monitors and equipment checked, pre-op evaluation and timeout performed  Prep:  Pt Position: sitting  Sterile barriers:cap, gloves, mask and washed/disinfected hands  Prep: ChloraPrep  Patient monitoring: blood pressure monitoring, continuous pulse oximetry and EKG  Procedure    Sedation: yes    Guidance:ultrasound guided    Needle gauge: 25 g. Images:still images obtained, printed/placed on chart    Laterality:right  Anesthesia block type: Intercostal brachial nerve block.  Injection Technique:single-shot  Needle Type:echogenic  Resistance on Injection: none    Medications Used: bupivacaine (PF) (MARCAINE) 0.5 % injection - Injection   10 mL - 1/30/2025 6:41:00 AM      Post Assessment  Injection Assessment: negative aspiration for heme, no paresthesia on injection and incremental injection  Patient Tolerance:comfortable throughout block  Complications:no  Additional Notes  Diagnosis: Acute pain of the right medial upper arm (axilla) in the distribution not covered by the brachial plexus    Ultrasound guidance used for safe needle positioning and incremental local anesthetic injection  Performed by: Jacklyn Del Rosario MD

## 2025-01-30 NOTE — ANESTHESIA PREPROCEDURE EVALUATION
Anesthesia Evaluation     Patient summary reviewed and Nursing notes reviewed   no history of anesthetic complications:   NPO Solid Status: > 8 hours  NPO Liquid Status: > 2 hours           Airway   Mallampati: II  TM distance: >3 FB  Neck ROM: limited  No difficulty expected  Dental    (+) partials        Pulmonary - normal exam   (+) a smoker Former, COPD, asthma,  Cardiovascular   Exercise tolerance: good (4-7 METS)    ECG reviewed  Rhythm: regular    (+) hypertension 2 medications or greater, valvular problems/murmurs murmur, PVD (Ascending Aorta dilation), hyperlipidemia  (-) past MI, angina, cardiac stents    ROS comment: Echo 2023: ·  Left ventricular systolic function is normal. Calculated left ventricular EF = 69%  ·  Left ventricular diastolic function is consistent with (grade I) impaired relaxation.  ·  Mild tricuspid valve regurgitation is present.  ·  Estimated right ventricular systolic pressure from tricuspid regurgitation is normal (<35 mmHg). Calculated right ventricular systolic pressure from tricuspid regurgitation is 25 mmHg.      Neuro/Psych  (-) seizures, CVA  GI/Hepatic/Renal/Endo    (+) GERD  (-) liver disease, no renal disease    ROS Comment: Ulcerative Colitis    Musculoskeletal     (+) neck pain, radiculopathy  Abdominal    Substance History - negative use     OB/GYN          Other   arthritis,                       Anesthesia Plan    ASA 3     general     (Plan for PNB for post-op pain management    I have reviewed the patient's history and chart with the patient, including all pertinent laboratory results and imaging. I have explained the risks of anesthesia including but not limited to dental or airway injury, nausea, and cardio-pulmonary complications, such as aspiration, MI, stroke, or death. Patient has agreed to proceed.     Risks of peripheral nerve block were discussed with patient including but not limited to: inadequate block, bleeding, infection, nerve injury, and need to  protect limb while numb.    VITALS:  /91 (BP Location: Left arm, Patient Position: Sitting)   Pulse 82   Temp 36.6 °C (97.9 °F) (Oral)   Resp 16   SpO2 97% )  intravenous induction     Anesthetic plan, risks, benefits, and alternatives have been provided, discussed and informed consent has been obtained with: patient.  Pre-procedure education provided      CODE STATUS:

## 2025-01-30 NOTE — ANESTHESIA POSTPROCEDURE EVALUATION
Patient: Nanci Umanzor    Procedure Summary       Date: 01/30/25 Room / Location:  [facility]  /  [facility]    Anesthesia Start: 0657 Anesthesia Stop: 0828    Procedure: RIGHT TOTAL SHOULDER REVERSE ARTHROPLASTY (Right: Shoulder) Diagnosis:     Surgeons: Bear Vaca MD Provider: Jacklyn Del Rosario MD    Anesthesia Type: general ASA Status: 3            Anesthesia Type: general    Vitals  Vitals Value Taken Time   /76 01/30/25 0915   Temp 36.6 °C (97.8 °F) 01/30/25 0824   Pulse 69 01/30/25 0916   Resp 12 01/30/25 0900   SpO2 98 % 01/30/25 0916   Vitals shown include unfiled device data.        Post Anesthesia Care and Evaluation    Patient location during evaluation: PACU  Patient participation: complete - patient participated  Level of consciousness: awake  Pain management: adequate    Airway patency: patent  Anesthetic complications: No anesthetic complications  PONV Status: none  Cardiovascular status: stable  Respiratory status: acceptable  Hydration status: acceptable

## 2025-01-30 NOTE — PLAN OF CARE
Goal Outcome Evaluation:  Plan of Care Reviewed With: patient        Progress: no change  Outcome Evaluation: Patient arrived to floor earlier from [facility] following R total reverse shoulder. Able to ambulate from stretcher to restroom with assist of 2, and gait belt. Makes needs known. Sitting up in chair. Continues with IV fluids and IV atb therapy. Plans to d/c home tomorrow and utilize meds to beds. No s/s of distress noted.

## 2025-01-30 NOTE — ANESTHESIA PROCEDURE NOTES
Peripheral Block      Patient reassessed immediately prior to procedure    Patient location during procedure: pre-op  Start time: 1/30/2025 6:31 AM  Stop time: 1/30/2025 6:38 AM  Reason for block: at surgeon's request and post-op pain management  Performed by  Anesthesiologist: Jacklyn Del Rosario MD  Preanesthetic Checklist  Completed: patient identified, IV checked, site marked, risks and benefits discussed, surgical consent, monitors and equipment checked, pre-op evaluation and timeout performed  Prep:  Pt Position: sitting  Sterile barriers:cap, gloves, mask and washed/disinfected hands  Prep: ChloraPrep  Patient monitoring: blood pressure monitoring, continuous pulse oximetry and EKG  Procedure    Sedation: yes  Performed under: local infiltration  Guidance:ultrasound guided    ULTRASOUND INTERPRETATION.  Using ultrasound guidance a 21 G gauge needle was placed in close proximity to the brachial plexus nerve, at which point, under ultrasound guidance anesthetic was injected in the area of the nerve and spread of the anesthesia was seen on ultrasound in close proximity thereto.  There were no abnormalities seen on ultrasound; a digital image was taken; and the patient tolerated the procedure with no complications. Images:still images obtained, printed/placed on chart    Laterality:right  Block Type:interscalene  Injection Technique:single-shot  Needle Type:echogenic  Resistance on Injection: none    Medications Used: bupivacaine liposome (EXPAREL) 1.3 % injection - Infiltration   10 mL - 1/30/2025 6:38:00 AM  bupivacaine (PF) (MARCAINE) 0.5 % injection - Injection   10 mL - 1/30/2025 6:38:00 AM      Post Assessment  Injection Assessment: negative aspiration for heme, no paresthesia on injection and incremental injection  Patient Tolerance:comfortable throughout block  Complications:no  Additional Notes  Ultrasound guidance used for verification of needle location and appropriate medication  delivery.    Diagnosis: Acute post-surgical pain of the right shoulder   Performed by: Jacklyn Del Rosario MD

## 2025-01-30 NOTE — ANESTHESIA PROCEDURE NOTES
Airway  Urgency: elective    Date/Time: 1/30/2025 7:05 AM  Airway not difficult    General Information and Staff    Patient location during procedure: OR  CRNA/CAA: La Rudd CRNA    Indications and Patient Condition  Indications for airway management: airway protection    Preoxygenated: yes  Mask difficulty assessment: 1 - vent by mask    Final Airway Details  Final airway type: endotracheal airway      Successful airway: ETT  Cuffed: yes   Successful intubation technique: direct laryngoscopy  Endotracheal tube insertion site: oral  Blade: Giovani  ETT size (mm): 7.0  Cormack-Lehane Classification: grade I - full view of glottis  Placement verified by: chest auscultation and capnometry   Cuff volume (mL): 7  Measured from: lips  Number of attempts at approach: 1  Assessment: lips, teeth, and gum same as pre-op and atraumatic intubation

## 2025-01-31 ENCOUNTER — READMISSION MANAGEMENT (OUTPATIENT)
Dept: CALL CENTER | Facility: HOSPITAL | Age: 70
End: 2025-01-31
Payer: MEDICARE

## 2025-01-31 VITALS
HEART RATE: 73 BPM | SYSTOLIC BLOOD PRESSURE: 103 MMHG | RESPIRATION RATE: 18 BRPM | BODY MASS INDEX: 22.07 KG/M2 | TEMPERATURE: 98.3 F | OXYGEN SATURATION: 94 % | WEIGHT: 105.16 LBS | DIASTOLIC BLOOD PRESSURE: 64 MMHG | HEIGHT: 58 IN

## 2025-01-31 LAB
ANION GAP SERPL CALCULATED.3IONS-SCNC: 11 MMOL/L (ref 5–15)
BUN SERPL-MCNC: 12 MG/DL (ref 8–23)
BUN/CREAT SERPL: 22.2 (ref 7–25)
CALCIUM SPEC-SCNC: 8.3 MG/DL (ref 8.6–10.5)
CHLORIDE SERPL-SCNC: 104 MMOL/L (ref 98–107)
CO2 SERPL-SCNC: 24 MMOL/L (ref 22–29)
CREAT SERPL-MCNC: 0.54 MG/DL (ref 0.57–1)
EGFRCR SERPLBLD CKD-EPI 2021: 98.6 ML/MIN/1.73
GLUCOSE SERPL-MCNC: 105 MG/DL (ref 65–99)
HCT VFR BLD AUTO: 31.3 % (ref 34–46.6)
HGB BLD-MCNC: 10.4 G/DL (ref 12–15.9)
POTASSIUM SERPL-SCNC: 3.7 MMOL/L (ref 3.5–5.2)
SODIUM SERPL-SCNC: 139 MMOL/L (ref 136–145)

## 2025-01-31 PROCEDURE — 94799 UNLISTED PULMONARY SVC/PX: CPT

## 2025-01-31 PROCEDURE — 94664 DEMO&/EVAL PT USE INHALER: CPT

## 2025-01-31 PROCEDURE — 94760 N-INVAS EAR/PLS OXIMETRY 1: CPT

## 2025-01-31 PROCEDURE — 80048 BASIC METABOLIC PNL TOTAL CA: CPT | Performed by: ORTHOPAEDIC SURGERY

## 2025-01-31 PROCEDURE — 97165 OT EVAL LOW COMPLEX 30 MIN: CPT

## 2025-01-31 PROCEDURE — G0378 HOSPITAL OBSERVATION PER HR: HCPCS

## 2025-01-31 PROCEDURE — 85018 HEMOGLOBIN: CPT | Performed by: ORTHOPAEDIC SURGERY

## 2025-01-31 PROCEDURE — 85014 HEMATOCRIT: CPT | Performed by: ORTHOPAEDIC SURGERY

## 2025-01-31 PROCEDURE — 97535 SELF CARE MNGMENT TRAINING: CPT

## 2025-01-31 RX ORDER — OXYCODONE AND ACETAMINOPHEN 5; 325 MG/1; MG/1
1 TABLET ORAL EVERY 4 HOURS PRN
Qty: 40 TABLET | Refills: 0 | Status: SHIPPED | OUTPATIENT
Start: 2025-01-31 | End: 2025-02-07

## 2025-01-31 RX ADMIN — OXYCODONE HYDROCHLORIDE AND ACETAMINOPHEN 1 TABLET: 5; 325 TABLET ORAL at 05:45

## 2025-01-31 RX ADMIN — BUDESONIDE AND FORMOTEROL FUMARATE DIHYDRATE 2 PUFF: 160; 4.5 AEROSOL RESPIRATORY (INHALATION) at 08:06

## 2025-01-31 RX ADMIN — PANTOPRAZOLE SODIUM 40 MG: 40 TABLET, DELAYED RELEASE ORAL at 09:29

## 2025-01-31 RX ADMIN — OXYCODONE HYDROCHLORIDE AND ACETAMINOPHEN 2 TABLET: 5; 325 TABLET ORAL at 09:55

## 2025-01-31 RX ADMIN — ATORVASTATIN CALCIUM 40 MG: 20 TABLET, FILM COATED ORAL at 09:29

## 2025-01-31 RX ADMIN — ASPIRIN 81 MG: 81 TABLET, COATED ORAL at 09:29

## 2025-01-31 NOTE — OUTREACH NOTE
Prep Survey      Flowsheet Row Responses   [facility] facility patient discharged from? [place]   Is LACE score < 7 ? Yes   Eligibility [facility]   Date of Admission 01/30/25   Date of Discharge 01/31/25   Discharge Disposition Home or Self Care   Discharge diagnosis s/p reverse total shoulder arthroplasty   Does the patient have one of the following disease processes/diagnoses(primary or secondary)? Total Joint Replacement   Does the patient have Home health ordered? No   Is there a DME ordered? No   Prep survey completed? Yes            ALEXANDRA A - Registered Nurse

## 2025-01-31 NOTE — DISCHARGE SUMMARY
Orthopedic Discharge Summary      Patient: Nanci Umanzor      YOB: 1955    Medical Record Number: [id number]    Attending Physician: Bear Vaca MD  Consulting Physician(s):   Date of Admission: 1/30/2025  5:15 AM  Date of Discharge:       S/P reverse total shoulder arthroplasty, right    [unfilled]  R RTSA  Allergies:   Allergies   Allergen Reactions    Clarithromycin Shortness Of Breath and Palpitations    Fluarix [Influenza Virus Vaccine] Other (See Comments)      GUILLAIN BARRE SYNDROME     Pneumococcal Vaccines Swelling       Current Medications:     Discharge Medications        ASK your doctor about these medications        Instructions Start Date   acetaminophen 325 MG tablet  Commonly known as: TYLENOL   650 mg, Every 6 Hours PRN      albuterol (2.5 MG/3ML) 0.083% nebulizer solution  Commonly known as: PROVENTIL   2.5 mg, Nebulization, Every 4 Hours PRN      albuterol sulfate  (90 Base) MCG/ACT inhaler  Commonly known as: PROVENTIL HFA;VENTOLIN HFA;PROAIR HFA   2 puffs, Inhalation, Every 4 Hours PRN      amLODIPine 5 MG tablet  Commonly known as: NORVASC   5 mg, Oral, Daily      atorvastatin 40 MG tablet  Commonly known as: LIPITOR   40 mg, Oral, Daily      benazepril 10 MG tablet  Commonly known as: LOTENSIN   10 mg, Oral, Daily      BIOTIN PO   1 capsule, Daily      calcium citrate-vitamin d 200-250 MG-UNIT tablet tablet  Commonly known as: CITRACAL   1 tablet, Daily      FISH OIL PO   1 capsule, Daily      fluticasone 50 MCG/ACT nasal spray  Commonly known as: FLONASE   2 sprays, Nasal, Daily      Fluticasone-Salmeterol 250-50 MCG/ACT DISKUS  Commonly known as: ADVAIR/WIXELA   1 puff, Inhalation, 2 Times Daily      guaiFENesin 600 MG 12 hr tablet  Commonly known as: Mucus Relief   600 mg, Oral, 2 Times Daily      meloxicam 15 MG tablet  Commonly known as: MOBIC   15 mg, Oral, Daily      multivitamin with minerals tablet tablet   1 tablet, Daily      pantoprazole 40 MG EC  tablet  Commonly known as: PROTONIX   40 mg, Oral, Daily      Prolia 60 MG/ML solution prefilled syringe syringe  Generic drug: denosumab   INJECT 1 ML UNDER THE SKIN INTO THE APPROPRIATE AREA EVERY 6 MONTHS ONE TIME FOR 1 DOSE AS DIRECTED.      QC TUMERIC COMPLEX PO   Daily      tretinoin 0.05 % cream  Commonly known as: RETIN-A   Topical, Nightly      vitamin C 500 MG tablet  Commonly known as: ASCORBIC ACID   1,000 mg, Daily      Vitamin D 50 MCG ( UT) tablet   2,000 Units, Daily               Past Medical History:   Diagnosis Date    Ascending aorta dilatation     3.8 CM - FOLLOWED BY CARDIOTHOR SURGERY    Asthma     Cervical hyperplasia     HX CERVICAL CONIZATION    Cervical radiculopathy 10/03/2023    Cervical spondylosis     Chronic cough     Chronic pain disorder     COPD (chronic obstructive pulmonary disease)     Depression with anxiety     Diverticulosis     GERD (gastroesophageal reflux disease) 2016    Glaucoma     Heart murmur     32 yrs ago    Hyperlipidemia     Hypertension     Osteoarthritis     Osteoporosis     Pulmonary nodule     STABLE - FOLLOWED BY DR WILDER    Reflex sympathetic dystrophy     Rotator cuff syndrome     Spinal stenosis     Ulcerative colitis 21     Past Surgical History:   Procedure Laterality Date    BUNIONECTOMY Right     CERVICAL CONIZATION      CERVICAL EPIDURAL N/A 2023    Procedure: CERVICAL EPIDURAL STEROID INJECTION CPT: 66416;  Surgeon: Chidi Portillo MD;  Location: [facility] [place] MAIN OR;  Service: Pain Management;  Laterality: N/A;     SECTION      ,,    COLONOSCOPY N/A 2017    Procedure: COLONOSCOPY TO CECUM with cold polypectomy;  Surgeon: Alo Staton Jr., MD;  Location: [facility] ENDOSCOPY;  Service:     COLONOSCOPY N/A 2022    Procedure: COLONOSCOPY TO CECUM WITH COLD BIOPSY POLYPECTOMY;  Surgeon: Chuck Servin MD;  Location: [facility] ENDOSCOPY;  Service: Gastroenterology;  Laterality: N/A;  PRE: HX COLON  POLYPS  POST: DIVERTICULOSIS, POLYP    ENDOMETRIAL ABLATION      EPIDURAL BLOCK      FINGER SURGERY Right     WITH INSTRUMENTATION    GLAUCOMA SURGERY Bilateral     HEMORRHOIDECTOMY  2015    NERVE BLOCK Right 2023    Procedure: RIGHT SUPRASCAPULAR NERVE BLOCK CPT: 98047;  Surgeon: Chidi Portillo MD;  [place] [facility] [place];  Service: Pain Management;  Laterality: Right;    TRIGGER POINT INJECTION      TUBAL ABDOMINAL LIGATION       Social History     Occupational History    Occupation: ADMINISTRATION     Comment: Disabled   Tobacco Use    Smoking status: Former     Current packs/day: 0.00     Average packs/day: 1 pack/day for 17.5 years (17.5 ttl pk-yrs)     Types: Cigarettes     Start date: 1995     Quit date: 2013     Years since quittin.8     Passive exposure: Past    Smokeless tobacco: Never   Vaping Use    Vaping status: Never Used   Substance and Sexual Activity    Alcohol use: Yes     Alcohol/week: 1.0 standard drink of alcohol     Types: 1 Glasses of wine per week     Comment: 1-2 DRINKS/ NIGHT WINE    Drug use: Never    Sexual activity: Not Currently     Partners: Male     Birth control/protection: Post-menopausal      Social History     Social History Narrative    Not on file     Family History   Problem Relation Age of Onset    Heart disease Mother         Mechanical aortic valve, several heart attacks    Hypertension Mother     Hyperlipidemia Mother     Rheum arthritis Mother     Arthritis Mother     Colon polyps Mother     Stroke Mother     Osteoporosis Mother     Seizures Mother     GI problems Mother     Broken bones Mother     Diabetes Father         Type I    Hyperlipidemia Father     Hypertension Father     Heart disease Father         Afib    Thyroid disease Father     Hearing loss Father         Hearing aid    COPD Father     Cancer Sister         Invasive Breast Cancer    Depression Sister         This needs to be removed    Glaucoma Sister     Miscarriages /  Stillbirths Sister     Vision loss Sister     Osteoporosis Sister     Crohn's disease Sister     GI problems Sister     Depression Sister     Hypertension Brother     Lung disease Brother     Asthma Brother     Hypertension Brother     Asthma Brother     Hyperlipidemia Brother     Hypertension Brother     Hyperlipidemia Brother     Lung disease Daughter     Asthma Daughter     Hyperlipidemia Daughter     Heart disease Maternal Aunt         Afib    Rheum arthritis Maternal Aunt     Arthritis Maternal Aunt     Stroke Maternal Aunt     Hyperlipidemia Maternal Uncle     Heart disease Maternal Uncle     Heart disease Paternal Aunt     Heart disease Maternal Grandmother     Hyperlipidemia Maternal Grandmother     Rheum arthritis Maternal Grandmother     Stroke Maternal Grandmother     Arthritis Maternal Grandmother     Hypertension Maternal Grandmother     Osteoporosis Maternal Grandmother     Heart disease Maternal Grandfather     Cancer Maternal Grandfather         Pancreatic    Stomach cancer Maternal Grandfather     Heart disease Paternal Grandmother     Hyperlipidemia Paternal Grandmother     Stroke Paternal Grandmother     Hypertension Paternal Grandmother     Heart disease Paternal Grandfather     Cancer Paternal Grandfather         Lung    Asthma Son         Diagnosed 3/24    Malig Hyperthermia Neg Hx          Vitals:    01/30/25 2017 01/30/25 2149 01/31/25 0219 01/31/25 0547   BP:  99/66 118/58 129/81   BP Location:  Left arm Left arm Left arm   Patient Position:  Lying Lying Lying   Pulse: 72 80 80 73   Resp: 16 16 16 17   Temp:  99 °F (37.2 °C) 98.4 °F (36.9 °C) 98.4 °F (36.9 °C)   TempSrc:  Oral Oral Oral   SpO2: 92% 93% 98% 99%   Weight:       Height:             Physical Exam: 69 y.o. female  General Appearance:    Alert, cooperative, in no acute distress           Extremities: intact       Pulses:  Pulses palpable and equal bilaterally.       Skin:  No bleeding, bruising or rash.       Lymph nodes:  No  palpable adenopathy.       Neurologic:  No deficits noted.              Hospital Course:  69 y.o. female admitted to [facility] to services of Bear Vaca MDto Bear Vaca MD for R RTSA.  Antibiotic and VTE prophylaxis were per SCIP protocols. Post-operatively the patient transferred to the post-operative floor where the patient underwent mobilization therapy that included active as well as passive ROM exercises. Opioids were titrated to achieve appropriate pain management to allow for participation in mobilization exercises. Vital signs are now stable. The incision is intact without signs or symptoms of infection and dressing was changed. Appropriate education re: incision care, activity levels, medications, and follow up visits was completed and all questions were answered. The patient is now deemed stable for discharge.    Discharge and Follow up Instructions: Follow up in 10-14 days.    Date: [unfilled]  Bear Vaca MD

## 2025-01-31 NOTE — PLAN OF CARE
Goal Outcome Evaluation:  Plan of Care Reviewed With: patient           Outcome Evaluation: Pt POD1 s/p R rTSA. OT provides education on shoulder px, sling mgt, ADL techniques, and HEP. Pt demo's good understanding of exercises and completes with cues for positioning. Pt completes UE dressing with Bita using claudette dressing techs, LB dressing with spv. Pt lives alone but plans to d/c home with family to assist as needed. Pt very concerned about returning to work and transportation to OP appointments. Will likely need home health services initially. All OT goals met at evaluation, will sign off and recommend follow-up with HH    Anticipated Discharge Disposition (OT): home with assist, home with home health

## 2025-01-31 NOTE — PLAN OF CARE
Problem: Adult Inpatient Plan of Care  Goal: Plan of Care Review  Outcome: Progressing  Flowsheets (Taken 1/31/2025 0500)  Progress: improving  Outcome Evaluation: Pt A&OX4. VSS. Pain well controlled. No shift events.  Plan of Care Reviewed With: patient  Goal: Absence of Hospital-Acquired Illness or Injury  Outcome: Progressing  Intervention: Identify and Manage Fall Risk  Recent Flowsheet Documentation  Taken 1/31/2025 0400 by Kelsy Webb, IVANNA  Safety Promotion/Fall Prevention:   assistive device/personal items within reach   clutter free environment maintained   fall prevention program maintained   nonskid shoes/slippers when out of bed   room organization consistent   safety round/check completed  Taken 1/31/2025 0245 by Kelsy Webb, RN  Safety Promotion/Fall Prevention:   assistive device/personal items within reach   clutter free environment maintained   fall prevention program maintained   nonskid shoes/slippers when out of bed   room organization consistent   safety round/check completed  Taken 1/31/2025 0027 by Kelsy Webb, RN  Safety Promotion/Fall Prevention:   assistive device/personal items within reach   clutter free environment maintained   fall prevention program maintained   nonskid shoes/slippers when out of bed   room organization consistent   safety round/check completed  Taken 1/30/2025 2236 by Kelsy Webb, RN  Safety Promotion/Fall Prevention:   assistive device/personal items within reach   clutter free environment maintained   fall prevention program maintained   lighting adjusted   nonskid shoes/slippers when out of bed   room organization consistent   safety round/check completed  Taken 1/30/2025 2000 by Kelsy Webb, RN  Safety Promotion/Fall Prevention:   activity supervised   assistive device/personal items within reach   clutter free environment maintained   fall prevention program maintained   lighting adjusted   nonskid shoes/slippers when out of bed   room organization  consistent   safety round/check completed  Intervention: Prevent Skin Injury  Recent Flowsheet Documentation  Taken 1/31/2025 0400 by Kelsy Webb RN  Body Position: position changed independently  Taken 1/31/2025 0245 by Kelsy Webb RN  Body Position: position changed independently  Taken 1/31/2025 0027 by Kelsy Webb RN  Body Position: position changed independently  Taken 1/30/2025 2236 by Kelsy Webb RN  Body Position: position changed independently  Taken 1/30/2025 2000 by Kelsy Webb RN  Body Position: position changed independently  Intervention: Prevent and Manage VTE (Venous Thromboembolism) Risk  Recent Flowsheet Documentation  Taken 1/30/2025 2000 by Kelsy Webb RN  VTE Prevention/Management:   bilateral   SCDs (sequential compression devices) on  Intervention: Prevent Infection  Recent Flowsheet Documentation  Taken 1/30/2025 2236 by Kelsy Webb RN  Infection Prevention:   environmental surveillance performed   hand hygiene promoted   personal protective equipment utilized   rest/sleep promoted   single patient room provided  Taken 1/30/2025 2000 by Kelsy Webb RN  Infection Prevention:   environmental surveillance performed   hand hygiene promoted   personal protective equipment utilized   rest/sleep promoted   single patient room provided  Goal: Optimal Comfort and Wellbeing  Outcome: Progressing  Intervention: Provide Person-Centered Care  Recent Flowsheet Documentation  Taken 1/30/2025 2000 by Kelsy Webb RN  Trust Relationship/Rapport:   care explained   questions answered   questions encouraged  Goal: Readiness for Transition of Care  Outcome: Progressing     Problem: Shoulder Arthroplasty (Total, Grupo, Reverse)  Goal: Optimal Coping  Outcome: Progressing  Goal: Absence of Bleeding  Outcome: Progressing  Goal: Effective Bowel Elimination  Outcome: Progressing  Goal: Fluid and Electrolyte Balance  Outcome: Progressing  Goal: Optimal Functional Ability  Outcome:  Progressing  Intervention: Promote Optimal Functional Status  Recent Flowsheet Documentation  Taken 1/31/2025 0400 by Kelsy Webb RN  Activity Management: (asleep) other (see comments)  Taken 1/31/2025 0245 by Kelsy Webb RN  Activity Management: (asleep) other (see comments)  Taken 1/31/2025 0027 by Kelsy Webb RN  Activity Management: activity minimized  Taken 1/30/2025 2236 by Kelsy Webb RN  Activity Management: activity minimized  Taken 1/30/2025 2228 by Kelsy Webb RN  Activity Management:   ambulated in room   ambulated to bathroom   back to bed  Taken 1/30/2025 2000 by Kelsy Webb RN  Activity Management: activity encouraged  Goal: Absence of Infection Signs and Symptoms  Outcome: Progressing  Goal: Intact Neurovascular Status  Outcome: Progressing  Goal: Optimal Pain Control and Function  Outcome: Progressing  Intervention: Prevent or Manage Pain  Recent Flowsheet Documentation  Taken 1/30/2025 2000 by Kelsy Webb RN  Diversional Activities: television  Goal: Effective Urinary Elimination  Outcome: Progressing   Goal Outcome Evaluation:  Plan of Care Reviewed With: patient        Progress: improving  Outcome Evaluation: Pt A&OX4. VSS. Pain well controlled. No shift events.

## 2025-01-31 NOTE — THERAPY EVALUATION
Patient Name: Nanci Umanzor  : 1955    MRN: [id number]                              Today's Date: 2025       Admit Date: 2025    Visit Dx:     ICD-10-CM ICD-9-CM   1. S/P reverse total shoulder arthroplasty, right  Z96.611 V43.61   2. COPD (chronic obstructive pulmonary disease) with chronic bronchitis  J44.89 491.20     Patient Active Problem List   Diagnosis    Cervical nerve root disorder    Essential hypertension    Hyperlipidemia    Colon polyps    Family history of breast cancer    Hypovitaminosis D    Closed compression fracture of fifth lumbar vertebra    Bunion    History of compression fracture of spine    Diverticulitis    COPD (chronic obstructive pulmonary disease) with chronic bronchitis    Low back pain    Neck pain    History of adenomatous polyp of colon    Personal history of tobacco use, presenting hazards to health    Colitis with rectal bleeding    Heart palpitations    Post-menopausal    Cervical stenosis of spinal canal    Cervical radiculopathy    Chronic scapular pain    Other osteoporosis without current pathological fracture    Gastroesophageal reflux disease without esophagitis    Routine health maintenance    Lung nodule    Degenerative disc disease, cervical    History of cervical dysplasia    Persistent asthma with acute exacerbation    History of colitis    Ascending aorta dilatation    S/P reverse total shoulder arthroplasty, right     Past Medical History:   Diagnosis Date    Ascending aorta dilatation     3.8 CM - FOLLOWED BY CARDIOTHOR SURGERY    Asthma     Cervical hyperplasia     HX CERVICAL CONIZATION    Cervical radiculopathy 10/03/2023    Cervical spondylosis     Chronic cough     Chronic pain disorder     COPD (chronic obstructive pulmonary disease)     Depression with anxiety     Diverticulosis     GERD (gastroesophageal reflux disease) 2016    Glaucoma 2006    Heart murmur     32 yrs ago    Hyperlipidemia     Hypertension     Osteoarthritis      Osteoporosis     Pulmonary nodule     STABLE - FOLLOWED BY DR WILDER    Reflex sympathetic dystrophy     Rotator cuff syndrome     Spinal stenosis     Ulcerative colitis 21     Past Surgical History:   Procedure Laterality Date    BUNIONECTOMY Right     CERVICAL CONIZATION      CERVICAL EPIDURAL N/A 2023    Procedure: CERVICAL EPIDURAL STEROID INJECTION CPT: 22517;  Surgeon: Chidi Portillo MD;  Location: [facility] MAIN OR;  Service: Pain Management;  Laterality: N/A;     SECTION      ,,    COLONOSCOPY N/A 2017    Procedure: COLONOSCOPY TO CECUM with cold polypectomy;  Surgeon: Alo Staton Jr., MD;  Location: [facility] ENDOSCOPY;  Service:     COLONOSCOPY N/A 2022    Procedure: COLONOSCOPY TO CECUM WITH COLD BIOPSY POLYPECTOMY;  Surgeon: Chuck Servin MD;  Location: [facility] ENDOSCOPY;  Service: Gastroenterology;  Laterality: N/A;  PRE: HX COLON POLYPS  POST: DIVERTICULOSIS, POLYP    ENDOMETRIAL ABLATION      EPIDURAL BLOCK      FINGER SURGERY Right     WITH INSTRUMENTATION    GLAUCOMA SURGERY Bilateral     HEMORRHOIDECTOMY  2015    NERVE BLOCK Right 2023    Procedure: RIGHT SUPRASCAPULAR NERVE BLOCK CPT: 01288;  Surgeon: Chidi Portillo MD;  Location: [facility] MAIN OR;  Service: Pain Management;  Laterality: Right;    TRIGGER POINT INJECTION      TUBAL ABDOMINAL LIGATION        General Information       Row Name 2513          OT Time and Intention    Subjective Information no complaints  -JW     Document Type evaluation;discharge evaluation/summary  -JW     Mode of Treatment occupational therapy  -JW     Patient Effort good  -JW       Row Name 25 0913          General Information    Patient Profile Reviewed yes  -JW     Prior Level of Function independent:;ADL's;all household mobility  -JW     Existing Precautions/Restrictions shoulder;right  -JW     Barriers to Rehab none identified  -JW       Row Name 25 0913          Living  Environment    People in Home alone  DIL plans to stay with her  -       Row Name 01/31/25 0913          Cognition    Orientation Status (Cognition) oriented x 3  -       Row Name 01/31/25 0913          Safety Issues/Impairments Affecting Functional Mobility    Impairments Affecting Function (Mobility) range of motion (ROM);sensation/sensory awareness;strength  -               User Key  (r) = Recorded By, (t) = Taken By, (c) = Cosigned By      Initials Name Provider Type     Opal Smith OT Occupational Therapist                     Mobility/ADL's       Row Name 01/31/25 0914          Bed Mobility    Bed Mobility bed mobility (all) activities  -     All Activities, [place] (Bed Mobility) independent  -       Row Name 01/31/25 0914          Transfers    Transfers sit-stand transfer  -       Row Name 01/31/25 0914          Sit-Stand Transfer    Sit-Stand [place] (Transfers) independent  -       Row Name 01/31/25 0914          Activities of Daily Living    BADL Assessment/Intervention upper body dressing;lower body dressing  -       Row Name 01/31/25 0914          Mobility    Extremity Weight-bearing Status right upper extremity  -     Right Upper Extremity (Weight-bearing Status) non weight-bearing (NWB)  -       Row Name 01/31/25 0914          Upper Body Dressing Assessment/Training    [place] Level (Upper Body Dressing) doff;don;front opening garment;verbal cues;minimum assist (75% patient effort)  -     Position (Upper Body Dressing) edge of bed sitting  -     Comment, (Upper Body Dressing) claudette dressing techniques  -       Row Name 01/31/25 0914          Lower Body Dressing Assessment/Training    [place] Level (Lower Body Dressing) don;pants/bottoms;socks;standby assist;verbal cues  -     Position (Lower Body Dressing) supported sitting;supported standing  -               User Key  (r) = Recorded By, (t) = Taken By, (c) = Cosigned By      Initials Name Provider  Type    JW Opal Smith OT Occupational Therapist                   Obj/Interventions       Row Name 01/31/25 0914          Sensory Assessment (Somatosensory)    Sensory Assessment sensation has mostly returned  -       Row Name 01/31/25 0914          Range of Motion Comprehensive    Comment, General Range of Motion normal post-op dec AROM. Elbow flexion/ext WNL  -       Row Name 01/31/25 0914          Strength Comprehensive (MMT)    Comment, General Manual Muscle Testing (MMT) Assessment normal post-op weakness  -       Row Name 01/31/25 0914          Shoulder (Therapeutic Exercise)    Shoulder (Therapeutic Exercise) pendulum exercises  -     Shoulder Pendulum Exercises (Therapeutic Exercise) right  -       Row Name 01/31/25 0914          Elbow/Forearm (Therapeutic Exercise)    Elbow/Forearm (Therapeutic Exercise) AROM (active range of motion)  -     Elbow/Forearm AROM (Therapeutic Exercise) flexion;extension;supination;pronation;right  -       Row Name 01/31/25 0914          Wrist (Therapeutic Exercise)    Wrist (Therapeutic Exercise) AROM (active range of motion)  -     Wrist AROM (Therapeutic Exercise) flexion;extension;right  -[facility] Name 01/31/25 0914          Hand (Therapeutic Exercise)    Hand (Therapeutic Exercise) AROM (active range of motion)  -     Hand AROM/AAROM (Therapeutic Exercise) AROM (active range of motion);finger flexion;finger extension  -       Row Name 01/31/25 0914          Motor Skills    Therapeutic Exercise shoulder;elbow/forearm;wrist;hand  -               User Key  (r) = Recorded By, (t) = Taken By, (c) = Cosigned By      Initials Name Provider Type     Opal Smith OT Occupational Therapist                   Goals/Plan       Row Name 01/31/25 0918          Problem Specific Goal 1 (OT)    Problem Specific Goal 1 (OT) Pt will be educated on shoulder px and HEP prior to d/c  -     Time Frame (Problem Specific Goal 1, OT) by discharge  -      Progress/Outcome (Problem Specific Goal 1, OT) goal met  -               User Key  (r) = Recorded By, (t) = Taken By, (c) = Cosigned By      Initials Name Provider Type    Opal Bennett OT Occupational Therapist                   Clinical Impression       Row Name 01/31/25 0915          Pain Assessment    Pretreatment Pain Rating 4/10  -     Posttreatment Pain Rating 4/10  -     Pain Location shoulder  -     Pain Side/Orientation right  -     Response to Pain Interventions activity participation with tolerable pain  -       Row Name 01/31/25 0915          Plan of Care Review    Plan of Care Reviewed With patient  -     Outcome Evaluation Pt POD1 s/p R rTSA. OT provides education on shoulder px, sling mgt, ADL techniques, and HEP. Pt demo's good understanding of exercises and completes with cues for positioning. Pt completes UE dressing with Bita using claudette dressing techs, LB dressing with spv. Pt lives alone but plans to d/c home with family to assist as needed. Pt very concerned about returning to work and transportation to OP appointments. Will likely need home health services initially. All OT goals met at evaluation, will sign off and recommend follow-up with   -       Row Name 01/31/25 0915          Therapy Assessment/Plan (OT)    Therapy Frequency (OT) evaluation only  -       Row Name 01/31/25 0915          Therapy Plan Review/Discharge Plan (OT)    Anticipated Discharge Disposition (OT) home with assist;home with home health  -       Row Name 01/31/25 0915          Positioning and Restraints    Pre-Treatment Position in bed  -     Post Treatment Position bed  -JW     In Bed sitting EOB;call light within reach;encouraged to call for assist  -               User Key  (r) = Recorded By, (t) = Taken By, (c) = Cosigned By      Initials Name Provider Type    Opal Bennett OT Occupational Therapist                   Outcome Measures       Row Name 01/31/25 0919          How much help  from another is currently needed...    Putting on and taking off regular lower body clothing? 3  -JW     Bathing (including washing, rinsing, and drying) 3  -JW     Toileting (which includes using toilet bed pan or urinal) 3  -JW     Putting on and taking off regular upper body clothing 3  -JW     Taking care of personal grooming (such as brushing teeth) 4  -JW     Eating meals 4  -JW     AM-PAC 6 Clicks Score (OT) 20  -       Row Name 01/31/25 0919          Functional Assessment    Outcome Measure Options AM-PAC 6 Clicks Daily Activity (OT)  -               User Key  (r) = Recorded By, (t) = Taken By, (c) = Cosigned By      Initials Name Provider Type    Opal Bennett OT Occupational Therapist                    Occupational Therapy Education       Title: PT OT SLP Therapies (Done)       Topic: Occupational Therapy (Done)       Point: ADL training (Done)       Description:   Instruct learner(s) on proper safety adaptation and remediation techniques during self care or transfers.   Instruct in proper use of assistive devices.                  Learning Progress Summary            Patient Acceptance, E,D,H, YAIR,VU by MADHAVI at 1/31/2025 0919                      Point: Home exercise program (Done)       Description:   Instruct learner(s) on appropriate technique for monitoring, assisting and/or progressing therapeutic exercises/activities.                  Learning Progress Summary            Patient Acceptance, E,D,H, YAIR,VU by MADHAVI at 1/31/2025 0919                      Point: Precautions (Done)       Description:   Instruct learner(s) on prescribed precautions during self-care and functional transfers.                  Learning Progress Summary            Patient Acceptance, E,D,H, DU,VU by MADHAVI at 1/31/2025 0919                      Point: Body mechanics (Done)       Description:   Instruct learner(s) on proper positioning and spine alignment during self-care, functional mobility activities and/or exercises.                   Learning Progress Summary            Patient Acceptance, E,D,H, DU,VU by  at 1/31/2025 0919                                      User Key       Initials Effective Dates Name Provider Type Discipline     06/10/21 -  Opal Smith OT Occupational Therapist OT                  OT Recommendation and Plan  Therapy Frequency (OT): evaluation only  Plan of Care Review  Plan of Care Reviewed With: patient  Outcome Evaluation: Pt POD1 s/p R rTSA. OT provides education on shoulder px, sling mgt, ADL techniques, and HEP. Pt demo's good understanding of exercises and completes with cues for positioning. Pt completes UE dressing with Bita using claudette dressing techs, LB dressing with spv. Pt lives alone but plans to d/c home with family to assist as needed. Pt very concerned about returning to work and transportation to OP appointments. Will likely need home health services initially. All OT goals met at evaluation, will sign off and recommend follow-up with      Time Calculation:   Evaluation Complexity (OT)  Review Occupational Profile/Medical/Therapy History Complexity: brief/low complexity  Assessment, Occupational Performance/Identification of Deficit Complexity: 1-3 performance deficits  Clinical Decision Making Complexity (OT): problem focused assessment/low complexity  Overall Complexity of Evaluation (OT): low complexity     Time Calculation- OT       Row Name 01/31/25 0919             Time Calculation- OT    OT Start Time 0835  -      OT Stop Time 0858  -      OT Time Calculation (min) 23 min  -      Total Timed Code Minutes- OT 13 minute(s)  -      OT Received On 01/31/25  -         Timed Charges    42145 - OT Therapeutic Exercise Minutes 5  -JW      21464 - OT Self Care/Mgmt Minutes 8  -         Untimed Charges    OT Eval/Re-eval Minutes 10  -JW         Total Minutes    Timed Charges Total Minutes 13  -      Untimed Charges Total Minutes 10  -JW       Total Minutes 23  -JW                User  Key  (r) = Recorded By, (t) = Taken By, (c) = Cosigned By      Initials Name Provider Type     Opal Smith OT Occupational Therapist                  Therapy Charges for Today       Code Description Service Date Service Provider Modifiers Qty    [id number]  OT SELF CARE/MGMT/TRAIN EA 15 MIN 1/31/2025 pOal Smith OT GO 1    [id number]  OT EVAL LOW COMPLEXITY 2 1/31/2025 Opal Smith OT GO 1                 Opal Smith OT  1/31/2025

## 2025-01-31 NOTE — DISCHARGE INSTRUCTIONS
Dr. Bear Vaca  [address] [address]  [phone]    Outpatient REVERSE TOTAL SHOULDER REPLACEMENT  HOSPITAL DISCHARGE INSTRUCTIONS     GENERAL INFORMATION: With improved surgical techniques for total shoulder and reverse total shoulder replacement and the Yazdanism of ultrasound guided long acting nerve blocks, the hospital stay for patients has decreased significantly.  Many people can go home right after surgery as an outpatient.    SPECIFIC POST-OP INSTRUCTIONS:  * FOLLOW UP APPOINTMENT: You should have been given an appointment to follow up 10-14 days after your date of surgery. We can see you back sooner if there are any problems or concerns.   * BLOOD THINNERS: All patients will be on some type of blood thinner post-op to prevent blood clot. Most patients who are not already on a blood thinner are discharged on over-the-counter aspirin 81 mg or 325mg daily which you will take for three weeks. If you were taking a blood thinner prior to surgery, we will have you resume this on the first day post-op.   * ICE: Ice helps to decrease both pain and swelling. Ice should be applied to the shoulder 20-25 minutes each hour while awake.    DRESSING CHANGES: We ask that you keep the incision clean and dry.  Your surgical dressing can be removed 48 hours after surgery.  There will be a yellow strip of gauze and a Zipline device that will be underneath the dressing.  The yellow gauze can be removed but leave the Zipline alone.  You can then apply a watertight dressing over the Zipline to protect it.  You can get this type of dressing from the hospital before you leave or at your local pharmacy. SHOWERING: The wound edges typically come together and seal by 3-5 days post-op if there is no drainage. Again, please keep the same waterproof dressing on. DO NOT SUBMERSE SHOULDER IN POOL,HOT TUB OR BATH UNTIL AT LEAST 3-4 WEEKS POST-OP (EVEN WITH WATERPROOF BANDAIDS).  * PAIN  MEDICINE: You will be given a prescription  for oral pain medication prior to discharge from the hospital. Please let us know if you have a sensitivity to certain pain medications prior to discharge. Additional pain medication prescriptions can be called into your pharmacy during normal business hours. NO medications can be called in over the weekends or after business hours.   * ORAL ANTI-INFLAMMATORIES:   You will be asked to discontinue ALL oral anti-inflammatories prior to surgery. You can resume these the first day post-op.These can be combined with the oral pain medications safely. DO NOT TAKE ADDITIONAL TYLENOL WITH THE NARCOTIC PAIN MEDICATION (it already has Tylenol in it). If you were not taking an anti-inflammatory pre-op, you can start one on the first day post-op. It will help decrease pain and swelling. Typical medications and dosages are as follows:   Advil/Motrin/Ibuprofen 200mg, 4 pills every 8 hours   Aleve/Naproxen Sodium 220mg, 2 pills every 12 hours  * SLING: We recommend you wear the sling at all times, other than when showering or doing physical therapy exercises.    * WEIGHT BEARING: We ask that you be non-weight bearing on the operative shoulder. Do not use the operative arm to lift/push/pull greater than 5lbs.   * PHYSICAL THERAPY: Before you leave the hospital staff will teach you some very gentle range of motion exercises to do at home for the first 10-14 days. After we see you in the office during your first post-op visit, we will give you a prescription to start formal physical therapy. This can be done downstairs at [facility] or at a location of your choice. Physical therapy is typically 2-3 times a week for 4-6 weeks, depending on the individual and their progress.   * DRIVING A CAR: Medically/legally we can not recommend you drive a car while in the sling or while on pain medication (roughly 10-14 days).   * RETURNING TO DAILY AND RECREATIONAL ACTIVITIES: For the most part patients can progress to daily activities as tolerated  "(keeping in mind the restrictions listed above). Initially, we do not want you to \"overdo\" it in an attempt to minimize post-op pain and swelling. Once the swelling is controlled, you can progress with activities as tolerated. Pain and swelling should be your guide to increasing your activity level.   * RETURN TO WORK: The return to work date depends on many factors and is very dependent on the individual. You would most likely be able to return to a sedentary job after your first post-op visit (10-14 days after surgery). A more physical job would obviously require a longer recovery time before return to work.  "

## 2025-01-31 NOTE — PROGRESS NOTES
Orthopedic Progress Note    Subjective     Post-Operative Day: 1 post-right RTSA  Systemic or Specific Complaints: No Complaints. Pain is under control with PO meds.     Objective     Vital signs in last 24 hours:  Temp:  [97.8 °F (36.6 °C)-99 °F (37.2 °C)] 98.4 °F (36.9 °C)  Heart Rate:  [64-88] 73  Resp:  [12-17] 17  BP: ()/(50-87) 129/81    General: alert, appears stated age and cooperative   Neurovascular: intact  Radial pulse: 2+.   Wound: Dressing clean and dry.    Range of Motion: Limited ROM Post op     Data Review  CBC:  Results from last 7 days   Lab Units 01/31/25  0537   HEMOGLOBIN g/dL 10.4*   HEMATOCRIT % 31.3*       Assessment & Plan     IMPRESSION:stable status post R total shoulder arthroplasty    PLAN: D/C home today. Change dressing to a light watertight bandage. Pendulum swings and elbow and wrist range of motion initiated.  Follow up in office 10-14 days (already scheduled).  Physical therapy is scheduled.    Activity: As directed by physical therapy.  Sling for protection.  Pendulum swings, elbow and wrist range of motion.  Ice for swelling     LOS: 0 days     Bear Vaca MD    Date: 1/31/2025  Time: 07:44 EST

## 2025-01-31 NOTE — CASE MANAGEMENT/SOCIAL WORK
Case Management Discharge Note      Final Note: Home         Selected Continued Care - Admitted Since 1/30/2025       Destination    No services have been selected for the patient.                Durable Medical Equipment    No services have been selected for the patient.                Dialysis/Infusion    No services have been selected for the patient.                Home Medical Care    No services have been selected for the patient.                Therapy    No services have been selected for the patient.                Community Resources    No services have been selected for the patient.                Community & DME    No services have been selected for the patient.                    Transportation Services  Private: Car    Final Discharge Disposition Code: 01 - home or self-care

## 2025-02-03 ENCOUNTER — TRANSITIONAL CARE MANAGEMENT TELEPHONE ENCOUNTER (OUTPATIENT)
Dept: CALL CENTER | Facility: HOSPITAL | Age: 70
End: 2025-02-03
Payer: MEDICARE

## 2025-02-03 NOTE — OUTREACH NOTE
Call Center TCM Note      Flowsheet Row Responses   [facility] patient discharged from? [place]   Does the patient have one of the following disease processes/diagnoses(primary or secondary)? Total Joint Replacement   Joint surgery performed? Shoulder   TCM attempt successful? Yes   Call start time 1145   Call end time 1157   Discharge diagnosis s/p reverse total shoulder arthroplasty   Does the patient have all medications related to this admission filled (includes all antibiotics, pain medications, etc.) Yes   Is the patient taking all medications as directed (includes completed medication regime)? Yes  [pt plans to speak to surgeon's office re: an alternative to Percocet]   Is the patient able to teach back alternate methods of pain control? Shoulder-elevate above heart/ keep in sling as advised, Correct alignment, Ice, Short, frequent activity   Does the patient have an appointment with their PCP within 7-14 days of discharge? No   Nursing Interventions Patient desires to follow up with specialty only, Routed TCM call to PCP office   Home health comments pt plans to speak to surgeon's office re: inpt PT   What DME was ordered? sling   Psychosocial issues? No   When is the first therapy visit scheduled (PO Day) including how many days per week  pt plans to speak to surgeon's office re: inpt PT   Has the patient fallen since discharge? No   Did the patient receive a copy of their discharge instructions? Yes   Nursing interventions Reviewed instructions with patient   What is the patient's perception of their functional status since discharge? Improving   Is the patient able to teach back signs and symptoms of infection? Temp >100.4 for 24h or longer, Incisional drainage, Blisters around incision, Increased swelling or redness around incision (not associated with surgical edema), Severe discomfort or pain, Changes in mobility, Shortness of breath or chest pain   Is the patient able to teach back how to  prevent infection? Check incision daily, Keep incision covered if drainage, Shower only as directed by surgeon, Eat well-balanced diet, No lotion or creams, No tub baths, hot tub or swimming, Monitor blood sugar if diabetic, Keep incision covered if pets in house, Wash hands before and after touching incision  [some redness,  pt plans to speak to her surgeon's office]   Is the patient able to teach back signs and symptoms of DVT? Redness in calf, Severe pain in calf, Swelling in calf, Shortness of breath or chest pain, Area hot to touch   Is the patient able to teach back home safety measures? Ability to shower, Accessibility to necessary areas in home, Modifications to reach items, Modifications with ADLs such as dressing, cooking, toileting   If the patient is a current smoker, are they able to teach back resources for cessation? Not a smoker   Is the patient/caregiver able to teach back the hierarchy of who to call/visit for symptoms/problems? PCP, Specialist, Home health nurse, Urgent Care, ED, 911 Yes   TCM call completed? Yes   Call end time 1157            Nancy Coyle RN    2/3/2025, 11:57 EST
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
## 2025-02-10 ENCOUNTER — OFFICE VISIT (OUTPATIENT)
Dept: NEUROSURGERY | Facility: CLINIC | Age: 70
End: 2025-02-10
Payer: MEDICARE

## 2025-02-10 VITALS
WEIGHT: 105 LBS | SYSTOLIC BLOOD PRESSURE: 120 MMHG | OXYGEN SATURATION: 96 % | BODY MASS INDEX: 21.95 KG/M2 | DIASTOLIC BLOOD PRESSURE: 92 MMHG | HEART RATE: 71 BPM

## 2025-02-10 DIAGNOSIS — M54.41 ACUTE RIGHT-SIDED LOW BACK PAIN WITH RIGHT-SIDED SCIATICA: Primary | ICD-10-CM

## 2025-02-10 PROCEDURE — 1159F MED LIST DOCD IN RCRD: CPT | Performed by: NURSE PRACTITIONER

## 2025-02-10 PROCEDURE — 3074F SYST BP LT 130 MM HG: CPT | Performed by: NURSE PRACTITIONER

## 2025-02-10 PROCEDURE — 3080F DIAST BP >= 90 MM HG: CPT | Performed by: NURSE PRACTITIONER

## 2025-02-10 PROCEDURE — 1160F RVW MEDS BY RX/DR IN RCRD: CPT | Performed by: NURSE PRACTITIONER

## 2025-02-10 PROCEDURE — 99213 OFFICE O/P EST LOW 20 MIN: CPT | Performed by: NURSE PRACTITIONER

## 2025-02-17 ENCOUNTER — TREATMENT (OUTPATIENT)
Dept: PHYSICAL THERAPY | Facility: CLINIC | Age: 70
End: 2025-02-17
Payer: MEDICARE

## 2025-02-17 DIAGNOSIS — Z74.09 IMPAIRED FUNCTIONAL MOBILITY AND ACTIVITY TOLERANCE: ICD-10-CM

## 2025-02-17 DIAGNOSIS — M25.511 ACUTE PAIN OF RIGHT SHOULDER: ICD-10-CM

## 2025-02-17 DIAGNOSIS — M25.611 DECREASED RANGE OF MOTION OF RIGHT SHOULDER: ICD-10-CM

## 2025-02-17 DIAGNOSIS — Z96.611 S/P REVERSE TOTAL SHOULDER ARTHROPLASTY, RIGHT: Primary | ICD-10-CM

## 2025-02-17 PROCEDURE — 97110 THERAPEUTIC EXERCISES: CPT | Performed by: PHYSICAL THERAPIST

## 2025-02-17 PROCEDURE — 97161 PT EVAL LOW COMPLEX 20 MIN: CPT | Performed by: PHYSICAL THERAPIST

## 2025-02-17 PROCEDURE — 97140 MANUAL THERAPY 1/> REGIONS: CPT | Performed by: PHYSICAL THERAPIST

## 2025-02-17 NOTE — PROGRESS NOTES
Physical Therapy Initial Evaluation and Plan of Care  [facility] [place]   [address]  [place] [address]  P: [phone]  F: [phone]    Patient: Nanci Umanzor   : 1955  Diagnosis/ICD-10 Code:  S/P reverse total shoulder arthroplasty, right [Z96.611]  Referring practitioner: Bear Vaca MD  Date of Initial Visit: 2025  Today's Date: 2025  Patient seen for 1 session         Visit Diagnoses:    ICD-10-CM ICD-9-CM   1. S/P reverse total shoulder arthroplasty, right  Z96.611 V43.61   2. Acute pain of right shoulder  M25.511 719.41   3. Decreased range of motion of right shoulder  M25.611 719.51   4. Impaired functional mobility and activity tolerance  Z74.09 V49.89       PMHx Reviewed : 2025      Subjective Evaluation    History of Present Illness  Mechanism of injury: Pt had R TSA on 25. Presents wearing sling. Reports compliance with pendulum exercises and using ice.     Pain  Current pain ratin  At worst pain ratin  Location: R shoulder  Quality: discomfort, tight and sharp  Relieving factors: medications and ice  Aggravating factors: movement    Social Support  Lives with: alone    Hand dominance: right    Treatments  Current treatment: physical therapy  Patient Goals  Patient goals for therapy: decreased pain, increased motion, increased strength, independence with ADLs/IADLs, return to sport/leisure activities and return to work             Objective          Active Range of Motion   Left Shoulder   Normal active range of motion    Additional Active Range of Motion Details  NO AROM Per Protocol     Passive Range of Motion     Right Shoulder   Flexion: 90 degrees with pain  External rotation 45°: 10 degrees with pain    Strength/Myotome Testing     Left Shoulder   Normal muscle strength    Right Shoulder     Planes of Motion   Flexion: 3-   Abduction: 3-   External rotation at 0°: 3-     Functional Assessment      Comments  Dash: 84.09          Assessment & Plan       Assessment  Impairments: abnormal muscle firing, abnormal or restricted ROM, activity intolerance, impaired physical strength, lacks appropriate home exercise program, pain with function and safety issue   Functional limitations: carrying objects, lifting, sleeping, pulling, pushing, uncomfortable because of pain, moving in bed, reaching behind back, reaching overhead and unable to perform repetitive tasks   Assessment details: Pt presents to PT with symptoms consistent with R Reverse Total Shoulder Replacement.  Pt would benefit from skilled PT intervention to address the deficits noted.     Reviewed the anatomy of the shoulder and the general procedure performed.  Reviewed use of ice, exercises and medication to help control and reduce pain.  Reviewed safety with the shoulder in different positions, use of the sling and lifting restrictions.  Reviewed expectations of PT, of the pt to be compliant w/ the HEP and the PT POC.    Prognosis: good    Goals  Plan Goals:   SHORT TERM GOALS: 6 weeks  1. Patient to be compliant with HEP and no diff. with sleeping  2. Increased (R) UE strength to 3+-4/5 with no pain> 5/10 to allow for household and work activities.   3. Pt to exhibit (R) shoulder active flexion / ABD to 135° in standing/sitting to assist with reaching overhead with less pain  4. Pt demonstrates improved posture in sitting and standing with minimal-no cues during treatment session    LONG TERM GOALS: 12 weeks  1. Pt score <15% perceived disability on DASH   2. Pt. to exhibit (R) shoulder AROM to WFL (> 160° flex/abd. to allow for reaching overhead and behind back without pain limiting function  3. Pt to exhibit 4+-5/5 UE strength to allow for pushing/pulling and lifting >5 #to occur with pain <2/10      Plan  Therapy options: will be seen for skilled therapy services  Planned modality interventions: cryotherapy, electrical stimulation/Tajik  stimulation, TENS and thermotherapy (hydrocollator packs)  Planned therapy interventions: ADL retraining, flexibility, body mechanics training, home exercise program, functional ROM exercises, joint mobilization, manual therapy, neuromuscular re-education, postural training, soft tissue mobilization, spinal/joint mobilization, strengthening, stretching, therapeutic activities, motor coordination training and IADL retraining  Frequency: 3x week  Duration in weeks: 12  Treatment plan discussed with: patient          Access Code: [email or web address]  URL: [email or web address]  Date: 02/17/2025  Prepared by: Teresa Cantu    Exercises  - Circular Shoulder Pendulum with Table Support  - 3-5 x daily - 7 x weekly - 1 sets - 10 reps  - Flexion-Extension Shoulder Pendulum with Table Support  - 3-5 x daily - 7 x weekly - 1 sets - 10 reps  - Horizontal Shoulder Pendulum with Table Support  - 3-5 x daily - 7 x weekly - 1 sets - 10 reps  - Seated Shoulder Flexion Towel Slide at Table Top  - 3-5 x daily - 7 x weekly - 1 sets - 10 reps  - Seated Shoulder Pendulum Exercise  - 3-5 x daily - 7 x weekly - 1 sets - 10 reps    Manual Therapy:  15        mins  62204;  Therapeutic Exercise:    15      mins  71422;     Neuromuscular Amanda:           mins  27832;    Therapeutic Activity:           mins  61628;     Gait Training:            mins  50422;     Ultrasound:           mins  47239;    Electrical Stimulation:          mins  79059 ( );  Dry Needling           mins self-pay  Traction           mins 94408  Canalith Repositioning         mins 43264      Timed Treatment:  30    mins   Total Treatment:     50   mins      PT: Teresa Cantu PT     License Number: [id number]  Electronically signed by Teresa Cantu PT, 02/17/25, 1:41 PM EST    Certification Period: 2/17/2025 thru 5/17/2025  I certify that the therapy services are furnished while this patient is under my care.  The services outlined above are required by this  patient, and will be reviewed every 90 days.         Physician Signature:__________________________________________________    PHYSICIAN: Bear Vaca MD  NPI: [id number]                                      DATE:      Please sign in Epic or return via fax to [email or web address] . Thank you, [facility].

## 2025-02-17 NOTE — PATIENT INSTRUCTIONS
Access Code: [email or web address]  URL: [email or web address]  Date: 02/17/2025  Prepared by: Teresa Cantu    Exercises  - Circular Shoulder Pendulum with Table Support  - 3-5 x daily - 7 x weekly - 1 sets - 10 reps  - Flexion-Extension Shoulder Pendulum with Table Support  - 3-5 x daily - 7 x weekly - 1 sets - 10 reps  - Horizontal Shoulder Pendulum with Table Support  - 3-5 x daily - 7 x weekly - 1 sets - 10 reps  - Seated Shoulder Flexion Towel Slide at Table Top  - 3-5 x daily - 7 x weekly - 1 sets - 10 reps  - Seated Shoulder Pendulum Exercise  - 3-5 x daily - 7 x weekly - 1 sets - 10 reps  Pt was educated on findings of evaluation, purpose of treatment and goals for therapy. Treatment options discussed and questions answered. Pt was educated on exercises, self treatment and pain relief techniques.

## 2025-02-19 DIAGNOSIS — J44.89 COPD (CHRONIC OBSTRUCTIVE PULMONARY DISEASE) WITH CHRONIC BRONCHITIS: ICD-10-CM

## 2025-02-19 RX ORDER — FLUTICASONE PROPIONATE AND SALMETEROL 250; 50 UG/1; UG/1
1 POWDER RESPIRATORY (INHALATION) 2 TIMES DAILY
Qty: 60 EACH | Refills: 11 | Status: SHIPPED | OUTPATIENT
Start: 2025-02-19

## 2025-02-19 RX ORDER — FLUTICASONE PROPIONATE AND SALMETEROL 250; 50 UG/1; UG/1
1 POWDER RESPIRATORY (INHALATION) 2 TIMES DAILY
Qty: 60 EACH | Refills: 11 | OUTPATIENT
Start: 2025-02-19

## 2025-02-19 NOTE — TELEPHONE ENCOUNTER
Caller: Nanci Umanzor    Relationship: Self    Best call back number: [phone]      Requested Prescriptions:   Requested Prescriptions     Pending Prescriptions Disp Refills    Fluticasone-Salmeterol (ADVAIR/WIXELA) 250-50 MCG/ACT DISKUS 60 each 11     Sig: Inhale 1 puff 2 (Two) Times a Day.        Pharmacy where request should be sent: [id number] [address] AT [facility] [phone] [facility] [phone]      Last office visit with prescribing clinician: 10/17/2024   Last telemedicine visit with prescribing clinician: Visit date not found   Next office visit with prescribing clinician: Visit date not found     Additional details provided by patient: OUT OF MEDICATION AND NEEDS TO GET THIS FILLED.  SHE CANNOT DRIVE RIGHT NOW SHE JUST HAD SHOULDER SURGERY.      Does the patient have less than a 3 day supply:  [x] Yes  [] No    Would you like a call back once the refill request has been completed: [] Yes [x] No    If the office needs to give you a call back, can they leave a voicemail: [] Yes [x] No    Colin Mosley Rep   02/19/25 15:27 EST

## 2025-02-20 ENCOUNTER — TREATMENT (OUTPATIENT)
Dept: PHYSICAL THERAPY | Facility: CLINIC | Age: 70
End: 2025-02-20
Payer: MEDICARE

## 2025-02-20 DIAGNOSIS — M25.611 DECREASED RANGE OF MOTION OF RIGHT SHOULDER: ICD-10-CM

## 2025-02-20 DIAGNOSIS — Z96.611 S/P REVERSE TOTAL SHOULDER ARTHROPLASTY, RIGHT: Primary | ICD-10-CM

## 2025-02-20 DIAGNOSIS — M25.511 ACUTE PAIN OF RIGHT SHOULDER: ICD-10-CM

## 2025-02-20 DIAGNOSIS — Z74.09 IMPAIRED FUNCTIONAL MOBILITY AND ACTIVITY TOLERANCE: ICD-10-CM

## 2025-02-20 NOTE — PROGRESS NOTES
Physical Therapy Daily Treatment Note  [facility] [place]   [address], [address]  [place] [address]  P: [phone]  F: [phone]    Patient: Nanci Umanzor   : 1955  Referring practitioner: Bear Vaca MD  Date of Initial Visit: Type: THERAPY  Noted: 2025  Today's Date: 2025  Patient seen for 2 sessions       Visit Diagnoses:    ICD-10-CM ICD-9-CM   1. S/P reverse total shoulder arthroplasty, right  Z96.611 V43.61   2. Acute pain of right shoulder  M25.511 719.41   3. Decreased range of motion of right shoulder  M25.611 719.51   4. Impaired functional mobility and activity tolerance  Z74.09 V49.89         Nanci Umanzor reports: compliance with HEP, R shoulder felt better after last visit     Subjective     Objective   See Exercise, Manual, and Modality Logs for complete treatment.       Assessment/Plan heavy guarding with initiation of PROM but lessened and had improved  range with PROM today, manual assist needed to guide AAROM exercises. R shoulder tightness lessened as progressed through AAROM exercises. Progress per protocol       Timed:         Manual Therapy:   15      mins  16039;     Therapeutic Exercise:   15      mins  21268;     Neuromuscular Amanda:        mins  26621;    Therapeutic Activity:    15      mins  15019;     Gait Training:           mins  66958;     Ultrasound:          mins  47964;    Ionto                                   mins  07465  Self Care                            mins  44140  Traction          mins 84915      Un-Timed:  Canalith Repos         mins 13658  Electrical Stimulation:         mins  10369 ( );  Dry Needling          mins self-pay  Traction          mins 31878        Timed Treatment:   45   mins   Total Treatment:     55   mins    Teresa Cantu PT  [place] License #: [id number]    Physical Therapist

## 2025-02-24 ENCOUNTER — TREATMENT (OUTPATIENT)
Dept: PHYSICAL THERAPY | Facility: CLINIC | Age: 70
End: 2025-02-24
Payer: MEDICARE

## 2025-02-24 DIAGNOSIS — M25.511 ACUTE PAIN OF RIGHT SHOULDER: ICD-10-CM

## 2025-02-24 DIAGNOSIS — Z96.611 S/P REVERSE TOTAL SHOULDER ARTHROPLASTY, RIGHT: Primary | ICD-10-CM

## 2025-02-24 DIAGNOSIS — Z74.09 IMPAIRED FUNCTIONAL MOBILITY AND ACTIVITY TOLERANCE: ICD-10-CM

## 2025-02-24 DIAGNOSIS — M25.611 DECREASED RANGE OF MOTION OF RIGHT SHOULDER: ICD-10-CM

## 2025-02-24 PROCEDURE — 97140 MANUAL THERAPY 1/> REGIONS: CPT | Performed by: PHYSICAL THERAPIST

## 2025-02-24 NOTE — PROGRESS NOTES
Physical Therapy Daily Treatment Note  [facility] [place]   [address], [address]  [place] [address]  P: [phone]  F: [phone]    Patient: Nanci Umanzor   : 1955  Referring practitioner: Bear Vaca MD  Date of Initial Visit: Type: THERAPY  Noted: 2025  Today's Date: 2025  Patient seen for 3 sessions       Visit Diagnoses:    ICD-10-CM ICD-9-CM   1. S/P reverse total shoulder arthroplasty, right  Z96.611 V43.61   2. Acute pain of right shoulder  M25.511 719.41   3. Decreased range of motion of right shoulder  M25.611 719.51   4. Impaired functional mobility and activity tolerance  Z74.09 V49.89         Nanci Umanzor reports: compliance with HEP. R shoulder pain is improving     Subjective     Objective   See Exercise, Manual, and Modality Logs for complete treatment.       Assessment/Plan improved tolerance to PROM today, less R shoulder tightness noted. Continue to progress per protocol.       Timed:         Manual Therapy:   30      mins  83928;     Therapeutic Exercise:         mins  61144;     Neuromuscular Amanda:        mins  47568;    Therapeutic Activity:          mins  03826;     Gait Training:           mins  86685;     Ultrasound:          mins  07334;    Ionto                                   mins  18231  Self Care                            mins  15691  Traction          mins 37912      Un-Timed:  Canalith Repos         mins 72147  Electrical Stimulation:         mins  64888 ( );  Dry Needling          mins self-pay  Traction          mins 33776        Timed Treatment: 30     mins   Total Treatment:   40     mins    Teresa Cantu PT  [place] License #: [id number]    Physical Therapist

## 2025-02-27 ENCOUNTER — TREATMENT (OUTPATIENT)
Dept: PHYSICAL THERAPY | Facility: CLINIC | Age: 70
End: 2025-02-27
Payer: MEDICARE

## 2025-02-27 DIAGNOSIS — Z96.611 S/P REVERSE TOTAL SHOULDER ARTHROPLASTY, RIGHT: Primary | ICD-10-CM

## 2025-02-27 DIAGNOSIS — M25.611 DECREASED RANGE OF MOTION OF RIGHT SHOULDER: ICD-10-CM

## 2025-02-27 DIAGNOSIS — M25.511 ACUTE PAIN OF RIGHT SHOULDER: ICD-10-CM

## 2025-02-27 DIAGNOSIS — Z74.09 IMPAIRED FUNCTIONAL MOBILITY AND ACTIVITY TOLERANCE: ICD-10-CM

## 2025-02-27 PROCEDURE — 97140 MANUAL THERAPY 1/> REGIONS: CPT | Performed by: PHYSICAL THERAPIST

## 2025-02-27 PROCEDURE — 97110 THERAPEUTIC EXERCISES: CPT | Performed by: PHYSICAL THERAPIST

## 2025-02-27 NOTE — PROGRESS NOTES
Physical Therapy Daily Treatment Note  [facility] [place]   [address], [address]  [place] [address]  P: [phone]  F: [phone]    Patient: Nanci Umanzor   : 1955  Referring practitioner: Bear Vaca MD  Date of Initial Visit: Type: THERAPY  Noted: 2025  Today's Date: 2025  Patient seen for 4 sessions       Visit Diagnoses:    ICD-10-CM ICD-9-CM   1. S/P reverse total shoulder arthroplasty, right  Z96.611 V43.61   2. Acute pain of right shoulder  M25.511 719.41   3. Decreased range of motion of right shoulder  M25.611 719.51   4. Impaired functional mobility and activity tolerance  Z74.09 V49.89         Nanci Umanzor reports: R shoulder feels stiff today, compliance with HEP     Subjective     Objective   See Exercise, Manual, and Modality Logs for complete treatment.       Assessment/Plan R shoulder ROM improved after PROM. Tolerated supine AAROM with cane well. Continue to progress per protocol.       Timed:         Manual Therapy:   30      mins  06379;     Therapeutic Exercise:    10     mins  04930;     Neuromuscular Amanda:        mins  95611;    Therapeutic Activity:          mins  86487;     Gait Training:           mins  53094;     Ultrasound:          mins  04468;    Ionto                                   mins  63617  Self Care                            mins  96001  Traction          mins 01810      Un-Timed:  Canalith Repos         mins 24610  Electrical Stimulation:         mins  24798 ( );  Dry Needling          mins self-pay  Traction          mins 13693        Timed Treatment:  40    mins   Total Treatment:    40    mins    Teresa Cantu PT  [place] License #: [id number]    Physical Therapist

## 2025-02-28 ENCOUNTER — PATIENT MESSAGE (OUTPATIENT)
Dept: FAMILY MEDICINE CLINIC | Facility: CLINIC | Age: 70
End: 2025-02-28
Payer: MEDICARE

## 2025-03-03 ENCOUNTER — TREATMENT (OUTPATIENT)
Dept: PHYSICAL THERAPY | Facility: CLINIC | Age: 70
End: 2025-03-03
Payer: MEDICARE

## 2025-03-03 DIAGNOSIS — Z74.09 IMPAIRED FUNCTIONAL MOBILITY AND ACTIVITY TOLERANCE: ICD-10-CM

## 2025-03-03 DIAGNOSIS — M25.611 DECREASED RANGE OF MOTION OF RIGHT SHOULDER: ICD-10-CM

## 2025-03-03 DIAGNOSIS — Z96.611 S/P REVERSE TOTAL SHOULDER ARTHROPLASTY, RIGHT: Primary | ICD-10-CM

## 2025-03-03 DIAGNOSIS — M25.511 ACUTE PAIN OF RIGHT SHOULDER: ICD-10-CM

## 2025-03-03 PROCEDURE — 97530 THERAPEUTIC ACTIVITIES: CPT | Performed by: PHYSICAL THERAPIST

## 2025-03-03 PROCEDURE — 97140 MANUAL THERAPY 1/> REGIONS: CPT | Performed by: PHYSICAL THERAPIST

## 2025-03-03 PROCEDURE — 97110 THERAPEUTIC EXERCISES: CPT | Performed by: PHYSICAL THERAPIST

## 2025-03-03 NOTE — PROGRESS NOTES
Physical Therapy Daily Treatment Note  [facility] [place]   [address]  [place] [address]  P: [phone]  F: [phone]    Patient: Nanci Umanzor   : 1955  Referring practitioner: Bear Vaca MD  Date of Initial Visit: Type: THERAPY  Noted: 2025  Today's Date: 3/3/2025  Patient seen for 5 sessions       Visit Diagnoses:    ICD-10-CM ICD-9-CM   1. S/P reverse total shoulder arthroplasty, right  Z96.611 V43.61   2. Acute pain of right shoulder  M25.511 719.41   3. Decreased range of motion of right shoulder  M25.611 719.51   4. Impaired functional mobility and activity tolerance  Z74.09 V49.89         Nanci Umanzor reports: R shoulder feels better and less tight after PT.     Subjective     Objective   See Exercise, Manual, and Modality Logs for complete treatment.       Assessment/Plan ROM tight at end range of flexion, discussed with pt to perform HEP at least 3/day. Continue to progress per protocol.       Timed:         Manual Therapy:  15       mins  15479;     Therapeutic Exercise:  20       mins  60363;     Neuromuscular Amanda:        mins  46059;    Therapeutic Activity:     10     mins  02454;     Gait Training:           mins  64621;     Ultrasound:          mins  20880;    Ionto                                   mins  03845  Self Care                            mins  35206  Traction          mins 69069      Un-Timed:  Canalith Repos         mins 41845  Electrical Stimulation:         mins  03917 (MC );  Dry Needling          mins self-pay  Traction          mins 42339        Timed Treatment:   45   mins   Total Treatment:    55    mins    Teresa Cantu PT  [place] License #: [id number]    Physical Therapist

## 2025-03-06 ENCOUNTER — TREATMENT (OUTPATIENT)
Dept: PHYSICAL THERAPY | Facility: CLINIC | Age: 70
End: 2025-03-06
Payer: MEDICARE

## 2025-03-06 DIAGNOSIS — M25.511 ACUTE PAIN OF RIGHT SHOULDER: ICD-10-CM

## 2025-03-06 DIAGNOSIS — Z96.611 S/P REVERSE TOTAL SHOULDER ARTHROPLASTY, RIGHT: Primary | ICD-10-CM

## 2025-03-06 DIAGNOSIS — M25.611 DECREASED RANGE OF MOTION OF RIGHT SHOULDER: ICD-10-CM

## 2025-03-06 DIAGNOSIS — Z74.09 IMPAIRED FUNCTIONAL MOBILITY AND ACTIVITY TOLERANCE: ICD-10-CM

## 2025-03-06 NOTE — PROGRESS NOTES
Physical Therapy Daily Treatment Note  [facility] [place]   [address] [place] [address], [address]  [place] [address]  P: [phone]  F: [phone]    Patient: Nanci Umanzor   : 1955  Referring practitioner: Bear Vaca MD  Date of Initial Visit: Type: THERAPY  Noted: 2025  Today's Date: 3/6/2025  Patient seen for 6 sessions       Visit Diagnoses:    ICD-10-CM ICD-9-CM   1. S/P reverse total shoulder arthroplasty, right  Z96.611 V43.61   2. Acute pain of right shoulder  M25.511 719.41   3. Decreased range of motion of right shoulder  M25.611 719.51   4. Impaired functional mobility and activity tolerance  Z74.09 V49.89         Nanci Umanzor reports: she has been performing HEP more times a day.     Subjective     Objective   See Exercise, Manual, and Modality Logs for complete treatment.       Assessment/Plan encouraged pt to purchase pulleys for home to add to HEP. Better tolerance to PROM and AAROM today. Continue to progress per protocol.       Timed:         Manual Therapy:   15      mins  32783;     Therapeutic Exercise:    10     mins  33849;     Neuromuscular Amanda:        mins  83512;    Therapeutic Activity:     15     mins  00661;     Gait Training:           mins  75049;     Ultrasound:          mins  59275;    Ionto                                   mins  35180  Self Care                            mins  01373  Traction          mins 96717      Un-Timed:  Canalith Repos         mins 60177  Electrical Stimulation:         mins  52273 ( );  Dry Needling          mins self-pay  Traction          mins 58493        Timed Treatment:   40   mins   Total Treatment:     40   mins    Teresa Cantu PT  [place] License #: [id number]    Physical Therapist

## 2025-03-10 ENCOUNTER — TREATMENT (OUTPATIENT)
Dept: PHYSICAL THERAPY | Facility: CLINIC | Age: 70
End: 2025-03-10
Payer: MEDICARE

## 2025-03-10 DIAGNOSIS — Z96.611 S/P REVERSE TOTAL SHOULDER ARTHROPLASTY, RIGHT: Primary | ICD-10-CM

## 2025-03-10 DIAGNOSIS — Z74.09 IMPAIRED FUNCTIONAL MOBILITY AND ACTIVITY TOLERANCE: ICD-10-CM

## 2025-03-10 DIAGNOSIS — M25.611 DECREASED RANGE OF MOTION OF RIGHT SHOULDER: ICD-10-CM

## 2025-03-10 DIAGNOSIS — M25.511 ACUTE PAIN OF RIGHT SHOULDER: ICD-10-CM

## 2025-03-10 PROCEDURE — 97530 THERAPEUTIC ACTIVITIES: CPT | Performed by: PHYSICAL THERAPIST

## 2025-03-10 PROCEDURE — 97140 MANUAL THERAPY 1/> REGIONS: CPT | Performed by: PHYSICAL THERAPIST

## 2025-03-10 PROCEDURE — 97110 THERAPEUTIC EXERCISES: CPT | Performed by: PHYSICAL THERAPIST

## 2025-03-10 NOTE — PROGRESS NOTES
Physical Therapy Daily Treatment Note  [facility] Physical Therapy [place]   [address] [place] [address], [address]  [place] [address]  P: [phone]  F: [phone]    Patient: Nanci Umanzor   : 1955  Referring practitioner: Bear Vaca MD  Date of Initial Visit: Type: THERAPY  Noted: 2025  Today's Date: 3/10/2025  Patient seen for 7 sessions       Visit Diagnoses:    ICD-10-CM ICD-9-CM   1. S/P reverse total shoulder arthroplasty, right  Z96.611 V43.61   2. Acute pain of right shoulder  M25.511 719.41   3. Decreased range of motion of right shoulder  M25.611 719.51   4. Impaired functional mobility and activity tolerance  Z74.09 V49.89         Nanci Umanzor reports: she bought pulleys for home but not sure how to adjust the length     Subjective     Objective   See Exercise, Manual, and Modality Logs for complete treatment.       Assessment/Plan educated pt how to adjust pulley length, added to HEP and educated pt to be performing her pulley HEP 3x/day. Progress per protocol       Timed:         Manual Therapy:    15     mins  94796;     Therapeutic Exercise:     15    mins  01422;     Neuromuscular Amanda:        mins  30797;    Therapeutic Activity:     8     mins  46249;     Gait Training:           mins  44425;     Ultrasound:          mins  62716;    Ionto                                   mins  40409  Self Care                            mins  95514  Traction          mins 27674      Un-Timed:  Canalith Repos         mins 08714  Electrical Stimulation:         mins  37195 (MC );  Dry Needling          mins self-pay  Traction          mins 45081        Timed Treatment:   38   mins   Total Treatment:     48   mins    Teresa Cantu PT  [place] License #: [id number]    Physical Therapist

## 2025-03-13 ENCOUNTER — TREATMENT (OUTPATIENT)
Dept: PHYSICAL THERAPY | Facility: CLINIC | Age: 70
End: 2025-03-13
Payer: MEDICARE

## 2025-03-13 DIAGNOSIS — Z96.611 S/P REVERSE TOTAL SHOULDER ARTHROPLASTY, RIGHT: Primary | ICD-10-CM

## 2025-03-13 DIAGNOSIS — M25.511 ACUTE PAIN OF RIGHT SHOULDER: ICD-10-CM

## 2025-03-13 DIAGNOSIS — Z74.09 IMPAIRED FUNCTIONAL MOBILITY AND ACTIVITY TOLERANCE: ICD-10-CM

## 2025-03-13 DIAGNOSIS — M25.611 DECREASED RANGE OF MOTION OF RIGHT SHOULDER: ICD-10-CM

## 2025-03-13 PROCEDURE — 97110 THERAPEUTIC EXERCISES: CPT | Performed by: PHYSICAL THERAPIST

## 2025-03-13 PROCEDURE — 97140 MANUAL THERAPY 1/> REGIONS: CPT | Performed by: PHYSICAL THERAPIST

## 2025-03-13 NOTE — PROGRESS NOTES
Physical Therapy Daily Treatment Note  [facility] Physical Therapy [place]   [address], [address]  [place] [address]  P: [phone]  F: [phone]    Patient: Nanci Umanzor   : 1955  Referring practitioner: Bear Vaca MD  Date of Initial Visit: Type: THERAPY  Noted: 2025  Today's Date: 3/13/2025  Patient seen for 8 sessions       Visit Diagnoses:    ICD-10-CM ICD-9-CM   1. S/P reverse total shoulder arthroplasty, right  Z96.611 V43.61   2. Acute pain of right shoulder  M25.511 719.41   3. Decreased range of motion of right shoulder  M25.611 719.51   4. Impaired functional mobility and activity tolerance  Z74.09 V49.89         Nanci Umanzor reports: R shoulder pain is improved, just feels tight     Subjective     Objective   See Exercise, Manual, and Modality Logs for complete treatment.       Assessment/Plan R shoulder tight at end range with PROM.  instructed pt  to increase frequency of HEP per day. Continue per protocol.       Timed:         Manual Therapy:    25     mins  89690;     Therapeutic Exercise:     15    mins  76282;     Neuromuscular Amanda:        mins  12570;    Therapeutic Activity:          mins  89792;     Gait Training:           mins  18493;     Ultrasound:          mins  51387;    Ionto                                   mins  51604  Self Care                            mins  54248  Traction          mins 98824      Un-Timed:  Canalith Repos         mins 71035  Electrical Stimulation:         mins  44614 (MC );  Dry Needling          mins self-pay  Traction          mins 79680        Timed Treatment:  40    mins   Total Treatment:     50   mins    Teresa Cantu PT  [place] License #: [id number]    Physical Therapist

## 2025-03-17 ENCOUNTER — TREATMENT (OUTPATIENT)
Dept: PHYSICAL THERAPY | Facility: CLINIC | Age: 70
End: 2025-03-17
Payer: MEDICARE

## 2025-03-17 DIAGNOSIS — Z74.09 IMPAIRED FUNCTIONAL MOBILITY AND ACTIVITY TOLERANCE: ICD-10-CM

## 2025-03-17 DIAGNOSIS — M25.611 DECREASED RANGE OF MOTION OF RIGHT SHOULDER: ICD-10-CM

## 2025-03-17 DIAGNOSIS — M25.511 ACUTE PAIN OF RIGHT SHOULDER: ICD-10-CM

## 2025-03-17 DIAGNOSIS — Z96.611 S/P REVERSE TOTAL SHOULDER ARTHROPLASTY, RIGHT: Primary | ICD-10-CM

## 2025-03-17 NOTE — PROGRESS NOTES
Re-Assessment / Progress Note  [facility] Physical Therapy [place]   [address] [place] [address], [address]  [place] [address]  P: [phone]  F: [phone]  Patient: Nanci Umanzor   : 1955  Diagnosis/ICD-10 Code:  S/P reverse total shoulder arthroplasty, right [Z96.611]  Referring practitioner: Bear Vaca MD  Date of Initial Visit: Episode Type: THERAPY  Noted: 2025    Today's Date: 3/17/2025  Patient seen for 9 sessions.      Subjective:   Nanci Umanzor reports: performing HEP     Subjective Questionnaire: QuickDASH:   Clinical Progress: improved  Home Program Compliance: Yes  Treatment has included: therapeutic exercise, neuromuscular re-education, manual therapy, therapeutic activity, and cryotherapy    Subjective   Objective   PROM R shoulder flexion: 155,  abd 150, ER 30  Assessment/Plan  Progress toward previous goals: Partially Met    Goals  Plan Goals:   SHORT TERM GOALS: 6 weeks  1. Patient to be compliant with HEP and no diff. with sleeping met  2. Increased (R) UE strength to 3+-4/5 with no pain> 5/10 to allow for household and work activities.   3. Pt to exhibit (R) shoulder active flexion / ABD to 135° in standing/sitting to assist with reaching overhead with less pain  4. Pt demonstrates improved posture in sitting and standing with minimal-no cues during treatment session met     LONG TERM GOALS: 12 weeks  1. Pt score <15% perceived disability on DASH   2. Pt. to exhibit (R) shoulder AROM to WFL (> 160° flex/abd. to allow for reaching overhead and behind back without pain limiting function  3. Pt to exhibit 4+-5/5 UE strength to allow for pushing/pulling and lifting >5 #to occur with pain <2/10           Recommendations: Continue as planned  Timeframe: 1 month  Prognosis to achieve goals: good    PT Signature: Teresa Cantu, PT  [place] Lic. # [id number]      Based upon review of the patient's progress and continued therapy plan, it is my medical opinion that Nanci Umanzor should  continue physical therapy treatment at [facility] PH[facility]ICAL TH[facility]AP[facility]  [address] [facility], [address] [address] [phone] ; Fax Number [phone]    Signature: __________________________________  Bear Vaca MD    Manual Therapy:   15       mins  14277;  Therapeutic Exercise:   15       mins  22048;     Neuromuscular Amanda:          mins  74677;    Therapeutic Activity:     10      mins  86854;     Gait Training:            mins  51851;     Ultrasound:           mins  71259;    Electrical Stimulation:          mins  71227 ( );  Dry Needling           mins self-pay  Traction           mins 56044  Canalith Repositioning         mins 59661      Timed Treatment:  40    mins   Total Treatment:     50   mins

## 2025-03-20 ENCOUNTER — TREATMENT (OUTPATIENT)
Dept: PHYSICAL THERAPY | Facility: CLINIC | Age: 70
End: 2025-03-20
Payer: MEDICARE

## 2025-03-20 DIAGNOSIS — M25.511 ACUTE PAIN OF RIGHT SHOULDER: ICD-10-CM

## 2025-03-20 DIAGNOSIS — Z96.611 S/P REVERSE TOTAL SHOULDER ARTHROPLASTY, RIGHT: Primary | ICD-10-CM

## 2025-03-20 DIAGNOSIS — Z74.09 IMPAIRED FUNCTIONAL MOBILITY AND ACTIVITY TOLERANCE: ICD-10-CM

## 2025-03-20 DIAGNOSIS — M25.611 DECREASED RANGE OF MOTION OF RIGHT SHOULDER: ICD-10-CM

## 2025-03-20 NOTE — PROGRESS NOTES
Orders were faxed. Confirmation received. Physical Therapy Daily Treatment Note  [facility] Physical Therapy [place]   [address], [address]  [place] [address]  P: [phone]  F: [phone]    Patient: Nanci Umanzor   : 1955  Referring practitioner: Bear Vaca MD  Date of Initial Visit: Type: THERAPY  Noted: 2025  Today's Date: 3/20/2025  Patient seen for 10 sessions       Visit Diagnoses:    ICD-10-CM ICD-9-CM   1. S/P reverse total shoulder arthroplasty, right  Z96.611 V43.61   2. Acute pain of right shoulder  M25.511 719.41   3. Decreased range of motion of right shoulder  M25.611 719.51   4. Impaired functional mobility and activity tolerance  Z74.09 V49.89         Nanci Umanzor reports: followed up with , pleased with progress. Dc'd sling     Subjective     Objective   See Exercise, Manual, and Modality Logs for complete treatment.   Educated pt to utilize R shoulder more with ADLs, utilize R arm movement normally with ambulating and not holding it tight to body     Assessment/Plan  ROM progressing well. Continue per protocol      Timed:         Manual Therapy:   15      mins  31086;     Therapeutic Exercise:   15      mins  44923;     Neuromuscular Amanda:        mins  45782;    Therapeutic Activity:    10      mins  60352;     Gait Training:           mins  97329;     Ultrasound:          mins  08963;    Ionto                                   mins  67498  Self Care                            mins  77582  Traction          mins 75170      Un-Timed:  Canalith Repos         mins 27325  Electrical Stimulation:         mins  06546 (MC );  Dry Needling          mins self-pay  Traction          mins 92565        Timed Treatment:   40   mins   Total Treatment:     50   mins    Teresa Cantu PT  [place] License #: [id number]    Physical Therapist

## 2025-03-24 ENCOUNTER — TREATMENT (OUTPATIENT)
Dept: PHYSICAL THERAPY | Facility: CLINIC | Age: 70
End: 2025-03-24
Payer: MEDICARE

## 2025-03-24 DIAGNOSIS — Z74.09 IMPAIRED FUNCTIONAL MOBILITY AND ACTIVITY TOLERANCE: ICD-10-CM

## 2025-03-24 DIAGNOSIS — M25.611 DECREASED RANGE OF MOTION OF RIGHT SHOULDER: ICD-10-CM

## 2025-03-24 DIAGNOSIS — Z96.611 S/P REVERSE TOTAL SHOULDER ARTHROPLASTY, RIGHT: Primary | ICD-10-CM

## 2025-03-24 DIAGNOSIS — M25.511 ACUTE PAIN OF RIGHT SHOULDER: ICD-10-CM

## 2025-03-24 PROCEDURE — 97530 THERAPEUTIC ACTIVITIES: CPT | Performed by: PHYSICAL THERAPIST

## 2025-03-24 PROCEDURE — 97110 THERAPEUTIC EXERCISES: CPT | Performed by: PHYSICAL THERAPIST

## 2025-03-24 PROCEDURE — 97140 MANUAL THERAPY 1/> REGIONS: CPT | Performed by: PHYSICAL THERAPIST

## 2025-03-24 NOTE — PROGRESS NOTES
Physical Therapy Daily Treatment Note  [facility] Physical Therapy [place]   [address], [address]  [place] [address]  P: [phone]  F: [phone]    Patient: Nanci Umanzor   : 1955  Referring practitioner: Bear Vaca MD  Date of Initial Visit: Type: THERAPY  Noted: 2025  Today's Date: 3/24/2025  Patient seen for 11 sessions       Visit Diagnoses:    ICD-10-CM ICD-9-CM   1. S/P reverse total shoulder arthroplasty, right  Z96.611 V43.61   2. Acute pain of right shoulder  M25.511 719.41   3. Decreased range of motion of right shoulder  M25.611 719.51   4. Impaired functional mobility and activity tolerance  Z74.09 V49.89         Nanci Umanzor reports: compliance with HEP     Subjective     Objective   See Exercise, Manual, and Modality Logs for complete treatment.       Assessment/Plan improved tolerance to PROM and AAROM today, ROM progressing well. Continue to progress per protocol.       Timed:         Manual Therapy:   20      mins  23506;     Therapeutic Exercise:   10     mins  03538;     Neuromuscular Amanda:        mins  04763;    Therapeutic Activity:    8      mins  12421;     Gait Training:           mins  33574;     Ultrasound:          mins  52333;    Ionto                                   mins  44661  Self Care                            mins  07329  Traction          mins 64621      Un-Timed:  Canalith Repos         mins 99694  Electrical Stimulation:         mins  88263 ( );  Dry Needling          mins self-pay  Traction          mins 38758        Timed Treatment: 38     mins   Total Treatment:   48     mins    Teresa Cantu PT  [place] License #: [id number]    Physical Therapist

## 2025-03-26 ENCOUNTER — TREATMENT (OUTPATIENT)
Dept: PHYSICAL THERAPY | Facility: CLINIC | Age: 70
End: 2025-03-26
Payer: MEDICARE

## 2025-03-26 DIAGNOSIS — M25.511 ACUTE PAIN OF RIGHT SHOULDER: ICD-10-CM

## 2025-03-26 DIAGNOSIS — M25.611 DECREASED RANGE OF MOTION OF RIGHT SHOULDER: ICD-10-CM

## 2025-03-26 DIAGNOSIS — Z74.09 IMPAIRED FUNCTIONAL MOBILITY AND ACTIVITY TOLERANCE: ICD-10-CM

## 2025-03-26 DIAGNOSIS — Z96.611 S/P REVERSE TOTAL SHOULDER ARTHROPLASTY, RIGHT: Primary | ICD-10-CM

## 2025-03-26 NOTE — PROGRESS NOTES
Re-Assessment / Progress Note  [facility] Physical Therapy [place]   [address], [address]  [place] [address]  P: [phone]  F: [phone]  Patient: Nanci Umanzor   : 1955  Diagnosis/ICD-10 Code:  S/P reverse total shoulder arthroplasty, right [Z96.611]  Referring practitioner: Bear Vaca MD  Date of Initial Visit: Episode Type: THERAPY  Noted: 2025    Today's Date: 3/26/2025  Patient seen for 12 sessions.      Subjective:   Nanci Umanzor reports: compliance with HEP, feels R shoulder motion is improving, R shoulder pain is improved.     Subjective Questionnaire: QuickDASH: 56.82  ( improved from 84.09)  Clinical Progress: improved  Home Program Compliance: Yes  Treatment has included: therapeutic exercise, neuromuscular re-education, manual therapy, therapeutic activity, and cryotherapy    Subjective   Objective   Active Range of Motion   Left Shoulder   Normal active range of motion     Additional Active Range of Motion Details  NO AROM R shoulder Per Protocol      Passive Range of Motion      Right Shoulder   Flexion: 145 degrees   ABD: 140 degrees  External rotation 45°: 30     Strength/Myotome Testing      Left Shoulder   Normal muscle strength     Right Shoulder      Planes of Motion   Flexion: 3-   Abduction: 3-   External rotation at 0°: 3-   Assessment/Plan  Progress toward previous goals: Partially Met    Pt is making excellent progress toward goals and progressing appropriately per protocol.   Patient will continue to benefit from PT addressing current limitations and impairments to help patient regain necessary function and meet current goals    Goals  Plan Goals:   SHORT TERM GOALS: 6 weeks  1. Patient to be compliant with HEP and no diff. with sleeping met  2. Increased (R) UE strength to 3+-4/5 with no pain> 5/10 to allow for household and work activities.   3. Pt to exhibit (R) shoulder active flexion / ABD to 135° in standing/sitting to assist with  reaching overhead with less pain  4. Pt demonstrates improved posture in sitting and standing with minimal-no cues during treatment session met     LONG TERM GOALS: 12 weeks  1. Pt score <15% perceived disability on DASH   2. Pt. to exhibit (R) shoulder AROM to WFL (>/= 160° flex/abd. to allow for reaching overhead and behind back without pain limiting function  3. Pt to exhibit 4+-5/5 UE strength to allow for pushing/pulling and lifting >5 #to occur with pain <2/10    Recommendations: Continue as planned  Timeframe: 6 weeks  Prognosis to achieve goals: good    PT Signature: Teresa Cantu, PT  [place] Lic. # [id number]      Based upon review of the patient's progress and continued therapy plan, it is my medical opinion that Nanci Umanzor should continue physical therapy treatment at [facility] PHYSICAL THERAPY  [address] [place] [address] [phone] ; Fax Number [phone]    Signature: __________________________________  Bear Vaca MD    Manual Therapy:   23       mins  44978;  Therapeutic Exercise:   15       mins  44187;     Neuromuscular Amanda:        mins  46235;    Therapeutic Activity:    15       mins  70094;     Gait Training:            mins  32336;     Ultrasound:           mins  46805;    Electrical Stimulation:          mins  15673 ( );  Dry Needling           mins self-pay  Traction           mins 60508  Canalith Repositioning         mins 62508      Timed Treatment:  53    mins   Total Treatment:      63  mins

## 2025-03-31 ENCOUNTER — TREATMENT (OUTPATIENT)
Dept: PHYSICAL THERAPY | Facility: CLINIC | Age: 70
End: 2025-03-31
Payer: MEDICARE

## 2025-03-31 DIAGNOSIS — M25.511 ACUTE PAIN OF RIGHT SHOULDER: ICD-10-CM

## 2025-03-31 DIAGNOSIS — Z96.611 S/P REVERSE TOTAL SHOULDER ARTHROPLASTY, RIGHT: Primary | ICD-10-CM

## 2025-03-31 DIAGNOSIS — M25.611 DECREASED RANGE OF MOTION OF RIGHT SHOULDER: ICD-10-CM

## 2025-03-31 DIAGNOSIS — Z74.09 IMPAIRED FUNCTIONAL MOBILITY AND ACTIVITY TOLERANCE: ICD-10-CM

## 2025-03-31 PROCEDURE — 97140 MANUAL THERAPY 1/> REGIONS: CPT | Performed by: PHYSICAL THERAPIST

## 2025-03-31 PROCEDURE — 97110 THERAPEUTIC EXERCISES: CPT | Performed by: PHYSICAL THERAPIST

## 2025-03-31 PROCEDURE — 97530 THERAPEUTIC ACTIVITIES: CPT | Performed by: PHYSICAL THERAPIST

## 2025-03-31 NOTE — PROGRESS NOTES
Physical Therapy Daily Treatment Note  [facility] Physical Therapy [place]   [address], [address]  [place] [address]  P: [phone]  F: [phone]    Patient: Nanci Umanzor   : 1955  Referring practitioner: Bear Vaca MD  Date of Initial Visit: Type: THERAPY  Noted: 2025  Today's Date: 3/31/2025  Patient seen for 13 sessions       Visit Diagnoses:    ICD-10-CM ICD-9-CM   1. S/P reverse total shoulder arthroplasty, right  Z96.611 V43.61   2. Acute pain of right shoulder  M25.511 719.41   3. Decreased range of motion of right shoulder  M25.611 719.51   4. Impaired functional mobility and activity tolerance  Z74.09 V49.89         Nanci Umanzor reports: compliance with HEP,  improved tolerance to daily functional activities.     Subjective     Objective   See Exercise, Manual, and Modality Logs for complete treatment.       Assessment/Plan  Compliant/cooperative with current rehab efforts.  Benefits from verbal/tactile cues to ensure correct exercise performance/technique, hold time and position. Plan details: Progress ROM / strengthening / stabilization / functional activity as tolerated       Timed:         Manual Therapy:   15      mins  48801;     Therapeutic Exercise:     15    mins  08790;     Neuromuscular Amanda:        mins  75337;    Therapeutic Activity:     10     mins  54339;     Gait Training:           mins  79815;     Ultrasound:          mins  56336;    Ionto                                   mins  35794  Self Care                            mins  54078  Traction          mins 49846      Un-Timed:  Canalith Repos         mins 93496  Electrical Stimulation:         mins  46188 ( );  Dry Needling          mins self-pay  Traction          mins 84325        Timed Treatment:  40    mins   Total Treatment:     50   mins    Teresa Cantu PT  [place] License #: [id number]    Physical Therapist

## 2025-04-01 ENCOUNTER — TELEPHONE (OUTPATIENT)
Dept: FAMILY MEDICINE CLINIC | Facility: CLINIC | Age: 70
End: 2025-04-01

## 2025-04-01 NOTE — TELEPHONE ENCOUNTER
Caller: Sherlyn Umanzor    Relationship: Self    Best call back number: [phone]    What is the best time to reach you: ANY    Who are you requesting to speak with (clinical staff, provider,  specific staff member): CLINICAL STAFF    Do you know the name of the person who called: SHERLYN     What was the call regarding: PATIENT IS REQUESTING LAB ORDERS FOR HER ANNUAL WELLNESS . PATIENT WANTS TO SCHEDULE A LAB APPOINTMENT. PLEASE CALL PATIENT TO SCHEDULE.  PATIENT WOULD ALSO LIKE TO SPEAK ABOUT HER PROLIO SHOT.      Is it okay if the provider responds through MyChart: NO

## 2025-04-03 ENCOUNTER — TREATMENT (OUTPATIENT)
Dept: PHYSICAL THERAPY | Facility: CLINIC | Age: 70
End: 2025-04-03
Payer: MEDICARE

## 2025-04-03 DIAGNOSIS — Z74.09 IMPAIRED FUNCTIONAL MOBILITY AND ACTIVITY TOLERANCE: ICD-10-CM

## 2025-04-03 DIAGNOSIS — M25.511 ACUTE PAIN OF RIGHT SHOULDER: ICD-10-CM

## 2025-04-03 DIAGNOSIS — M25.611 DECREASED RANGE OF MOTION OF RIGHT SHOULDER: ICD-10-CM

## 2025-04-03 DIAGNOSIS — Z96.611 S/P REVERSE TOTAL SHOULDER ARTHROPLASTY, RIGHT: Primary | ICD-10-CM

## 2025-04-03 NOTE — PROGRESS NOTES
Physical Therapy Daily Treatment Note  [facility] [place]   [address] [place] [address], [address]  [place] [address]  P: [phone]  F: [phone]    Patient: Nanci Umanzor   : 1955  Referring practitioner: Bear Vaca MD  Date of Initial Visit: Type: THERAPY  Noted: 2025  Today's Date: 4/3/2025  Patient seen for 14 sessions       Visit Diagnoses:    ICD-10-CM ICD-9-CM   1. S/P reverse total shoulder arthroplasty, right  Z96.611 V43.61   2. Acute pain of right shoulder  M25.511 719.41   3. Decreased range of motion of right shoulder  M25.611 719.51   4. Impaired functional mobility and activity tolerance  Z74.09 V49.89         Nanci Umanzor reports: compliance with HEP     Subjective     Objective   See Exercise, Manual, and Modality Logs for complete treatment.       Assessment/Plan ROM progressing well. Continue to progress per protocol      Timed:         Manual Therapy:   15      mins  00091;     Therapeutic Exercise:    15     mins  46066;     Neuromuscular Amanda:       mins  50462;    Therapeutic Activity:     10     mins  42979;     Gait Training:           mins  99862;     Ultrasound:          mins  09993;    Ionto                                   mins  37863  Self Care                            mins  12728  Traction          mins 16555      Un-Timed:  Canalith Repos         mins 36516  Electrical Stimulation:         mins  46929 ( );  Dry Needling          mins self-pay  Traction          mins 38096        Timed Treatment: 40     mins   Total Treatment:     50   mins    Teresa Cantu PT  [place] License #: [id number]    Physical Therapist

## 2025-04-04 NOTE — PROGRESS NOTES
Subjective   Patient ID: Nanci Umanzor is a 70 y.o. female is here today for follow-up for acute right-sided low back pain with right-sided sciatica.    Imaging    - MRI Lumbar Spine Without Contrast completed 01/04/2025    Pt reports epidural injections have improved her condition     History of Present Illness    She reports that she is doing and feeling great after receiving the epidural injection almost 2 weeks ago.  She was having some intermittent pain radiating into the right thigh with numbness and tingling, this has completely resolved.  She also reports complete resolution of the muscle spasms that she was having in her bilateral low back.  She also continues to recover well from right shoulder replacement surgery.  She denies any balance or gait issues.  She will return to work next week at [employer] with restrictions for her shoulder.  She denies any radiating leg pain, numbness, tingling or weakness.  She states that she has not felt this good in many years.      She presents unaccompanied.        The following portions of the patient's history were reviewed and updated as appropriate: allergies, current medications, past family history, past medical history, past social history, past surgical history, and problem list.    Review of Systems   Constitutional:  Negative for fatigue and fever.   Eyes:  Negative for visual disturbance.   Gastrointestinal:  Negative for nausea and vomiting.   Genitourinary:  Negative for difficulty urinating.   Musculoskeletal:  Positive for back pain (pt reports pain decreased after epidural). Negative for gait problem, neck pain and neck stiffness.   Neurological:  Negative for dizziness, weakness, light-headedness, numbness and headaches.   Psychiatric/Behavioral:  Negative for confusion and decreased concentration.        /82 (BP Location: Right arm, Patient Position: Sitting, Cuff Size: Adult)   Pulse 62   Temp 98.6 °F (37 °C) (Temporal)   Wt 48.9 kg (107  lb 12.8 oz)   SpO2 97%   BMI 22.54 kg/m²       Objective     Vitals:    04/14/25 1028 04/14/25 1043   BP: 134/84 132/82   BP Location: Right arm Right arm   Patient Position: Sitting Sitting   Cuff Size: Adult Adult   Pulse: 62    Temp: 98.6 °F (37 °C)    TempSrc: Temporal    SpO2: 97%    Weight: 48.9 kg (107 lb 12.8 oz)    PainSc: 0-No pain      Body mass index is 22.54 kg/m².      Physical Exam  Constitutional:       Appearance: Normal appearance.   HENT:      Head: Atraumatic.   Pulmonary:      Effort: Pulmonary effort is normal.   Musculoskeletal:         General: No tenderness. Normal range of motion.      Comments: Strength equal and intact throughout with normal muscle bulk and tone   full lumbar range of motion without pain     Skin:     General: Skin is warm and dry.   Neurological:      Mental Status: She is alert and oriented to person, place, and time.      GCS: GCS eye subscore is 4. GCS verbal subscore is 5. GCS motor subscore is 6.      Sensory: No sensory deficit.      Motor: No weakness or tremor.      Gait: Gait normal.      Deep Tendon Reflexes:      Reflex Scores:       Patellar reflexes are 2+ on the right side and 2+ on the left side.       Achilles reflexes are 2+ on the right side and 2+ on the left side.     Comments: Gait is stable and upright, nonantalgic     Psychiatric:         Mood and Affect: Mood normal.       Neurological Exam  Mental Status  Alert. Oriented to person, place, and time.    Reflexes                                            Right                      Left  Patellar                                2+                         2+  Achilles                                2+                         2+    Coordination  No tremor    Gait   Normal gait.          Assessment & Plan   Independent Review of Radiographic Studies:      I personally reviewed the images from the following studies.    No new imaging, physical therapy and pain management notes reviewed    Medical  Decision Making:      Returns the office today with history of acute on chronic low back and right leg pain.  Exam as noted above, no red flags.  She underwent a lumbar epidural injection a couple weeks ago and has reported significant improvement in her overall pain.  She now denies any low back or leg pain.  She still recovering from right shoulder replacement surgery.  From our standpoint she is doing great and is intact on exam.  We will see her back in 3 months for clinical follow-up, per her request.  She will call with any questions or concerns in the interim.  She is currently off the meloxicam due to the fact that she is asymptomatic.    Plan: Follow-up in 3 months    Diagnoses and all orders for this visit:    1. Acute right-sided low back pain with right-sided sciatica (Primary)      Return in about 3 months (around 7/14/2025).

## 2025-04-07 ENCOUNTER — TREATMENT (OUTPATIENT)
Dept: PHYSICAL THERAPY | Facility: CLINIC | Age: 70
End: 2025-04-07
Payer: MEDICARE

## 2025-04-07 DIAGNOSIS — Z96.611 S/P REVERSE TOTAL SHOULDER ARTHROPLASTY, RIGHT: Primary | ICD-10-CM

## 2025-04-07 DIAGNOSIS — Z74.09 IMPAIRED FUNCTIONAL MOBILITY AND ACTIVITY TOLERANCE: ICD-10-CM

## 2025-04-07 DIAGNOSIS — M25.611 DECREASED RANGE OF MOTION OF RIGHT SHOULDER: ICD-10-CM

## 2025-04-07 DIAGNOSIS — M25.511 ACUTE PAIN OF RIGHT SHOULDER: ICD-10-CM

## 2025-04-07 PROCEDURE — 97110 THERAPEUTIC EXERCISES: CPT | Performed by: PHYSICAL THERAPIST

## 2025-04-07 PROCEDURE — 97112 NEUROMUSCULAR REEDUCATION: CPT | Performed by: PHYSICAL THERAPIST

## 2025-04-07 PROCEDURE — 97140 MANUAL THERAPY 1/> REGIONS: CPT | Performed by: PHYSICAL THERAPIST

## 2025-04-07 NOTE — PROGRESS NOTES
Physical Therapy Daily Treatment Note  [facility] Physical Therapy [place]   [address], [address]  [place] [address]  P: [phone]  F: [phone]    Patient: Nanci Umanzor   : 1955  Referring practitioner: Bear Vaca MD  Date of Initial Visit: Type: THERAPY  Noted: 2025  Today's Date: 2025  Patient seen for 15 sessions       Visit Diagnoses:    ICD-10-CM ICD-9-CM   1. S/P reverse total shoulder arthroplasty, right  Z96.611 V43.61   2. Acute pain of right shoulder  M25.511 719.41   3. Decreased range of motion of right shoulder  M25.611 719.51   4. Impaired functional mobility and activity tolerance  Z74.09 V49.89         Nanci Umanzor reports: compliance with HEP     Subjective     Objective   See Exercise, Manual, and Modality Logs for complete treatment.       Assessment/Plan R shoulder ROM progressing appropriately. Continue to progress per protocol.       Timed:         Manual Therapy:   15      mins  30995;     Therapeutic Exercise:   20      mins  10759;     Neuromuscular Amanda:   10     mins  48595;    Therapeutic Activity:          mins  78233;     Gait Training:           mins  63952;     Ultrasound:          mins  21575;    Ionto                                   mins  15895  Self Care                            mins  49515  Traction          mins 45517      Un-Timed:  Canalith Repos         mins 33046  Electrical Stimulation:         mins  28540 ( );  Dry Needling          mins self-pay  Traction          mins 92849        Timed Treatment: 45     mins   Total Treatment:   55     mins    Teresa Cantu PT  [place] License #: [id number]    Physical Therapist

## 2025-04-08 ENCOUNTER — HOSPITAL ENCOUNTER (OUTPATIENT)
Dept: GENERAL RADIOLOGY | Facility: HOSPITAL | Age: 70
Discharge: HOME | End: 2025-04-08
Payer: MEDICARE

## 2025-04-08 ENCOUNTER — HOSPITAL ENCOUNTER (OUTPATIENT)
Dept: PAIN MEDICINE | Facility: HOSPITAL | Age: 70
Discharge: HOME | End: 2025-04-08
Payer: MEDICARE

## 2025-04-08 ENCOUNTER — ANESTHESIA (OUTPATIENT)
Dept: PAIN MEDICINE | Facility: HOSPITAL | Age: 70
End: 2025-04-08
Payer: MEDICARE

## 2025-04-08 ENCOUNTER — ANESTHESIA EVENT (OUTPATIENT)
Dept: PAIN MEDICINE | Facility: HOSPITAL | Age: 70
End: 2025-04-08
Payer: MEDICARE

## 2025-04-08 VITALS
SYSTOLIC BLOOD PRESSURE: 110 MMHG | TEMPERATURE: 97.7 F | DIASTOLIC BLOOD PRESSURE: 75 MMHG | HEART RATE: 66 BPM | RESPIRATION RATE: 16 BRPM | OXYGEN SATURATION: 96 %

## 2025-04-08 DIAGNOSIS — M48.062 SPINAL STENOSIS OF LUMBAR REGION WITH NEUROGENIC CLAUDICATION: Primary | ICD-10-CM

## 2025-04-08 DIAGNOSIS — R52 PAIN: ICD-10-CM

## 2025-04-08 DIAGNOSIS — M99.73 CONNECTIVE TISSUE AND DISC STENOSIS OF INTERVERTEBRAL FORAMINA OF LUMBAR REGION: ICD-10-CM

## 2025-04-08 PROCEDURE — 25510000001 IOPAMIDOL 41 % SOLUTION: Performed by: STUDENT IN AN ORGANIZED HEALTH CARE EDUCATION/TRAINING PROGRAM

## 2025-04-08 PROCEDURE — 77003 FLUOROGUIDE FOR SPINE INJECT: CPT

## 2025-04-08 PROCEDURE — 25010000002 METHYLPREDNISOLONE PER 80 MG: Performed by: STUDENT IN AN ORGANIZED HEALTH CARE EDUCATION/TRAINING PROGRAM

## 2025-04-08 RX ORDER — METHYLPREDNISOLONE ACETATE 80 MG/ML
80 INJECTION, SUSPENSION INTRA-ARTICULAR; INTRALESIONAL; INTRAMUSCULAR; SOFT TISSUE ONCE
Status: COMPLETED | OUTPATIENT
Start: 2025-04-08 | End: 2025-04-08

## 2025-04-08 RX ORDER — LIDOCAINE HYDROCHLORIDE 10 MG/ML
1 INJECTION, SOLUTION INFILTRATION; PERINEURAL ONCE
Status: DISCONTINUED | OUTPATIENT
Start: 2025-04-08 | End: 2025-04-09 | Stop reason: HOSPADM

## 2025-04-08 RX ORDER — IOPAMIDOL 408 MG/ML
10 INJECTION, SOLUTION INTRATHECAL
Status: COMPLETED | OUTPATIENT
Start: 2025-04-08 | End: 2025-04-08

## 2025-04-08 RX ADMIN — IOPAMIDOL 10 ML: 408 INJECTION, SOLUTION INTRATHECAL at 13:35

## 2025-04-08 RX ADMIN — METHYLPREDNISOLONE ACETATE 80 MG: 80 INJECTION, SUSPENSION INTRA-ARTICULAR; INTRALESIONAL; INTRAMUSCULAR; SOFT TISSUE at 13:35

## 2025-04-08 NOTE — ANESTHESIA PROCEDURE NOTES
PAIN Epidural block      Patient reassessed immediately prior to procedure    Patient location during procedure: pain clinic  Indication:procedure for pain  Performed By  Anesthesiologist: Jacklyn Del Rosario MD  Preanesthetic Checklist  Completed: patient identified, risks and benefits discussed, surgical consent, monitors and equipment checked, pre-op evaluation and timeout performed  Additional Notes  Needle placement guided by fluoroscopy and confirmed with BALJEET and contrast injection.     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  Pt Tolerance:patient tolerated the procedure well with no apparent complications  Complications:no

## 2025-04-08 NOTE — H&P
CHIEF COMPLAINT:   Chronic right leg pain    HISTORY OF PRESENT ILLNESS:  Ms. Umanzor is a 70-year-old female who presents with a chief complaint of chronic right leg pain.  She was seen by Vicky Phillips in the outpatient neurosurgery clinic who recommended trial of LESI.  A lumbar MRI was performed on 1/4/2025 that showed a compression of T11 and L5.  She had significant scoliosis with apex at L2-3.  There was multiple levels of disc bulging, facet arthropathy, canal stenosis and neuroforaminal narrowing.    The patient's the patient has minimal pain in the low back, but mostly has radiating pain down her right leg.  It is described as intermittent, burning, prickling, tingling.  She has numbness and burning across her lateral thigh to her knee.  Occasional burning and prickling on her anterior thigh.  Their pain is a 2-10/10.  The pain is stable in severity.   Their symptoms are exacerbated by standing and alleviated by rest and pulling her knee to her chest/stretching.  The pain is severe enough that it interferes with her activities of daily living including staying mobile, active, caring for the home  Conservative therapy that the patient has attempted without significant relief of her symptoms include rest, heat, OTC medications, NSAIDs.  Physical therapy has not seemed to improve her symptoms.      PAST MEDICAL HISTORY:  Current Outpatient Medications on File Prior to Encounter   Medication Sig Dispense Refill    acetaminophen (TYLENOL) 325 MG tablet Take 2 tablets by mouth Every 6 (Six) Hours As Needed for Moderate Pain.      albuterol (PROVENTIL) (2.5 MG/3ML) 0.083% nebulizer solution Take 2.5 mg by nebulization Every 4 (Four) Hours As Needed for Wheezing. 3 mL 2    albuterol sulfate  (90 Base) MCG/ACT inhaler Inhale 2 puffs Every 4 (Four) Hours As Needed for Wheezing. 8.5 g 11    amLODIPine (NORVASC) 5 MG tablet Take 1 tablet by mouth Daily. 90 tablet 1    atorvastatin (LIPITOR) 40 MG tablet Take 1  tablet by mouth Daily. 90 tablet 1    benazepril (LOTENSIN) 10 MG tablet Take 1 tablet by mouth Daily. 90 tablet 1    BIOTIN PO Take 1 capsule by mouth Daily.      calcium citrate-vitamin d (CITRACAL) 200-250 MG-UNIT tablet tablet Take 1 tablet by mouth Daily.      Cholecalciferol (Vitamin D) 50 MCG (2000 UT) tablet Take 1 tablet by mouth Daily.      fluticasone (FLONASE) 50 MCG/ACT nasal spray 2 sprays into the nostril(s) as directed by provider Daily for 14 days. (Patient taking differently: Administer 2 sprays into the nostril(s) as directed by provider Daily As Needed.) 16 g 11    Fluticasone-Salmeterol (ADVAIR/WIXELA) 250-50 MCG/ACT DISKUS Inhale 1 puff 2 (Two) Times a Day. 60 each 11    meloxicam (MOBIC) 15 MG tablet Take 1 tablet by mouth Daily. 30 tablet 3    Multiple Vitamins-Minerals (CENTRUM SILVER ADULT 50+ PO) Take 1 tablet by mouth Daily. HELD FOR OR      Omega-3 Fatty Acids (FISH OIL PO) Take 1 capsule by mouth Daily. HELD FOR OR      pantoprazole (PROTONIX) 40 MG EC tablet TAKE 1 TABLET BY MOUTH DAILY 90 tablet 3    Prolia 60 MG/ML solution prefilled syringe syringe INJECT 1 ML UNDER THE SKIN INTO THE APPROPRIATE AREA EVERY 6 MONTHS ONE TIME FOR 1 DOSE AS DIRECTED. (Patient taking differently: Inject 1 mL under the skin into the appropriate area as directed Every 6 (Six) Months.) 1 mL 2    tretinoin (RETIN-A) 0.05 % cream Apply  topically to the appropriate area as directed Every Night. 20 g 5    Turmeric (QC TUMERIC COMPLEX PO) Take  by mouth Daily. HELD FOR OR      vitamin C (ASCORBIC ACID) 500 MG tablet Take 2 tablets by mouth Daily.       No current facility-administered medications on file prior to encounter.       Past Medical History:   Diagnosis Date    Ascending aorta dilatation     3.8 CM - FOLLOWED BY CARDIOTHOR SURGERY    Asthma     Cervical hyperplasia     HX CERVICAL CONIZATION    Cervical radiculopathy 10/03/2023    Cervical spondylosis     Chronic cough     Chronic pain disorder      COPD (chronic obstructive pulmonary disease)     Depression with anxiety     Diverticulosis     GERD (gastroesophageal reflux disease) 2016    Glaucoma 2006    Heart murmur     32 yrs ago    Hyperlipidemia     Hypertension     Osteoarthritis     Osteoporosis     Pulmonary nodule     STABLE - FOLLOWED BY DR WILDER    Reflex sympathetic dystrophy     Rotator cuff syndrome     Spinal stenosis     Ulcerative colitis 01/04/23         SOCIAL HISTORY:  No current tobacco product use    REVIEW OF SYSTEMS:  No hematologic infectious or constitutional symptoms  Other review of systems non-contributory  Negative screen for KIMBERLY      PHYSICAL EXAM:  There were no vitals taken for this visit.  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  Lumbar 4 epidural steroid injections, up to 3. If pain control is acceptable after 1 or 2 injections, it was discussed with the patient that they may return for the subsequent injections if and when their pain returns.  The risks were discussed with the patient including failure of relief, worsening pain, Headache (post dural puncture headache), bleeding (epidural hematoma) and infection (epidural abscess or skin infection).  2.  Physical therapy exercises at home as prescribed by physical therapy or from the pain clinic handout.  Continuation of these exercises  every day, or multiple times per week, even when the patient has good pain relief, was stressed to the patient as a preventative measure to decrease the frequency and severity of future pain episodes.  3.  Continue pain medicines as already prescribed.  If patient not currently taking any, it is recommended to begin Acetaminophen 1000 mg po q 8 hours.  If other medicines containing Acetaminophen are currently prescribed, maintain daily dose at 3000 mg.    4.  If they can tolerate NSAIDS, it is recommended to take Ibuprofen 600 mg po q 6 hours for 7 days during pain exacerbations.  Alternatively, they may substitute an NSAID of their choice (e.g. Aleve).  This may be taken at the same time as Acetaminophen.  5.  Heat and ice to the affected area as tolerated for pain control.    6.  Daily low impact exercise such as walking or water exercise was recommended to maintain overall health and aid in weight control.   7.  Follow up as needed for subsequent injections.

## 2025-04-08 NOTE — DISCHARGE INSTRUCTIONS
EPIDURAL STEROID INJECTION          An epidural steroid injection is a shot of steroid medicine and numbing medicine that is given into the space between the spinal cord and the bones of the back (epidural space).  The injection helps relieve pain by an irritated or swollen nerve root.    TELL YOUR HEALTH CARE PROVIDER ABOUT:  Any allergies you have  All medicines you are taking including any over the counter medicines  Any blood disorders you have  Any surgeries you have had  Any medical conditions you have  Whether you are pregnant or may be pregnant    WHAT ARE THE RISK?  Generally, this is a safe procedure. However,problems may occur, including  Headache  Bleeding  Infection  Allergic Reaction  Nerve Damage    WHAT CAN I EXPECT AFTER THE PROCEDURE?    INJECTION SITE  Remove the Band-Aid/s after 24 hours  Check your injection site every day for signs of infection.  Check for:             Redness             Bleeding (small amt is normal)             Warmth             Pus or bad odor  Some numbness may be experienced for several hours following the procedure.  Avoid using heat on the injection site for 24 hours. You may use ice intermittently if needed by placing a         towel between your skin and the ice bag and using the ice for 20 minutes 2-3 times a day.  Do not take baths, swim or use a hot tub for 24 hours.    ACTIVITY  No strenuous activity for 24 hours then return to normal activity as tolerated.  If your leg is numb, no driving until full sensation and strength has returned.    GENERAL INSTRUCTIONS:  The injection site may feel numb, use ice with caution if numbness is present and no heat for 24 hours or until numbness is gone.   If you have numbness or weakness in your arm or leg, use those areas with caution until normal sensation returns.  It is not uncommon to notice an increase in discomfort or a change in the location of discomfort for 3-4 days after the procedure.  If discomfort is noticed  at the injection site, ice may be            applied to that area for 20 min 2-3 times a day.  Take the pain medicine your physician has prescribed or over the counter pain relievers as long as you do not have any contraindications.  If you are a diabetic, monitor your blood sugar closely.  The steroids used in your procedure may increase your blood sugar level up to 36 hours after the injection.  If your blood sugar is greater than 250, call the physician that helps you monitor your blood sugar.  Keep all follow-up visits as scheduled by your health care provider. This is important.    CONTACT OUR OFFICE IF:  You have any of these signs of infection            -Redness, swelling, or warmth around your injection site.            -Fluid or blood coming from your injection site (small amt of blood is normal)            -Pus or a bad odor from your injection site            -A fever  You develop a severe headache or a stiff neck  You lose control of your bladder or bowel movements      PAIN MANAGEMENT CENTER HOURS   Monday-Friday 7:30 am. - 4:00 pm.  For any problem related to your procedure we can be reached at [phone].  If you experience an emergency with your procedure, call [phone] or go to the emergency room.    Back Exercises  These exercises help to make your trunk and back strong. They also help to keep the lower back flexible. Doing these exercises can help to prevent or lessen pain in your lower back.  If you have back pain, try to do these exercises 2-3 times each day or as told by your doctor.  As you get better, do the exercises once each day. Repeat the exercises more often as told by your doctor.  To stop back pain from coming back, do the exercises once each day, or as told by your doctor.  Do exercises exactly as told by your doctor. Stop right away if you feel sudden pain or your pain gets worse.  Exercises  Single knee to chest  Do these steps 3-5 times in a row for each leg:  Lie on your  back on a firm bed or the floor with your legs stretched out.  Bring one knee to your chest.  Grab your knee or thigh with both hands and hold it in place.  Pull on your knee until you feel a gentle stretch in your lower back or butt.  Keep doing the stretch for 10-30 seconds.  Slowly let go of your leg and straighten it.  Pelvic tilt  Do these steps 5-10 times in a row:  Lie on your back on a firm bed or the floor with your legs stretched out.  Bend your knees so they point up to the ceiling. Your feet should be flat on the floor.  Tighten your lower belly (abdomen) muscles to press your lower back against the floor. This will make your tailbone point up to the ceiling instead of pointing down to your feet or the floor.  Stay in this position for 5-10 seconds while you gently tighten your muscles and breathe evenly.  Cat-cow  Do these steps until your lower back bends more easily:  Get on your hands and knees on a firm bed or the floor. Keep your hands under your shoulders, and keep your knees under your hips. You may put padding under your knees.  Let your head hang down toward your chest. Tighten (contract) the muscles in your belly. Point your tailbone toward the floor so your lower back becomes rounded like the back of a cat.  Stay in this position for 5 seconds.  Slowly lift your head. Let the muscles of your belly relax. Point your tailbone up toward the ceiling so your back forms a sagging arch like the back of a cow.  Stay in this position for 5 seconds.     Press-ups  Do these steps 5-10 times in a row:  Lie on your belly (face-down) on a firm bed or the floor.  Place your hands near your head, about shoulder-width apart.  While you keep your back relaxed and keep your hips on the floor, slowly straighten your arms to raise the top half of your body and lift your shoulders. Do not use your back muscles. You may change where you place your hands to make yourself more comfortable.  Stay in this position for  5 seconds. Keep your back relaxed.  Slowly return to lying flat on the floor.     Bridges  Do these steps 10 times in a row:  Lie on your back on a firm bed or the floor.  Bend your knees so they point up to the ceiling. Your feet should be flat on the floor. Your arms should be flat at your sides, next to your body.  Tighten your butt muscles and lift your butt off the floor until your waist is almost as high as your knees. If you do not feel the muscles working in your butt and the back of your thighs, slide your feet 1-2 inches (2.5-5 cm) farther away from your butt.  Stay in this position for 3-5 seconds.  Slowly lower your butt to the floor, and let your butt muscles relax.  If this exercise is too easy, try doing it with your arms crossed over your chest.  Belly crunches  Do these steps 5-10 times in a row:  Lie on your back on a firm bed or the floor with your legs stretched out.  Bend your knees so they point up to the ceiling. Your feet should be flat on the floor.  Cross your arms over your chest.  Tip your chin a little bit toward your chest, but do not bend your neck.  Tighten your belly muscles and slowly raise your chest just enough to lift your shoulder blades a tiny bit off the floor. Avoid raising your body higher than that because it can put too much stress on your lower back.  Slowly lower your chest and your head to the floor.  Back lifts  Do these steps 5-10 times in a row:  Lie on your belly (face-down) with your arms at your sides, and rest your forehead on the floor.  Tighten the muscles in your legs and your butt.  Slowly lift your chest off the floor while you keep your hips on the floor. Keep the back of your head in line with the curve in your back. Look at the floor while you do this.  Stay in this position for 3-5 seconds.  Slowly lower your chest and your face to the floor.  Contact a doctor if:  Your back pain gets a lot worse when you do an exercise.  Your back pain does not get  better within 2 hours after you exercise.  If you have any of these problems, stop doing the exercises. Do not do them again unless your doctor says it is okay.  Get help right away if:  You have sudden, very bad back pain. If this happens, stop doing the exercises. Do not do them again unless your doctor says it is okay.  This information is not intended to replace advice given to you by your health care provider. Make sure you discuss any questions you have with your health care provider.  Document Revised: 03/02/2022 Document Reviewed: 03/02/2022  [employer] Patient Education © 2024 [employer]    What is [employer]?  [employer] is a fitness and wellness program offered at no additional cost to seniors 65+ on eligible Medicare plans that helps you get active, get fit, and connect with others.  The program is designed for all levels and abilities and provides access to online and in-person classes, over 15,000 fitness locations, and health & wellness discounts.  Before starting any exercise program, consult with your primary care provider.  For additional information and to check eligibility:  tools.Kopo Kopo

## 2025-04-14 ENCOUNTER — TREATMENT (OUTPATIENT)
Dept: PHYSICAL THERAPY | Facility: CLINIC | Age: 70
End: 2025-04-14
Payer: MEDICARE

## 2025-04-14 ENCOUNTER — OFFICE VISIT (OUTPATIENT)
Dept: NEUROSURGERY | Facility: CLINIC | Age: 70
End: 2025-04-14
Payer: MEDICARE

## 2025-04-14 VITALS
HEART RATE: 62 BPM | WEIGHT: 107.8 LBS | DIASTOLIC BLOOD PRESSURE: 82 MMHG | TEMPERATURE: 98.6 F | BODY MASS INDEX: 22.54 KG/M2 | OXYGEN SATURATION: 97 % | SYSTOLIC BLOOD PRESSURE: 132 MMHG

## 2025-04-14 DIAGNOSIS — Z96.611 S/P REVERSE TOTAL SHOULDER ARTHROPLASTY, RIGHT: Primary | ICD-10-CM

## 2025-04-14 DIAGNOSIS — M25.611 DECREASED RANGE OF MOTION OF RIGHT SHOULDER: ICD-10-CM

## 2025-04-14 DIAGNOSIS — Z74.09 IMPAIRED FUNCTIONAL MOBILITY AND ACTIVITY TOLERANCE: ICD-10-CM

## 2025-04-14 DIAGNOSIS — M54.41 ACUTE RIGHT-SIDED LOW BACK PAIN WITH RIGHT-SIDED SCIATICA: Primary | ICD-10-CM

## 2025-04-14 DIAGNOSIS — M25.511 ACUTE PAIN OF RIGHT SHOULDER: ICD-10-CM

## 2025-04-14 PROCEDURE — 97140 MANUAL THERAPY 1/> REGIONS: CPT | Performed by: PHYSICAL THERAPIST

## 2025-04-14 PROCEDURE — 97530 THERAPEUTIC ACTIVITIES: CPT | Performed by: PHYSICAL THERAPIST

## 2025-04-14 PROCEDURE — 97110 THERAPEUTIC EXERCISES: CPT | Performed by: PHYSICAL THERAPIST

## 2025-04-14 RX ORDER — LANOLIN ALCOHOL/MO/W.PET/CERES
250 CREAM (GRAM) TOPICAL DAILY
COMMUNITY

## 2025-04-14 NOTE — PROGRESS NOTES
Physical Therapy Daily Treatment Note  [facility] [place]   [address], [address]  [place] [address]  P: [phone]  F: [phone]    Patient: Nanci Umanzor   : 1955  Referring practitioner: Bear Vaca MD  Date of Initial Visit: Type: THERAPY  Noted: 2025  Today's Date: 2025  Patient seen for 16 sessions       Visit Diagnoses:    ICD-10-CM ICD-9-CM   1. S/P reverse total shoulder arthroplasty, right  Z96.611 V43.61   2. Acute pain of right shoulder  M25.511 719.41   3. Decreased range of motion of right shoulder  M25.611 719.51   4. Impaired functional mobility and activity tolerance  Z74.09 V49.89         Nanci Umanzor reports: compliance with HEP     Subjective     Objective   See Exercise, Manual, and Modality Logs for complete treatment.       Assessment/Plan R shoulder ROM progressing appropriately. Plan details: Progress ROM / strengthening / stabilization / functional activity as tolerated       Timed:         Manual Therapy:   15      mins  28960;     Therapeutic Exercise:     15    mins  81912;     Neuromuscular Amanda:        mins  01360;    Therapeutic Activity:      8    mins  63723;     Gait Training:           mins  18476;     Ultrasound:          mins  63154;    Ionto                                   mins  88776  Self Care                            mins  26043  Traction          mins 72322      Un-Timed:  Canalith Repos         mins 03037  Electrical Stimulation:         mins  12985 ( );  Dry Needling          mins self-pay  Traction          mins 11080        Timed Treatment:   38   mins   Total Treatment:   48     mins    Teresa Cantu PT  [place] License #: [id number]    Physical Therapist

## 2025-04-17 ENCOUNTER — TREATMENT (OUTPATIENT)
Dept: PHYSICAL THERAPY | Facility: CLINIC | Age: 70
End: 2025-04-17
Payer: MEDICARE

## 2025-04-17 DIAGNOSIS — M25.511 ACUTE PAIN OF RIGHT SHOULDER: ICD-10-CM

## 2025-04-17 DIAGNOSIS — Z96.611 S/P REVERSE TOTAL SHOULDER ARTHROPLASTY, RIGHT: Primary | ICD-10-CM

## 2025-04-17 DIAGNOSIS — M25.611 DECREASED RANGE OF MOTION OF RIGHT SHOULDER: ICD-10-CM

## 2025-04-17 DIAGNOSIS — Z74.09 IMPAIRED FUNCTIONAL MOBILITY AND ACTIVITY TOLERANCE: ICD-10-CM

## 2025-04-17 NOTE — PROGRESS NOTES
Re-Assessment / Progress Note  [facility] [place]   [address], [address]  [place] [address]  P: [phone]  F: [phone]  Patient: Nanci Umanzor   : 1955  Diagnosis/ICD-10 Code:  S/P reverse total shoulder arthroplasty, right [Z96.611]  Referring practitioner: Bear Vaca MD  Date of Initial Visit: Episode Type: THERAPY  Noted: 2025    Today's Date: 2025  Patient seen for 17 sessions.      Subjective:   Nanci Umanzor reports: feels improvement with R shoulder pain and use with daily functional activities    Subjective Questionnaire: QuickDASH: 43.18  (improved from 56.82)  Clinical Progress: improved  Home Program Compliance: Yes  Treatment has included: therapeutic exercise, neuromuscular re-education, manual therapy, therapeutic activity, and cryotherapy    Subjective   Objective   R shoulder AROM:  Flexion: 140   ABD: 130  IR: hip   ER: C5     R Shoulder PROM:  Flexion: 160  ABD:150  IR: 50  ER: 55     Strength/Myotome Testing      Left Shoulder   Normal muscle strength     Right Shoulder      Planes of Motion   Flexion: 3-   Abduction: 3-   External rotation at 0°: 3-   Assessment/Plan  Progress toward previous goals: Partially Met  Pt is making excellent progress toward goals and progressing appropriately per protocol.   Patient will continue to benefit from PT addressing current limitations and impairments to help patient regain necessary function and meet current goals.  Pt continues to benefit from manual intervention to improve ROM, needs to continue skilled PT to progress strength per protocol.       Goals    SHORT TERM GOALS: 6 weeks  1. Patient to be compliant with HEP and no diff. with sleeping met  2. Increased (R) UE strength to 3+-4/5 with no pain> 5/10 to allow for household and work activities.   3. Pt to exhibit (R) shoulder active flexion / ABD to 135° in standing/sitting to assist with reaching overhead with less pain  Progressing  4. Pt demonstrates improved posture in sitting and standing with minimal-no cues during treatment session met     LONG TERM GOALS: 12 weeks  1. Pt score <15% perceived disability on DASH   2. Pt. to exhibit (R) shoulder AROM to WFL (>/= 160° flex/abd. to allow for reaching overhead and behind back without pain limiting function  3. Pt to exhibit 4+-5/5 UE strength to allow for pushing/pulling and lifting >5 #to occur with pain <2/10      Recommendations: Continue as planned  Timeframe: 1 month  Prognosis to achieve goals: good    PT Signature: Teresa Cantu, PT  [place] Lic. # [id number]      Based upon review of the patient's progress and continued therapy plan, it is my medical opinion that Nanci Umanzor should continue physical therapy treatment at [facility]  [address] [place] [id number] [phone] ; Fax Number [phone]    Signature: __________________________________  Bear Vaca MD    Manual Therapy:    10      mins  51763;  Therapeutic Exercise:    20     mins  52323;     Neuromuscular Amanda:          mins  81972;    Therapeutic Activity:   10        mins  52955;     Gait Training:            mins  37186;     Ultrasound:           mins  86115;    Electrical Stimulation:          mins  41519 ( );  Dry Needling           mins self-pay  Traction           mins 68647  Canalith Repositioning         mins 46358      Timed Treatment:  40   mins   Total Treatment:      50  mins

## 2025-04-21 ENCOUNTER — TREATMENT (OUTPATIENT)
Dept: PHYSICAL THERAPY | Facility: CLINIC | Age: 70
End: 2025-04-21
Payer: MEDICARE

## 2025-04-21 DIAGNOSIS — M25.611 DECREASED RANGE OF MOTION OF RIGHT SHOULDER: ICD-10-CM

## 2025-04-21 DIAGNOSIS — Z96.611 S/P REVERSE TOTAL SHOULDER ARTHROPLASTY, RIGHT: Primary | ICD-10-CM

## 2025-04-21 DIAGNOSIS — M25.511 ACUTE PAIN OF RIGHT SHOULDER: ICD-10-CM

## 2025-04-21 DIAGNOSIS — Z74.09 IMPAIRED FUNCTIONAL MOBILITY AND ACTIVITY TOLERANCE: ICD-10-CM

## 2025-04-21 PROCEDURE — 97110 THERAPEUTIC EXERCISES: CPT | Performed by: PHYSICAL THERAPIST

## 2025-04-21 PROCEDURE — 97112 NEUROMUSCULAR REEDUCATION: CPT | Performed by: PHYSICAL THERAPIST

## 2025-04-21 PROCEDURE — 97140 MANUAL THERAPY 1/> REGIONS: CPT | Performed by: PHYSICAL THERAPIST

## 2025-04-21 NOTE — PROGRESS NOTES
Physical Therapy Daily Treatment Note  [facility] [place]   [address], [address]  [place] [address]  P: [phone]  F: [phone]    Patient: Nanci Umanzor   : 1955  Referring practitioner: Bear Vaca MD  Date of Initial Visit: Type: THERAPY  Noted: 2025  Today's Date: 2025  Patient seen for 18 sessions       Visit Diagnoses:    ICD-10-CM ICD-9-CM   1. S/P reverse total shoulder arthroplasty, right  Z96.611 V43.61   2. Acute pain of right shoulder  M25.511 719.41   3. Decreased range of motion of right shoulder  M25.611 719.51   4. Impaired functional mobility and activity tolerance  Z74.09 V49.89         Nanci Umanzor reports: compliance with HEP, feels R shoulder continues to improve     Subjective     Objective   See Exercise, Manual, and Modality Logs for complete treatment.       Assessment/Plan Compliant/cooperative with current rehab efforts.  Benefits from verbal/tactile cues to ensure correct exercise performance/technique, hold time and position. Plan details: Progress ROM / strengthening / stabilization / functional activity as tolerated       Timed:         Manual Therapy:   10      mins  23931;     Therapeutic Exercise:    20     mins  19858;     Neuromuscular Amanda:   10     mins  73494;    Therapeutic Activity:          mins  83470;     Gait Training:           mins  27189;     Ultrasound:          mins  03008;    Ionto                                   mins  95536  Self Care                            mins  64809  Traction          mins 05424      Un-Timed:  Canalith Repos         mins 66120  Electrical Stimulation:         mins  90743 ( );  Dry Needling          mins self-pay  Traction          mins 04773        Timed Treatment: 40     mins   Total Treatment:     50   mins    Teresa Cantu PT  [place] License #: [id number]    Physical Therapist

## 2025-04-22 ENCOUNTER — TELEPHONE (OUTPATIENT)
Dept: NEUROSURGERY | Facility: CLINIC | Age: 70
End: 2025-04-22
Payer: MEDICARE

## 2025-04-24 ENCOUNTER — TREATMENT (OUTPATIENT)
Dept: PHYSICAL THERAPY | Facility: CLINIC | Age: 70
End: 2025-04-24
Payer: MEDICARE

## 2025-04-24 DIAGNOSIS — Z96.611 S/P REVERSE TOTAL SHOULDER ARTHROPLASTY, RIGHT: Primary | ICD-10-CM

## 2025-04-24 DIAGNOSIS — Z74.09 IMPAIRED FUNCTIONAL MOBILITY AND ACTIVITY TOLERANCE: ICD-10-CM

## 2025-04-24 DIAGNOSIS — M25.611 DECREASED RANGE OF MOTION OF RIGHT SHOULDER: ICD-10-CM

## 2025-04-24 DIAGNOSIS — M25.511 ACUTE PAIN OF RIGHT SHOULDER: ICD-10-CM

## 2025-04-24 PROCEDURE — 97110 THERAPEUTIC EXERCISES: CPT | Performed by: PHYSICAL THERAPIST

## 2025-04-24 PROCEDURE — 97140 MANUAL THERAPY 1/> REGIONS: CPT | Performed by: PHYSICAL THERAPIST

## 2025-04-24 NOTE — PROGRESS NOTES
Physical Therapy Daily Treatment Note  [facility] [place]   [address], [address]  [place] [address]  P: [phone]  F: [phone]    Patient: Nanci Umanzor   : 1955  Referring practitioner: Bear Vaca MD  Date of Initial Visit: Type: THERAPY  Noted: 2025  Today's Date: 2025  Patient seen for 19 sessions       Visit Diagnoses:    ICD-10-CM ICD-9-CM   1. S/P reverse total shoulder arthroplasty, right  Z96.611 V43.61   2. Acute pain of right shoulder  M25.511 719.41   3. Decreased range of motion of right shoulder  M25.611 719.51   4. Impaired functional mobility and activity tolerance  Z74.09 V49.89         Nanci Umanzor reports: returned to work yesterday and tolerated well with R shoulder. Feels R shoulder is improving     Subjective     Objective   See Exercise, Manual, and Modality Logs for complete treatment.       Assessment/Plan R shoulder IR remains most limited but improving. Plan details: Progress ROM / strengthening / stabilization / functional activity as tolerated       Timed:         Manual Therapy:  15       mins  25820;     Therapeutic Exercise:  15       mins  44393;     Neuromuscular Amanda:        mins  98554;    Therapeutic Activity:          mins  36783;     Gait Training:           mins  11015;     Ultrasound:          mins  03740;    Ionto                                   mins  26184  Self Care                            mins  10641  Traction          mins 23300      Un-Timed:  Canalith Repos         mins 49756  Electrical Stimulation:         mins  55954 ( );  Dry Needling          mins self-pay  Traction          mins 44457        Timed Treatment:   30   mins   Total Treatment:       40  mins    Teresa Cantu PT  [place] License #: [id number]    Physical Therapist

## 2025-04-28 ENCOUNTER — TREATMENT (OUTPATIENT)
Dept: PHYSICAL THERAPY | Facility: CLINIC | Age: 70
End: 2025-04-28
Payer: MEDICARE

## 2025-04-28 DIAGNOSIS — Z74.09 IMPAIRED FUNCTIONAL MOBILITY AND ACTIVITY TOLERANCE: ICD-10-CM

## 2025-04-28 DIAGNOSIS — M25.511 ACUTE PAIN OF RIGHT SHOULDER: ICD-10-CM

## 2025-04-28 DIAGNOSIS — Z96.611 S/P REVERSE TOTAL SHOULDER ARTHROPLASTY, RIGHT: Primary | ICD-10-CM

## 2025-04-28 DIAGNOSIS — M25.611 DECREASED RANGE OF MOTION OF RIGHT SHOULDER: ICD-10-CM

## 2025-04-28 PROCEDURE — 97112 NEUROMUSCULAR REEDUCATION: CPT | Performed by: PHYSICAL THERAPIST

## 2025-04-28 PROCEDURE — 97140 MANUAL THERAPY 1/> REGIONS: CPT | Performed by: PHYSICAL THERAPIST

## 2025-04-28 PROCEDURE — 97110 THERAPEUTIC EXERCISES: CPT | Performed by: PHYSICAL THERAPIST

## 2025-04-28 NOTE — PROGRESS NOTES
Physical Therapy Daily Treatment Note  [facility] [place]   [address], [address]  [place] [address]  P: [phone]  F: [phone]    Patient: Nanci Umanzor   : 1955  Referring practitioner: Bear Vaca MD  Date of Initial Visit: Type: THERAPY  Noted: 2025  Today's Date: 2025  Patient seen for 20 sessions       Visit Diagnoses:    ICD-10-CM ICD-9-CM   1. S/P reverse total shoulder arthroplasty, right  Z96.611 V43.61   2. Acute pain of right shoulder  M25.511 719.41   3. Decreased range of motion of right shoulder  M25.611 719.51   4. Impaired functional mobility and activity tolerance  Z74.09 V49.89         Nanci Umanzor reports: R shoulder has been sore after returning to work.     Subjective     Objective   See Exercise, Manual, and Modality Logs for complete treatment.       Assessment/Plan good tolerance to progression of strengthening exercises today. Plan details: Progress ROM / strengthening / stabilization / functional activity as tolerated       Timed:         Manual Therapy: 10        mins  07224;     Therapeutic Exercise:   15      mins  09428;     Neuromuscular Amanda:    15    mins  96432;    Therapeutic Activity:          mins  19371;     Gait Training:           mins  30200;     Ultrasound:          mins  90067;    Ionto                                  mins  71004  Self Care                            mins  66493  Traction          mins 23789      Un-Timed:  Canalith Repos         mins 68562  Electrical Stimulation:         mins  00793 (MC );  Dry Needling          mins self-pay  Traction          mins 24006        Timed Treatment: 40    mins   Total Treatment:     50   mins    Teresa Cantu PT  [place] License #: [id number]    Physical Therapist

## 2025-04-29 RX ORDER — METHYLPREDNISOLONE 4 MG/1
TABLET ORAL
Qty: 21 TABLET | Refills: 0 | Status: SHIPPED | OUTPATIENT
Start: 2025-04-29

## 2025-05-01 ENCOUNTER — TREATMENT (OUTPATIENT)
Dept: PHYSICAL THERAPY | Facility: CLINIC | Age: 70
End: 2025-05-01
Payer: MEDICARE

## 2025-05-01 DIAGNOSIS — Z96.611 S/P REVERSE TOTAL SHOULDER ARTHROPLASTY, RIGHT: Primary | ICD-10-CM

## 2025-05-01 DIAGNOSIS — Z74.09 IMPAIRED FUNCTIONAL MOBILITY AND ACTIVITY TOLERANCE: ICD-10-CM

## 2025-05-01 DIAGNOSIS — M25.611 DECREASED RANGE OF MOTION OF RIGHT SHOULDER: ICD-10-CM

## 2025-05-01 DIAGNOSIS — M25.511 ACUTE PAIN OF RIGHT SHOULDER: ICD-10-CM

## 2025-05-01 PROCEDURE — 97110 THERAPEUTIC EXERCISES: CPT | Performed by: PHYSICAL THERAPIST

## 2025-05-01 PROCEDURE — 97140 MANUAL THERAPY 1/> REGIONS: CPT | Performed by: PHYSICAL THERAPIST

## 2025-05-01 PROCEDURE — 97112 NEUROMUSCULAR REEDUCATION: CPT | Performed by: PHYSICAL THERAPIST

## 2025-05-01 NOTE — PROGRESS NOTES
Physical Therapy Daily Treatment Note  [facility] [place]   [address]  [place] [address]  P: [phone]  F: [phone]    Patient: Nanci Umanzor   : 1955  Referring practitioner: Bear Vaca MD  Date of Initial Visit: Type: THERAPY  Noted: 2025  Today's Date: 2025  Patient seen for 21 sessions       Visit Diagnoses:    ICD-10-CM ICD-9-CM   1. S/P reverse total shoulder arthroplasty, right  Z96.611 V43.61   2. Acute pain of right shoulder  M25.511 719.41   3. Decreased range of motion of right shoulder  M25.611 719.51   4. Impaired functional mobility and activity tolerance  Z74.09 V49.89         Nanci Umanzor reports: followed up with , pleased with progress.     Subjective     Objective   See Exercise, Manual, and Modality Logs for complete treatment.       Assessment/Plan pt tolerated progression of resistive exercises well today. Plan details: Progress ROM / strengthening / stabilization / functional activity as tolerated       Timed:         Manual Therapy:    10     mins  04110;     Therapeutic Exercise:    10     mins  66831;     Neuromuscular mAanda:    20    mins  23953;    Therapeutic Activity:          mins  11057;     Gait Training:           mins  57967;     Ultrasound:          mins  83801;    Ionto                                   mins  80102  Self Care                            mins  08366  Traction          mins 99810      Un-Timed:  Canalith Repos         mins 40966  Electrical Stimulation:         mins  11341 (MC );  Dry Needling          mins self-pay  Traction          mins 69123        Timed Treatment:   40   mins   Total Treatment:     50   mins    Teresa Cantu PT  [place] License #: [id number]    Physical Therapist

## 2025-05-05 RX ORDER — ALBUTEROL SULFATE 0.83 MG/ML
2.5 SOLUTION RESPIRATORY (INHALATION) EVERY 4 HOURS PRN
Qty: 3 ML | Refills: 2 | Status: SHIPPED | OUTPATIENT
Start: 2025-05-05

## 2025-05-08 ENCOUNTER — TREATMENT (OUTPATIENT)
Dept: PHYSICAL THERAPY | Facility: CLINIC | Age: 70
End: 2025-05-08
Payer: MEDICARE

## 2025-05-08 DIAGNOSIS — M25.611 DECREASED RANGE OF MOTION OF RIGHT SHOULDER: ICD-10-CM

## 2025-05-08 DIAGNOSIS — Z96.611 S/P REVERSE TOTAL SHOULDER ARTHROPLASTY, RIGHT: Primary | ICD-10-CM

## 2025-05-08 DIAGNOSIS — M25.511 ACUTE PAIN OF RIGHT SHOULDER: ICD-10-CM

## 2025-05-08 DIAGNOSIS — Z74.09 IMPAIRED FUNCTIONAL MOBILITY AND ACTIVITY TOLERANCE: ICD-10-CM

## 2025-05-08 PROCEDURE — 97140 MANUAL THERAPY 1/> REGIONS: CPT | Performed by: PHYSICAL THERAPIST

## 2025-05-08 PROCEDURE — 97110 THERAPEUTIC EXERCISES: CPT | Performed by: PHYSICAL THERAPIST

## 2025-05-08 PROCEDURE — 97112 NEUROMUSCULAR REEDUCATION: CPT | Performed by: PHYSICAL THERAPIST

## 2025-05-08 NOTE — PROGRESS NOTES
Physical Therapy Daily Treatment Note  [facility] [place]   [address]  [place] [address]  P: [phone]  F: [phone]    Patient: Nanci Umanzor   : 1955  Referring practitioner: Bear Vaca MD  Date of Initial Visit: Type: THERAPY  Noted: 2025  Today's Date: 2025  Patient seen for 22 sessions       Visit Diagnoses:    ICD-10-CM ICD-9-CM   1. S/P reverse total shoulder arthroplasty, right  Z96.611 V43.61   2. Acute pain of right shoulder  M25.511 719.41   3. Decreased range of motion of right shoulder  M25.611 719.51   4. Impaired functional mobility and activity tolerance  Z74.09 V49.89         Nanci Umanzor reports: reaching behind back motion is getting better     Subjective     Objective   See Exercise, Manual, and Modality Logs for complete treatment.       Assessment/Plan Compliant/cooperative with current rehab efforts.  Benefits from verbal/tactile cues to ensure correct exercise performance/technique, hold time and position. Plan details: Progress ROM / strengthening / stabilization / functional activity as tolerated       Timed:         Manual Therapy:    10     mins  38091;     Therapeutic Exercise:      15   mins  04463;     Neuromuscular Amanda:   15     mins  12877;    Therapeutic Activity:          mins  57327;     Gait Training:           mins  38715;     Ultrasound:          mins  36019;    Ionto                                   mins  55645  Self Care                            mins  54826  Traction          mins 86664      Un-Timed:  Canalith Repos         mins 26241  Electrical Stimulation:         mins  32569 ( );  Dry Needling          mins self-pay  Traction          mins 41493        Timed Treatment:   40   mins   Total Treatment:     50   mins    Teresa Cantu PT  [place] License #: [id number]    Physical Therapist

## 2025-05-19 DIAGNOSIS — I10 ESSENTIAL HYPERTENSION: ICD-10-CM

## 2025-05-19 RX ORDER — AMLODIPINE BESYLATE 5 MG/1
5 TABLET ORAL DAILY
Qty: 90 TABLET | Refills: 1 | Status: SHIPPED | OUTPATIENT
Start: 2025-05-19 | End: 2025-05-20 | Stop reason: SDUPTHER

## 2025-05-19 RX ORDER — BENAZEPRIL HYDROCHLORIDE 10 MG/1
10 TABLET ORAL DAILY
Qty: 90 TABLET | Refills: 1 | Status: SHIPPED | OUTPATIENT
Start: 2025-05-19 | End: 2025-05-20 | Stop reason: SDUPTHER

## 2025-05-20 ENCOUNTER — PATIENT ROUNDING (BHMG ONLY) (OUTPATIENT)
Dept: FAMILY MEDICINE CLINIC | Facility: CLINIC | Age: 70
End: 2025-05-20

## 2025-05-20 ENCOUNTER — OFFICE VISIT (OUTPATIENT)
Dept: FAMILY MEDICINE CLINIC | Facility: CLINIC | Age: 70
End: 2025-05-20
Payer: MEDICARE

## 2025-05-20 VITALS
HEART RATE: 93 BPM | HEIGHT: 58 IN | OXYGEN SATURATION: 98 % | BODY MASS INDEX: 22.25 KG/M2 | DIASTOLIC BLOOD PRESSURE: 74 MMHG | WEIGHT: 106 LBS | SYSTOLIC BLOOD PRESSURE: 110 MMHG

## 2025-05-20 DIAGNOSIS — E55.9 HYPOVITAMINOSIS D: ICD-10-CM

## 2025-05-20 DIAGNOSIS — M81.8 OTHER OSTEOPOROSIS WITHOUT CURRENT PATHOLOGICAL FRACTURE: ICD-10-CM

## 2025-05-20 DIAGNOSIS — J30.1 SEASONAL ALLERGIC RHINITIS DUE TO POLLEN: ICD-10-CM

## 2025-05-20 DIAGNOSIS — Z86.0101 HISTORY OF ADENOMATOUS POLYP OF COLON: ICD-10-CM

## 2025-05-20 DIAGNOSIS — L65.9 NONSCARRING HAIR LOSS, UNSPECIFIED: Primary | ICD-10-CM

## 2025-05-20 DIAGNOSIS — I10 ESSENTIAL HYPERTENSION: ICD-10-CM

## 2025-05-20 DIAGNOSIS — I77.810 ASCENDING AORTA DILATATION: ICD-10-CM

## 2025-05-20 DIAGNOSIS — Z78.0 POST-MENOPAUSAL: ICD-10-CM

## 2025-05-20 DIAGNOSIS — J45.901 PERSISTENT ASTHMA WITH ACUTE EXACERBATION, UNSPECIFIED ASTHMA SEVERITY: ICD-10-CM

## 2025-05-20 DIAGNOSIS — E78.01 FAMILIAL HYPERCHOLESTEROLEMIA: ICD-10-CM

## 2025-05-20 DIAGNOSIS — J44.89 COPD (CHRONIC OBSTRUCTIVE PULMONARY DISEASE) WITH CHRONIC BRONCHITIS: ICD-10-CM

## 2025-05-20 DIAGNOSIS — K21.9 GASTROESOPHAGEAL REFLUX DISEASE WITHOUT ESOPHAGITIS: ICD-10-CM

## 2025-05-20 PROBLEM — M75.51 SUBACROMIAL BURSITIS OF RIGHT SHOULDER JOINT: Status: ACTIVE | Noted: 2024-12-11

## 2025-05-20 PROBLEM — K57.92 DIVERTICULITIS: Status: RESOLVED | Noted: 2020-08-19 | Resolved: 2025-05-20

## 2025-05-20 PROBLEM — K62.5 COLITIS WITH RECTAL BLEEDING: Status: RESOLVED | Noted: 2023-01-04 | Resolved: 2025-05-20

## 2025-05-20 PROBLEM — K52.9 COLITIS WITH RECTAL BLEEDING: Status: RESOLVED | Noted: 2023-01-04 | Resolved: 2025-05-20

## 2025-05-20 PROBLEM — S32.050A CLOSED COMPRESSION FRACTURE OF FIFTH LUMBAR VERTEBRA: Status: RESOLVED | Noted: 2017-08-09 | Resolved: 2025-05-20

## 2025-05-20 PROBLEM — M75.121 NONTRAUMATIC COMPLETE TEAR OF RIGHT ROTATOR CUFF: Status: ACTIVE | Noted: 2024-12-11

## 2025-05-20 PROBLEM — M80.00XA AGE-RELATED OSTEOPOROSIS WITH CURRENT PATHOLOGICAL FRACTURE: Status: ACTIVE | Noted: 2017-08-09

## 2025-05-20 PROBLEM — I38 LEAKY HEART VALVE: Status: ACTIVE | Noted: 2023-01-04

## 2025-05-20 PROBLEM — M19.019 OSTEOARTHRITIS OF SHOULDER: Status: ACTIVE | Noted: 2024-12-09

## 2025-05-20 PROBLEM — M51.369 DDD (DEGENERATIVE DISC DISEASE), LUMBAR: Status: ACTIVE | Noted: 2023-02-21

## 2025-05-20 RX ORDER — FLUTICASONE PROPIONATE 50 MCG
2 SPRAY, SUSPENSION (ML) NASAL DAILY
Qty: 16 G | Refills: 11 | Status: SHIPPED | OUTPATIENT
Start: 2025-05-20 | End: 2025-06-03

## 2025-05-20 RX ORDER — ATORVASTATIN CALCIUM 40 MG/1
40 TABLET, FILM COATED ORAL DAILY
Qty: 90 TABLET | Refills: 1 | Status: SHIPPED | OUTPATIENT
Start: 2025-05-20

## 2025-05-20 RX ORDER — FLUTICASONE FUROATE 100 UG/1
1 POWDER RESPIRATORY (INHALATION) DAILY
COMMUNITY
Start: 2025-05-14

## 2025-05-20 RX ORDER — PANTOPRAZOLE SODIUM 40 MG/1
40 TABLET, DELAYED RELEASE ORAL DAILY
Qty: 90 TABLET | Refills: 1 | Status: SHIPPED | OUTPATIENT
Start: 2025-05-20

## 2025-05-20 RX ORDER — BENAZEPRIL HYDROCHLORIDE 10 MG/1
10 TABLET ORAL DAILY
Qty: 90 TABLET | Refills: 1 | Status: SHIPPED | OUTPATIENT
Start: 2025-05-20

## 2025-05-20 RX ORDER — ALBUTEROL SULFATE 90 UG/1
2 INHALANT RESPIRATORY (INHALATION) EVERY 4 HOURS PRN
Qty: 8.5 G | Refills: 11 | Status: SHIPPED | OUTPATIENT
Start: 2025-05-20

## 2025-05-20 RX ORDER — AMLODIPINE BESYLATE 5 MG/1
5 TABLET ORAL DAILY
Qty: 90 TABLET | Refills: 1 | Status: SHIPPED | OUTPATIENT
Start: 2025-05-20

## 2025-05-20 NOTE — PROGRESS NOTES
A My-Chart message has been sent to the patient for PATIENT ROUNDING with [facility]

## 2025-05-20 NOTE — PROGRESS NOTES
[facility]  Primary Care    Chief Complaint  Establish Care and Alopecia    Subjective        Nanci Umanzor presents to [facility] MEDICAL [facility] PRIMARY CARE  History of Present Illness  Nanci is a 70-year-old female who presents today to re-establish care with the office.  Her past medical history is significant for hypertension, hyperlipidemia, osteoporosis, vitamin D deficiency, ascending aortic dilation, COPD and former tobacco use.      COPD: She has a 17-pack-year smoking history and quit smoking in 2013.  Her most recent yearly CT for lung cancer screening was completed 10/15/2024. She states that she is supposed to have a recheck 6 months from her previous CT, but I was able to review  the results from that study and it does show that she does not need a repeat for one year.     Osteoporosis and Vit D Def: She is also asking to get setup to resume her Prolia injection through this office. She is due to have her next injection at the end of this month. She has a h/o fractures in bilateral lower extremities secondary to her osteoporosis. She is on daily Calcium and Vit D    Hyperlipidemia: she is treated with Lipitor 40mg QHS. She is tolerating the medication without issue. Last LDL was 81 and liver enzymes were within normal limits. Last lab check was 11/26/2024.     Hypertension: she is treated for her HTN with benazapril and amlodipine. She denies headaches, chest pain or lower extremity edema. Last GFR was 98.6 and was checked on 1/31/2025.     Aortic dilation: this is monitored annually by vascular and has showed no progression.    GERD: patient has long history of GERD and is well-controlled with pantaprazole daily. She has no evidence of reduced renal function. She is on treatment for osteoporosis. She has no breakthrough symptoms with the use of her PPI.    Hair loss: she mentions concern for hair loss. She was first made aware of it my her hairdresser. She denies patches of lost  "hair and more complains that it has thinned significantly along the top of her scalp. She denies itching or irritation, changes in hair products or medications that correlate with the change.           Review of Systems   Constitutional:  Positive for fatigue. Negative for appetite change and fever.   HENT:  Positive for rhinorrhea and sneezing.    Respiratory:  Negative for cough, chest tightness, shortness of breath and wheezing.    Cardiovascular:  Negative for chest pain, palpitations and leg swelling.   Gastrointestinal:  Negative for abdominal pain, constipation, diarrhea, nausea and vomiting.   Musculoskeletal:  Positive for arthralgias. Negative for myalgias.   Skin:         Hair thinning    Neurological: Negative.         Objective   Vital Signs:  /74 (BP Location: Left arm, Patient Position: Sitting, Cuff Size: Adult)   Pulse 93   Ht 147.3 cm (57.99\")   Wt 48.1 kg (106 lb)   SpO2 98%   BMI 22.16 kg/m²   Estimated body mass index is 22.16 kg/m² as calculated from the following:    Height as of this encounter: 147.3 cm (57.99\").    Weight as of this encounter: 48.1 kg (106 lb).    BMI is within normal parameters. No other follow-up for BMI required.      Physical Exam  Vitals and nursing note reviewed.   Constitutional:       General: She is not in acute distress.     Appearance: Normal appearance.   Neck:      Vascular: No carotid bruit.   Cardiovascular:      Rate and Rhythm: Normal rate and regular rhythm.      Heart sounds: Normal heart sounds.   Pulmonary:      Effort: Pulmonary effort is normal.      Breath sounds: Normal breath sounds. No wheezing, rhonchi or rales.   Skin:     General: Skin is warm and dry.      Comments: Diffuse, even thinning of hair on scalp without erythema, skin flaking or other skin changes.    Neurological:      Mental Status: She is alert and oriented to person, place, and time.   Psychiatric:         Mood and Affect: Mood normal.         Behavior: Behavior normal. "        Result Review :                Assessment and Plan   Diagnoses and all orders for this visit:    1. Nonscarring hair loss, unspecified (Primary)  Assessment & Plan:  Get updated TSH since this problem started several months after last TSH check. Referral to dermatology for further evaluation and management also placed.     Orders:  -     Ambulatory Referral to Dermatology  -     TSH; Future    2. Essential hypertension  Assessment & Plan:  BP well-controlled. No adverse effects from medications. Continue Amlodipine 5mg and Benazepril 10mg daily. Patient to return later this week for updated labs.     Orders:  -     benazepril (LOTENSIN) 10 MG tablet; Take 1 tablet by mouth Daily.  Dispense: 90 tablet; Refill: 1  -     amLODIPine (NORVASC) 5 MG tablet; Take 1 tablet by mouth Daily.  Dispense: 90 tablet; Refill: 1  -     Comprehensive metabolic panel; Future    3. Familial hypercholesterolemia  Assessment & Plan:  Lipid panel shows well-controlled cholesterol. Patient tolerating statin well. Liver enzymes WNLs. Continue Lipitor 40mg QHS. Plan to recheck fasting labs this week.     Orders:  -     atorvastatin (LIPITOR) 40 MG tablet; Take 1 tablet by mouth Daily.  Dispense: 90 tablet; Refill: 1  -     Comprehensive metabolic panel; Future  -     Lipid panel; Future    4. Seasonal allergic rhinitis due to pollen  Assessment & Plan:  Well-controlled with Flonase. Patient prefers this be prescribed to the pharmacy since insurance will cover cost.     Orders:  -     fluticasone (FLONASE) 50 MCG/ACT nasal spray; Administer 2 sprays into the nostril(s) as directed by provider Daily for 14 days.  Dispense: 16 g; Refill: 11    5. COPD (chronic obstructive pulmonary disease) with chronic bronchitis  Assessment & Plan:  Well controlled with Advair BID and PRN use of rescue inhaler. LDCT for lung cancer screening completed 10/2024 and good for one year.     Orders:  -     albuterol sulfate  (90 Base) MCG/ACT inhaler;  Inhale 2 puffs Every 4 (Four) Hours As Needed for Wheezing.  Dispense: 8.5 g; Refill: 11    6. Persistent asthma with acute exacerbation, unspecified asthma severity  -     albuterol sulfate  (90 Base) MCG/ACT inhaler; Inhale 2 puffs Every 4 (Four) Hours As Needed for Wheezing.  Dispense: 8.5 g; Refill: 11    7. Ascending aorta dilatation  Assessment & Plan:  Monitored by vascular surgeon. Last chest CT performed 10/2024 and showed no progression. Patient to follow with vascular annually.      8. Hypovitaminosis D  Assessment & Plan:  Patient on daily, OTC, Vit D. Plan to recheck level this week.     Orders:  -     Vitamin D 25 hydroxy; Future    9. History of adenomatous polyp of colon  Assessment & Plan:  C-scope completed 9/30/2022 and good for 5 years.       10. Gastroesophageal reflux disease without esophagitis  Assessment & Plan:  Controlled with pantoprazole. Renal function normal. Continue the same.     Orders:  -     pantoprazole (PROTONIX) 40 MG EC tablet; Take 1 tablet by mouth Daily.  Dispense: 90 tablet; Refill: 1    11. Post-menopausal  -     DEXA Bone Density Axial    12. Other osteoporosis without current pathological fracture  -     DEXA Bone Density Axial           I spent 42 minutes caring for Nanci on this date of service. This time includes time spent by me in the following activities:preparing for the visit, reviewing tests, obtaining and/or reviewing a separately obtained history, performing a medically appropriate examination and/or evaluation , counseling and educating the patient/family/caregiver, ordering medications, tests, or procedures, documenting information in the medical record, independently interpreting results and communicating that information with the patient/family/caregiver, and care coordination  Follow Up   Return for fasting labs later this week. Medicare Wellness after 6/18/2025.  Patient was given instructions and counseling regarding her condition or for health  maintenance advice. Please see specific information pulled into the AVS if appropriate.     RTC after 6/18/2025 for sub MCW  RTC 6 months for annual physical with fasting labs prior to visit       Paty Anderson PA-C  [address]  [address] [phone]

## 2025-05-21 DIAGNOSIS — M81.8 OTHER OSTEOPOROSIS WITHOUT CURRENT PATHOLOGICAL FRACTURE: Primary | ICD-10-CM

## 2025-05-21 PROBLEM — L65.9 NONSCARRING HAIR LOSS, UNSPECIFIED: Status: ACTIVE | Noted: 2025-05-21

## 2025-05-21 PROBLEM — J30.1 SEASONAL ALLERGIC RHINITIS DUE TO POLLEN: Status: ACTIVE | Noted: 2025-05-21

## 2025-05-21 NOTE — ASSESSMENT & PLAN NOTE
BP well-controlled. No adverse effects from medications. Continue Amlodipine 5mg and Benazepril 10mg daily. Patient to return later this week for updated labs.

## 2025-05-21 NOTE — ASSESSMENT & PLAN NOTE
Well-controlled with Flonase. Patient prefers this be prescribed to the pharmacy since insurance will cover cost.

## 2025-05-21 NOTE — INTERVAL H&P NOTE
H&P reviewed. The patient was examined and there are no changes to the H&P.  
Detail Level: Generalized
Include Location In Plan?: No
Detail Level: Simple
Detail Level: Zone

## 2025-05-21 NOTE — ASSESSMENT & PLAN NOTE
Get updated TSH since this problem started several months after last TSH check. Referral to dermatology for further evaluation and management also placed.

## 2025-05-21 NOTE — ASSESSMENT & PLAN NOTE
Monitored by vascular surgeon. Last chest CT performed 10/2024 and showed no progression. Patient to follow with vascular annually.

## 2025-05-21 NOTE — ASSESSMENT & PLAN NOTE
Lipid panel shows well-controlled cholesterol. Patient tolerating statin well. Liver enzymes WNLs. Continue Lipitor 40mg QHS. Plan to recheck fasting labs this week.

## 2025-05-21 NOTE — ASSESSMENT & PLAN NOTE
Well controlled with Advair BID and PRN use of rescue inhaler. LDCT for lung cancer screening completed 10/2024 and good for one year.

## 2025-05-22 ENCOUNTER — TREATMENT (OUTPATIENT)
Dept: PHYSICAL THERAPY | Facility: CLINIC | Age: 70
End: 2025-05-22
Payer: MEDICARE

## 2025-05-22 DIAGNOSIS — M25.611 DECREASED RANGE OF MOTION OF RIGHT SHOULDER: ICD-10-CM

## 2025-05-22 DIAGNOSIS — M25.511 ACUTE PAIN OF RIGHT SHOULDER: ICD-10-CM

## 2025-05-22 DIAGNOSIS — Z74.09 IMPAIRED FUNCTIONAL MOBILITY AND ACTIVITY TOLERANCE: ICD-10-CM

## 2025-05-22 DIAGNOSIS — Z96.611 S/P REVERSE TOTAL SHOULDER ARTHROPLASTY, RIGHT: Primary | ICD-10-CM

## 2025-05-22 NOTE — PROGRESS NOTES
Re-Assessment / Progress Note  [facility] [place]   [address]  [place] [address]  P: [phone]  F: [phone]  Patient: Nanci Umanzor   : 1955  Diagnosis/ICD-10 Code:  S/P reverse total shoulder arthroplasty, right [Z96.611]  Referring practitioner: Bear Vaca MD  Date of Initial Visit: Episode Type: THERAPY  Noted: 2025    Today's Date: 2025  Patient seen for 23 sessions.      Subjective:   Nanci Umanzor reports: feels R shoulder continues to improve, better tolerance to daily activities but still having difficulty reaching behind her back,  no pain     Subjective Questionnaire: QuickDASH: 27.27 (improved from 43.18 last reading)  Clinical Progress: improved  Home Program Compliance: Yes  Treatment has included: therapeutic exercise, neuromuscular re-education, manual therapy, therapeutic activity, and cryotherapy    Subjective   Objective   R shoulder AROM:  Flexion: 144   ABD: 140 IR: hip   ER: C7      R Shoulder PROM:  Flexion: 160  ABD:150  IR: 50  ER: 55     Strength/Myotome Testing      Left Shoulder   Normal muscle strength     Right Shoulder      Planes of Motion   Flexion: 3+  Abduction: 3  External rotation at 0°: 3  Assessment/Plan  Progress toward previous goals: Partially Met    Pt is making excellent progress toward goals and progressing appropriately per protocol.   Patient will continue to benefit from PT addressing current limitations and impairments to help patient regain necessary function and meet current goals.  Pt continues to benefit from manual intervention to improve ROM. Pt remains limited in IR ROM and strength and would benefit to continue skilled PT to address.     Goals    SHORT TERM GOALS: 6 weeks  1. Patient to be compliant with HEP and no diff. with sleeping met  2. Increased (R) UE strength to 3+-4/5 with no pain> 5/10 to allow for household and work activities.   3. Pt to exhibit (R) shoulder active  flexion / ABD to 135° in standing/sitting to assist with reaching overhead with less pain met  4. Pt demonstrates improved posture in sitting and standing with minimal-no cues during treatment session met     LONG TERM GOALS: 12 weeks  1. Pt score <15% perceived disability on DASH   2. Pt. to exhibit (R) shoulder AROM to WFL (>/= 160° flex/abd. to allow for reaching overhead and behind back without pain limiting function  3. Pt to exhibit 4+-5/5 UE strength to allow for pushing/pulling and lifting >5 #to occur with pain <2/10                 Recommendations: Continue as planned  Timeframe: 1 month  Prognosis to achieve goals: good    PT Signature: Teresa Cantu, PT  [place] Lic. # [id number]      Based upon review of the patient's progress and continued therapy plan, it is my medical opinion that Nanci Umanzor should continue physical therapy treatment at [facility]  [address] [place] [id number] [phone] ; Fax Number [phone]    Signature: __________________________________  Bear Vaca MD    Manual Therapy:  15       mins  81381;  Therapeutic Exercise:          mins  80717;     Neuromuscular Amanda:   10      mins  51210;    Therapeutic Activity:     15      mins  93545;     Gait Training:            mins  74301;     Ultrasound:           mins  59003;    Electrical Stimulation:          mins  08085 ( );  Dry Needling           mins self-pay  Traction           mins 21724  Canalith Repositioning         mins 46032      Timed Treatment:   40   mins   Total Treatment:      50  mins

## 2025-05-29 ENCOUNTER — TREATMENT (OUTPATIENT)
Dept: PHYSICAL THERAPY | Facility: CLINIC | Age: 70
End: 2025-05-29
Payer: MEDICARE

## 2025-05-29 DIAGNOSIS — M25.611 DECREASED RANGE OF MOTION OF RIGHT SHOULDER: ICD-10-CM

## 2025-05-29 DIAGNOSIS — Z96.611 S/P REVERSE TOTAL SHOULDER ARTHROPLASTY, RIGHT: Primary | ICD-10-CM

## 2025-05-29 DIAGNOSIS — Z74.09 IMPAIRED FUNCTIONAL MOBILITY AND ACTIVITY TOLERANCE: ICD-10-CM

## 2025-05-29 DIAGNOSIS — M25.511 ACUTE PAIN OF RIGHT SHOULDER: ICD-10-CM

## 2025-05-29 PROCEDURE — 97140 MANUAL THERAPY 1/> REGIONS: CPT | Performed by: PHYSICAL THERAPIST

## 2025-05-29 PROCEDURE — 97110 THERAPEUTIC EXERCISES: CPT | Performed by: PHYSICAL THERAPIST

## 2025-05-29 PROCEDURE — 97112 NEUROMUSCULAR REEDUCATION: CPT | Performed by: PHYSICAL THERAPIST

## 2025-05-29 NOTE — PROGRESS NOTES
Physical Therapy Daily Treatment Note  [facility] [place]   [address], [address]  [place] [address]  P: [phone]  F: [phone]    Patient: Nanci Umanzor   : 1955  Referring practitioner: Sal QUISPE MD  Date of Initial Visit: Type: THERAPY  Noted: 2025  Today's Date: 2025  Patient seen for 24 sessions       Visit Diagnoses:    ICD-10-CM ICD-9-CM   1. S/P reverse total shoulder arthroplasty, right  Z96.611 V43.61   2. Acute pain of right shoulder  M25.511 719.41   3. Decreased range of motion of right shoulder  M25.611 719.51   4. Impaired functional mobility and activity tolerance  Z74.09 V49.89         Nanci Umanzor reports: R shoulder muscles are sore, feels reaching behind back is slowly getting better     Subjective     Objective   See Exercise, Manual, and Modality Logs for complete treatment.       Assessment/Plan Subjectively, pt reports no increase of pain or discomfort with interventions performed today. Performed well with continued functional strengthening interventions. Continues to demonstrate good tolerance to exercise progressions. Continues to benefit from verbal/tactile cues to ensure proper form and technique for exercise performance. Plan details: Progress ROM / strengthening / stabilization / functional activity as tolerated       Timed:         Manual Therapy:  15       mins  75406;     Therapeutic Exercise:   10      mins  07982;     Neuromuscular Amanda:   15     mins  96423;    Therapeutic Activity:          mins  83043;     Gait Training:           mins  81790;     Ultrasound:          mins  19121;    Ionto                                   mins  47037  Self Care                            mins  85131  Traction          mins 02777      Un-Timed:  Canalith Repos         mins 02020  Electrical Stimulation:         mins  51264 ( );  Dry Needling          mins self-pay  Traction          mins 43307        Timed Treatment: 40      mins   Total Treatment:     50   mins    Teresa Cantu, PT  [place] License #: [id number]    Physical Therapist

## 2025-06-02 ENCOUNTER — TREATMENT (OUTPATIENT)
Dept: PHYSICAL THERAPY | Facility: CLINIC | Age: 70
End: 2025-06-02
Payer: MEDICARE

## 2025-06-02 DIAGNOSIS — M25.511 ACUTE PAIN OF RIGHT SHOULDER: ICD-10-CM

## 2025-06-02 DIAGNOSIS — Z74.09 IMPAIRED FUNCTIONAL MOBILITY AND ACTIVITY TOLERANCE: ICD-10-CM

## 2025-06-02 DIAGNOSIS — Z96.611 S/P REVERSE TOTAL SHOULDER ARTHROPLASTY, RIGHT: Primary | ICD-10-CM

## 2025-06-02 DIAGNOSIS — M25.611 DECREASED RANGE OF MOTION OF RIGHT SHOULDER: ICD-10-CM

## 2025-06-02 PROCEDURE — 97112 NEUROMUSCULAR REEDUCATION: CPT | Performed by: PHYSICAL THERAPIST

## 2025-06-02 PROCEDURE — 97110 THERAPEUTIC EXERCISES: CPT | Performed by: PHYSICAL THERAPIST

## 2025-06-02 PROCEDURE — 97140 MANUAL THERAPY 1/> REGIONS: CPT | Performed by: PHYSICAL THERAPIST

## 2025-06-02 PROCEDURE — 97530 THERAPEUTIC ACTIVITIES: CPT | Performed by: PHYSICAL THERAPIST

## 2025-06-02 NOTE — PROGRESS NOTES
Physical Therapy Daily Treatment Note  [facility] [place]   [address], [address]  [place] [address]  P: [phone]  F: [phone]    Patient: Nanci Umanzor   : 1955  Referring practitioner: Bear Vaca MD  Date of Initial Visit: Type: THERAPY  Noted: 2025  Today's Date: 2025  Patient seen for 25 sessions       Visit Diagnoses:    ICD-10-CM ICD-9-CM   1. S/P reverse total shoulder arthroplasty, right  Z96.611 V43.61   2. Acute pain of right shoulder  M25.511 719.41   3. Decreased range of motion of right shoulder  M25.611 719.51   4. Impaired functional mobility and activity tolerance  Z74.09 V49.89         Nanci Umanzor reports: compliance with HEP, feels like reaching behind back motion is getting a little better     Subjective     Objective   See Exercise, Manual, and Modality Logs for complete treatment.       Assessment/PlanSubjectively, pt reports no increase of pain or discomfort with interventions performed today. Performed well with continued functional strengthening interventions. Continues to demonstrate good tolerance to exercise progressions. Continues to benefit from verbal/tactile cues to ensure proper form and technique for exercise performance. Plan details: Progress ROM / strengthening / stabilization / functional activity as tolerated       Timed:         Manual Therapy: 15        mins  29533;     Therapeutic Exercise:  10       mins  69510;     Neuromuscular Amanda: 20       mins  82939;    Therapeutic Activity:     8     mins  36475;     Gait Training:           mins  23331;     Ultrasound:          mins  95939;    Ionto                                   mins  38944  Self Care                            mins  61054  Traction          mins 87327      Un-Timed:  Canalith Repos         mins 64328  Electrical Stimulation:         mins  43822 ( );  Dry Needling          mins self-pay  Traction          mins 53832        Timed  Treatment: 53     mins   Total Treatment:   63     mins    Teresa Cantu, PT  [place] License #: [id number]    Physical Therapist

## 2025-06-05 ENCOUNTER — TELEPHONE (OUTPATIENT)
Dept: PHYSICAL THERAPY | Facility: CLINIC | Age: 70
End: 2025-06-05

## 2025-06-05 NOTE — TELEPHONE ENCOUNTER
Caller: Nanci Umanzor    Relationship:  Self    PATIENT CALLED REQUESTING TO CANCEL SAME DAY APPT.    Did the patient call AFTER the start time of their scheduled appointment?  []YES  [x]NO    Was the patient's appointment rescheduled? [x]YES  []NO    Any additional information: NOT FEELING WELL, STOMACH IS UPSET

## 2025-06-09 ENCOUNTER — TREATMENT (OUTPATIENT)
Dept: PHYSICAL THERAPY | Facility: CLINIC | Age: 70
End: 2025-06-09
Payer: MEDICARE

## 2025-06-09 DIAGNOSIS — M25.611 DECREASED RANGE OF MOTION OF RIGHT SHOULDER: ICD-10-CM

## 2025-06-09 DIAGNOSIS — Z74.09 IMPAIRED FUNCTIONAL MOBILITY AND ACTIVITY TOLERANCE: ICD-10-CM

## 2025-06-09 DIAGNOSIS — Z96.611 S/P REVERSE TOTAL SHOULDER ARTHROPLASTY, RIGHT: Primary | ICD-10-CM

## 2025-06-09 DIAGNOSIS — M25.511 ACUTE PAIN OF RIGHT SHOULDER: ICD-10-CM

## 2025-06-09 PROCEDURE — 97112 NEUROMUSCULAR REEDUCATION: CPT | Performed by: PHYSICAL THERAPIST

## 2025-06-09 PROCEDURE — 97140 MANUAL THERAPY 1/> REGIONS: CPT | Performed by: PHYSICAL THERAPIST

## 2025-06-09 PROCEDURE — 97110 THERAPEUTIC EXERCISES: CPT | Performed by: PHYSICAL THERAPIST

## 2025-06-09 NOTE — PROGRESS NOTES
Physical Therapy Daily Treatment Note  [facility] Physical Therapy [place]   [address], [address]  [place] [address]  P: [phone]  F: [phone]    Patient: Nanci Umanzor   : 1955  Referring practitioner: Bear Vaca MD  Date of Initial Visit: Type: THERAPY  Noted: 2025  Today's Date: 2025  Patient seen for 26 sessions       Visit Diagnoses:    ICD-10-CM ICD-9-CM   1. S/P reverse total shoulder arthroplasty, right  Z96.611 V43.61   2. Acute pain of right shoulder  M25.511 719.41   3. Decreased range of motion of right shoulder  M25.611 719.51   4. Impaired functional mobility and activity tolerance  Z74.09 V49.89         Nanci Umanzor reports: reaching behind back is getting a little better.     Subjective     Objective   See Exercise, Manual, and Modality Logs for complete treatment.       Assessment/Plan Subjectively, pt reports no increase of pain or discomfort with interventions performed today. Performed well with continued functional strengthening interventions. Continues to demonstrate good tolerance to exercise progressions. Continues to benefit from verbal/tactile cues to ensure proper form and technique for exercise performance. Plan details: Progress ROM / strengthening / stabilization / functional activity as tolerated       Timed:         Manual Therapy:   10      mins  41144;     Therapeutic Exercise:    10 mins  00295;     Neuromuscular Amanda:   20     mins  85060;    Therapeutic Activity:          mins  35639;     Gait Training:           mins  72011;     Ultrasound:          mins  69142;    Ionto                                   mins  21577  Self Care                            mins  60768  Traction          mins 61097      Un-Timed:  Canalith Repos         mins 99882  Electrical Stimulation:         mins  18489 ( );  Dry Needling          mins self-pay  Traction          mins 14989        Timed Treatment:  40    mins   Total Treatment:        50 mins    Teresa Cantu, PT  [place] License #: [id number]    Physical Therapist

## 2025-06-16 ENCOUNTER — TREATMENT (OUTPATIENT)
Dept: PHYSICAL THERAPY | Facility: CLINIC | Age: 70
End: 2025-06-16
Payer: MEDICARE

## 2025-06-16 ENCOUNTER — TELEPHONE (OUTPATIENT)
Dept: FAMILY MEDICINE CLINIC | Facility: CLINIC | Age: 70
End: 2025-06-16
Payer: MEDICARE

## 2025-06-16 DIAGNOSIS — Z96.611 S/P REVERSE TOTAL SHOULDER ARTHROPLASTY, RIGHT: Primary | ICD-10-CM

## 2025-06-16 DIAGNOSIS — M25.511 ACUTE PAIN OF RIGHT SHOULDER: ICD-10-CM

## 2025-06-16 DIAGNOSIS — Z74.09 IMPAIRED FUNCTIONAL MOBILITY AND ACTIVITY TOLERANCE: ICD-10-CM

## 2025-06-16 DIAGNOSIS — M25.611 DECREASED RANGE OF MOTION OF RIGHT SHOULDER: ICD-10-CM

## 2025-06-16 PROCEDURE — 97110 THERAPEUTIC EXERCISES: CPT | Performed by: PHYSICAL THERAPIST

## 2025-06-16 PROCEDURE — 97530 THERAPEUTIC ACTIVITIES: CPT | Performed by: PHYSICAL THERAPIST

## 2025-06-16 PROCEDURE — 97112 NEUROMUSCULAR REEDUCATION: CPT | Performed by: PHYSICAL THERAPIST

## 2025-06-16 NOTE — TELEPHONE ENCOUNTER
Spoke with PT. She reports injections being billed incorrectly being done in the infusion center. Advised PT to call her insurance company. PT stated she will not. She can't afford $600 a year for prolia injections. She stated previous provider whom she did injections through had them done within their office and he was with [facility]. She knew when she arrived at the infusion center that it would be more. PT stated is going to find a different provider through [facility] that is not at [facility] that wont be charged so much. Also She wishes someone would have told her how much  her copay would have been.

## 2025-06-16 NOTE — TELEPHONE ENCOUNTER
Verified previous charges and pt paid 300.00 last November. PT will need to call her  insurance. JUSTINE

## 2025-06-16 NOTE — TELEPHONE ENCOUNTER
She would have to call her insurance to see if there is another place this could be done where they would charge less for it. The coding appears to be correct and the infusion site is the only option we are aware of for setting this up.

## 2025-06-16 NOTE — PROGRESS NOTES
Physical Therapy Daily Treatment Note  [facility] Physical Therapy [place]   [address], [address]  [place] [address]  P: [phone]  F: [phone]    Patient: Nanci Umanzor   : 1955  Referring practitioner: Bear Vaca MD  Date of Initial Visit: Type: THERAPY  Noted: 2025  Today's Date: 2025  Patient seen for 27 sessions       Visit Diagnoses:    ICD-10-CM ICD-9-CM   1. S/P reverse total shoulder arthroplasty, right  Z96.611 V43.61   2. Acute pain of right shoulder  M25.511 719.41   3. Decreased range of motion of right shoulder  M25.611 719.51   4. Impaired functional mobility and activity tolerance  Z74.09 V49.89         Nanci Umanzor reports: overall doing good, compliance with HEP     Subjective     Objective   See Exercise, Manual, and Modality Logs for complete treatment.       Assessment/Plan Compliant/cooperative with current rehab efforts.  Benefits from verbal/tactile cues to ensure correct exercise performance/technique, hold time and position. Plan details: Progress ROM / strengthening / stabilization / functional activity as tolerated       Timed:         Manual Therapy:    15     mins  65736;     Therapeutic Exercise:    10     mins  48795;     Neuromuscular Amanda:    15    mins  34185;    Therapeutic Activity:          mins  88250;     Gait Training:           mins  29719;     Ultrasound:          mins  57874;    Ionto                                   mins  95987  Self Care                            mins  54992  Traction          mins 35539      Un-Timed:  Canalith Repos         mins 04531  Electrical Stimulation:        mins  80865 ( );  Dry Needling          mins self-pay  Traction          mins 90907        Timed Treatment:   40   mins   Total Treatment:    50    mins    Teresa Cantu PT  [place] License #: [id number]    Physical Therapist

## 2025-06-16 NOTE — TELEPHONE ENCOUNTER
Caller: Nanci Umanzor    Relationship: Self    Best call back number: [phone]       Who are you requesting to speak with (clinical staff, provider,  specific staff member): CLINICAL STAFF     What was the call regarding: ASKING FOR A CALLBACK BECAUSE HER PROILA INJECTIONS ARE NOT BEING COVERED FULLY BY INSURANCE SHE'S BEING CHARGED $300 EACH INJECTION  BECAUSE SHE'S GETTING THEM DONE AT THE INFUSION CENTER. HER INSURANCE TOLD HER THE WAY ITS BEING BILLED IS WHAT IS COSTING HER AND SHE WANTS TO KNOW IF THERE IS SOMEONE SHE CAN GO TO THAT WILL DO THE INJECTION IN OFFICE INSTEAD OF THE INFUSION CENTER PLEASE CALL AND ADVISE

## 2025-06-23 ENCOUNTER — TREATMENT (OUTPATIENT)
Dept: PHYSICAL THERAPY | Facility: CLINIC | Age: 70
End: 2025-06-23
Payer: MEDICARE

## 2025-06-23 ENCOUNTER — TELEPHONE (OUTPATIENT)
Dept: PAIN MEDICINE | Facility: HOSPITAL | Age: 70
End: 2025-06-23
Payer: MEDICARE

## 2025-06-23 DIAGNOSIS — Z96.611 S/P REVERSE TOTAL SHOULDER ARTHROPLASTY, RIGHT: Primary | ICD-10-CM

## 2025-06-23 DIAGNOSIS — M25.511 ACUTE PAIN OF RIGHT SHOULDER: ICD-10-CM

## 2025-06-23 DIAGNOSIS — Z74.09 IMPAIRED FUNCTIONAL MOBILITY AND ACTIVITY TOLERANCE: ICD-10-CM

## 2025-06-23 DIAGNOSIS — M25.611 DECREASED RANGE OF MOTION OF RIGHT SHOULDER: ICD-10-CM

## 2025-06-23 PROCEDURE — 97140 MANUAL THERAPY 1/> REGIONS: CPT | Performed by: PHYSICAL THERAPIST

## 2025-06-23 PROCEDURE — 97110 THERAPEUTIC EXERCISES: CPT | Performed by: PHYSICAL THERAPIST

## 2025-06-23 PROCEDURE — 97112 NEUROMUSCULAR REEDUCATION: CPT | Performed by: PHYSICAL THERAPIST

## 2025-06-23 NOTE — TELEPHONE ENCOUNTER
TREATMENT,DATE,LEVEL(S) INJECTED:  HAILEE 4/18/25  Level:4-5 (Intralaminar)     At it's best, what percentage of pain relief did you receive after your last treatment and how long did it last? (0% to 100%)   50%    What would you rate your pain on a 0-10 scale?  Prior to your last procedure:  10  For the first 2-3 weeks following your last procedure:  5  Currently: 6    Was your pain improved by at least 50% for 3 months after your last procedure (yes/no):  Yes    Describe the location of your pain and if it radiates:    Burning is much better. Still has numbness and pain.   Pain in back, down right leg to the thigh    Has your function improved?  Tell us how.    (Can sit/stand longer or walk further?  Complete daily activities more easily?  Have been able to go back to work?)  Yes    Are there things you would like or need to do that you can't because of your pain?  Yes    Have you tried/continued to try any other treatments? (Chiropractor, PT, massage, yoga, back exercises, heat/ice)  Home exercises and resistance bands  Cannot do chiro, scared because of brittle bones    What medications are you currently taking to help with your pain and how often?    Aleve    Is there anything else you'd like to tell us that has improved since your last procedure?  Burning is gone. Numbness and tingling is better, but still there.

## 2025-06-23 NOTE — PROGRESS NOTES
Physical Therapy Daily Treatment Note  [facility] Physical Therapy [place]   [address], [address]  [place] [address]  P: [phone]  F: [phone]    Patient: Nanci Umanzor   : 1955  Referring practitioner: Bear Vaca MD  Date of Initial Visit: Type: THERAPY  Noted: 2025  Today's Date: 2025  Patient seen for 28 sessions       Visit Diagnoses:    ICD-10-CM ICD-9-CM   1. S/P reverse total shoulder arthroplasty, right  Z96.611 V43.61   2. Acute pain of right shoulder  M25.511 719.41   3. Decreased range of motion of right shoulder  M25.611 719.51   4. Impaired functional mobility and activity tolerance  Z74.09 V49.89         Nanci Umanzor reports: R shoulder improving, reaching  behind back is improving     Subjective     Objective   See Exercise, Manual, and Modality Logs for complete treatment.       Assessment/Plan Compliant/cooperative with current rehab efforts.  Benefits from verbal/tactile cues to ensure correct exercise performance/technique, hold time and position. Plan details: Progress ROM / strengthening / stabilization / functional activity as tolerated       Timed:         Manual Therapy:    10     mins  52874;     Therapeutic Exercise:    10     mins  91316;     Neuromuscular Amanda:   18     mins  87914;    Therapeutic Activity:          mins  24955;     Gait Training:           mins  04669;     Ultrasound:          mins  96002;    Ionto                                   mins  65163  Self Care                            mins  02790  Traction          mins 93393      Un-Timed:  Canalith Repos         mins 13043  Electrical Stimulation:         mins  59734 ( );  Dry Needling          mins self-pay  Traction          mins 78043        Timed Treatment:  38    mins   Total Treatment:   38     mins    Teresa Cantu PT  [place] License #: [id number]    Physical Therapist

## 2025-06-24 DIAGNOSIS — M81.8 OTHER OSTEOPOROSIS WITHOUT CURRENT PATHOLOGICAL FRACTURE: Primary | ICD-10-CM

## 2025-06-25 ENCOUNTER — INFUSION (OUTPATIENT)
Dept: ONCOLOGY | Facility: HOSPITAL | Age: 70
End: 2025-06-25
Payer: MEDICARE

## 2025-06-25 ENCOUNTER — OFFICE VISIT (OUTPATIENT)
Dept: FAMILY MEDICINE CLINIC | Facility: CLINIC | Age: 70
End: 2025-06-25
Payer: MEDICARE

## 2025-06-25 VITALS
HEART RATE: 77 BPM | TEMPERATURE: 98 F | SYSTOLIC BLOOD PRESSURE: 104 MMHG | OXYGEN SATURATION: 97 % | HEIGHT: 58 IN | DIASTOLIC BLOOD PRESSURE: 82 MMHG | BODY MASS INDEX: 21.75 KG/M2 | WEIGHT: 103.6 LBS

## 2025-06-25 DIAGNOSIS — Z00.00 MEDICARE ANNUAL WELLNESS VISIT, SUBSEQUENT: Primary | ICD-10-CM

## 2025-06-25 DIAGNOSIS — M81.8 OTHER OSTEOPOROSIS WITHOUT CURRENT PATHOLOGICAL FRACTURE: Primary | ICD-10-CM

## 2025-06-25 PROCEDURE — 25010000002 DENOSUMAB 60 MG/ML SOLUTION PREFILLED SYRINGE: Performed by: PHYSICIAN ASSISTANT

## 2025-06-25 PROCEDURE — 96372 THER/PROPH/DIAG INJ SC/IM: CPT

## 2025-06-25 RX ADMIN — DENOSUMAB 60 MG: 60 INJECTION SUBCUTANEOUS at 12:54

## 2025-06-25 NOTE — PROGRESS NOTES
QUICK REFERENCE INFORMATION:  The ABCs of the Annual Wellness Visit    Subsequent Medicare Wellness Visit     HEALTH RISK ASSESSMENT    : 1955    Recent Hospitalizations:  Recently treated at the following:  [facility].  [facility]    Current Medical Providers:  Patient Care Team:  Paty Anderson PA-C as PCP - General (Family Medicine)  Pearl Poole MD as Referring Physician (Internal Medicine)  Rashid Malik III, MD as Consulting Physician (Cardiology)  Sumit Tolbert MD as Consulting Physician (Pulmonary Disease)  Kaitlin Rico APRN as Nurse Practitioner (Cardiothoracic Surgery)    Smoking Status:  Social History     Tobacco Use   Smoking Status Former    Current packs/day: 0.00    Average packs/day: 1 pack/day for 17.5 years (17.5 ttl pk-yrs)    Types: Cigarettes    Start date: 1995    Quit date: 2013    Years since quittin.2    Passive exposure: Past   Smokeless Tobacco Never       Alcohol Consumption:  Social History     Substance and Sexual Activity   Alcohol Use Yes    Alcohol/week: 1.0 standard drink of alcohol    Types: 1 Glasses of wine per week    Comment: 1-2 DRINKS/ NIGHT WINE       Depression Screen:       2025     2:00 PM   PHQ-2/PHQ-9 Depression Screening   Little interest or pleasure in doing things Not at all   Feeling down, depressed, or hopeless Not at all   Trouble falling or staying asleep, or sleeping too much Not at all   Feeling tired or having little energy Not at all   Poor appetite or overeating Not at all   Feeling bad about yourself - or that you are a failure or have let yourself or your family down Not at all   Trouble concentrating on things, such as reading the newspaper or watching television Not at all   Moving or speaking so slowly that other people could have noticed? Or the opposite - being so fidgety or restless that you have been moving around a lot more than usual. Not at all   Thoughts that you would be  better off dead or hurting yourself in some way Not at all   Patient Health Questionnaire-9 Score 0       Health Habits and Functional and Cognitive Screenin/25/2025     2:14 PM   Functional & Cognitive Status   Do you have difficulty preparing food and eating? No   Do you have difficulty bathing yourself, getting dressed or grooming yourself? No   Do you have difficulty using the toilet? No   Do you have difficulty moving around from place to place? No   Do you have trouble with steps or getting out of a bed or a chair? No   Current Diet Well Balanced Diet   Dental Exam Up to date   Eye Exam Up to date   Exercise (times per week) 4 times per week   Current Exercises Include Walking   Do you need help using the phone?  No   Are you deaf or do you have serious difficulty hearing?  No   Do you need help to go to places out of walking distance? No   Do you need help shopping? No   Do you need help preparing meals?  No   Do you need help with housework?  No   Do you need help with laundry? No   Do you need help taking your medications? No   Do you need help managing money? No   Do you ever drive or ride in a car without wearing a seat belt? No   Have you felt unusual fatigue (could be tiredness), stress, anger or loneliness in the last month? No   Who do you live with? Alone   If you need help, do you have trouble finding someone available to you? No   Have you been bothered in the last four weeks by sexual problems? No   Do you have difficulty concentrating, remembering or making decisions? No       Does the patient have evidence of cognitive impairment? No    Mini-Mental State Examination (MMSE)        Instructions: Ask the questions in the order listed. Score one point for each correct response within each question or activity.      Maximum Score  Patient’s Score  Questions    5   5 “What is the year?  Season?  Date?  Day of the week?  Month?”    5  5  “Where are we now: State?  County?  Town/city?   "Hospital?  Floor?”    3  3  The examiner names three unrelated objects clearly and slowly, then asks the patient to name all three of them. The patient’s response is used for scoring. The examiner repeats them until patient learns all of them, if possible. Number of trials: ___________    5  5  “I would like you to count backward from 100 by sevens.” (93, 86, 79, 72, 65, …) Stop after five answers.   Alternative: “Spell WORLD backwards.” (D-[id number]-W)    3  3  “Earlier I told you the names of three things. Can you tell me what those were?”    2   2 Show the patient two simple objects, such as a wristwatch and a pencil, and ask the patient to name them.    1  1  “Repeat the phrase: ‘No ifs, ands, or buts.’”    3   3 “Take the paper in your right hand, fold it in half, and put it on the floor.”   (The examiner gives the patient a piece of blank paper.)    1   1 “Please read this and do what it says.” (Written instruction is “Close your eyes.”)    1  1  “Make up and write a sentence about anything.” (This sentence must contain a noun and a verb.)    1  1  “Please copy this picture.” (The examiner gives the patient a blank piece of paper and asks him/her to draw the symbol below. All 10 angles must be present and two must intersect.)             30  30  TOTAL          Aspirin use counseling: Does not need ASA (and currently is not on it)    Recent Lab Results:    Lab Results   Component Value Date    Glucose 84 06/02/2025    Glucose 105 (H) 01/31/2025    Glucose 98 12/23/2022    Glucose, UA Negative 01/21/2025     No components found for: \"HGBA1C\"  Lab Results   Component Value Date    Chol/HDL Ratio 2.2 05/31/2023    Total Cholesterol 201 (H) 06/02/2025    Total Cholesterol 198 11/26/2024    Triglycerides 61 06/02/2025    Triglycerides 62 11/26/2024    HDL Cholesterol 103 (H) 06/02/2025    HDL Cholesterol 106 (H) 11/26/2024    VLDL Cholesterol 11 11/26/2024    VLDL Cholesterol Rajiv 11 06/02/2025       Age-appropriate " Screening Schedule:  Refer to the list below for future screening recommendations based on patient's age, sex and/or medical conditions. Orders for these recommended tests are listed in the plan section. The patient has been provided with a written plan.    Health Maintenance   Topic Date Due    ZOSTER VACCINE (2 of 2) 2013    DXA SCAN  2025    COVID-19 Vaccine ( season) 2025 (Originally 2024)    LIPID PANEL  2026    ANNUAL WELLNESS VISIT  2026    MAMMOGRAM  10/25/2026    COLORECTAL CANCER SCREENING  2027    TDAP/TD VACCINES (3 - Td or Tdap) 2034    HEPATITIS C SCREENING  Completed        Subjective   History of Present Illness:  Nanci Umanzor is a 70 y.o. female who presents for an Annual Wellness Visit.  Compared to one year ago, the patient feels her physical health is better.  Compared to one year ago, the patient feels her mental health is better.  History of Present Illness     Allergies:  Allergies   Allergen Reactions    Clarithromycin Shortness Of Breath and Palpitations    Fluarix [Influenza Virus Vaccine] Other (See Comments)      GUILLAIN BARRE SYNDROME     Pneumococcal Vaccines Swelling       Social History:  Social History     Socioeconomic History    Marital status:     Number of children: 3    Years of education: AS   Tobacco Use    Smoking status: Former     Current packs/day: 0.00     Average packs/day: 1 pack/day for 17.5 years (17.5 ttl pk-yrs)     Types: Cigarettes     Start date: 1995     Quit date: 2013     Years since quittin.2     Passive exposure: Past    Smokeless tobacco: Never   Vaping Use    Vaping status: Never Used   Substance and Sexual Activity    Alcohol use: Yes     Alcohol/week: 1.0 standard drink of alcohol     Types: 1 Glasses of wine per week     Comment: 1-2 DRINKS/ NIGHT WINE    Drug use: Never    Sexual activity: Not Currently     Partners: Male     Birth control/protection: Post-menopausal        Family History:  Family History   Problem Relation Age of Onset    Heart disease Mother         Mechanical aortic valve, several heart attacks    Hypertension Mother     Hyperlipidemia Mother     Rheum arthritis Mother     Arthritis Mother     Colon polyps Mother     Stroke Mother     Osteoporosis Mother     Seizures Mother     GI problems Mother     Broken bones Mother     Heart attack Mother     Heart failure Mother     Diabetes Father         Type I    Hyperlipidemia Father     Hypertension Father     Heart disease Father         Afib    Thyroid disease Father     Hearing loss Father         Hearing aid    COPD Father     Cancer Father     Depression Father     Miscarriages / Stillbirths Father     Arrhythmia Father     Cancer Sister         Invasive Breast Cancer    Depression Sister         This needs to be removed    Glaucoma Sister     Miscarriages / Stillbirths Sister     Vision loss Sister     Osteoporosis Sister     Hearing loss Sister         Hearing aid    Crohn's disease Sister     GI problems Sister     Depression Sister     Hypertension Brother     Lung disease Brother     Asthma Brother     Hypertension Brother     Asthma Brother     Hyperlipidemia Brother     Hypertension Brother     Hyperlipidemia Brother     Hyperlipidemia Brother     Hypertension Brother     Lung disease Daughter     Asthma Daughter     Hyperlipidemia Daughter     Heart disease Maternal Aunt         Afib    Rheum arthritis Maternal Aunt     Arthritis Maternal Aunt     Stroke Maternal Aunt     Hyperlipidemia Maternal Uncle     Heart disease Maternal Uncle     Hypertension Maternal Uncle     Heart disease Paternal Aunt     Heart disease Maternal Grandmother     Hyperlipidemia Maternal Grandmother     Rheum arthritis Maternal Grandmother     Stroke Maternal Grandmother     Arthritis Maternal Grandmother     Hypertension Maternal Grandmother     Osteoporosis Maternal Grandmother     Heart attack Maternal Grandmother     Heart  disease Maternal Grandfather     Cancer Maternal Grandfather         Pancreatic    Stomach cancer Maternal Grandfather     Heart disease Paternal Grandmother     Hyperlipidemia Paternal Grandmother     Stroke Paternal Grandmother     Hypertension Paternal Grandmother     Heart disease Paternal Grandfather     Cancer Paternal Grandfather         Lung    Heart attack Paternal Grandfather     Asthma Son         Diagnosed 3/24    Asthma Brother     Hyperlipidemia Brother     Asthma Daughter     Cancer Maternal Uncle     COPD Maternal Uncle     Heart disease Maternal Uncle     Hyperlipidemia Maternal Uncle     Heart disease Maternal Aunt     Hyperlipidemia Maternal Aunt     Stroke Paternal Uncle     Cancer Sister         Invasive breast cancer & double mastectomy    Osteoporosis Sister     Asthma Brother     Hypertension Brother     Hyperlipidemia Brother     Asthma Daughter     COPD Maternal Uncle     Arthritis Maternal Aunt     Heart disease Maternal Aunt         Afib    Arrhythmia Maternal Aunt     Hyperlipidemia Maternal Aunt     Hypertension Paternal Uncle     Heart disease Paternal Uncle     Stroke Paternal Uncle     Arthritis Sister         L2 compression fracture    Stroke Maternal Aunt     COPD Maternal Uncle     Heart disease Paternal Uncle     Hyperlipidemia Daughter     Malig Hyperthermia Neg Hx        Past Medical History :  Active Ambulatory Problems     Diagnosis Date Noted    Cervical nerve root disorder 06/10/2016    Essential hypertension 06/10/2016    Hyperlipidemia 06/10/2016    Colon polyps 04/05/2017    Family history of breast cancer 04/05/2017    Hypovitaminosis D 04/05/2017    Bunion 11/05/2018    History of compression fracture of spine 09/12/2019    COPD (chronic obstructive pulmonary disease) with chronic bronchitis 05/01/1999    Low back pain     Neck pain     History of adenomatous polyp of colon 06/20/2022    Personal history of tobacco use, presenting hazards to health 11/17/2022    Heart  palpitations 01/04/2023    Post-menopausal 06/06/2023    Cervical stenosis of spinal canal 10/03/2023    Cervical radiculopathy 10/03/2023    Chronic scapular pain 11/28/2023    Other osteoporosis without current pathological fracture 02/26/2024    Gastroesophageal reflux disease without esophagitis 02/26/2024    Routine health maintenance 02/26/2024    Lung nodule 02/26/2024    Degenerative disc disease, cervical 02/26/2024    History of cervical dysplasia 02/26/2024    History of colitis 02/26/2024    Ascending aorta dilatation 01/20/2025    S/P reverse total shoulder arthroplasty, right 01/30/2025    Age-related osteoporosis with current pathological fracture 08/09/2017    DDD (degenerative disc disease), lumbar 02/21/2023    Subacromial bursitis of right shoulder joint 12/11/2024    Osteoarthritis of shoulder 12/09/2024    Nontraumatic complete tear of right rotator cuff 12/11/2024    Leaky heart valve 01/04/2023    Nonscarring hair loss, unspecified 05/21/2025    Seasonal allergic rhinitis due to pollen 05/21/2025     Resolved Ambulatory Problems     Diagnosis Date Noted    Drug therapy 06/10/2016    Pruritus 04/05/2017    Acute bronchitis with bronchospasm 05/24/2017    Closed compression fracture of fifth lumbar vertebra 08/09/2017    Cellulitis of left upper extremity 04/11/2018    Diverticulitis 08/19/2020    Colitis with rectal bleeding 01/04/2023    Persistent asthma with acute exacerbation 02/26/2024     Past Medical History:   Diagnosis Date    Allergic 1990's    Ankylosing spondylitis of site in spine     Arrhythmia 12/23/22    Asthma     Brain concussion 10/1970    Cataract 2017    Cervical disc disorder     Cervical hyperplasia     Cervical spondylosis     Chronic cough     Chronic pain disorder     Claustrophobia     Colon polyp     COPD (chronic obstructive pulmonary disease)     Coronary artery disease 03/2023    Depression with anxiety     Diverticulitis of colon 2019    Diverticulosis      Fractures     GERD (gastroesophageal reflux disease) 2016    Glaucoma 2006    Heart murmur     Hypertension     Injury of back 10/21/1970    Injury of neck 1953, 10/21/1970    Joint pain     Lumbosacral disc disease     Osteoarthritis     Osteopenia 2016    Osteoporosis     Pneumonia     Pulmonary nodule     Reflex sympathetic dystrophy     Rotator cuff syndrome     Spinal stenosis     Spondylolisthesis     Ulcerative colitis 01/04/23    Visual impairment Since 1yrs of age       Medication List:  Outpatient Encounter Medications as of 6/25/2025   Medication Sig Dispense Refill    acetaminophen (TYLENOL) 325 MG tablet Take 2 tablets by mouth Every 6 (Six) Hours As Needed for Moderate Pain.      albuterol (PROVENTIL) (2.5 MG/3ML) 0.083% nebulizer solution Take 2.5 mg by nebulization Every 4 (Four) Hours As Needed for Wheezing. 3 mL 2    albuterol sulfate  (90 Base) MCG/ACT inhaler Inhale 2 puffs Every 4 (Four) Hours As Needed for Wheezing. 8.5 g 11    amLODIPine (NORVASC) 5 MG tablet Take 1 tablet by mouth Daily. 90 tablet 1    Arnuity Ellipta 100 MCG/ACT aerosol powder  Inhale 1 puff Daily.      atorvastatin (LIPITOR) 40 MG tablet Take 1 tablet by mouth Daily. 90 tablet 1    benazepril (LOTENSIN) 10 MG tablet Take 1 tablet by mouth Daily. 90 tablet 1    BIOTIN PO Take 1 capsule by mouth Daily.      calcium citrate-vitamin d (CITRACAL) 200-250 MG-UNIT tablet tablet Take 1 tablet by mouth Daily.      Cholecalciferol (Vitamin D) 50 MCG (2000 UT) tablet Take 1 tablet by mouth Daily.      Magnesium Oxide -Mg Supplement 400 (240 Mg) MG tablet Take 250 mg by mouth Daily.      Multiple Vitamins-Minerals (CENTRUM SILVER ADULT 50+ PO) Take 1 tablet by mouth Daily. HELD FOR OR      Omega-3 Fatty Acids (FISH OIL PO) Take 1 capsule by mouth Daily. HELD FOR OR      pantoprazole (PROTONIX) 40 MG EC tablet Take 1 tablet by mouth Daily. 90 tablet 1    Prolia 60 MG/ML solution prefilled syringe syringe INJECT 1 ML UNDER THE  SKIN INTO THE APPROPRIATE AREA EVERY 6 MONTHS ONE TIME FOR 1 DOSE AS DIRECTED. (Patient taking differently: Inject 1 mL under the skin into the appropriate area as directed Every 6 (Six) Months.) 1 mL 2    tretinoin (RETIN-A) 0.05 % cream Apply  topically to the appropriate area as directed Every Night. 20 g 5    Turmeric (QC TUMERIC COMPLEX PO) Take  by mouth Daily. HELD FOR OR      vitamin C (ASCORBIC ACID) 500 MG tablet Take 2 tablets by mouth Daily.      fluticasone (FLONASE) 50 MCG/ACT nasal spray Administer 2 sprays into the nostril(s) as directed by provider Daily for 14 days. 16 g 11     Facility-Administered Encounter Medications as of 2025   Medication Dose Route Frequency Provider Last Rate Last Admin    [COMPLETED] denosumab (PROLIA) syringe 60 mg  60 mg Subcutaneous Once SliderPaty PA-C   60 mg at 25 1254     Reviewed use of high risk medication in the elderly: yes  Reviewed for potential of harmful drug interactions in the elderly: yes    Past Surgical History:  Past Surgical History:   Procedure Laterality Date    ANKLE OPEN REDUCTION INTERNAL FIXATION      BUNIONECTOMY Right     CERVICAL CONIZATION      CERVICAL EPIDURAL N/A 2023    Procedure: CERVICAL EPIDURAL STEROID INJECTION CPT: 15603;  Surgeon: Chidi Portillo MD;  Location: [facility] OR;  Service: Pain Management;  Laterality: N/A;     SECTION      ,,    COLONOSCOPY N/A 2017    Procedure: COLONOSCOPY TO CECUM with cold polypectomy;  Surgeon: Alo Staton Jr., MD;  Location: [facility] ENDOSCOPY;  Service:     COLONOSCOPY N/A 2022    Procedure: COLONOSCOPY TO CECUM WITH COLD BIOPSY POLYPECTOMY;  Surgeon: Chuck Servin MD;  Location: [facility] ENDOSCOPY;  Service: Gastroenterology;  Laterality: N/A;  PRE: HX COLON POLYPS  POST: DIVERTICULOSIS, POLYP    ENDOMETRIAL ABLATION  2003    EPIDURAL BLOCK      EYE SURGERY  2006    FINGER SURGERY Right     WITH INSTRUMENTATION    FRACTURE  "SURGERY  2002    GLAUCOMA SURGERY Bilateral     HAND SURGERY      HEMORRHOIDECTOMY  2015    NERVE BLOCK Right 12/29/2023    Procedure: RIGHT SUPRASCAPULAR NERVE BLOCK CPT: 98689;  Surgeon: Chidi Portillo MD;  Location: [facility] OR;  Service: Pain Management;  Laterality: Right;    TOTAL SHOULDER ARTHROPLASTY W/ DISTAL CLAVICLE EXCISION Right 01/30/2025    Procedure: RIGHT TOTAL SHOULDER REVERSE ARTHROPLASTY;  Surgeon: Bera Vaca MD;  Location: [facility];  Service: Orthopedics;  Laterality: Right;    TRIGGER POINT INJECTION      TUBAL ABDOMINAL LIGATION  1992    WRIST FRACTURE SURGERY          The following portions of the patient's history were reviewed and updated as appropriate: allergies, current medications, past family history, past medical history, past social history, past surgical history and problem list.    Review Of Systems:  Review of Systems  I have reviewed and confirmed the accuracy of the ROS as documented by the MA/LPN/RN Paty Anderson PA-C    Objective     Physical Exam:  Vital Signs:  Visit Vitals  /82 (BP Location: Left arm, Patient Position: Sitting, Cuff Size: Adult)   Pulse 77   Temp 98 °F (36.7 °C) (Temporal)   Ht 147.3 cm (57.99\")   Wt 47 kg (103 lb 9.6 oz)   SpO2 97%   BMI 21.66 kg/m²        BMI is within normal parameters. No other follow-up for BMI required.      Physical Exam    Assessment & Plan   Assessment and Plan:  Patient Self-Management and Personalized Health Advice  The patient has been provided with information about: diet, exercise, and prevention of cardiac or vascular disease and preventive services including:   Annual Wellness Visit (AWV).    Advance Care Planning:  ACP discussion was held with the patient during this visit. Patient has an advance directive in EMR which is still valid.   Identification of Risk Factors:  Risk factors include: Cardiovascular risk  Osteoporosis Risk.    Diagnoses and all orders for this visit:    1. Medicare annual " wellness visit, subsequent (Primary)          Follow Up:  Return in about 6 months (around 12/25/2025) for Annual physical with fasting labs one week prior , Annual physical.     An After Visit Summary and PPPS with all of these plans were given to the patient.             Paty Anderson PA-C  [address]  [address]  [phone]

## 2025-06-25 NOTE — NURSING NOTE
Arrived for prolia injection. Indication and side effects reviewed. Denies recent dental work. Labs and medications verified. Prolia administered in L arm without incidence. Instructed to call prescribing MD for any concerns or questions.  Appt card provided with scheduling number and instructed on how to schedule future appts.  Pt vu and discharged in stable condition.

## 2025-06-30 ENCOUNTER — TELEPHONE (OUTPATIENT)
Dept: PHYSICAL THERAPY | Facility: CLINIC | Age: 70
End: 2025-06-30

## 2025-06-30 NOTE — TELEPHONE ENCOUNTER
Caller: Nanci Umanzor    Relationship: Self         What was the call regarding: FAMILY MATTER CAME UP AND SHE CAN NOT MAKE IT IN

## 2025-07-07 ENCOUNTER — TREATMENT (OUTPATIENT)
Dept: PHYSICAL THERAPY | Facility: CLINIC | Age: 70
End: 2025-07-07
Payer: MEDICARE

## 2025-07-07 DIAGNOSIS — Z74.09 IMPAIRED FUNCTIONAL MOBILITY AND ACTIVITY TOLERANCE: ICD-10-CM

## 2025-07-07 DIAGNOSIS — M25.511 ACUTE PAIN OF RIGHT SHOULDER: ICD-10-CM

## 2025-07-07 DIAGNOSIS — Z96.611 S/P REVERSE TOTAL SHOULDER ARTHROPLASTY, RIGHT: Primary | ICD-10-CM

## 2025-07-07 DIAGNOSIS — M25.611 DECREASED RANGE OF MOTION OF RIGHT SHOULDER: ICD-10-CM

## 2025-07-07 PROCEDURE — 97140 MANUAL THERAPY 1/> REGIONS: CPT | Performed by: PHYSICAL THERAPIST

## 2025-07-07 PROCEDURE — 97110 THERAPEUTIC EXERCISES: CPT | Performed by: PHYSICAL THERAPIST

## 2025-07-07 PROCEDURE — 97112 NEUROMUSCULAR REEDUCATION: CPT | Performed by: PHYSICAL THERAPIST

## 2025-07-07 NOTE — PROGRESS NOTES
Physical Therapy Daily Treatment Note  [facility] [place]   [address]  [place] [address]  P: [phone]  F: [phone]    Patient: Nanci Umanzor   : 1955  Referring practitioner: Bear Vaca MD  Date of Initial Visit: Type: THERAPY  Noted: 2025  Today's Date: 2025  Patient seen for 29 sessions       Visit Diagnoses:    ICD-10-CM ICD-9-CM   1. S/P reverse total shoulder arthroplasty, right  Z96.611 V43.61   2. Acute pain of right shoulder  M25.511 719.41   3. Decreased range of motion of right shoulder  M25.611 719.51   4. Impaired functional mobility and activity tolerance  Z74.09 V49.89         Nanci Umanzor reports: reaching behind back is slowly getting better     Subjective     Objective   See Exercise, Manual, and Modality Logs for complete treatment.       Assessment/Plan Compliant/cooperative with current rehab efforts.  Benefits from verbal/tactile cues to ensure correct exercise performance/technique, hold time and position. Plan details: Progress ROM / strengthening / stabilization / functional activity as tolerated       Timed:         Manual Therapy:  10       mins  58214;     Therapeutic Exercise:      8   mins  12964;     Neuromuscular Amanda:    20    mins  18690;    Therapeutic Activity:          mins  00876;     Gait Training:           mins  11737;     Ultrasound:          mins  16207;    Ionto                                   mins  06038  Self Care                            mins  14935  Traction          mins 94809      Un-Timed:  Canalith Repos         mins 49143  Electrical Stimulation:         mins  67310 ( );  Dry Needling          mins self-pay  Traction          mins 99541        Timed Treatment:  38    mins   Total Treatment:    38    mins    Teresa Cantu PT  [place] License #: [id number]    Physical Therapist

## 2025-07-08 ENCOUNTER — TELEPHONE (OUTPATIENT)
Dept: FAMILY MEDICINE CLINIC | Facility: CLINIC | Age: 70
End: 2025-07-08
Payer: MEDICARE

## 2025-07-08 NOTE — TELEPHONE ENCOUNTER
The office does not offer this and pt will need to reach out to the places who is giving the injection.

## 2025-07-14 ENCOUNTER — TREATMENT (OUTPATIENT)
Dept: PHYSICAL THERAPY | Facility: CLINIC | Age: 70
End: 2025-07-14
Payer: MEDICARE

## 2025-07-14 DIAGNOSIS — Z96.611 S/P REVERSE TOTAL SHOULDER ARTHROPLASTY, RIGHT: Primary | ICD-10-CM

## 2025-07-14 DIAGNOSIS — M25.611 DECREASED RANGE OF MOTION OF RIGHT SHOULDER: ICD-10-CM

## 2025-07-14 DIAGNOSIS — M25.511 ACUTE PAIN OF RIGHT SHOULDER: ICD-10-CM

## 2025-07-14 DIAGNOSIS — Z74.09 IMPAIRED FUNCTIONAL MOBILITY AND ACTIVITY TOLERANCE: ICD-10-CM

## 2025-07-14 PROCEDURE — 97164 PT RE-EVAL EST PLAN CARE: CPT | Performed by: PHYSICAL THERAPIST

## 2025-07-14 PROCEDURE — 97140 MANUAL THERAPY 1/> REGIONS: CPT | Performed by: PHYSICAL THERAPIST

## 2025-07-14 PROCEDURE — 97110 THERAPEUTIC EXERCISES: CPT | Performed by: PHYSICAL THERAPIST

## 2025-07-14 NOTE — PROGRESS NOTES
Re-Assessment / Progress Note  [facility]   [address], [address]  [place] [address]  P: [phone]  F: [phone]  Patient: Nanci Umanzor   : 1955  Diagnosis/ICD-10 Code:  S/P reverse total shoulder arthroplasty, right [Z96.611]  Referring practitioner: Bear Vaca MD  Date of Initial Visit: Episode Type: THERAPY  Noted: 2025    Today's Date: 2025  Patient seen for 30 sessions.      Subjective:   Nanci Umanzor reports: having pain and catching in R shoulder / UT area when reaching up for objects that started about a week ago, feels like it's muscle. Following up with  on 25.     Subjective Questionnaire: QuickDASH:   Clinical Progress: improved  Home Program Compliance: Yes  Treatment has included: therapeutic exercise, neuromuscular re-education, manual therapy, therapeutic activity, moist heat, and cryotherapy    Subjective   Objective   TTP/trigger point noted int R UT.  Added STM to R UT with MHP today to address pain in R UT      R shoulder AROM:  Flexion: 165   ABD: 160 IR: lumbar   ER: C7     Strength/Myotome Testing      Left Shoulder   Normal muscle strength     Right Shoulder      Planes of Motion   Flexion: 4  Abduction: 4  External rotation at 0°: 3+    Assessment/Plan  Progress toward previous goals: Partially Met  Pt continues to have limitations in the above mentioned areas and will continue to benefit from skilled PT to help pt regain functional mobility and strength necessary to reduce symptoms and return to PLOF.   Goals  SHORT TERM GOALS: 6 weeks  1. Patient to be compliant with HEP and no diff. with sleeping met  2. Increased (R) UE strength to 3+-4/5 with no pain> 5/10 to allow for household and work activities. met  3. Pt to exhibit (R) shoulder active flexion / ABD to 135° in standing/sitting to assist with reaching overhead with less pain met  4. Pt demonstrates improved posture in sitting and standing with  minimal-no cues during treatment session met     LONG TERM GOALS: 12 weeks  1. Pt score <15% perceived disability on DASH   2. Pt. to exhibit (R) shoulder AROM to WFL (>/= 160° flex/abd. to allow for reaching overhead and behind back without pain limiting function  3. Pt to exhibit 4+-5/5 UE strength to allow for pushing/pulling and lifting >5 #to occur with pain <2/10                             Recommendations: Continue as planned  Timeframe: 1 month  Prognosis to achieve goals: good    PT Signature: Teresa Cantu, PT  [place] Lic. # [id number]      Based upon review of the patient's progress and continued therapy plan, it is my medical opinion that Nanci Umanzor should continue physical therapy treatment at [facility]  [address] [place] [id number] [phone] ; Fax Number [phone]    Signature: __________________________________  Bear Vaca MD    Manual Therapy:    15      mins  10264;  Therapeutic Exercise:       15   mins  69687;     Neuromuscular Amanda:          mins  69197;    Therapeutic Activity:           mins  41199;     Gait Training:            mins  71820;     Ultrasound:           mins  31228;    Electrical Stimulation:          mins  49535 ( );  Dry Needling           mins self-pay  Traction           mins 68202  Canalith Repositioning         mins 92446  Re-eval:    _10___mins     Timed Treatment:   30   mins   Total Treatment:     40   mins

## 2025-07-16 RX ORDER — TRETINOIN 0.5 MG/G
CREAM TOPICAL NIGHTLY
Qty: 20 G | Refills: 5 | Status: SHIPPED | OUTPATIENT
Start: 2025-07-16

## 2025-07-16 NOTE — TELEPHONE ENCOUNTER
Rx Refill Note  Requested Prescriptions     Pending Prescriptions Disp Refills    tretinoin (RETIN-A) 0.05 % cream 20 g 5     Sig: Apply  topically to the appropriate area as directed Every Night.      Last office visit with prescribing clinician: 6/18/2024   Last telemedicine visit with prescribing clinician: Visit date not found   Next office visit with prescribing clinician: Visit date not found                         Would you like a call back once the refill request has been completed: [] Yes [] No    If the office needs to give you a call back, can they leave a voicemail: [] Yes [] No    Kalia Olsen MA  07/16/25, 08:46 EDT

## 2025-07-17 ENCOUNTER — ANESTHESIA (OUTPATIENT)
Dept: FAMILY MEDICINE CLINIC | Facility: CLINIC | Age: 70
End: 2025-07-17

## 2025-07-17 ENCOUNTER — ANESTHESIA EVENT (OUTPATIENT)
Dept: FAMILY MEDICINE CLINIC | Facility: CLINIC | Age: 70
End: 2025-07-17

## 2025-07-21 ENCOUNTER — TREATMENT (OUTPATIENT)
Dept: PHYSICAL THERAPY | Facility: CLINIC | Age: 70
End: 2025-07-21
Payer: MEDICARE

## 2025-07-21 DIAGNOSIS — M25.511 ACUTE PAIN OF RIGHT SHOULDER: ICD-10-CM

## 2025-07-21 DIAGNOSIS — Z96.611 S/P REVERSE TOTAL SHOULDER ARTHROPLASTY, RIGHT: Primary | ICD-10-CM

## 2025-07-21 DIAGNOSIS — Z74.09 IMPAIRED FUNCTIONAL MOBILITY AND ACTIVITY TOLERANCE: ICD-10-CM

## 2025-07-21 DIAGNOSIS — M25.611 DECREASED RANGE OF MOTION OF RIGHT SHOULDER: ICD-10-CM

## 2025-07-21 PROCEDURE — 97140 MANUAL THERAPY 1/> REGIONS: CPT | Performed by: PHYSICAL THERAPIST

## 2025-07-21 PROCEDURE — 97112 NEUROMUSCULAR REEDUCATION: CPT | Performed by: PHYSICAL THERAPIST

## 2025-07-21 NOTE — PROGRESS NOTES
Physical Therapy Daily Treatment Note  [facility]   [address], [address]  [place] [address]  P: [phone]  F: [phone]    Patient: Nanci Umanzor   : 1955  Referring practitioner: Bear Vaca MD  Date of Initial Visit: Type: THERAPY  Noted: 2025  Today's Date: 2025  Patient seen for 31 sessions       Visit Diagnoses:    ICD-10-CM ICD-9-CM   1. S/P reverse total shoulder arthroplasty, right  Z96.611 V43.61   2. Acute pain of right shoulder  M25.511 719.41   3. Decreased range of motion of right shoulder  M25.611 719.51   4. Impaired functional mobility and activity tolerance  Z74.09 V49.89         Nanci Umanzor reports: followed up with Dr due to increased c/o pain, reports the x-rays looked good, Dr thinks the deltoid is source of pain     Subjective     Objective   See Exercise, Manual, and Modality Logs for complete treatment.       Assessment/Plan Subjectively, pt reports no increase of pain or discomfort with interventions performed today. Performed well with continued functional strengthening interventions.  Continues to benefit from verbal/tactile cues to ensure proper form and technique for exercise performance. Plan details: Progress ROM / strengthening / stabilization / functional activity as tolerated       Timed:         Manual Therapy:   10      mins  23780;     Therapeutic Exercise:         mins  67726;     Neuromuscular Amanda: 20       mins  41046;    Therapeutic Activity:          mins  08384;     Gait Training:           mins  35788;     Ultrasound:          mins  67096;    Ionto                                   mins  21130  Self Care                            mins  77054  Traction          mins 37034      Un-Timed:  Canalith Repos         mins 73955  Electrical Stimulation:         mins  72713 ( );  Dry Needling          mins self-pay  Traction          mins 08222        Timed Treatment: 30     mins   Total Treatment:     50    mins    Teresa Cantu, PT  [place] License #: [id number]    Physical Therapist

## 2025-07-23 DIAGNOSIS — M54.12 CERVICAL RADICULOPATHY: ICD-10-CM

## 2025-07-23 DIAGNOSIS — M50.30 DEGENERATIVE DISC DISEASE, CERVICAL: ICD-10-CM

## 2025-07-23 DIAGNOSIS — M51.362 DEGENERATION OF INTERVERTEBRAL DISC OF LUMBAR REGION WITH DISCOGENIC BACK PAIN AND LOWER EXTREMITY PAIN: Primary | ICD-10-CM

## 2025-07-24 ENCOUNTER — TELEPHONE (OUTPATIENT)
Dept: FAMILY MEDICINE CLINIC | Facility: CLINIC | Age: 70
End: 2025-07-24
Payer: MEDICARE

## 2025-07-28 ENCOUNTER — TREATMENT (OUTPATIENT)
Dept: PHYSICAL THERAPY | Facility: CLINIC | Age: 70
End: 2025-07-28
Payer: MEDICARE

## 2025-07-28 DIAGNOSIS — Z96.611 S/P REVERSE TOTAL SHOULDER ARTHROPLASTY, RIGHT: Primary | ICD-10-CM

## 2025-07-28 DIAGNOSIS — M25.611 DECREASED RANGE OF MOTION OF RIGHT SHOULDER: ICD-10-CM

## 2025-07-28 DIAGNOSIS — M25.511 ACUTE PAIN OF RIGHT SHOULDER: ICD-10-CM

## 2025-07-28 DIAGNOSIS — Z74.09 IMPAIRED FUNCTIONAL MOBILITY AND ACTIVITY TOLERANCE: ICD-10-CM

## 2025-07-28 PROCEDURE — 97140 MANUAL THERAPY 1/> REGIONS: CPT | Performed by: PHYSICAL THERAPIST

## 2025-07-28 PROCEDURE — 97110 THERAPEUTIC EXERCISES: CPT | Performed by: PHYSICAL THERAPIST

## 2025-07-28 PROCEDURE — 97112 NEUROMUSCULAR REEDUCATION: CPT | Performed by: PHYSICAL THERAPIST

## 2025-07-28 NOTE — PROGRESS NOTES
Physical Therapy Daily Treatment Note  [facility]   [address], [address]  [place] [address]  P: [phone]  F: [phone]    Patient: Nanci Umanzor   : 1955  Referring practitioner: Bear Vaca MD  Date of Initial Visit: Type: THERAPY  Noted: 2025  Today's Date: 2025  Patient seen for 32 sessions       Visit Diagnoses:    ICD-10-CM ICD-9-CM   1. S/P reverse total shoulder arthroplasty, right  Z96.611 V43.61   2. Acute pain of right shoulder  M25.511 719.41   3. Decreased range of motion of right shoulder  M25.611 719.51   4. Impaired functional mobility and activity tolerance  Z74.09 V49.89         Nanci Umanzor reports: no pain in R shoulder     Subjective     Objective   See Exercise, Manual, and Modality Logs for complete treatment.       Assessment/Plan Subjectively, pt reports no increase of pain or discomfort with interventions performed today. Performed well with continued functional strengthening interventions. Continues to demonstrate good tolerance to exercise progressions. Continues to benefit from verbal/tactile cues to ensure proper form and technique for exercise performance. Plan details: Progress ROM / strengthening / stabilization / functional activity as tolerated       Timed:         Manual Therapy:   10      mins  22870;     Therapeutic Exercise:    15     mins  35684;     Neuromuscular Amanda:     15   mins  33911;    Therapeutic Activity:          mins  96425;     Gait Training:           mins  92791;     Ultrasound:          mins  67296;    Ionto                                   mins  81987  Self Care                            mins  21274  Traction          mins 98852      Un-Timed:  Canalith Repos         mins 36732  Electrical Stimulation:         mins  39171 ( );  Dry Needling          mins self-pay  Traction          mins 54593        Timed Treatment:  40    mins   Total Treatment:    40    mins    Teresa  Alondra, PT  [place] License #: [id number]    Physical Therapist

## 2025-08-04 ENCOUNTER — TREATMENT (OUTPATIENT)
Dept: PHYSICAL THERAPY | Facility: CLINIC | Age: 70
End: 2025-08-04
Payer: MEDICARE

## 2025-08-04 DIAGNOSIS — Z74.09 IMPAIRED FUNCTIONAL MOBILITY AND ACTIVITY TOLERANCE: ICD-10-CM

## 2025-08-04 DIAGNOSIS — Z96.611 S/P REVERSE TOTAL SHOULDER ARTHROPLASTY, RIGHT: Primary | ICD-10-CM

## 2025-08-04 DIAGNOSIS — M25.611 DECREASED RANGE OF MOTION OF RIGHT SHOULDER: ICD-10-CM

## 2025-08-04 DIAGNOSIS — M25.511 ACUTE PAIN OF RIGHT SHOULDER: ICD-10-CM

## 2025-08-04 PROCEDURE — 97530 THERAPEUTIC ACTIVITIES: CPT | Performed by: PHYSICAL THERAPIST

## 2025-08-04 PROCEDURE — 97110 THERAPEUTIC EXERCISES: CPT | Performed by: PHYSICAL THERAPIST

## 2025-08-04 PROCEDURE — 97112 NEUROMUSCULAR REEDUCATION: CPT | Performed by: PHYSICAL THERAPIST

## 2025-08-11 ENCOUNTER — TREATMENT (OUTPATIENT)
Dept: PHYSICAL THERAPY | Facility: CLINIC | Age: 70
End: 2025-08-11
Payer: MEDICARE

## 2025-08-11 DIAGNOSIS — Z96.611 S/P REVERSE TOTAL SHOULDER ARTHROPLASTY, RIGHT: Primary | ICD-10-CM

## 2025-08-11 DIAGNOSIS — M25.511 ACUTE PAIN OF RIGHT SHOULDER: ICD-10-CM

## 2025-08-11 DIAGNOSIS — Z74.09 IMPAIRED FUNCTIONAL MOBILITY AND ACTIVITY TOLERANCE: ICD-10-CM

## 2025-08-11 DIAGNOSIS — M25.611 DECREASED RANGE OF MOTION OF RIGHT SHOULDER: ICD-10-CM

## 2025-08-11 PROCEDURE — 97164 PT RE-EVAL EST PLAN CARE: CPT | Performed by: PHYSICAL THERAPIST

## 2025-08-11 PROCEDURE — 97110 THERAPEUTIC EXERCISES: CPT | Performed by: PHYSICAL THERAPIST

## (undated) DEVICE — Device: Brand: DEFENDO AIR/WATER/SUCTION AND BIOPSY VALVE

## (undated) DEVICE — PK SHLDR OPN 40

## (undated) DEVICE — SUT VIC 0 CT1 36IN J946H

## (undated) DEVICE — SHEET, DRAPE, SPLIT, STERILE: Brand: MEDLINE

## (undated) DEVICE — EPIDURAL TRAY: Brand: MEDLINE INDUSTRIES, INC.

## (undated) DEVICE — DRESSING,GAUZE,XEROFORM,CURAD,1"X8",ST: Brand: CURAD

## (undated) DEVICE — SINGLE-USE BIOPSY FORCEPS: Brand: RADIAL JAW 4

## (undated) DEVICE — 450 ML BOTTLE OF 0.05% CHLORHEXIDINE GLUCONATE IN 99.95% STERILE WATER FOR IRRIGATION, USP AND APPLICATOR.: Brand: IRRISEPT ANTIMICROBIAL WOUND LAVAGE

## (undated) DEVICE — GLV SURG TRIUMPH PF LTX 7.5 STRL

## (undated) DEVICE — ARM SLING: Brand: DEROYAL

## (undated) DEVICE — NDL EPID TUOHY W/WINGS 20G 3.5IN

## (undated) DEVICE — PATIENT RETURN ELECTRODE, SINGLE-USE, CONTACT QUALITY MONITORING, ADULT, WITH 9FT CORD, FOR PATIENTS WEIGING OVER 33LBS. (15KG): Brand: MEGADYNE

## (undated) DEVICE — MAT FLR ABSORBENT LG 4FT 10 2.5FT

## (undated) DEVICE — HANDPIECE SET WITH COAXIAL HIGH FLOW TIP AND SUCTION TUBE: Brand: INTERPULSE

## (undated) DEVICE — THE TORRENT IRRIGATION SCOPE CONNECTOR IS USED WITH THE TORRENT IRRIGATION TUBING TO PROVIDE IRRIGATION FLUIDS SUCH AS STERILE WATER DURING GASTROINTESTINAL ENDOSCOPIC PROCEDURES WHEN USED IN CONJUNCTION WITH AN IRRIGATION PUMP (OR ELECTROSURGICAL UNIT).: Brand: TORRENT

## (undated) DEVICE — GLND KWIRE
Type: IMPLANTABLE DEVICE | Site: SHOULDER | Status: NON-FUNCTIONAL
Brand: EQUINOXE
Removed: 2025-01-30

## (undated) DEVICE — SKIN PREP TRAY W/CHG: Brand: MEDLINE INDUSTRIES, INC.

## (undated) DEVICE — KT ORCA ORCAPOD DISP STRL

## (undated) DEVICE — BLANKT WARM LOWR/BDY 100X120CM

## (undated) DEVICE — YANKAUER,POOLE TIP,STERILE,50/CS: Brand: MEDLINE

## (undated) DEVICE — BIT DRL EQUINOXE 2 AND 3.2MM

## (undated) DEVICE — Device: Brand: PORTEX

## (undated) DEVICE — ADAPT CLN BIOGUARD AIR/H2O DISP

## (undated) DEVICE — ZIP 16 SURGICAL SKIN CLOSURE DEVICE, PSA: Brand: ZIP 16 SURGICAL SKIN CLOSURE DEVICE

## (undated) DEVICE — GLV SURG BIOGEL LTX PF 6 1/2

## (undated) DEVICE — TUBING, SUCTION, 1/4" X 10', STRAIGHT: Brand: MEDLINE

## (undated) DEVICE — CANN NASL CO2 TRULINK W/O2 A/

## (undated) DEVICE — DUAL CUT SAGITTAL BLADE

## (undated) DEVICE — SUT ETHIB 2 CV V37 MS/4 30IN MX69G

## (undated) DEVICE — DRAPE,U/ SHT,SPLIT,PLAS,STERIL: Brand: MEDLINE

## (undated) DEVICE — GLV SURG BIOGEL LTX PF 8 1/2

## (undated) DEVICE — LN SMPL CO2 SHTRM SD STREAM W/M LUER

## (undated) DEVICE — GLV SURG BIOGEL LTX PF 8

## (undated) DEVICE — BNDG ELAS CO-FLEX SLF ADHR 4IN5YD LF STRL

## (undated) DEVICE — SENSR O2 OXIMAX FNGR A/ 18IN NONSTR

## (undated) DEVICE — CANN O2 ETCO2 FITS ALL CONN CO2 SMPL A/ 7IN DISP LF

## (undated) DEVICE — SUT VIC 2/0 X1 27IN J459H

## (undated) DEVICE — NDL SPINE 22G 31/2IN BLK